# Patient Record
Sex: FEMALE | Race: WHITE | NOT HISPANIC OR LATINO | Employment: OTHER | URBAN - METROPOLITAN AREA
[De-identification: names, ages, dates, MRNs, and addresses within clinical notes are randomized per-mention and may not be internally consistent; named-entity substitution may affect disease eponyms.]

---

## 2017-01-03 ENCOUNTER — TELEPHONE (OUTPATIENT)
Dept: HEMATOLOGY/ONCOLOGY | Facility: CLINIC | Age: 72
End: 2017-01-03

## 2017-01-03 NOTE — TELEPHONE ENCOUNTER
Returned call to patient.   Patient unavailable.   Left message for patient to return call with provided call back number.     ----- Message from Graham Thornton sent at 1/3/2017  4:25 PM CST -----  Mrs. Peggy Luna cancel her appt. On Feb. 8th with Dr. avila she ask that someone please call her back and explain why she need to see carlene Navarro and Dr. avila Thanks

## 2017-01-04 ENCOUNTER — TELEPHONE (OUTPATIENT)
Dept: HEMATOLOGY/ONCOLOGY | Facility: CLINIC | Age: 72
End: 2017-01-04

## 2017-01-04 NOTE — TELEPHONE ENCOUNTER
Returned call to patient.   Rescheduled patient accordingly for survivorship clinic with Melanie per patient's preferred date and time.   Appt slip mailed out.       ----- Message from Janie Lomax sent at 1/4/2017  1:18 PM CST -----  Contact: self  Pt would like to discuss all cancel scheduled appointments, pt states at last visit she was told she will not be seen by , pt would be seen by (Melanie), pt states she's is considered a survivor pt, pt would like to clarify if she needs to see  or (Melanie).  Contact number [phone]

## 2017-01-04 NOTE — TELEPHONE ENCOUNTER
Duplicate message.     ----- Message from Hayde Olivas sent at 1/3/2017  4:55 PM CST -----  Contact: Self   Pt called to speak with you in regards to scheduling an appt. Pt stated she is confused on what physician she should be scheduled with and would like a call back at your earleist convenience.     Pt can be contacted after 2pm at [phone]

## 2017-02-21 ENCOUNTER — OFFICE VISIT (OUTPATIENT)
Dept: HEMATOLOGY/ONCOLOGY | Facility: CLINIC | Age: 72
End: 2017-02-21
Payer: MEDICARE

## 2017-02-21 VITALS
SYSTOLIC BLOOD PRESSURE: 114 MMHG | BODY MASS INDEX: 25.26 KG/M2 | HEART RATE: 86 BPM | TEMPERATURE: 99 F | WEIGHT: 156.5 LBS | OXYGEN SATURATION: 98 % | DIASTOLIC BLOOD PRESSURE: 73 MMHG | RESPIRATION RATE: 20 BRPM

## 2017-02-21 DIAGNOSIS — C50.912 MALIGNANT NEOPLASM OF LEFT FEMALE BREAST, UNSPECIFIED SITE OF BREAST: ICD-10-CM

## 2017-02-21 DIAGNOSIS — Z79.811 USE OF AROMATASE INHIBITORS: ICD-10-CM

## 2017-02-21 DIAGNOSIS — F17.200 SMOKER: ICD-10-CM

## 2017-02-21 DIAGNOSIS — C50.919 MALIGNANT NEOPLASM OF OTHER SPECIFIED SITES OF FEMALE BREAST: Primary | ICD-10-CM

## 2017-02-21 PROCEDURE — 99999 PR PBB SHADOW E&M-EST. PATIENT-LVL III: CPT | Mod: PBBFAC,,, | Performed by: PHYSICIAN ASSISTANT

## 2017-02-21 PROCEDURE — 99213 OFFICE O/P EST LOW 20 MIN: CPT | Mod: PBBFAC | Performed by: PHYSICIAN ASSISTANT

## 2017-02-21 PROCEDURE — 99213 OFFICE O/P EST LOW 20 MIN: CPT | Mod: S$PBB,,, | Performed by: PHYSICIAN ASSISTANT

## 2017-02-21 RX ORDER — ANASTROZOLE 1 MG/1
1 TABLET ORAL DAILY
Qty: 30 TABLET | Refills: 2 | Status: SHIPPED | OUTPATIENT
Start: 2017-02-21 | End: 2017-05-04 | Stop reason: SDUPTHER

## 2017-02-21 NOTE — PROGRESS NOTES
"Subjective:       Patient ID: Radha Woods is a 71 y.o. female.    Chief Complaint: Breast Cancer    HPI Comments: HPI      Still reports "just never feels well"    Currently with right shoulder pain, constant. During the day it eases up.   States she has worsening migrating arthralgias, this week it is her shoulder, last week it was her left ankle.  Also with right arm lymphedema for which she uses compression sleeve.   States she would like to try another AI to see if musculoskeletal complaints are any better. Was originally on Arimidex for 2 weeks but discontinued that as she had questionable worsening of dyspnea. States that she would like to try arimidex again as does not recall arthralgias being a problem  Still smoking, approximately 10 cigarettes per day.   Approximate 50 pack year history.    Wears oxygen PRN.     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   Her carcinoma was ER strongly positive, [place] negative, and HER-2 roe negative. She received one cycle of adjuvant AC chemotherapy, and subsequently refused further chemotherapy due to side effects was hospitalized x 2. She completed XRT.   She was then started on aromatase inhibitors with Arimidex and quit because her dyspnea gets worse.   Reports after initiating that even minimal exertion she would get winded. She stopped on her own as a test and noted by 10 days it was much better so she went on and off Arimidex.  Reports that she got in trouble for stopping with her prior onoclogist- she did agree to switch to Femara and symptoms recurred.  Did agree to restart Femar         Admits to continued tobacco- has tried all cessation issues- hypnosis, medications...  Prior history of recurrent bronchitis, asthma  Shortness of breath remains her biggest issues      Lives in [place], [place] ([place] miles south [facility])    Review of Systems "   Constitutional: Positive for fatigue. Negative for activity change, appetite change, chills, diaphoresis, fever and unexpected weight change.   HENT: Negative for congestion, dental problem, ear pain, hearing loss, mouth sores, nosebleeds, rhinorrhea, tinnitus and trouble swallowing.    Eyes: Negative for redness and visual disturbance.   Respiratory: Positive for shortness of breath. Negative for cough, chest tightness and wheezing.    Cardiovascular: Negative for chest pain, palpitations and leg swelling.   Gastrointestinal: Negative for abdominal distention, abdominal pain, blood in stool, constipation, diarrhea, nausea and vomiting.   Genitourinary: Negative for decreased urine volume, difficulty urinating, dysuria, frequency and hematuria.   Musculoskeletal: Negative for arthralgias, back pain, gait problem, joint swelling and neck pain.   Skin: Negative for pallor and rash.   Neurological: Negative for dizziness, weakness, light-headedness, numbness and headaches.   Hematological: Negative for adenopathy. Does not bruise/bleed easily.   Psychiatric/Behavioral: Positive for dysphoric mood and sleep disturbance. Negative for confusion. The patient is not nervous/anxious.        Objective:      Physical Exam   Constitutional: She is oriented to person, place, and time. She appears well-developed and well-nourished. No distress.   Presents alone   HENT:   Head: Normocephalic and atraumatic.   Right Ear: External ear normal.   Left Ear: External ear normal.   Nose: Nose normal.   Mouth/Throat: Oropharynx is clear and moist. No oropharyngeal exudate.   Eyes: Conjunctivae and EOM are normal. Pupils are equal, round, and reactive to light. Right eye exhibits no discharge. Left eye exhibits no discharge. No scleral icterus. Right pupil is round and reactive. Left pupil is round and reactive.   Neck: Normal range of motion. Neck supple. No thyromegaly present.   Cardiovascular: Normal rate, regular rhythm, normal  heart sounds and intact distal pulses.  Exam reveals no gallop and no friction rub.    No murmur heard.  Pulmonary/Chest: Effort normal and breath sounds normal. No respiratory distress. She has no wheezes. She has no rales.   Distant breath sounds  Hyperinflated  Bilateral breasts- no masses, no nipple irregularities, no supraclavicular or axillary adenopathy   Abdominal: Soft. Bowel sounds are normal. She exhibits no distension and no mass. There is no hepatosplenomegaly. There is no tenderness. There is no rebound and no guarding.   Musculoskeletal: Normal range of motion. She exhibits no edema, tenderness or deformity.   No spinal or paraspinal tenderness to palpation  No tenderness to palpation at shoulders   Lymphadenopathy:        Head (right side): No submandibular adenopathy present.        Head (left side): No submandibular adenopathy present.     She has no cervical adenopathy.        Right cervical: No superficial cervical, no deep cervical and no posterior cervical adenopathy present.       Left cervical: No superficial cervical, no deep cervical and no posterior cervical adenopathy present.        Right: No inguinal and no supraclavicular adenopathy present.        Left: No inguinal and no supraclavicular adenopathy present.   Neurological: She is alert and oriented to person, place, and time. She has normal strength. No cranial nerve deficit or sensory deficit. She exhibits normal muscle tone. Coordination normal.   Skin: Skin is warm and dry. No bruising, no lesion, no petechiae and no rash noted. No erythema. No pallor.   Psychiatric: She has a normal mood and affect. Her behavior is normal. Judgment and thought content normal. Her mood appears not anxious. She does not exhibit a depressed mood.   Nursing note and vitals reviewed.      Assessment:       1. Malignant neoplasm of other specified sites of female breast    2. Use of aromatase inhibitors    3. Smoker        Plan:       1-2) Will re-try  Arimidex to see if decreased arthralgias. Patient understands she must discontinue Femara. We discussed that her history of dyspnea is due to smoking history and unlikely due to AI use. If she has any worsening on dyspnea on Arimidex she knows to contact clinic and discontinue.   3)importance of smoking cessation discussed; patient states she will quit when she is mentally ready to do so.    Return to clinic in late April with annual mammogram.

## 2017-02-21 NOTE — LETTER
February 22, 2017      Rosio Allen MD  [address]  [facility] [address]           [facility]  [address]  [facility] [address]  Phone: [phone]          Patient: Radha Woods   MR Number: [id number]   YOB: 1945   Date of Visit: 2/21/2017       Dear Dr. Rosio Allen:    Thank you for referring Radha Woods to me for evaluation. Attached you will find relevant portions of my assessment and plan of care.    If you have questions, please do not hesitate to call me. I look forward to following Radha Woods along with you.    Sincerely,    Melanie Russell PA-C    Enclosure  CC:  No Recipients    If you would like to receive this communication electronically, please contact [email or web address] or [phone] to request more information on [employer] Link access.    For providers and/or their staff who would like to refer a patient to [facility], please contact us through our one-stop-shop provider referral line, [facility] , at [phone].    If you feel you have received this communication in error or would no longer like to receive these types of communications, please e-mail [email or web address]

## 2017-04-03 ENCOUNTER — TELEPHONE (OUTPATIENT)
Dept: ADMINISTRATIVE | Facility: OTHER | Age: 72
End: 2017-04-03

## 2017-04-03 NOTE — TELEPHONE ENCOUNTER
----- Message from Allison Brandt sent at 4/3/2017  1:13 PM CDT -----  Contact: Pt       ----- Message -----     From: Abhay Valdez     Sent: 4/3/2017   1:10 PM       To: Tiffany CARMONA Staff    Pt would like a call back from staff    Pt is requesting to schedule visit    Pt states she would like courtesy call once appt has been scheduled.  Pt ask to not schedule appt and not notify her.    Can be reached at [phone]

## 2017-04-07 ENCOUNTER — TELEPHONE (OUTPATIENT)
Dept: HEMATOLOGY/ONCOLOGY | Facility: CLINIC | Age: 72
End: 2017-04-07

## 2017-04-07 NOTE — TELEPHONE ENCOUNTER
----- Message from Megan Colin sent at 4/7/2017  2:33 PM CDT -----  Contact: Pt  Pt is calling to r/s lab appt and Hello Agento on 5/4/17.   Pt can be reached at [phone].  Thank you

## 2017-04-11 ENCOUNTER — TELEPHONE (OUTPATIENT)
Dept: HEMATOLOGY/ONCOLOGY | Facility: CLINIC | Age: 72
End: 2017-04-11

## 2017-04-11 NOTE — TELEPHONE ENCOUNTER
Faxed paperwork.       ----- Message from Jyoti Simeon sent at 4/11/2017  9:41 AM CDT -----  Contact: Dentist   Need to fax paperwork approving surgical extraction on tooth 29 ASAP. Also concerned that the blood count is low. Please fax to [phone]

## 2017-04-18 ENCOUNTER — TELEPHONE (OUTPATIENT)
Dept: ADMINISTRATIVE | Facility: OTHER | Age: 72
End: 2017-04-18

## 2017-04-18 NOTE — TELEPHONE ENCOUNTER
----- Message from Janie Lomax sent at 4/17/2017  1:19 PM CDT -----  Contact: self  Pt would like to discuss scheduled appointments on 05/10/17, pt states she didn't cancel appointments on 05/04/17, pt states she drives 6 1/2 hours and would like to confirm appointment s before they are scheduled, pt is not able to make it appointments on 05/10/17  Contact number [phone]

## 2017-05-04 ENCOUNTER — HOSPITAL ENCOUNTER (OUTPATIENT)
Dept: RADIOLOGY | Facility: HOSPITAL | Age: 72
Discharge: HOME | End: 2017-05-04
Attending: INTERNAL MEDICINE
Payer: MEDICARE

## 2017-05-04 ENCOUNTER — OFFICE VISIT (OUTPATIENT)
Dept: HEMATOLOGY/ONCOLOGY | Facility: CLINIC | Age: 72
End: 2017-05-04
Payer: MEDICARE

## 2017-05-04 VITALS
HEART RATE: 83 BPM | TEMPERATURE: 99 F | OXYGEN SATURATION: 94 % | WEIGHT: 155.19 LBS | BODY MASS INDEX: 25.05 KG/M2 | RESPIRATION RATE: 20 BRPM | SYSTOLIC BLOOD PRESSURE: 128 MMHG | DIASTOLIC BLOOD PRESSURE: 68 MMHG

## 2017-05-04 DIAGNOSIS — J06.9 UPPER RESPIRATORY TRACT INFECTION, UNSPECIFIED TYPE: ICD-10-CM

## 2017-05-04 DIAGNOSIS — C77.3 BREAST CANCER METASTASIZED TO AXILLARY LYMPH NODE, RIGHT: Primary | ICD-10-CM

## 2017-05-04 DIAGNOSIS — C50.912 MALIGNANT NEOPLASM OF LEFT FEMALE BREAST, UNSPECIFIED SITE OF BREAST: ICD-10-CM

## 2017-05-04 DIAGNOSIS — C50.919 MALIGNANT NEOPLASM OF FEMALE BREAST, UNSPECIFIED LATERALITY, UNSPECIFIED SITE OF BREAST: ICD-10-CM

## 2017-05-04 DIAGNOSIS — C50.911 MALIGNANT NEOPLASM OF RIGHT FEMALE BREAST, UNSPECIFIED SITE OF BREAST: ICD-10-CM

## 2017-05-04 DIAGNOSIS — C50.911 BREAST CANCER METASTASIZED TO AXILLARY LYMPH NODE, RIGHT: Primary | ICD-10-CM

## 2017-05-04 DIAGNOSIS — Z79.811 USE OF AROMATASE INHIBITORS: ICD-10-CM

## 2017-05-04 PROCEDURE — 99214 OFFICE O/P EST MOD 30 MIN: CPT | Mod: S$PBB,,, | Performed by: INTERNAL MEDICINE

## 2017-05-04 PROCEDURE — 99999 PR PBB SHADOW E&M-EST. PATIENT-LVL III: CPT | Mod: PBBFAC,,, | Performed by: INTERNAL MEDICINE

## 2017-05-04 PROCEDURE — 77066 DX MAMMO INCL CAD BI: CPT | Mod: 26,,, | Performed by: RADIOLOGY

## 2017-05-04 PROCEDURE — 99213 OFFICE O/P EST LOW 20 MIN: CPT | Mod: PBBFAC | Performed by: INTERNAL MEDICINE

## 2017-05-04 RX ORDER — LEVOFLOXACIN 500 MG/1
500 TABLET, FILM COATED ORAL DAILY
Qty: 7 TABLET | Refills: 0 | Status: SHIPPED | OUTPATIENT
Start: 2017-05-04 | End: 2017-05-14

## 2017-05-04 RX ORDER — ANASTROZOLE 1 MG/1
1 TABLET ORAL DAILY
Qty: 90 TABLET | Refills: 3 | Status: SHIPPED | OUTPATIENT
Start: 2017-05-04 | End: 2017-09-19

## 2017-05-04 NOTE — PROGRESS NOTES
"Subjective:       Patient ID: Radha Woods is a 71 y.o. female.    Chief Complaint: No chief complaint on file.    HPI     Still reports "never feels good"  Tries to take 1 day at a time  States she has this routine now where she stays up all night (until 4:30-5AM) and then sleeps all day.  States no one ever calls, has nothing to do or anywhere to go so this just makes sense to her    Reports something hurts every day- pains always come and go- shoulders, RUE lymphedema area     At last visit she opted to switch AIs to see if tolerated better - no difference noted on Arimidex versus Femara  She was originally on Arimidex for 2 weeks but discontinued that as she had questionable worsening of dyspnea but opted to try again.    Still smoking, admits she has increased again- states she is at point she doesn't care but then states she heard from a friend about hypnosis or accupuncture and may pursue.  Approximate 50 pack year history.   Wears oxygen PRN. States Dr. Payan gave her steroid which helped her breathing. States she is trying to exercise now.      Knows time is coming that she may need to make some decisions about where to live.    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   Her carcinoma was ER strongly positive, [place] negative, and HER-2 roe negative. She received one cycle of adjuvant AC chemotherapy, and subsequently refused further chemotherapy due to side effects was hospitalized x 2. She completed XRT.   She was then started on aromatase inhibitors with Arimidex and quit because her dyspnea gets worse. Tried Femara then back to Arimidex.      Lives in [place], [place] ([place] miles south [facility])    Review of Systems   Constitutional: Negative for activity change, appetite change, chills, diaphoresis, fatigue, fever and unexpected weight change.   HENT: Negative for congestion, dental " problem, ear pain, hearing loss, mouth sores, nosebleeds, rhinorrhea, tinnitus and trouble swallowing.    Eyes: Negative for redness and visual disturbance.   Respiratory: Positive for cough (productive green phlegm), shortness of breath and wheezing. Negative for chest tightness.    Cardiovascular: Negative for chest pain, palpitations and leg swelling.   Gastrointestinal: Negative for abdominal distention, abdominal pain, blood in stool, constipation, diarrhea, nausea and vomiting.   Genitourinary: Negative for decreased urine volume, difficulty urinating, dysuria, frequency and hematuria.   Musculoskeletal: Negative for arthralgias, back pain, gait problem, joint swelling and neck pain.   Skin: Negative for pallor and rash.   Neurological: Negative for dizziness, weakness, light-headedness, numbness and headaches.   Hematological: Negative for adenopathy. Does not bruise/bleed easily.   Psychiatric/Behavioral: Positive for dysphoric mood and sleep disturbance. Negative for confusion. The patient is not nervous/anxious.        Objective:      Physical Exam   Constitutional: She is oriented to person, place, and time. She appears well-developed and well-nourished. No distress.   Presents alone   HENT:   Head: Normocephalic and atraumatic.   Right Ear: External ear normal.   Left Ear: External ear normal.   Nose: Nose normal.   Mouth/Throat: Oropharynx is clear and moist. No oropharyngeal exudate.   Eyes: Conjunctivae and EOM are normal. Pupils are equal, round, and reactive to light. Right eye exhibits no discharge. Left eye exhibits no discharge. No scleral icterus. Right pupil is round and reactive. Left pupil is round and reactive.   Neck: Normal range of motion. Neck supple. No JVD present. No tracheal deviation present. No thyromegaly present.   Cardiovascular: Normal rate, regular rhythm, normal heart sounds and intact distal pulses.  Exam reveals no gallop and no friction rub.    No murmur  heard.  Pulmonary/Chest: Effort normal and breath sounds normal. No respiratory distress. She has no wheezes. She has no rales.   Diffuse exp wheezing  Hyperinflated  Breasts- no masses, no nipple irregularities, no LAD   Abdominal: Soft. Bowel sounds are normal. She exhibits no distension and no mass. There is no hepatosplenomegaly. There is no tenderness. There is no rebound and no guarding.   Musculoskeletal: Normal range of motion. She exhibits no edema, tenderness or deformity.   Lymphadenopathy:        Head (right side): No submandibular adenopathy present.        Head (left side): No submandibular adenopathy present.     She has no cervical adenopathy.        Right cervical: No superficial cervical, no deep cervical and no posterior cervical adenopathy present.       Left cervical: No superficial cervical, no deep cervical and no posterior cervical adenopathy present.        Right: No inguinal and no supraclavicular adenopathy present.        Left: No inguinal and no supraclavicular adenopathy present.   Neurological: She is alert and oriented to person, place, and time. She has normal strength. No cranial nerve deficit or sensory deficit. She exhibits normal muscle tone. Coordination normal.   Skin: Skin is warm and dry. No bruising, no lesion, no petechiae and no rash noted. She is not diaphoretic. No erythema. No pallor.   Psychiatric: She has a normal mood and affect. Her behavior is normal. Judgment and thought content normal. Her mood appears not anxious. She does not exhibit a depressed mood.   Nursing note and vitals reviewed.    Labs- reviewed  Assessment:       1. Breast cancer metastasized to axillary lymph node, right    2. Use of aromatase inhibitors    3. Malignant neoplasm of right female breast, unspecified site of breast    4. Upper respiratory tract infection, unspecified type        Plan:     1-3. Continue Arimidex  BMD next due in 1/18  On Calcium and Vit D  4. abx

## 2017-06-12 ENCOUNTER — TELEPHONE (OUTPATIENT)
Dept: HEMATOLOGY/ONCOLOGY | Facility: CLINIC | Age: 72
End: 2017-06-12

## 2017-06-12 NOTE — TELEPHONE ENCOUNTER
Called pt and informed pt of the following from Dr. Allen regarding Arimidex:   MD Allison Sanders   Caller: Unspecified (Today,  4:31 PM)             Hold until next appt      Pt verbalized understanding.

## 2017-06-12 NOTE — TELEPHONE ENCOUNTER
"Returned call to pt.   Pt stated that she has been having "alot of issues" with Arimidex.   Pt stated when she was with Dr. Oleary, he switched her from letrozole to arimidex.   Pt stated she can no longer take side effects of Arimidex: pt states she has tremors "can't hardly write her name", SOB has worsened, and "every bone in her body hurts" and has become reliant on pain meds every day.   I informed pt I would fwd message to Dr. Allen to see her recommendations and get back with pt.   Pt verbalized understanding.     Message routed to Dr. Allen.       ----- Message from Jyoti Simeon sent at 6/12/2017  4:26 PM CDT -----  Contact: Self   Pt wants to change anastrozole (ARIMIDEX) 1 mg Tab. Please call pt back at [phone]    "

## 2017-07-31 ENCOUNTER — TELEPHONE (OUTPATIENT)
Dept: HEMATOLOGY/ONCOLOGY | Facility: CLINIC | Age: 72
End: 2017-07-31

## 2017-07-31 NOTE — TELEPHONE ENCOUNTER
Returned call to pt.   Pt calling to schedule a f/u appt.   Informed pt that  already scheduled and mailed.   Pt verbalized understanding.       ----- Message from Denice Cantrell sent at 7/31/2017 12:26 PM CDT -----  Contact: Pt  Pt would like to be called back regarding a f/u appointment.          Please call pt at [phone].        Thanks!

## 2017-09-19 ENCOUNTER — OFFICE VISIT (OUTPATIENT)
Dept: HEMATOLOGY/ONCOLOGY | Facility: CLINIC | Age: 72
End: 2017-09-19
Payer: MEDICARE

## 2017-09-19 ENCOUNTER — LAB VISIT (OUTPATIENT)
Dept: LAB | Facility: HOSPITAL | Age: 72
End: 2017-09-19
Attending: INTERNAL MEDICINE
Payer: MEDICARE

## 2017-09-19 VITALS
BODY MASS INDEX: 24.45 KG/M2 | RESPIRATION RATE: 20 BRPM | OXYGEN SATURATION: 97 % | SYSTOLIC BLOOD PRESSURE: 107 MMHG | WEIGHT: 151.44 LBS | TEMPERATURE: 98 F | HEART RATE: 75 BPM | DIASTOLIC BLOOD PRESSURE: 59 MMHG

## 2017-09-19 DIAGNOSIS — Z79.811 USE OF AROMATASE INHIBITORS: ICD-10-CM

## 2017-09-19 DIAGNOSIS — F32.A DEPRESSION, UNSPECIFIED DEPRESSION TYPE: ICD-10-CM

## 2017-09-19 DIAGNOSIS — C50.911 BREAST CANCER METASTASIZED TO AXILLARY LYMPH NODE, RIGHT: ICD-10-CM

## 2017-09-19 DIAGNOSIS — C77.3 BREAST CANCER METASTASIZED TO AXILLARY LYMPH NODE, RIGHT: ICD-10-CM

## 2017-09-19 DIAGNOSIS — C50.911 MALIGNANT NEOPLASM OF RIGHT FEMALE BREAST: ICD-10-CM

## 2017-09-19 DIAGNOSIS — C77.3 BREAST CANCER METASTASIZED TO AXILLARY LYMPH NODE, RIGHT: Primary | ICD-10-CM

## 2017-09-19 DIAGNOSIS — J06.9 UPPER RESPIRATORY TRACT INFECTION, UNSPECIFIED TYPE: ICD-10-CM

## 2017-09-19 DIAGNOSIS — C50.911 BREAST CANCER METASTASIZED TO AXILLARY LYMPH NODE, RIGHT: Primary | ICD-10-CM

## 2017-09-19 LAB
ALBUMIN SERPL BCP-MCNC: 3.2 G/DL
ALP SERPL-CCNC: 74 U/L
ALT SERPL W/O P-5'-P-CCNC: 6 U/L
ANION GAP SERPL CALC-SCNC: 7 MMOL/L
AST SERPL-CCNC: 9 U/L
BILIRUB SERPL-MCNC: 0.4 MG/DL
BUN SERPL-MCNC: 10 MG/DL
CALCIUM SERPL-MCNC: 9.3 MG/DL
CHLORIDE SERPL-SCNC: 103 MMOL/L
CO2 SERPL-SCNC: 34 MMOL/L
CREAT SERPL-MCNC: 0.8 MG/DL
ERYTHROCYTE [DISTWIDTH] IN BLOOD BY AUTOMATED COUNT: 13 %
EST. GFR  (AFRICAN AMERICAN): >60 ML/MIN/1.73 M^2
EST. GFR  (NON AFRICAN AMERICAN): >60 ML/MIN/1.73 M^2
GLUCOSE SERPL-MCNC: 117 MG/DL
HCT VFR BLD AUTO: 41.3 %
HGB BLD-MCNC: 13.2 G/DL
MCH RBC QN AUTO: 29.7 PG
MCHC RBC AUTO-ENTMCNC: 32 G/DL
MCV RBC AUTO: 93 FL
NEUTROPHILS # BLD AUTO: 4.7 K/UL
PLATELET # BLD AUTO: 219 K/UL
PMV BLD AUTO: 9.2 FL
POTASSIUM SERPL-SCNC: 3.8 MMOL/L
PROT SERPL-MCNC: 6.6 G/DL
RBC # BLD AUTO: 4.45 M/UL
SODIUM SERPL-SCNC: 144 MMOL/L
WBC # BLD AUTO: 6.48 K/UL

## 2017-09-19 PROCEDURE — 99213 OFFICE O/P EST LOW 20 MIN: CPT | Mod: PBBFAC | Performed by: INTERNAL MEDICINE

## 2017-09-19 PROCEDURE — 80053 COMPREHEN METABOLIC PANEL: CPT

## 2017-09-19 PROCEDURE — 1157F ADVNC CARE PLAN IN RCRD: CPT | Mod: ,,, | Performed by: INTERNAL MEDICINE

## 2017-09-19 PROCEDURE — 36415 COLL VENOUS BLD VENIPUNCTURE: CPT

## 2017-09-19 PROCEDURE — 85027 COMPLETE CBC AUTOMATED: CPT

## 2017-09-19 PROCEDURE — 99214 OFFICE O/P EST MOD 30 MIN: CPT | Mod: S$PBB,,, | Performed by: INTERNAL MEDICINE

## 2017-09-19 PROCEDURE — 1159F MED LIST DOCD IN RCRD: CPT | Mod: ,,, | Performed by: INTERNAL MEDICINE

## 2017-09-19 PROCEDURE — 99999 PR PBB SHADOW E&M-EST. PATIENT-LVL III: CPT | Mod: PBBFAC,,, | Performed by: INTERNAL MEDICINE

## 2017-09-19 PROCEDURE — 1125F AMNT PAIN NOTED PAIN PRSNT: CPT | Mod: ,,, | Performed by: INTERNAL MEDICINE

## 2017-09-19 RX ORDER — LETROZOLE 2.5 MG/1
2.5 TABLET, FILM COATED ORAL DAILY
Qty: 90 TABLET | Refills: 4 | Status: SHIPPED | OUTPATIENT
Start: 2017-09-19 | End: 2018-02-06 | Stop reason: SDUPTHER

## 2017-09-19 NOTE — PROGRESS NOTES
"Subjective:       Patient ID: Radha Woods is a 72 y.o. female.    Chief Complaint: Follow-up and Generalized pain    HPI     Presents today reporting that she remains very limited  Her dyspnea remains a major issue and she reports that she is limited to a sedentary lifestyle- can barely do much other than shower, manage meals but reports very cautious and always carries her phone with her. Even these activities wear her out.  Also reports every joint hurts.  Frustarted that she cannot go anywhere to do anything without oxygen.  Wonders why emphysema did not become a big issue for her until after her breast surgery and XRT.     Tremors in the interval noted that have gotten better. No other neurologic symptoms noted.    At last visit she opted to switch AIs to see if tolerated better - no difference noted on Arimidex versus Femara  She was originally on Arimidex for 2 weeks but discontinued that as she had questionable worsening of dyspnea but opted to try again.  Now today she states she tolerated Femara better and wants to switch back to.     Still smoking  Approximate 50 pack year history.   Wears oxygen PRN but notes more frequently.     Has chronic neck and spine pain with prior surgeries.  Joints hurt all over- continued whether on or off aromatase inhibitors.    Today she is tearful at times during the visit. She admits to depression and sometimes wishing her life was over. She denies suicidal thoughts stating she would never do that as she is "[employer]"  However, she does admit that she took her gun out of her home and locked it in her vehicle.     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   Her carcinoma was ER strongly positive, TN negative, and HER-2 roe negative. She received one cycle of adjuvant AC chemotherapy, and subsequently refused further chemotherapy due to side effects was " hospitalized x 2. She completed XRT.   She was then started on aromatase inhibitors with Arimidex and quit because her dyspnea gets worse. Tried Femara then back to Arimidex.      Lives in [place], [place] (80 miles south of [place])    Review of Systems   Constitutional: Positive for activity change. Negative for appetite change, chills, diaphoresis, fatigue, fever and unexpected weight change.   HENT: Negative for congestion, dental problem, ear pain, hearing loss, mouth sores, nosebleeds, rhinorrhea, tinnitus and trouble swallowing.    Eyes: Negative for redness and visual disturbance.   Respiratory: Positive for shortness of breath and wheezing. Negative for cough and chest tightness.    Cardiovascular: Negative for chest pain, palpitations and leg swelling.   Gastrointestinal: Negative for abdominal distention, abdominal pain, blood in stool, constipation, diarrhea, nausea and vomiting.   Genitourinary: Negative for decreased urine volume, difficulty urinating, dysuria, frequency and hematuria.   Musculoskeletal: Negative for arthralgias, back pain, gait problem, joint swelling and neck pain.   Skin: Negative for pallor and rash.   Neurological: Negative for dizziness, weakness, light-headedness, numbness and headaches.   Hematological: Negative for adenopathy. Does not bruise/bleed easily.   Psychiatric/Behavioral: Positive for dysphoric mood and sleep disturbance. Negative for confusion. The patient is not nervous/anxious.        Objective:      Physical Exam   Constitutional: She is oriented to person, place, and time. She appears well-developed and well-nourished. No distress.   Presents alone   HENT:   Head: Normocephalic and atraumatic.   Right Ear: External ear normal.   Left Ear: External ear normal.   Nose: Nose normal.   Mouth/Throat: Oropharynx is clear and moist. No oropharyngeal exudate.   Eyes: Conjunctivae and EOM are normal. Pupils are equal, round, and reactive to light. Right eye exhibits no  discharge. Left eye exhibits no discharge. No scleral icterus. Right pupil is round and reactive. Left pupil is round and reactive.   Neck: Normal range of motion. Neck supple. No JVD present. No tracheal deviation present. No thyromegaly present.   Cardiovascular: Normal rate, regular rhythm, normal heart sounds and intact distal pulses.  Exam reveals no gallop and no friction rub.    No murmur heard.  Pulmonary/Chest: Effort normal and breath sounds normal. No respiratory distress. She has no wheezes. She has no rales.   Diffuse exp wheezing  Hyperinflated  Breasts- no masses, no nipple irregularities, no LAD   Abdominal: Soft. Bowel sounds are normal. She exhibits no distension and no mass. There is no hepatosplenomegaly. There is no tenderness. There is no rebound and no guarding.   Musculoskeletal: Normal range of motion. She exhibits no edema, tenderness or deformity.   Lymphadenopathy:        Head (right side): No submandibular adenopathy present.        Head (left side): No submandibular adenopathy present.     She has no cervical adenopathy.        Right cervical: No superficial cervical, no deep cervical and no posterior cervical adenopathy present.       Left cervical: No superficial cervical, no deep cervical and no posterior cervical adenopathy present.        Right: No inguinal and no supraclavicular adenopathy present.        Left: No inguinal and no supraclavicular adenopathy present.   Neurological: She is alert and oriented to person, place, and time. She has normal strength. No cranial nerve deficit or sensory deficit. She exhibits normal muscle tone. Coordination normal.   Skin: Skin is warm and dry. No bruising, no lesion, no petechiae and no rash noted. She is not diaphoretic. No erythema. No pallor.   Psychiatric: She has a normal mood and affect. Her behavior is normal. Judgment and thought content normal. Her mood appears not anxious. She does not exhibit a depressed mood.   Nursing note and  vitals reviewed.    Labs- reviewed  Assessment:       1. Breast cancer metastasized to axillary lymph node, right    2. Use of aromatase inhibitors    3. Depression, unspecified depression type        Plan:     1-2. We discussed possibility of discontinuing AIs with her side effect concerns but she wants to continue and switch back to Femara.  We will have to monitor closely.  She has lots of fears about breast cancer returning or developing lung cancer- she declines psychology or psychiatry assessment to assist with depression  We discussed imaging for concerns regarding pain and intermittent weight loss but declines and will see if desires at future visit- she states easiest for her to return 1/2018 but knows we are available at any time

## 2017-09-20 PROBLEM — F32.A DEPRESSION: Status: ACTIVE | Noted: 2017-09-20

## 2018-01-02 ENCOUNTER — TELEPHONE (OUTPATIENT)
Dept: HEMATOLOGY/ONCOLOGY | Facility: CLINIC | Age: 73
End: 2018-01-02

## 2018-01-02 NOTE — TELEPHONE ENCOUNTER
Message fwd to MD.     ----- Message from Megan Colin sent at 1/2/2018  1:26 PM CST -----  Contact: Pt  Pt is calling to have orders placed in system for bone density and PET scans.  Pt can be reached at [phone].    Thank you

## 2018-01-02 NOTE — TELEPHONE ENCOUNTER
Called pt and informed pt of the following:  MD Allison Sanders   Caller: Unspecified (Today,  2:02 PM)             Please see my last note   She declined images at that time   We would need updated clinical to order now with delay      Pt verbalized understanding.

## 2018-01-02 NOTE — TELEPHONE ENCOUNTER
Returned call to pt and informed of MD plan of care as below.   Pt expressed frustrations over the phone and stated that is not what her and MD had discussed- pt stated that she lives over 300 miles away and needs to have all of this taken care of before her MD visit as she cannot come back and do.   Informed pt would send a message to Dr. Allen for them to discuss.   Pt verbalized understanding.     Message fwd to MD.       ----- Message from Rosio Allen MD sent at 1/2/2018  1:36 PM CST -----  Contact: Pt  Last note discussed scans only if needed for continued weight loss- would be CT scans not PET and she would need reassessement before ordering to see if indicated    ----- Message -----  From: Allison Brandt  Sent: 1/2/2018   1:29 PM  To: Rosio Allen MD    Looks like her f/u was just RTC in January 2018 with labs.   Is she to get scans?     ----- Message -----  From: Megan Colin  Sent: 1/2/2018   1:26 PM  To: Tiffany CARMONA Staff    Pt is calling to have orders placed in system for bone density and PET scans.  Pt can be reached at [phone].    Thank you

## 2018-01-17 ENCOUNTER — TELEPHONE (OUTPATIENT)
Dept: HEMATOLOGY/ONCOLOGY | Facility: CLINIC | Age: 73
End: 2018-01-17

## 2018-01-17 NOTE — TELEPHONE ENCOUNTER
Returned call to pt.   Pt stated that she is sick and that the roads are iced around her area; therefore, she would like to r/s her appt on Friday.   Informed pt would r/s- pt agreeable to PA.   Rescheduled appt- pt verbalized understanding.         ----- Message from Anni Sinclair sent at 1/17/2018  1:08 PM CST -----  Contact: self  Pt called to reschedule her appts on Friday 1/19/18 due to the weather, and also being sick. Pt would like the nurse to give her a call back to reschedule.    Pt can be reached at [phone]

## 2018-01-29 ENCOUNTER — OFFICE VISIT (OUTPATIENT)
Dept: HEMATOLOGY/ONCOLOGY | Facility: CLINIC | Age: 73
End: 2018-01-29
Payer: MEDICARE

## 2018-01-29 ENCOUNTER — LAB VISIT (OUTPATIENT)
Dept: LAB | Facility: HOSPITAL | Age: 73
End: 2018-01-29
Attending: INTERNAL MEDICINE
Payer: MEDICARE

## 2018-01-29 VITALS
RESPIRATION RATE: 14 BRPM | HEIGHT: 67 IN | SYSTOLIC BLOOD PRESSURE: 137 MMHG | TEMPERATURE: 98 F | DIASTOLIC BLOOD PRESSURE: 71 MMHG | OXYGEN SATURATION: 90 % | BODY MASS INDEX: 22.98 KG/M2 | HEART RATE: 93 BPM | WEIGHT: 146.38 LBS

## 2018-01-29 DIAGNOSIS — C77.3 BREAST CANCER METASTASIZED TO AXILLARY LYMPH NODE, RIGHT: ICD-10-CM

## 2018-01-29 DIAGNOSIS — M54.50 CHRONIC BILATERAL LOW BACK PAIN WITHOUT SCIATICA: ICD-10-CM

## 2018-01-29 DIAGNOSIS — R63.4 WEIGHT LOSS: ICD-10-CM

## 2018-01-29 DIAGNOSIS — C50.911 BREAST CANCER METASTASIZED TO AXILLARY LYMPH NODE, RIGHT: ICD-10-CM

## 2018-01-29 DIAGNOSIS — C50.911 CARCINOMA OF RIGHT BREAST METASTATIC TO AXILLARY LYMPH NODE: Primary | ICD-10-CM

## 2018-01-29 DIAGNOSIS — M89.9 DISORDER OF BONE: ICD-10-CM

## 2018-01-29 DIAGNOSIS — Z13.820 OSTEOPOROSIS SCREENING: ICD-10-CM

## 2018-01-29 DIAGNOSIS — Z72.0 TOBACCO ABUSE: ICD-10-CM

## 2018-01-29 DIAGNOSIS — C50.911 MALIGNANT NEOPLASM OF RIGHT BREAST IN FEMALE, ESTROGEN RECEPTOR POSITIVE, UNSPECIFIED SITE OF BREAST: ICD-10-CM

## 2018-01-29 DIAGNOSIS — G89.29 CHRONIC BILATERAL LOW BACK PAIN WITHOUT SCIATICA: ICD-10-CM

## 2018-01-29 DIAGNOSIS — Z17.0 MALIGNANT NEOPLASM OF RIGHT BREAST IN FEMALE, ESTROGEN RECEPTOR POSITIVE, UNSPECIFIED SITE OF BREAST: ICD-10-CM

## 2018-01-29 DIAGNOSIS — C77.3 CARCINOMA OF RIGHT BREAST METASTATIC TO AXILLARY LYMPH NODE: Primary | ICD-10-CM

## 2018-01-29 LAB
ALBUMIN SERPL BCP-MCNC: 3.4 G/DL
ALP SERPL-CCNC: 82 U/L
ALT SERPL W/O P-5'-P-CCNC: 6 U/L
ANION GAP SERPL CALC-SCNC: 9 MMOL/L
AST SERPL-CCNC: 11 U/L
BASOPHILS # BLD AUTO: 0.05 K/UL
BASOPHILS NFR BLD: 0.8 %
BILIRUB SERPL-MCNC: 0.4 MG/DL
BUN SERPL-MCNC: 9 MG/DL
CALCIUM SERPL-MCNC: 9.8 MG/DL
CHLORIDE SERPL-SCNC: 102 MMOL/L
CO2 SERPL-SCNC: 33 MMOL/L
CREAT SERPL-MCNC: 0.8 MG/DL
DIFFERENTIAL METHOD: ABNORMAL
EOSINOPHIL # BLD AUTO: 0.2 K/UL
EOSINOPHIL NFR BLD: 3.8 %
ERYTHROCYTE [DISTWIDTH] IN BLOOD BY AUTOMATED COUNT: 13 %
EST. GFR  (AFRICAN AMERICAN): >60 ML/MIN/1.73 M^2
EST. GFR  (NON AFRICAN AMERICAN): >60 ML/MIN/1.73 M^2
GLUCOSE SERPL-MCNC: 105 MG/DL
HCT VFR BLD AUTO: 45.1 %
HGB BLD-MCNC: 14.1 G/DL
IMM GRANULOCYTES # BLD AUTO: 0.02 K/UL
IMM GRANULOCYTES NFR BLD AUTO: 0.3 %
LYMPHOCYTES # BLD AUTO: 1.2 K/UL
LYMPHOCYTES NFR BLD: 19.1 %
MCH RBC QN AUTO: 29.3 PG
MCHC RBC AUTO-ENTMCNC: 31.3 G/DL
MCV RBC AUTO: 94 FL
MONOCYTES # BLD AUTO: 0.5 K/UL
MONOCYTES NFR BLD: 8.2 %
NEUTROPHILS # BLD AUTO: 4.2 K/UL
NEUTROPHILS NFR BLD: 67.8 %
NRBC BLD-RTO: 0 /100 WBC
PLATELET # BLD AUTO: 255 K/UL
PMV BLD AUTO: 9.2 FL
POTASSIUM SERPL-SCNC: 4 MMOL/L
PROT SERPL-MCNC: 7.4 G/DL
RBC # BLD AUTO: 4.81 M/UL
SODIUM SERPL-SCNC: 144 MMOL/L
WBC # BLD AUTO: 6.24 K/UL

## 2018-01-29 PROCEDURE — 85025 COMPLETE CBC W/AUTO DIFF WBC: CPT

## 2018-01-29 PROCEDURE — 1125F AMNT PAIN NOTED PAIN PRSNT: CPT | Mod: ,,, | Performed by: PHYSICIAN ASSISTANT

## 2018-01-29 PROCEDURE — 99214 OFFICE O/P EST MOD 30 MIN: CPT | Mod: S$PBB,,, | Performed by: PHYSICIAN ASSISTANT

## 2018-01-29 PROCEDURE — 80053 COMPREHEN METABOLIC PANEL: CPT

## 2018-01-29 PROCEDURE — 99999 PR PBB SHADOW E&M-EST. PATIENT-LVL V: CPT | Mod: PBBFAC,,, | Performed by: PHYSICIAN ASSISTANT

## 2018-01-29 PROCEDURE — 99215 OFFICE O/P EST HI 40 MIN: CPT | Mod: PBBFAC | Performed by: PHYSICIAN ASSISTANT

## 2018-01-29 PROCEDURE — 36415 COLL VENOUS BLD VENIPUNCTURE: CPT

## 2018-01-29 PROCEDURE — 1159F MED LIST DOCD IN RCRD: CPT | Mod: ,,, | Performed by: PHYSICIAN ASSISTANT

## 2018-01-29 RX ORDER — OXYCODONE AND ACETAMINOPHEN 5; 325 MG/1; MG/1
1 TABLET ORAL EVERY 4 HOURS PRN
COMMUNITY
End: 2019-03-27 | Stop reason: SDUPTHER

## 2018-01-29 NOTE — PROGRESS NOTES
Subjective:       Patient ID: Radha Woods is a 72 y.o. female.    Chief Complaint: Breast cancer metastasized to axillary lymph node, right    72 year old female with right breast cancer, currently on adjuvant letrozole.     Patient states she is overall just not feeling well.     Has had follow up with PCP who is managing chronic back/neck pain. Pain has worsened and she recently started on Percocet, taking 4 times a day. Also uses Tens unit and Flexeril.    Has an appointment with rheumatology in 2 weeks close to home to evaluate new arthritic symptoms.  Has previously seen Orthopedic surgeon in the past for back pain, believes last MRI back was 7 years ago.  Remains on letrozole.   Uses portable oxygen, has had to use that more recently. She is a patient of Dr. Payan's, last seen 10/2016 with recommendation for smoking cessation.   She has good appetite but notes continued weight loss despite increased calorie intake.     Remains on Femara.     Still smoking, although has cut back.    Approximate 50 pack year history.               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   Her carcinoma was ER strongly positive, AL negative, and HER-2 roe negative. She received one cycle of adjuvant AC chemotherapy, and subsequently refused further chemotherapy due to side effects was hospitalized x 2. She completed XRT.   She was then started on aromatase inhibitors with Arimidex and quit because her dyspnea gets worse. Tried Femara then back to Arimidex.      Lives in [place], [place] ([place] miles south [facility])      Review of Systems   Constitutional: Positive for activity change and unexpected weight change (weight loss). Negative for appetite change, chills, diaphoresis, fatigue and fever.   HENT: Negative for congestion, dental problem, ear pain, hearing loss, mouth sores, nosebleeds, rhinorrhea, tinnitus  and trouble swallowing.    Eyes: Negative for redness and visual disturbance.   Respiratory: Positive for shortness of breath. Negative for cough, chest tightness and wheezing.    Cardiovascular: Negative for chest pain, palpitations and leg swelling.   Gastrointestinal: Negative for abdominal distention, abdominal pain, blood in stool, constipation, diarrhea, nausea and vomiting.   Genitourinary: Negative for decreased urine volume, difficulty urinating, dysuria, frequency and hematuria.   Musculoskeletal: Negative for arthralgias, back pain, gait problem, joint swelling and neck pain.   Skin: Negative for pallor and rash.   Neurological: Negative for dizziness, weakness, light-headedness, numbness and headaches.   Hematological: Negative for adenopathy. Does not bruise/bleed easily.   Psychiatric/Behavioral: Positive for dysphoric mood and sleep disturbance. Negative for confusion. The patient is not nervous/anxious.        Objective:      Physical Exam   Constitutional: She is oriented to person, place, and time. She appears well-developed and well-nourished. No distress.   Presents alone   HENT:   Head: Normocephalic and atraumatic.   Nose: Nose normal.   Mouth/Throat: Oropharynx is clear and moist. No oropharyngeal exudate.   Eyes: Conjunctivae and EOM are normal. Pupils are equal, round, and reactive to light. Right eye exhibits no discharge. Left eye exhibits no discharge. No scleral icterus. Right pupil is round and reactive. Left pupil is round and reactive.   Neck: Normal range of motion. Neck supple. No thyromegaly present.   Cardiovascular: Normal rate, regular rhythm, normal heart sounds and intact distal pulses.  Exam reveals no gallop and no friction rub.    No murmur heard.  Pulmonary/Chest: Effort normal and breath sounds normal. No respiratory distress. She has no wheezes. She has no rales.   Diffuse exp wheezing  Hyperinflated  Right breast with well healed lumpectomy incision, no mass, nodule or  skin changes. Left breast without mass, nodule or skin changes. No axillary or supraclavicular adenopathy.    Abdominal: Soft. Bowel sounds are normal. She exhibits no distension and no mass. There is no hepatosplenomegaly. There is no tenderness. There is no rebound and no guarding.   Musculoskeletal: Normal range of motion. She exhibits no edema, tenderness or deformity.   Tenderness to palpation in lumbar spine  5/5 strength in upper and lower extremities.    Lymphadenopathy:        Head (right side): No submandibular adenopathy present.        Head (left side): No submandibular adenopathy present.     She has no cervical adenopathy.        Right cervical: No superficial cervical, no deep cervical and no posterior cervical adenopathy present.       Left cervical: No superficial cervical, no deep cervical and no posterior cervical adenopathy present.        Right: No inguinal and no supraclavicular adenopathy present.        Left: No inguinal and no supraclavicular adenopathy present.   Neurological: She is alert and oriented to person, place, and time. She has normal strength. No cranial nerve deficit or sensory deficit. She exhibits normal muscle tone. Coordination normal.   Skin: Skin is warm and dry. No bruising, no lesion, no petechiae and no rash noted. No erythema. No pallor.   Psychiatric: She has a normal mood and affect. Her behavior is normal. Judgment and thought content normal. Her mood appears not anxious. She does not exhibit a depressed mood.   Nursing note and vitals reviewed.      Assessment:       1. Carcinoma of right breast metastatic to axillary lymph node    2. Malignant neoplasm of right breast in female, estrogen receptor positive, unspecified site of breast    3. Weight loss    4. Chronic bilateral low back pain without sciatica    5. Tobacco abuse        Plan:       1-2)Given continued weight loss, worsening fatigue/malaise and worsening back pain will obtain PET scan. She will continue  Letrozole.  Mammogram and bone density due 5/2018.  3)as above; encouraged smaller more frequent protein rich meals  4)PET scan as above; also has f/u with rheumatology closer to home. Pain medication currently prescribed by her primary care doctor at home.   5)smoking cessation encouraged    Distress Screening Results: Psychosocial Distress screening score of Distress Score: 3 noted and reviewed. No intervention indicated.

## 2018-01-29 NOTE — Clinical Note
Graham- can we check on her PET scan being scheduled? She is leaving town at the end of the week and we were hoping to get it done prior to then, thanks

## 2018-01-29 NOTE — Clinical Note
Hi- can we get her scheduled for a PET scan this week, in the afternoon? She will be going back to [place] this weekend so ideally done soon, thanks, order is in

## 2018-01-29 NOTE — Clinical Note
Just a fYi, sending for PET scan; worsening fatigue, back pain and weight loss in h/o breast cancer and heavy smoker. If you would prefer CT scan let me know

## 2018-01-31 ENCOUNTER — TELEPHONE (OUTPATIENT)
Dept: HEMATOLOGY/ONCOLOGY | Facility: CLINIC | Age: 73
End: 2018-01-31

## 2018-01-31 NOTE — TELEPHONE ENCOUNTER
----- Message from Melanie Russell PA-C sent at 1/29/2018  2:44 PM CST -----  Hi- can we get her scheduled for a PET scan this week, in the afternoon? She will be going back to [place] this weekend so ideally done soon, thanks, order is in

## 2018-02-02 ENCOUNTER — HOSPITAL ENCOUNTER (OUTPATIENT)
Dept: RADIOLOGY | Facility: HOSPITAL | Age: 73
Discharge: HOME | End: 2018-02-02
Attending: PHYSICIAN ASSISTANT
Payer: MEDICARE

## 2018-02-02 DIAGNOSIS — C77.3 CARCINOMA OF RIGHT BREAST METASTATIC TO AXILLARY LYMPH NODE: ICD-10-CM

## 2018-02-02 DIAGNOSIS — C50.911 CARCINOMA OF RIGHT BREAST METASTATIC TO AXILLARY LYMPH NODE: ICD-10-CM

## 2018-02-02 LAB — POCT GLUCOSE: 97 MG/DL (ref 70–110)

## 2018-02-02 PROCEDURE — A9552 F18 FDG: HCPCS

## 2018-02-02 PROCEDURE — 78815 PET IMAGE W/CT SKULL-THIGH: CPT | Mod: TC

## 2018-02-02 PROCEDURE — 78815 PET IMAGE W/CT SKULL-THIGH: CPT | Mod: 26,PI,, | Performed by: RADIOLOGY

## 2018-02-06 DIAGNOSIS — C50.911 BREAST CANCER METASTASIZED TO AXILLARY LYMPH NODE, RIGHT: ICD-10-CM

## 2018-02-06 DIAGNOSIS — C77.3 BREAST CANCER METASTASIZED TO AXILLARY LYMPH NODE, RIGHT: ICD-10-CM

## 2018-02-06 RX ORDER — LETROZOLE 2.5 MG/1
2.5 TABLET, FILM COATED ORAL DAILY
Qty: 90 TABLET | Refills: 4 | Status: SHIPPED | OUTPATIENT
Start: 2018-02-06 | End: 2018-02-08 | Stop reason: SDUPTHER

## 2018-02-08 DIAGNOSIS — C50.911 BREAST CANCER METASTASIZED TO AXILLARY LYMPH NODE, RIGHT: ICD-10-CM

## 2018-02-08 DIAGNOSIS — C77.3 BREAST CANCER METASTASIZED TO AXILLARY LYMPH NODE, RIGHT: ICD-10-CM

## 2018-02-08 RX ORDER — LETROZOLE 2.5 MG/1
2.5 TABLET, FILM COATED ORAL DAILY
Qty: 90 TABLET | Refills: 4 | Status: SHIPPED | OUTPATIENT
Start: 2018-02-08 | End: 2019-03-08 | Stop reason: SDUPTHER

## 2018-02-09 ENCOUNTER — DOCUMENTATION ONLY (OUTPATIENT)
Dept: HEMATOLOGY/ONCOLOGY | Facility: CLINIC | Age: 73
End: 2018-02-09

## 2018-02-09 NOTE — PROGRESS NOTES
Called patient to discuss PET findings, namely left adrenal lesion.  She has been scheduled with IR for biopsy.

## 2018-02-15 ENCOUNTER — TELEPHONE (OUTPATIENT)
Dept: HEMATOLOGY/ONCOLOGY | Facility: CLINIC | Age: 73
End: 2018-02-15

## 2018-02-15 NOTE — TELEPHONE ENCOUNTER
Returned call to pt.   Pt stated she is to be scheduled for a biopsy after her PET scan- informed pt do not see any notes in chart stating a biopsy needed or what kind of biopsy.   Pt stated she had spoke with  regarding setting up and nothing is scheduled currently.   Asked pt if she knew name of  she spoke with- pt can not recall name.   Informed pt that Dr. Allen out of office but would send her a message as well as  to see what is needed and how fast it could be taken care of.   Pt verbalized understanding.     Message fwd to Graham/Dr. Allen.         ----- Message from Tulio Lomax sent at 2/15/2018  4:22 PM CST -----  Contact: Pt   Will like a call from office regarding her supposedly be scheduled for biopsy on tomorrow     Contact::[phone]

## 2018-02-16 DIAGNOSIS — C77.3 CARCINOMA OF RIGHT BREAST METASTATIC TO AXILLARY LYMPH NODE: Primary | ICD-10-CM

## 2018-02-16 DIAGNOSIS — C50.911 CARCINOMA OF RIGHT BREAST METASTATIC TO AXILLARY LYMPH NODE: Primary | ICD-10-CM

## 2018-02-16 NOTE — TELEPHONE ENCOUNTER
I Return Mrs. Woods  Call and told her the I left a voice mail today for IR to setup her biopsy and I am not sure of the protocol, but  someone will give her a call back.

## 2018-03-01 DIAGNOSIS — C77.3 CARCINOMA OF RIGHT BREAST METASTATIC TO AXILLARY LYMPH NODE: Primary | ICD-10-CM

## 2018-03-01 DIAGNOSIS — C50.911 CARCINOMA OF RIGHT BREAST METASTATIC TO AXILLARY LYMPH NODE: Primary | ICD-10-CM

## 2018-03-01 DIAGNOSIS — D49.9 NEOPLASM: ICD-10-CM

## 2018-03-01 NOTE — PRE-PROCEDURE INSTRUCTIONS
Preop instructions: NPO after midnight or 8 hours prior to procedure time, shower instructions, directions, leave all valuables at home, medication instructions for PM prior & am of procedure explained. Patient stated an understanding.  Patient denies any side effects or issues with anesthesia or sedation.

## 2018-03-02 ENCOUNTER — ANESTHESIA (OUTPATIENT)
Dept: ENDOSCOPY | Facility: HOSPITAL | Age: 73
End: 2018-03-02
Payer: MEDICARE

## 2018-03-02 ENCOUNTER — ANESTHESIA EVENT (OUTPATIENT)
Dept: ENDOSCOPY | Facility: HOSPITAL | Age: 73
End: 2018-03-02
Payer: MEDICARE

## 2018-03-02 ENCOUNTER — HOSPITAL ENCOUNTER (OUTPATIENT)
Facility: HOSPITAL | Age: 73
Discharge: HOME | End: 2018-03-02
Attending: INTERNAL MEDICINE | Admitting: INTERNAL MEDICINE
Payer: MEDICARE

## 2018-03-02 VITALS
OXYGEN SATURATION: 95 % | HEART RATE: 70 BPM | RESPIRATION RATE: 16 BRPM | DIASTOLIC BLOOD PRESSURE: 64 MMHG | HEIGHT: 67 IN | SYSTOLIC BLOOD PRESSURE: 113 MMHG | WEIGHT: 145 LBS | TEMPERATURE: 98 F | BODY MASS INDEX: 22.76 KG/M2

## 2018-03-02 DIAGNOSIS — C50.911 CARCINOMA OF RIGHT BREAST METASTATIC TO AXILLARY LYMPH NODE: ICD-10-CM

## 2018-03-02 DIAGNOSIS — C77.3 CARCINOMA OF RIGHT BREAST METASTATIC TO AXILLARY LYMPH NODE: ICD-10-CM

## 2018-03-02 PROCEDURE — 88342 IMHCHEM/IMCYTCHM 1ST ANTB: CPT | Mod: 26,,, | Performed by: PATHOLOGY

## 2018-03-02 PROCEDURE — 25000003 PHARM REV CODE 250: Performed by: FAMILY MEDICINE

## 2018-03-02 PROCEDURE — 63600175 PHARM REV CODE 636 W HCPCS: Performed by: NURSE ANESTHETIST, CERTIFIED REGISTERED

## 2018-03-02 PROCEDURE — 88305 TISSUE EXAM BY PATHOLOGIST: CPT | Performed by: PATHOLOGY

## 2018-03-02 PROCEDURE — 88333 PATH CONSLTJ SURG CYTO XM 1: CPT | Mod: 26,,, | Performed by: PATHOLOGY

## 2018-03-02 PROCEDURE — 37000008 HC ANESTHESIA 1ST 15 MINUTES

## 2018-03-02 PROCEDURE — 37000009 HC ANESTHESIA EA ADD 15 MINS

## 2018-03-02 PROCEDURE — 88305 TISSUE EXAM BY PATHOLOGIST: CPT | Mod: 26,,, | Performed by: PATHOLOGY

## 2018-03-02 PROCEDURE — 71000033 HC RECOVERY, INTIAL HOUR

## 2018-03-02 PROCEDURE — 27000221 HC OXYGEN, UP TO 24 HOURS

## 2018-03-02 PROCEDURE — 25000003 PHARM REV CODE 250: Performed by: NURSE ANESTHETIST, CERTIFIED REGISTERED

## 2018-03-02 PROCEDURE — D9220A PRA ANESTHESIA: Mod: ANES,,, | Performed by: ANESTHESIOLOGY

## 2018-03-02 PROCEDURE — D9220A PRA ANESTHESIA: Mod: CRNA,,, | Performed by: NURSE ANESTHETIST, CERTIFIED REGISTERED

## 2018-03-02 RX ORDER — FENTANYL CITRATE 50 UG/ML
INJECTION, SOLUTION INTRAMUSCULAR; INTRAVENOUS
Status: DISCONTINUED | OUTPATIENT
Start: 2018-03-02 | End: 2018-03-02

## 2018-03-02 RX ORDER — GLYCOPYRROLATE 0.2 MG/ML
INJECTION INTRAMUSCULAR; INTRAVENOUS
Status: DISCONTINUED | OUTPATIENT
Start: 2018-03-02 | End: 2018-03-02

## 2018-03-02 RX ORDER — ROCURONIUM BROMIDE 10 MG/ML
INJECTION, SOLUTION INTRAVENOUS
Status: DISCONTINUED | OUTPATIENT
Start: 2018-03-02 | End: 2018-03-02

## 2018-03-02 RX ORDER — SODIUM CHLORIDE 0.9 % (FLUSH) 0.9 %
3 SYRINGE (ML) INJECTION
Status: DISCONTINUED | OUTPATIENT
Start: 2018-03-02 | End: 2018-03-02 | Stop reason: HOSPADM

## 2018-03-02 RX ORDER — ACETAMINOPHEN 500 MG
1000 TABLET ORAL DAILY
Status: ON HOLD | COMMUNITY
End: 2022-04-06

## 2018-03-02 RX ORDER — LIDOCAINE HCL/PF 100 MG/5ML
SYRINGE (ML) INTRAVENOUS
Status: DISCONTINUED | OUTPATIENT
Start: 2018-03-02 | End: 2018-03-02

## 2018-03-02 RX ORDER — DEXAMETHASONE SODIUM PHOSPHATE 4 MG/ML
INJECTION, SOLUTION INTRA-ARTICULAR; INTRALESIONAL; INTRAMUSCULAR; INTRAVENOUS; SOFT TISSUE
Status: DISCONTINUED | OUTPATIENT
Start: 2018-03-02 | End: 2018-03-02

## 2018-03-02 RX ORDER — MIDAZOLAM HYDROCHLORIDE 1 MG/ML
INJECTION, SOLUTION INTRAMUSCULAR; INTRAVENOUS
Status: DISCONTINUED | OUTPATIENT
Start: 2018-03-02 | End: 2018-03-02

## 2018-03-02 RX ORDER — SODIUM CHLORIDE 9 MG/ML
500 INJECTION, SOLUTION INTRAVENOUS ONCE
Status: COMPLETED | OUTPATIENT
Start: 2018-03-02 | End: 2018-03-02

## 2018-03-02 RX ORDER — PROPOFOL 10 MG/ML
VIAL (ML) INTRAVENOUS
Status: DISCONTINUED | OUTPATIENT
Start: 2018-03-02 | End: 2018-03-02

## 2018-03-02 RX ORDER — CALCIUM CARBONATE 600 MG
1 TABLET ORAL DAILY
Status: ON HOLD | COMMUNITY
End: 2024-01-23 | Stop reason: HOSPADM

## 2018-03-02 RX ORDER — NEOSTIGMINE METHYLSULFATE 1 MG/ML
INJECTION, SOLUTION INTRAVENOUS
Status: DISCONTINUED | OUTPATIENT
Start: 2018-03-02 | End: 2018-03-02

## 2018-03-02 RX ORDER — PHENYLEPHRINE HYDROCHLORIDE 10 MG/ML
INJECTION INTRAVENOUS
Status: DISCONTINUED | OUTPATIENT
Start: 2018-03-02 | End: 2018-03-02

## 2018-03-02 RX ORDER — VITAMIN E 268 MG
400 CAPSULE ORAL DAILY
Status: ON HOLD | COMMUNITY
End: 2020-03-11

## 2018-03-02 RX ORDER — ONDANSETRON 2 MG/ML
INJECTION INTRAMUSCULAR; INTRAVENOUS
Status: DISCONTINUED | OUTPATIENT
Start: 2018-03-02 | End: 2018-03-02

## 2018-03-02 RX ADMIN — PROPOFOL 80 MG: 10 INJECTION, EMULSION INTRAVENOUS at 12:03

## 2018-03-02 RX ADMIN — DEXAMETHASONE SODIUM PHOSPHATE 8 MG: 4 INJECTION, SOLUTION INTRAMUSCULAR; INTRAVENOUS at 01:03

## 2018-03-02 RX ADMIN — PHENYLEPHRINE HYDROCHLORIDE 100 MCG: 10 INJECTION INTRAVENOUS at 12:03

## 2018-03-02 RX ADMIN — SODIUM CHLORIDE 500 ML: 0.9 INJECTION, SOLUTION INTRAVENOUS at 09:03

## 2018-03-02 RX ADMIN — ONDANSETRON 4 MG: 2 INJECTION INTRAMUSCULAR; INTRAVENOUS at 01:03

## 2018-03-02 RX ADMIN — NEOSTIGMINE METHYLSULFATE 4 MG: 1 INJECTION INTRAVENOUS at 01:03

## 2018-03-02 RX ADMIN — LIDOCAINE HYDROCHLORIDE 60 MG: 20 INJECTION, SOLUTION INTRAVENOUS at 12:03

## 2018-03-02 RX ADMIN — MIDAZOLAM HYDROCHLORIDE 1 MG: 1 INJECTION, SOLUTION INTRAMUSCULAR; INTRAVENOUS at 12:03

## 2018-03-02 RX ADMIN — ROCURONIUM BROMIDE 30 MG: 10 INJECTION, SOLUTION INTRAVENOUS at 12:03

## 2018-03-02 RX ADMIN — GLYCOPYRROLATE 0.4 MG: 0.2 INJECTION, SOLUTION INTRAMUSCULAR; INTRAVENOUS at 01:03

## 2018-03-02 RX ADMIN — FENTANYL CITRATE 50 MCG: 50 INJECTION, SOLUTION INTRAMUSCULAR; INTRAVENOUS at 12:03

## 2018-03-02 NOTE — PROGRESS NOTES
Pt to IR room 173 via stretcher for left adrenal lesion biopsy. No complaints at this time.  Timeout and consents complete. Pt to remain in the care of Anesthesia.

## 2018-03-02 NOTE — TRANSFER OF CARE
"Anesthesia Transfer of Care Note    Patient: Radha Woods    Procedure(s) Performed: Procedure(s) (LRB):  [id number] (N/A)    Patient location: PACU    Anesthesia Type: general    Transport from OR: Transported from OR on 2-3 L/min O2 by NC with adequate spontaneous ventilation    Post pain: adequate analgesia    Post assessment: no apparent anesthetic complications    Post vital signs: stable    Level of consciousness: awake    Nausea/Vomiting: no nausea/vomiting    Complications: none    Transfer of care protocol was followed      Last vitals:   Visit Vitals  /61 (BP Location: Left arm, Patient Position: Lying)   Pulse 73   Temp 36.4 °C (97.6 °F)   Resp 19   Ht 5' 7" (1.702 m)   Wt 65.8 kg (145 lb)   SpO2 (!) 93%   Breastfeeding? No   BMI 22.71 kg/m²     "

## 2018-03-02 NOTE — DISCHARGE INSTRUCTIONS
Anesthesia: General Anesthesia     You are watched continuously during your procedure by your anesthesia provider.     Youre due to have surgery. During surgery, youll be given medicine called anesthesia or anesthetic. This will keep you comfortable and pain-free. Your anesthesia provider will use general anesthesia.  What is general anesthesia?  General anesthesia puts you into a state like deep sleep. It goes into the bloodstream (IV anesthetics), into the lungs (gas anesthetics), or both. You feel nothing during the procedure. You will not remember it. During the procedure, the anesthesia provider monitors you continuously. He or she checks your heart rate and rhythm, blood pressure, breathing, and blood oxygen.  · IV anesthetics. IV anesthetics are given through an IV line in your arm. Theyre often given first. This is so you are asleep before a gas anesthetic is started. Some kinds of IV anesthetics relieve pain. Others relax you. Your doctor will decide which kind is best in your case.  · Gas anesthetics. Gas anesthetics are breathed into the lungs. They are often used to keep you asleep. They can be given through a facemask or a tube placed in your larynx or trachea (breathing tube).  ¨ If you have a facemask, your anesthesia provider will most likely place it over your nose and mouth while youre still awake. Youll breathe oxygen through the mask as your IV anesthetic is started. Gas anesthetic may be added through the mask.  ¨ If you have a tube in the larynx or trachea, it will be inserted into your throat after youre asleep.  Anesthesia tools and medicines  You will likely have:  · IV anesthetics. These are put into an IV line into your bloodstream.  · Gas anesthetics. You breathe these anesthetics into your lungs, where they pass into your bloodstream.  · Pulse oximeter. This is a small clip that is attached to the end of your finger. This measures your blood oxygen level.  · Electrocardiography  leads (electrodes). These are small sticky pads that are placed on your chest. They record your heart rate and rhythm.  · Blood pressure cuff. This reads your blood pressure.  Risks and possible complications  General anesthesia has some risks. These include:  · Breathing problems  · Nausea and vomiting  · Sore throat or hoarseness (usually temporary)  · Allergic reaction to the anesthetic  · Irregular heartbeat (rare)  · Cardiac arrest (rare)   Anesthesia safety  · Follow all instructions you are given for how long not to eat or drink before your procedure.  · Be sure your doctor knows what medicines and drugs you take. This includes over-the-counter medicines, herbs, supplements, alcohol or other drugs. You will be asked when those were last taken.  · Have an adult family member or friend drive you home after the procedure.  · For the first 24 hours after your surgery:  ¨ Do not drive or use heavy equipment.  ¨ Do not make important decisions or sign legal documents. If important decisions or signing legal documents is necessary during the first 24 hours after surgery, have a trusted family member or spouse act on your behalf.  ¨ Avoid alcohol.  ¨ Have a responsible adult stay with you. He or she can watch for problems and help keep you safe.  Date Last Reviewed: 12/1/2016  © 5816-2443 [employer]. [address] [place] [address], [place] [address]. All rights reserved. This information is not intended as a substitute for professional medical care. Always follow your healthcare professional's instructions.

## 2018-03-02 NOTE — ANESTHESIA PREPROCEDURE EVALUATION
03/02/2018     Radha Woods is a 72 y.o., female with a history of breast cancer here for CT-guided biopsy of an adrenal mass.  H/o COPD, on 2-2.5L O2 most of the time but at times feels great and does not always require O2.    Past Medical History:   Diagnosis Date    Allergy     Back pain     Breast cancer 5/2015    right breast infiltrating ductal CA with DCIS, ER positive, CO/Her2 negative     COPD (chronic obstructive pulmonary disease)     Chronic bronchitis    Hyperlipidemia     Hypertension        Past Surgical History:   Procedure Laterality Date    BREAST BIOPSY Right 5/6/2015    IDC and DCIS    CERVICAL FUSION      HYSTERECTOMY      with BSO    LUMBAR FUSION      LYMPH NODE DISSECTION Right     right lumpectomy           Anesthesia Evaluation    I have reviewed the Patient Summary Reports.     I have reviewed the Medications.     Review of Systems  Anesthesia Hx:  No problems with previous Anesthesia  History of prior surgery of interest to airway management or planning: Denies Family Hx of Anesthesia complications.   Denies Personal Hx of Anesthesia complications.   Social:  Smoker    Cardiovascular:   Exercise tolerance: poor Hypertension Denies MI.    Denies Angina.    Pulmonary:   COPD Shortness of breath (with minimal activity) Home O2 2-2.5L most of the day and most nights   Hepatic/GI:  Hepatic/GI Normal    Neurological:  Neurology Normal        Physical Exam  General:  Well nourished    Airway/Jaw/Neck:  Airway Findings: Mouth Opening: Normal Tongue: Normal  General Airway Assessment: Adult  Mallampati: II  TM Distance: Normal, at least 6 cm      Dental:  Dental Findings: In tact   Chest/Lungs:  Chest/Lungs Findings: Decreased Breathe Sounds Left, Decreased Breathe Sounds Right, Clear to auscultation     Heart/Vascular:  Heart Findings: Rate: Normal        Mental  Status:  Mental Status Findings:  Alert and Oriented         Anesthesia Plan  Type of Anesthesia, risks & benefits discussed:  Anesthesia Type:  general  Patient's Preference:   Intra-op Monitoring Plan: standard ASA monitors  Intra-op Monitoring Plan Comments:   Post Op Pain Control Plan: multimodal analgesia, IV/PO Opioids PRN and per primary service following discharge from PACU  Post Op Pain Control Plan Comments:   Induction:   IV  Beta Blocker:  Patient is not currently on a Beta-Blocker (No further documentation required).       Informed Consent: Patient understands risks and agrees with Anesthesia plan.  Questions answered. Anesthesia consent signed with patient.  ASA Score: 3     Day of Surgery Review of History & Physical:    H&P update referred to the surgeon.     Anesthesia Plan Notes: Discussed increased risk of post-op intubation given COPD.          Ready For Surgery From Anesthesia Perspective.

## 2018-03-02 NOTE — PLAN OF CARE
Problem: Patient Care Overview  Goal: Plan of Care Review  Outcome: Outcome(s) achieved Date Met: 03/02/18  Awake and alert. VSS. Denies pain or nausea. Tolerating liquids well. Incontinent of urine and cleaned up. DC instructions given to patient and family and they verbalize understanding.

## 2018-03-02 NOTE — PROCEDURES
Radiology Post-Procedure Note    Pre Op Diagnosis: Adrenal nodule    Post Op Diagnosis: Same    Procedure: Left adrenal nodule biopsy    Procedure performed by: Dagoberto Bernabe MD    Written Informed Consent Obtained: Yes  Specimen Removed: YES 6 x 18 gauge cores of left adrenal nodule  Estimated Blood Loss: Minimal    Findings:   Using CT guidance, a 17/18 gauge biopsy system was used to obtain 6 core samples of a left adrenal nodule. No complications.     Patient tolerated procedure well.    Dagoberto Bernabe M.D.  Diagnostic and Interventional Radiologist  Department of Radiology  Pager: [phone]

## 2018-03-02 NOTE — H&P
Radiology History & Physical      SUBJECTIVE:     Chief Complaint: left adrenal nodule    History of Present Illness:  Radha Woods is a 72 y.o. female who presents for ct guided left adrenal lesion biopsy. Pt with hx of breast cancer.    Past Medical History:   Diagnosis Date    Allergy     Back pain     Breast cancer 5/2015    right breast infiltrating ductal CA with DCIS, ER positive, UT/Her2 negative     COPD (chronic obstructive pulmonary disease)     Chronic bronchitis    Hyperlipidemia     Hypertension      Past Surgical History:   Procedure Laterality Date    BREAST BIOPSY Right 5/6/2015    IDC and DCIS    CERVICAL FUSION      HYSTERECTOMY      with BSO    LUMBAR FUSION      LYMPH NODE DISSECTION Right     right lumpectomy         Home Meds:   Prior to Admission medications    Medication Sig Start Date End Date Taking? Authorizing Provider   albuterol 90 mcg/actuation inhaler Inhale 2 puffs into the lungs every 6 (six) hours as needed for Wheezing.   Yes Historical Provider, MD   calcium carbonate (OS-JENNI) 500 mg calcium (1,250 mg) chewable tablet Take 1 tablet by mouth once daily.   Yes Historical Provider, MD   cetirizine (ZYRTEC) 10 MG tablet Take 10 mg by mouth every evening.    Yes Historical Provider, MD   letrozole (FEMARA) 2.5 mg Tab Take 1 tablet (2.5 mg total) by mouth once daily.  Patient taking differently: Take 2.5 mg by mouth every evening.  2/8/18 2/8/19 Yes Rosio Allen MD   metoprolol tartrate (LOPRESSOR) 50 MG tablet Take 50 mg by mouth every evening.    Yes Historical Provider, MD   oxyCODONE-acetaminophen (PERCOCET) 5-325 mg per tablet Take 1 tablet by mouth every 4 (four) hours as needed for Pain.   Yes Historical Provider, MD   pravastatin (PRAVACHOL) 40 MG tablet Take 40 mg by mouth every evening.    Yes Historical Provider, MD   tiotropium-olodaterol (STIOLTO RESPIMAT) 2.5-2.5 mcg/actuation Mist Inhale into the lungs once daily.    Yes Historical Provider, MD    topiramate (TOPAMAX) 50 MG tablet Take 50 mg by mouth every evening.    Yes Historical Provider, MD   vitamin D 1000 units Tab Take 1,000 Units by mouth once daily.   Yes Historical Provider, MD   vitamin E 400 UNIT capsule Take 400 Units by mouth once daily.   Yes Historical Provider, MD   aspirin (ECOTRIN) 81 MG EC tablet Take 81 mg by mouth once daily.    Historical Provider, MD     Anticoagulants/Antiplatelets: aspirin, stopped 5 days ago    Allergies:   Review of patient's allergies indicates:   Allergen Reactions    Adhesive Rash     Use PAPER Tape / TEGADERM    Levaquin [levofloxacin] Diarrhea and Nausea Only    Penicillins Rash     Sedation History:  no adverse reactions    Review of Systems:   Hematological: no known coagulopathies  Respiratory: no shortness of breath  Cardiovascular: no chest pain  Gastrointestinal: no abdominal pain  Genito-Urinary: no dysuria  Musculoskeletal: negative  Neurological: no TIA or stroke symptoms         OBJECTIVE:     Vital Signs (Most Recent)  Temp: 98.7 °F (37.1 °C) (03/02/18 0916)  Pulse: 72 (03/02/18 0916)  Resp: 16 (03/02/18 0916)  BP: (!) 142/79 (03/02/18 0916)  SpO2: 100 % (03/02/18 0916)    Physical Exam:  ASA: 3  Mallampati: 1    General: no acute distress  Mental Status: alert and oriented to person, place and time  HEENT: normocephalic, atraumatic  Chest: unlabored breathing  Heart: regular heart rate  Abdomen: nondistended  Extremity: moves all extremities    Laboratory  Lab Results   Component Value Date    INR 0.9 03/02/2018       Lab Results   Component Value Date    WBC 6.73 03/02/2018    HGB 13.0 03/02/2018    HCT 40.8 03/02/2018    MCV 92 03/02/2018     03/02/2018      Lab Results   Component Value Date     01/29/2018     01/29/2018    K 4.0 01/29/2018     01/29/2018    CO2 33 (H) 01/29/2018    BUN 9 01/29/2018    CREATININE 0.8 01/29/2018    CALCIUM 9.8 01/29/2018    ALT 6 (L) 01/29/2018    AST 11 01/29/2018    ALBUMIN 3.4  (L) 01/29/2018    BILITOT 0.4 01/29/2018       ASSESSMENT/PLAN:     Sedation Plan: per anesthesia  Patient will undergo ct guided left adrenal mass biopsy    Enmanuel Patricia MD  Department of Radiology  Pager: [phone]

## 2018-03-02 NOTE — DISCHARGE SUMMARY
Radiology Discharge Summary      Hospital Course: No complications    Admit Date: 3/2/2018  Discharge Date: 03/02/2018     Instructions Given to Patient: Yes  Diet: Resume prior diet  Activity: activity as tolerated and no driving for today    Description of Condition on Discharge: Stable  Vital Signs (Most Recent): Temp: 97.6 °F (36.4 °C) (03/02/18 1330)  Pulse: 70 (03/02/18 1415)  Resp: 20 (03/02/18 1415)  BP: (!) 133/58 (03/02/18 1415)  SpO2: (!) 94 % (03/02/18 1415)    Discharge Disposition: Home    Discharge Diagnosis: LEft adrenal nodule, history of breast cancer. F/U as scheduled.    Dagoberto Bernabe M.D.  Diagnostic and Interventional Radiologist  Department of Radiology  Pager: [phone]

## 2018-03-03 NOTE — ANESTHESIA POSTPROCEDURE EVALUATION
"Anesthesia Post Evaluation    Patient: Radha Woods    Procedure(s) Performed: Procedure(s) (LRB):  [id number] (N/A)    Final Anesthesia Type: general  Patient location during evaluation: PACU  Patient participation: Yes- Able to Participate  Level of consciousness: awake and alert  Post-procedure vital signs: reviewed and stable  Pain management: adequate  Airway patency: patent  PONV status at discharge: No PONV  Anesthetic complications: no      Cardiovascular status: blood pressure returned to baseline  Respiratory status: unassisted, room air and spontaneous ventilation  Hydration status: euvolemic  Follow-up not needed.        Visit Vitals  /64   Pulse 70   Temp 36.6 °C (97.9 °F) (Temporal)   Resp 16   Ht 5' 7" (1.702 m)   Wt 65.8 kg (145 lb)   SpO2 95%   Breastfeeding? No   BMI 22.71 kg/m²       Pain/Aj Score: Pain Assessment Performed: Yes (3/2/2018  3:15 PM)  Presence of Pain: denies (3/2/2018  3:15 PM)  Aj Score: 10 (3/2/2018  2:15 PM)      "

## 2018-03-05 ENCOUNTER — TELEPHONE (OUTPATIENT)
Dept: HEMATOLOGY/ONCOLOGY | Facility: CLINIC | Age: 73
End: 2018-03-05

## 2018-03-05 NOTE — TELEPHONE ENCOUNTER
----- Message from Tulio Lomax sent at 3/5/2018  1:10 PM CST -----  Contact: Pt   Will like to know results from biopsy done on Friday     Contact::[phone]

## 2018-03-05 NOTE — TELEPHONE ENCOUNTER
----- Message from Sirisha Salcido sent at 3/5/2018  3:18 PM CST -----  Contact: Pt  Pt calling stating that the number left for a return call was incorrect. Please contact pt at number listed below.        Pt call back number [phone]

## 2018-03-05 NOTE — TELEPHONE ENCOUNTER
Spoke with pt to inform her that her results have not come in yet. Pt would like a call as soon as they come in and the number she can be reached at is [phone].

## 2018-03-05 NOTE — TELEPHONE ENCOUNTER
----- Message from Tulio Lomax sent at 3/5/2018  1:10 PM CST -----  Contact: Pt   Will like to know results from biopsy done on Friday     Contact::[phone]

## 2018-03-07 ENCOUNTER — TELEPHONE (OUTPATIENT)
Dept: HEMATOLOGY/ONCOLOGY | Facility: CLINIC | Age: 73
End: 2018-03-07

## 2018-03-07 ENCOUNTER — DOCUMENTATION ONLY (OUTPATIENT)
Dept: HEMATOLOGY/ONCOLOGY | Facility: CLINIC | Age: 73
End: 2018-03-07

## 2018-03-07 NOTE — TELEPHONE ENCOUNTER
Returned call to pt.   Pt calling for pathology results.   Informed pt would send message to PA.   Pt verbalized understanding.         ----- Message from Leana Gomez sent at 3/7/2018  3:19 PM CST -----  Contact: Pt  Pt called and would like a call back would like to get information   Callback#[phone]  Thank You  CLARITZA Gomez

## 2018-03-07 NOTE — PROGRESS NOTES
Patient contacted regarding recent biopsy of adrenal gland which did not show any evidence of malignancy.  I will discuss further imaging with Dr. Allen to establish surveillance plan.  Patient understands biopsy results and we will contact her with upcoming appt.

## 2018-03-09 ENCOUNTER — PATIENT MESSAGE (OUTPATIENT)
Dept: HEMATOLOGY/ONCOLOGY | Facility: CLINIC | Age: 73
End: 2018-03-09

## 2018-03-09 ENCOUNTER — DOCUMENTATION ONLY (OUTPATIENT)
Dept: HEMATOLOGY/ONCOLOGY | Facility: CLINIC | Age: 73
End: 2018-03-09

## 2018-03-09 DIAGNOSIS — C50.911 MALIGNANT NEOPLASM OF RIGHT BREAST IN FEMALE, ESTROGEN RECEPTOR POSITIVE, UNSPECIFIED SITE OF BREAST: Primary | ICD-10-CM

## 2018-03-09 DIAGNOSIS — C50.611 MALIGNANT NEOPLASM OF AXILLARY TAIL OF RIGHT BREAST IN FEMALE, ESTROGEN RECEPTOR POSITIVE: ICD-10-CM

## 2018-03-09 DIAGNOSIS — Z17.0 MALIGNANT NEOPLASM OF RIGHT BREAST IN FEMALE, ESTROGEN RECEPTOR POSITIVE, UNSPECIFIED SITE OF BREAST: Primary | ICD-10-CM

## 2018-03-09 DIAGNOSIS — Z17.0 MALIGNANT NEOPLASM OF AXILLARY TAIL OF RIGHT BREAST IN FEMALE, ESTROGEN RECEPTOR POSITIVE: ICD-10-CM

## 2018-03-09 NOTE — PROGRESS NOTES
Subjective:       Patient ID: Radha Woods is a 72 y.o. female.    Chief Complaint: No chief complaint on file.    HPI  Review of Systems    Objective:      Physical Exam    Assessment:       No diagnosis found.    Plan:       ***

## 2018-03-19 ENCOUNTER — TELEPHONE (OUTPATIENT)
Dept: HEMATOLOGY/ONCOLOGY | Facility: CLINIC | Age: 73
End: 2018-03-19

## 2018-03-19 NOTE — TELEPHONE ENCOUNTER
Message fwd to .       ----- Message from Ruthy Miranda sent at 3/19/2018 10:39 AM CDT -----  Contact: Pt can be reached at [phone]  Pt called to speak with the nurse to possibly reschedule her appt to later morning time if possible.      Please contact pt      Thank you!

## 2018-03-20 ENCOUNTER — TELEPHONE (OUTPATIENT)
Dept: HEMATOLOGY/ONCOLOGY | Facility: CLINIC | Age: 73
End: 2018-03-20

## 2018-03-20 NOTE — TELEPHONE ENCOUNTER
----- Message from Allison Brandt sent at 3/19/2018 10:41 AM CDT -----  Contact: Pt can be reached at [phone]      ----- Message -----  From: Ruthy Miranda  Sent: 3/19/2018  10:39 AM  To: Drew VILLALTA ([facility]) Staff    Pt called to speak with the nurse to possibly reschedule her appt to later morning time if possible.      Please contact pt      Thank you!

## 2018-03-22 ENCOUNTER — TELEPHONE (OUTPATIENT)
Dept: HEMATOLOGY/ONCOLOGY | Facility: CLINIC | Age: 73
End: 2018-03-22

## 2018-03-22 NOTE — TELEPHONE ENCOUNTER
----- Message from Allison Brandt sent at 3/22/2018 11:29 AM CDT -----  Contact: patient      ----- Message -----  From: Breanna Russell  Sent: 3/22/2018  11:25 AM  To: Drew VILLALTA ([facility]) Staff    patient needs to speak with the nurse about rescheduling her appointments 5/28/18 & she wants after 1:00 they don't all have to be on same day either, patient also said she does not want to have to come & go from across the street.  Please call patient at [phone]

## 2018-03-22 NOTE — TELEPHONE ENCOUNTER
Message fwd to .       ----- Message from Breanna Russell sent at 3/22/2018 11:25 AM CDT -----  Contact: patient  patient needs to speak with the nurse about rescheduling her appointments 5/28/18 & she wants after 1:00 they don't all have to be on same day either, patient also said she does not want to have to come & go from across the street.  Please call patient at [phone]

## 2018-03-22 NOTE — TELEPHONE ENCOUNTER
----- Message from Graham Thornton sent at 3/22/2018 12:53 PM CDT -----  Contact: patient  Return  Call no answer all appt. R/s per pt. Request. Dr. avila do not have any open times she will see Melanie. Thanks   ----- Message -----  From: Allison Brandt  Sent: 3/22/2018  11:29 AM  To: Graham Thornton        ----- Message -----  From: Breanna Russell  Sent: 3/22/2018  11:25 AM  To: Drew VILLALTA ([facility]) Staff    patient needs to speak with the nurse about rescheduling her appointments 5/28/18 & she wants after 1:00 they don't all have to be on same day either, patient also said she does not want to have to come & go from across the street.  Please call patient at [phone]

## 2018-05-28 ENCOUNTER — HOSPITAL ENCOUNTER (OUTPATIENT)
Dept: RADIOLOGY | Facility: CLINIC | Age: 73
Discharge: HOME | End: 2018-05-28
Attending: PHYSICIAN ASSISTANT
Payer: MEDICARE

## 2018-05-28 ENCOUNTER — HOSPITAL ENCOUNTER (OUTPATIENT)
Dept: RADIOLOGY | Facility: HOSPITAL | Age: 73
Discharge: HOME | End: 2018-05-28
Attending: PHYSICIAN ASSISTANT
Payer: MEDICARE

## 2018-05-28 DIAGNOSIS — C50.911 CARCINOMA OF RIGHT BREAST METASTATIC TO AXILLARY LYMPH NODE: ICD-10-CM

## 2018-05-28 DIAGNOSIS — C77.3 CARCINOMA OF RIGHT BREAST METASTATIC TO AXILLARY LYMPH NODE: ICD-10-CM

## 2018-05-28 DIAGNOSIS — Z13.820 OSTEOPOROSIS SCREENING: ICD-10-CM

## 2018-05-28 DIAGNOSIS — M89.9 DISORDER OF BONE: ICD-10-CM

## 2018-05-28 PROCEDURE — 77062 BREAST TOMOSYNTHESIS BI: CPT | Mod: 26,,, | Performed by: RADIOLOGY

## 2018-05-28 PROCEDURE — 77080 DXA BONE DENSITY AXIAL: CPT | Mod: 26,,, | Performed by: INTERNAL MEDICINE

## 2018-05-28 PROCEDURE — 77066 DX MAMMO INCL CAD BI: CPT | Mod: 26,,, | Performed by: RADIOLOGY

## 2018-05-28 PROCEDURE — 77080 DXA BONE DENSITY AXIAL: CPT | Mod: TC

## 2018-05-28 PROCEDURE — 77062 BREAST TOMOSYNTHESIS BI: CPT | Mod: TC,PO

## 2018-05-29 ENCOUNTER — OFFICE VISIT (OUTPATIENT)
Dept: HEMATOLOGY/ONCOLOGY | Facility: CLINIC | Age: 73
End: 2018-05-29
Payer: MEDICARE

## 2018-05-29 ENCOUNTER — LAB VISIT (OUTPATIENT)
Dept: LAB | Facility: HOSPITAL | Age: 73
End: 2018-05-29
Attending: PHYSICIAN ASSISTANT
Payer: MEDICARE

## 2018-05-29 VITALS
WEIGHT: 143.75 LBS | DIASTOLIC BLOOD PRESSURE: 66 MMHG | SYSTOLIC BLOOD PRESSURE: 126 MMHG | HEART RATE: 78 BPM | RESPIRATION RATE: 12 BRPM | OXYGEN SATURATION: 86 % | BODY MASS INDEX: 22.56 KG/M2 | HEIGHT: 67 IN | TEMPERATURE: 99 F

## 2018-05-29 DIAGNOSIS — C77.3 CARCINOMA OF RIGHT BREAST METASTATIC TO AXILLARY LYMPH NODE: ICD-10-CM

## 2018-05-29 DIAGNOSIS — C50.111 MALIGNANT NEOPLASM OF CENTRAL PORTION OF RIGHT BREAST IN FEMALE, ESTROGEN RECEPTOR POSITIVE: ICD-10-CM

## 2018-05-29 DIAGNOSIS — Z17.0 MALIGNANT NEOPLASM OF RIGHT BREAST IN FEMALE, ESTROGEN RECEPTOR POSITIVE, UNSPECIFIED SITE OF BREAST: Primary | ICD-10-CM

## 2018-05-29 DIAGNOSIS — Z17.0 MALIGNANT NEOPLASM OF CENTRAL PORTION OF RIGHT BREAST IN FEMALE, ESTROGEN RECEPTOR POSITIVE: ICD-10-CM

## 2018-05-29 DIAGNOSIS — C50.911 CARCINOMA OF RIGHT BREAST METASTATIC TO AXILLARY LYMPH NODE: ICD-10-CM

## 2018-05-29 DIAGNOSIS — C50.911 MALIGNANT NEOPLASM OF RIGHT BREAST IN FEMALE, ESTROGEN RECEPTOR POSITIVE, UNSPECIFIED SITE OF BREAST: Primary | ICD-10-CM

## 2018-05-29 DIAGNOSIS — Z72.0 TOBACCO ABUSE: ICD-10-CM

## 2018-05-29 LAB
ALBUMIN SERPL BCP-MCNC: 3.6 G/DL
ALP SERPL-CCNC: 72 U/L
ALT SERPL W/O P-5'-P-CCNC: 10 U/L
ANION GAP SERPL CALC-SCNC: 6 MMOL/L
AST SERPL-CCNC: 13 U/L
BILIRUB SERPL-MCNC: 0.6 MG/DL
BUN SERPL-MCNC: 14 MG/DL
CALCIUM SERPL-MCNC: 9.6 MG/DL
CHLORIDE SERPL-SCNC: 103 MMOL/L
CO2 SERPL-SCNC: 36 MMOL/L
CREAT SERPL-MCNC: 0.8 MG/DL
ERYTHROCYTE [DISTWIDTH] IN BLOOD BY AUTOMATED COUNT: 12.9 %
EST. GFR  (AFRICAN AMERICAN): >60 ML/MIN/1.73 M^2
EST. GFR  (NON AFRICAN AMERICAN): >60 ML/MIN/1.73 M^2
GLUCOSE SERPL-MCNC: 98 MG/DL
HCT VFR BLD AUTO: 44.5 %
HGB BLD-MCNC: 13.8 G/DL
IMM GRANULOCYTES # BLD AUTO: 0.03 K/UL
MCH RBC QN AUTO: 29.8 PG
MCHC RBC AUTO-ENTMCNC: 31 G/DL
MCV RBC AUTO: 96 FL
NEUTROPHILS # BLD AUTO: 4 K/UL
PLATELET # BLD AUTO: 234 K/UL
PMV BLD AUTO: 9.1 FL
POTASSIUM SERPL-SCNC: 3.8 MMOL/L
PROT SERPL-MCNC: 6.8 G/DL
RBC # BLD AUTO: 4.63 M/UL
SODIUM SERPL-SCNC: 145 MMOL/L
WBC # BLD AUTO: 6.38 K/UL

## 2018-05-29 PROCEDURE — 99214 OFFICE O/P EST MOD 30 MIN: CPT | Mod: S$PBB,,, | Performed by: PHYSICIAN ASSISTANT

## 2018-05-29 PROCEDURE — 36415 COLL VENOUS BLD VENIPUNCTURE: CPT

## 2018-05-29 PROCEDURE — 80053 COMPREHEN METABOLIC PANEL: CPT

## 2018-05-29 PROCEDURE — 99999 PR PBB SHADOW E&M-EST. PATIENT-LVL IV: CPT | Mod: PBBFAC,,, | Performed by: PHYSICIAN ASSISTANT

## 2018-05-29 PROCEDURE — 99214 OFFICE O/P EST MOD 30 MIN: CPT | Mod: PBBFAC | Performed by: PHYSICIAN ASSISTANT

## 2018-05-29 PROCEDURE — 85027 COMPLETE CBC AUTOMATED: CPT

## 2018-05-29 RX ORDER — VARENICLINE TARTRATE 0.5 (11)-1
KIT ORAL
Status: ON HOLD | COMMUNITY
End: 2020-03-23 | Stop reason: HOSPADM

## 2018-05-29 NOTE — Clinical Note
Adrenal biopsy from march was negative. I will have her return in September with repeat PET (6 months from the last one)- if you want it sooner let me know

## 2018-05-29 NOTE — PROGRESS NOTES
Subjective:       Patient ID: Radha Woods is a 72 y.o. female.    Chief Complaint: Breast cancer metastasized to axillary lymph node, right    72 year old female with right breast cancer, currently on adjuvant letrozole.     Had PET scan on 3/2 showing adrenal activity (see below).  Subsequent biopsy on 3/2/18 was negative.   On Chantix but has still been smoking.   States it is easier to just quit cold turkey.   Overall feeling well and better than in past. She notes some generalized aches and pains.    Appetite is good.  Uses portable 02 as needed.  She is a patient of Dr. Carreros, last seen 10/2016 with recommendation for smoking cessation.        PET from 2/2/18:  There is physiologic intracranial, head, and neck activity. There is vocal cord activation. There is neck muscle activation. Heart and mediastinum are normal. There is a left adrenal lesion SUV max 5.04. There is physiologic liver spleen GI and  activity. There is a postoperative change of the spine. There is emphysema.    Approximate 50 pack year history.               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   Her carcinoma was ER strongly positive, UT negative, and HER-2 roe negative. She received one cycle of adjuvant AC chemotherapy, and subsequently refused further chemotherapy due to side effects was hospitalized x 2. She completed XRT.   She was then started on aromatase inhibitors with Arimidex and quit because her dyspnea gets worse. Tried Femara then back to Arimidex.      Lives in [place], [place] (80 miles south of [place])      Review of Systems   Constitutional: Negative for activity change, appetite change, chills, diaphoresis, fatigue, fever and unexpected weight change.   HENT: Negative for congestion, dental problem, ear pain, hearing loss, mouth sores, nosebleeds, rhinorrhea, tinnitus and trouble swallowing.     Eyes: Negative for redness and visual disturbance.   Respiratory: Positive for shortness of breath (chronic but no recent exacerbation). Negative for cough, chest tightness and wheezing.    Cardiovascular: Negative for chest pain, palpitations and leg swelling.   Gastrointestinal: Negative for abdominal distention, abdominal pain, blood in stool, constipation, diarrhea, nausea and vomiting.   Genitourinary: Negative for decreased urine volume, difficulty urinating, dysuria, frequency and hematuria.   Musculoskeletal: Negative for arthralgias, back pain, gait problem, joint swelling and neck pain.   Skin: Negative for pallor and rash.   Neurological: Negative for dizziness, weakness, light-headedness, numbness and headaches.   Hematological: Negative for adenopathy. Does not bruise/bleed easily.   Psychiatric/Behavioral: Positive for dysphoric mood and sleep disturbance. Negative for confusion. The patient is not nervous/anxious.        Objective:      Physical Exam   Constitutional: She is oriented to person, place, and time. She appears well-developed and well-nourished. No distress.   Presents alone   HENT:   Head: Normocephalic and atraumatic.   Nose: Nose normal.   Mouth/Throat: Oropharynx is clear and moist. No oropharyngeal exudate.   Eyes: Conjunctivae and EOM are normal. Pupils are equal, round, and reactive to light. Right eye exhibits no discharge. Left eye exhibits no discharge. No scleral icterus. Right pupil is round and reactive. Left pupil is round and reactive.   Neck: Normal range of motion. Neck supple. No thyromegaly present.   Cardiovascular: Normal rate, regular rhythm, normal heart sounds and intact distal pulses.  Exam reveals no gallop and no friction rub.    No murmur heard.  Pulmonary/Chest: Effort normal and breath sounds normal. No respiratory distress. She has no wheezes. She has no rales.   Hyperinflated and diminished breath sounds throughout lung fields consistent with smoking  history.  Right breast with well healed lumpectomy incision, no mass, nodule or skin changes. Left breast without mass, nodule or skin changes. No axillary or supraclavicular adenopathy.    Abdominal: Soft. Bowel sounds are normal. She exhibits no distension and no mass. There is no hepatosplenomegaly. There is no tenderness. There is no rebound and no guarding.   Musculoskeletal: Normal range of motion. She exhibits no edema, tenderness or deformity.   Tenderness to palpation in lumbar spine  5/5 strength in upper and lower extremities.    Lymphadenopathy:        Head (right side): No submandibular adenopathy present.        Head (left side): No submandibular adenopathy present.     She has no cervical adenopathy.        Right cervical: No superficial cervical, no deep cervical and no posterior cervical adenopathy present.       Left cervical: No superficial cervical, no deep cervical and no posterior cervical adenopathy present.        Right: No inguinal and no supraclavicular adenopathy present.        Left: No inguinal and no supraclavicular adenopathy present.   Neurological: She is alert and oriented to person, place, and time. She has normal strength. No cranial nerve deficit or sensory deficit. She exhibits normal muscle tone. Coordination normal.   Skin: Skin is warm and dry. No bruising, no lesion, no petechiae and no rash noted. No erythema. No pallor.   Psychiatric: She has a normal mood and affect. Her behavior is normal. Judgment and thought content normal. Her mood appears not anxious. She does not exhibit a depressed mood.   Nursing note and vitals reviewed.      Assessment:       1. Malignant neoplasm of right breast in female, estrogen receptor positive, unspecified site of breast    2. Carcinoma of right breast metastatic to axillary lymph node    3. Tobacco abuse        Plan:       1-2)Continue Letrozole and return to clinic in 3-4 months.  At that time will plan on follow up PET to assess  stability of left adrenal nodule which was negative on biopsy in March 2018.  Mammogram from today was unremarkable.  BOne density results from yesterday are pending, she remains on Vitamin D and Calcium.   3)continued use of Chantix/smoking cessation encouraged      Distress Screening Results: Psychosocial Distress screening score of Distress Score: 1 noted and reviewed. No intervention indicated.

## 2018-06-06 DIAGNOSIS — M85.80 OSTEOPENIA, UNSPECIFIED LOCATION: Primary | ICD-10-CM

## 2018-06-06 RX ORDER — IBANDRONATE SODIUM 150 MG/1
150 TABLET, FILM COATED ORAL
Qty: 1 TABLET | Refills: 11 | Status: SHIPPED | OUTPATIENT
Start: 2018-06-06 | End: 2019-06-18 | Stop reason: SDUPTHER

## 2018-06-06 NOTE — PROGRESS NOTES
Spoke with patient about recent bone density test which showed decrease and recommendation for supplemental bone therapy.  Given her location out of town will try oral bisphosphonate.   RX for Boniva sent to [facility].  Side effects discussed with patient. She does not have any dental issues.  She continues on vitamin D/Calcium.  She knows to call if any issues/concerns.

## 2018-09-18 ENCOUNTER — HOSPITAL ENCOUNTER (OUTPATIENT)
Dept: RADIOLOGY | Facility: HOSPITAL | Age: 73
Discharge: HOME | End: 2018-09-18
Attending: PHYSICIAN ASSISTANT
Payer: MEDICARE

## 2018-09-18 DIAGNOSIS — C50.111 MALIGNANT NEOPLASM OF CENTRAL PORTION OF RIGHT BREAST IN FEMALE, ESTROGEN RECEPTOR POSITIVE: ICD-10-CM

## 2018-09-18 DIAGNOSIS — Z17.0 MALIGNANT NEOPLASM OF CENTRAL PORTION OF RIGHT BREAST IN FEMALE, ESTROGEN RECEPTOR POSITIVE: ICD-10-CM

## 2018-09-18 DIAGNOSIS — Z17.0 MALIGNANT NEOPLASM OF RIGHT BREAST IN FEMALE, ESTROGEN RECEPTOR POSITIVE, UNSPECIFIED SITE OF BREAST: ICD-10-CM

## 2018-09-18 DIAGNOSIS — C50.911 MALIGNANT NEOPLASM OF RIGHT BREAST IN FEMALE, ESTROGEN RECEPTOR POSITIVE, UNSPECIFIED SITE OF BREAST: ICD-10-CM

## 2018-09-18 LAB — POCT GLUCOSE: 103 MG/DL (ref 70–110)

## 2018-09-18 PROCEDURE — A9552 F18 FDG: HCPCS

## 2018-09-18 PROCEDURE — 78815 PET IMAGE W/CT SKULL-THIGH: CPT | Mod: 26,PS,, | Performed by: RADIOLOGY

## 2018-09-18 PROCEDURE — 78815 PET IMAGE W/CT SKULL-THIGH: CPT | Mod: TC,PS

## 2018-09-19 ENCOUNTER — TELEPHONE (OUTPATIENT)
Dept: HEMATOLOGY/ONCOLOGY | Facility: CLINIC | Age: 73
End: 2018-09-19

## 2018-09-19 ENCOUNTER — OFFICE VISIT (OUTPATIENT)
Dept: HEMATOLOGY/ONCOLOGY | Facility: CLINIC | Age: 73
End: 2018-09-19
Payer: MEDICARE

## 2018-09-19 VITALS
SYSTOLIC BLOOD PRESSURE: 151 MMHG | DIASTOLIC BLOOD PRESSURE: 76 MMHG | HEIGHT: 67 IN | TEMPERATURE: 98 F | WEIGHT: 146.19 LBS | BODY MASS INDEX: 22.94 KG/M2 | HEART RATE: 91 BPM | RESPIRATION RATE: 20 BRPM | OXYGEN SATURATION: 95 %

## 2018-09-19 DIAGNOSIS — J06.9 UPPER RESPIRATORY TRACT INFECTION, UNSPECIFIED TYPE: Primary | ICD-10-CM

## 2018-09-19 DIAGNOSIS — C77.3 CARCINOMA OF RIGHT BREAST METASTATIC TO AXILLARY LYMPH NODE: ICD-10-CM

## 2018-09-19 DIAGNOSIS — C50.911 CARCINOMA OF RIGHT BREAST METASTATIC TO AXILLARY LYMPH NODE: ICD-10-CM

## 2018-09-19 PROCEDURE — 99999 PR PBB SHADOW E&M-EST. PATIENT-LVL III: CPT | Mod: PBBFAC,,, | Performed by: INTERNAL MEDICINE

## 2018-09-19 PROCEDURE — 99214 OFFICE O/P EST MOD 30 MIN: CPT | Mod: S$PBB,,, | Performed by: INTERNAL MEDICINE

## 2018-09-19 PROCEDURE — 99213 OFFICE O/P EST LOW 20 MIN: CPT | Mod: PBBFAC | Performed by: INTERNAL MEDICINE

## 2018-09-19 RX ORDER — PREDNISONE 2.5 MG/1
2.5 TABLET ORAL DAILY
Qty: 5 TABLET | Refills: 0 | Status: SHIPPED | OUTPATIENT
Start: 2018-09-19 | End: 2018-09-29

## 2018-09-19 RX ORDER — AZITHROMYCIN 250 MG/1
TABLET, FILM COATED ORAL
Qty: 6 TABLET | Refills: 0 | Status: SHIPPED | OUTPATIENT
Start: 2018-09-19 | End: 2018-09-24

## 2018-09-19 RX ORDER — AMOXICILLIN 500 MG
CAPSULE ORAL DAILY
Status: ON HOLD | COMMUNITY
End: 2022-04-06

## 2018-09-19 NOTE — TELEPHONE ENCOUNTER
Returned call to [facility]'s pharmacy.   Informed Deandra that rx for prednisone should be for 10 tablets and not 5.   Deandra verbalized understanding and will adjust rx.         ----- Message from Tulio Lomax sent at 9/19/2018 12:41 PM CDT -----  Contact: Pt   Will like a call from office in regards to predniSONE (DELTASONE) 2.5 MG tablet being prescribed for 5 days instead of 10     Contact::[phone]

## 2018-09-19 NOTE — PROGRESS NOTES
Subjective:       Patient ID: Radha Woods is a 73 y.o. female.    Chief Complaint: Follow-up    HPI     This is a 72 year old female with right breast cancer, currently on adjuvant letrozole.   Reports she has an upper respiratory infection developing- she needs to wear her O2 with less exertion now as a result.    Had PET scan on 3/2/18 showing adrenal activity (see below).  Subsequent biopsy on 3/2/18 was negative.   Repeat PET scan 9/18/18 revealed No evidence of recurrent or metastatic disease.  Decrease in activity of the left adrenal nodule, previously biopsied with pathology demonstrating benign adrenal cortical tissue    Reports that she needs to see dentist- knows to hold Boniva pre and post surgery    On Chantix but has still been smoking.   Overall feeling well and better than in past. She notes some generalized aches and pains.    Appetite is good.  Uses portable 02 as needed.       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   Her carcinoma was ER strongly positive, SC negative, and HER-2 roe negative. She received one cycle of adjuvant AC chemotherapy, and subsequently refused further chemotherapy due to side effects was hospitalized x 2. She completed XRT.   She was then started on aromatase inhibitors with Arimidex and quit because her dyspnea gets worse. Tried Femara then back to Arimidex.      Lives in [place], [place] ([place] south [facility])    Review of Systems   Constitutional: Negative for activity change, appetite change, chills, diaphoresis, fatigue, fever and unexpected weight change.   HENT: Negative for congestion, dental problem, ear pain, hearing loss, mouth sores, nosebleeds, rhinorrhea, tinnitus and trouble swallowing.    Eyes: Negative for redness and visual disturbance.   Respiratory: Positive for shortness of breath (chronic but no recent exacerbation). Negative  for cough, chest tightness and wheezing.    Cardiovascular: Negative for chest pain, palpitations and leg swelling.   Gastrointestinal: Negative for abdominal distention, abdominal pain, blood in stool, constipation, diarrhea, nausea and vomiting.   Genitourinary: Negative for decreased urine volume, difficulty urinating, dysuria, frequency and hematuria.   Musculoskeletal: Negative for arthralgias, back pain, gait problem, joint swelling and neck pain.   Skin: Negative for pallor and rash.   Neurological: Negative for dizziness, weakness, light-headedness, numbness and headaches.   Hematological: Negative for adenopathy. Does not bruise/bleed easily.   Psychiatric/Behavioral: Positive for dysphoric mood and sleep disturbance. Negative for confusion. The patient is not nervous/anxious.        Objective:      Physical Exam   Constitutional: She is oriented to person, place, and time. She appears well-developed and well-nourished. No distress.   Presents alone   HENT:   Head: Normocephalic and atraumatic.   Nose: Nose normal.   Mouth/Throat: Oropharynx is clear and moist. No oropharyngeal exudate.   Eyes: Conjunctivae and EOM are normal. Pupils are equal, round, and reactive to light. Right eye exhibits no discharge. Left eye exhibits no discharge. No scleral icterus. Right pupil is round and reactive. Left pupil is round and reactive.   Neck: Normal range of motion. Neck supple. No thyromegaly present.   Cardiovascular: Normal rate, regular rhythm, normal heart sounds and intact distal pulses. Exam reveals no gallop and no friction rub.   No murmur heard.  Pulmonary/Chest: Effort normal and breath sounds normal. No respiratory distress. She has no wheezes. She has no rales.   Hyperinflated and diminished breath sounds throughout lung fields consistent with smoking history.  Right breast with well healed lumpectomy incision, no mass, nodule or skin changes. Left breast without mass, nodule or skin changes. No axillary  or supraclavicular adenopathy.    Abdominal: Soft. Bowel sounds are normal. She exhibits no distension and no mass. There is no hepatosplenomegaly. There is no tenderness. There is no rebound and no guarding.   Musculoskeletal: Normal range of motion. She exhibits no edema, tenderness or deformity.   Tenderness to palpation in lumbar spine  5/5 strength in upper and lower extremities.    Lymphadenopathy:        Head (right side): No submandibular adenopathy present.        Head (left side): No submandibular adenopathy present.     She has no cervical adenopathy.        Right cervical: No superficial cervical, no deep cervical and no posterior cervical adenopathy present.       Left cervical: No superficial cervical, no deep cervical and no posterior cervical adenopathy present.        Right: No inguinal and no supraclavicular adenopathy present.        Left: No inguinal and no supraclavicular adenopathy present.   Neurological: She is alert and oriented to person, place, and time. She has normal strength. No cranial nerve deficit or sensory deficit. She exhibits normal muscle tone. Coordination normal.   Skin: Skin is warm and dry. No bruising, no lesion, no petechiae and no rash noted. No erythema. No pallor.   Psychiatric: She has a normal mood and affect. Her behavior is normal. Judgment and thought content normal. Her mood appears not anxious. She does not exhibit a depressed mood.   Nursing note and vitals reviewed.    Labs- reviewed  Assessment:       1. Upper respiratory tract infection, unspecified type    2. Carcinoma of right breast metastatic to axillary lymph node        Plan:     1. Antibiotic sent  2. Continue Letrozole  Scans negative for malignancy  See above  Reviewed with patient  Knows to call for any issues    RTC 3 months

## 2018-09-19 NOTE — TELEPHONE ENCOUNTER
Returned call to pharmacy.   Informed pharmacy that pt is listed only allergic to levaquin and PCN and no prior allergies to Z-pack.   Pharmacy verbalized understanding.           ----- Message from Carleen Beck sent at 9/19/2018  1:24 PM CDT -----  Contact: Gerri Whitt  Pharmacy called regarding  for the above Pt....she was RX  For a Zpac pt had a  allergic reaction to this mediciation .    Kathy from [facility] can be reached at [phone].

## 2019-01-29 ENCOUNTER — TELEPHONE (OUTPATIENT)
Dept: HEMATOLOGY/ONCOLOGY | Facility: CLINIC | Age: 74
End: 2019-01-29

## 2019-01-29 DIAGNOSIS — M81.0 OSTEOPOROSIS, UNSPECIFIED OSTEOPOROSIS TYPE, UNSPECIFIED PATHOLOGICAL FRACTURE PRESENCE: Primary | ICD-10-CM

## 2019-01-29 RX ORDER — ALENDRONATE SODIUM 70 MG/1
70 TABLET ORAL
Qty: 4 TABLET | Refills: 11 | Status: ON HOLD | OUTPATIENT
Start: 2019-01-29 | End: 2020-03-23 | Stop reason: HOSPADM

## 2019-01-29 NOTE — TELEPHONE ENCOUNTER
Called and informed of below.   Pt stated picked up Boniva and paid $40 copay and after runs out she will  Fosamax rx.   Pt verbalized understanding of instructions.         ----- Message from Melanie Russell PA-C sent at 1/29/2019  4:08 PM CST -----  Can you let patient know that boniva no longer covered by her insurance, I sent in RX for Fosamax instead. So she will need to take this once a week instead of the Boniva which she was taking once a month. Thanks!

## 2019-02-05 ENCOUNTER — TELEPHONE (OUTPATIENT)
Dept: HEMATOLOGY/ONCOLOGY | Facility: CLINIC | Age: 74
End: 2019-02-05

## 2019-02-05 NOTE — TELEPHONE ENCOUNTER
Returned call to patient she stated she wanted to reschedule appt she wants  to see Melanie. Mailed appt slip      ----- Message from Leana Gomez sent at 2/5/2019 12:35 PM CST -----  Contact: pt   Pt called to speak with you about her appt on 3/26 have some questions   Callback#[phone]  Thank You  CLARITZA Gomez

## 2019-03-08 DIAGNOSIS — C50.911 BREAST CANCER METASTASIZED TO AXILLARY LYMPH NODE, RIGHT: ICD-10-CM

## 2019-03-08 DIAGNOSIS — C77.3 BREAST CANCER METASTASIZED TO AXILLARY LYMPH NODE, RIGHT: ICD-10-CM

## 2019-03-11 RX ORDER — LETROZOLE 2.5 MG/1
TABLET, FILM COATED ORAL
Qty: 90 TABLET | Refills: 4 | Status: SHIPPED | OUTPATIENT
Start: 2019-03-11 | End: 2020-04-21

## 2019-03-26 ENCOUNTER — TELEPHONE (OUTPATIENT)
Dept: HEMATOLOGY/ONCOLOGY | Facility: CLINIC | Age: 74
End: 2019-03-26

## 2019-03-26 ENCOUNTER — HOSPITAL ENCOUNTER (EMERGENCY)
Facility: HOSPITAL | Age: 74
Discharge: HOME | End: 2019-03-27
Attending: EMERGENCY MEDICINE
Payer: MEDICARE
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
## 2019-03-26 DIAGNOSIS — S32.020A CLOSED WEDGE COMPRESSION FRACTURE OF SECOND LUMBAR VERTEBRA, INITIAL ENCOUNTER: Primary | ICD-10-CM

## 2019-03-26 DIAGNOSIS — M25.559 HIP PAIN: ICD-10-CM

## 2019-03-26 DIAGNOSIS — R06.02 SHORTNESS OF BREATH: ICD-10-CM

## 2019-03-26 PROCEDURE — 99285 EMERGENCY DEPT VISIT HI MDM: CPT | Mod: ,,, | Performed by: PHYSICIAN ASSISTANT

## 2019-03-26 PROCEDURE — 96375 TX/PRO/DX INJ NEW DRUG ADDON: CPT

## 2019-03-26 PROCEDURE — 96376 TX/PRO/DX INJ SAME DRUG ADON: CPT

## 2019-03-26 PROCEDURE — 99285 EMERGENCY DEPT VISIT HI MDM: CPT | Mod: 25

## 2019-03-26 PROCEDURE — 96374 THER/PROPH/DIAG INJ IV PUSH: CPT

## 2019-03-26 PROCEDURE — 99285 PR EMERGENCY DEPT VISIT,LEVEL V: ICD-10-PCS | Mod: ,,, | Performed by: PHYSICIAN ASSISTANT

## 2019-03-26 NOTE — TELEPHONE ENCOUNTER
Returned call to pt.   Pt stated fell last night sitting down- injured back and tailbone.   Pt calling to cancel appts today.   Advised pt to go to ED.   Pt verbalized understanding and stated she will r/s appts in May when due for mammo.         ----- Message from Janie Mayes RN sent at 3/26/2019  1:11 PM CDT -----  Contact: Self      ----- Message -----  From: Natacha Payan  Sent: 3/26/2019  12:32 PM  To: Drew VILLALTA ([facility]) Staff    Pt is calling to speak with Staff regarding scheduling an appt.  Pt says she called earlier today and left a message, but has not received a returned call.      She can be reached at [phone].    Thank you.

## 2019-03-27 ENCOUNTER — TELEPHONE (OUTPATIENT)
Dept: NEUROSURGERY | Facility: CLINIC | Age: 74
End: 2019-03-27

## 2019-03-27 VITALS
RESPIRATION RATE: 20 BRPM | SYSTOLIC BLOOD PRESSURE: 113 MMHG | BODY MASS INDEX: 22.44 KG/M2 | WEIGHT: 143 LBS | TEMPERATURE: 98 F | HEART RATE: 102 BPM | HEIGHT: 67 IN | DIASTOLIC BLOOD PRESSURE: 64 MMHG | OXYGEN SATURATION: 95 %

## 2019-03-27 LAB
ALBUMIN SERPL BCP-MCNC: 3.6 G/DL (ref 3.5–5.2)
ALP SERPL-CCNC: 67 U/L (ref 55–135)
ALT SERPL W/O P-5'-P-CCNC: 7 U/L (ref 10–44)
ANION GAP SERPL CALC-SCNC: 6 MMOL/L (ref 8–16)
AST SERPL-CCNC: 12 U/L (ref 10–40)
BASOPHILS # BLD AUTO: 0.04 K/UL (ref 0–0.2)
BASOPHILS NFR BLD: 0.4 % (ref 0–1.9)
BILIRUB SERPL-MCNC: 0.5 MG/DL (ref 0.1–1)
BNP SERPL-MCNC: 47 PG/ML (ref 0–99)
BUN SERPL-MCNC: 10 MG/DL (ref 8–23)
CALCIUM SERPL-MCNC: 9.5 MG/DL (ref 8.7–10.5)
CHLORIDE SERPL-SCNC: 99 MMOL/L (ref 95–110)
CO2 SERPL-SCNC: 37 MMOL/L (ref 23–29)
CREAT SERPL-MCNC: 0.7 MG/DL (ref 0.5–1.4)
DIFFERENTIAL METHOD: ABNORMAL
EOSINOPHIL # BLD AUTO: 0.2 K/UL (ref 0–0.5)
EOSINOPHIL NFR BLD: 2.1 % (ref 0–8)
ERYTHROCYTE [DISTWIDTH] IN BLOOD BY AUTOMATED COUNT: 12.6 % (ref 11.5–14.5)
EST. GFR  (AFRICAN AMERICAN): >60 ML/MIN/1.73 M^2
EST. GFR  (NON AFRICAN AMERICAN): >60 ML/MIN/1.73 M^2
GLUCOSE SERPL-MCNC: 137 MG/DL (ref 70–110)
HCT VFR BLD AUTO: 43.5 % (ref 37–48.5)
HGB BLD-MCNC: 12.9 G/DL (ref 12–16)
IMM GRANULOCYTES # BLD AUTO: 0.04 K/UL (ref 0–0.04)
IMM GRANULOCYTES NFR BLD AUTO: 0.4 % (ref 0–0.5)
INR PPP: 0.9 (ref 0.8–1.2)
LACTATE SERPL-SCNC: 1 MMOL/L (ref 0.5–2.2)
LYMPHOCYTES # BLD AUTO: 0.6 K/UL (ref 1–4.8)
LYMPHOCYTES NFR BLD: 6.6 % (ref 18–48)
MAGNESIUM SERPL-MCNC: 2 MG/DL (ref 1.6–2.6)
MCH RBC QN AUTO: 29.5 PG (ref 27–31)
MCHC RBC AUTO-ENTMCNC: 29.7 G/DL (ref 32–36)
MCV RBC AUTO: 100 FL (ref 82–98)
MONOCYTES # BLD AUTO: 1 K/UL (ref 0.3–1)
MONOCYTES NFR BLD: 10.3 % (ref 4–15)
NEUTROPHILS # BLD AUTO: 7.4 K/UL (ref 1.8–7.7)
NEUTROPHILS NFR BLD: 80.2 % (ref 38–73)
NRBC BLD-RTO: 0 /100 WBC
PLATELET # BLD AUTO: 146 K/UL (ref 150–350)
PMV BLD AUTO: 9.6 FL (ref 9.2–12.9)
POTASSIUM SERPL-SCNC: 3.7 MMOL/L (ref 3.5–5.1)
PROT SERPL-MCNC: 6.6 G/DL (ref 6–8.4)
PROTHROMBIN TIME: 10 SEC (ref 9–12.5)
RBC # BLD AUTO: 4.37 M/UL (ref 4–5.4)
SODIUM SERPL-SCNC: 142 MMOL/L (ref 136–145)
TROPONIN I SERPL DL<=0.01 NG/ML-MCNC: 0.01 NG/ML (ref 0–0.03)
WBC # BLD AUTO: 9.21 K/UL (ref 3.9–12.7)

## 2019-03-27 PROCEDURE — 80053 COMPREHEN METABOLIC PANEL: CPT

## 2019-03-27 PROCEDURE — 83880 ASSAY OF NATRIURETIC PEPTIDE: CPT

## 2019-03-27 PROCEDURE — 83735 ASSAY OF MAGNESIUM: CPT

## 2019-03-27 PROCEDURE — 87040 BLOOD CULTURE FOR BACTERIA: CPT | Mod: 59

## 2019-03-27 PROCEDURE — 93010 EKG 12-LEAD: ICD-10-PCS | Mod: ,,, | Performed by: INTERNAL MEDICINE

## 2019-03-27 PROCEDURE — 84484 ASSAY OF TROPONIN QUANT: CPT

## 2019-03-27 PROCEDURE — 83605 ASSAY OF LACTIC ACID: CPT

## 2019-03-27 PROCEDURE — 63600175 PHARM REV CODE 636 W HCPCS: Performed by: PHYSICIAN ASSISTANT

## 2019-03-27 PROCEDURE — 93005 ELECTROCARDIOGRAM TRACING: CPT

## 2019-03-27 PROCEDURE — 93010 ELECTROCARDIOGRAM REPORT: CPT | Mod: ,,, | Performed by: INTERNAL MEDICINE

## 2019-03-27 PROCEDURE — 85610 PROTHROMBIN TIME: CPT

## 2019-03-27 PROCEDURE — 85025 COMPLETE CBC W/AUTO DIFF WBC: CPT

## 2019-03-27 RX ORDER — OXYCODONE AND ACETAMINOPHEN 5; 325 MG/1; MG/1
1 TABLET ORAL EVERY 4 HOURS PRN
Qty: 12 TABLET | Refills: 0 | Status: ON HOLD | OUTPATIENT
Start: 2019-03-27 | End: 2020-03-23 | Stop reason: HOSPADM

## 2019-03-27 RX ORDER — MORPHINE SULFATE 4 MG/ML
4 INJECTION, SOLUTION INTRAMUSCULAR; INTRAVENOUS
Status: COMPLETED | OUTPATIENT
Start: 2019-03-27 | End: 2019-03-27

## 2019-03-27 RX ORDER — CYCLOBENZAPRINE HCL 10 MG
10 TABLET ORAL 3 TIMES DAILY PRN
Status: ON HOLD | COMMUNITY
End: 2020-03-23 | Stop reason: HOSPADM

## 2019-03-27 RX ORDER — ONDANSETRON 2 MG/ML
4 INJECTION INTRAMUSCULAR; INTRAVENOUS
Status: COMPLETED | OUTPATIENT
Start: 2019-03-27 | End: 2019-03-27

## 2019-03-27 RX ADMIN — MORPHINE SULFATE 4 MG: 4 INJECTION, SOLUTION INTRAMUSCULAR; INTRAVENOUS at 02:03

## 2019-03-27 RX ADMIN — ONDANSETRON 4 MG: 2 INJECTION INTRAMUSCULAR; INTRAVENOUS at 12:03

## 2019-03-27 RX ADMIN — MORPHINE SULFATE 4 MG: 4 INJECTION, SOLUTION INTRAMUSCULAR; INTRAVENOUS at 12:03

## 2019-03-27 NOTE — DISCHARGE INSTRUCTIONS
Return to the ED for any worsening pain, leg weakness, urinary/fecal incontinence, or numbness to legs.     Our goal in the emergency department is to always give you outstanding care and exceptional service. You may receive a survey by mail or e-mail in the next week regarding your experience in our ED. We would greatly appreciate your completing and returning the survey. Your feedback provides us with a way to recognize our staff who give very good care and it helps us learn how to improve when your experience was below our aspiration of excellence.

## 2019-03-27 NOTE — CONSULTS
[facility]  Neurosurgery  Consult Note    Consults  Subjective:     Chief Complaint/Reason for Admission: Lumbar compression fracture    History of Present Illness: Patient is a 73 year old female with PMHX of hx of breast cancer 2015, d-CHF with 60%EF, HTN, HLD, COPD, and degenerative disc disease. She presents to the ED for fall. She reports having fall from standing position last night. Reports falling directly onto tailbone and onto back. Denies head trauma. Denies LOC. Denies hx of anticoagulation. Also, reports fever 101 on Saturday. Reports associated productive cough with green tinged sputum and SOB. Reports baseline oxygen saturation of 80-85% on home oxygen of 2.5 to 3 L via NC. Reports desaturation to 70% with exertion, which has been occurring for approximately one year. Denies urinary/fecal incontinence, paresthesias, or lower extremity weakness. She denies chills, nausea, vomiting, chest pain, abd pain, dysuria, diarrhea, or constipation. She is a current everyday smoker and reports alcohol use.          (Not in a hospital admission)    Review of patient's allergies indicates:   Allergen Reactions    Adhesive Rash     Use PAPER Tape / TEGADERM    Levaquin [levofloxacin] Diarrhea and Nausea Only    Penicillins Rash       Past Medical History:   Diagnosis Date    Allergy     Back pain     Breast cancer 5/2015    right breast infiltrating ductal CA with DCIS, ER positive, NC/Her2 negative     COPD (chronic obstructive pulmonary disease)     Chronic bronchitis    Hyperlipidemia     Hypertension      Past Surgical History:   Procedure Laterality Date    OFVHVL-MGUVPZ-[id number] N/A 3/2/2018    Performed by [facility] Surgeon at [facility] EV    BREAST BIOPSY Right     BREAST LUMPECTOMY Right 2015    IDC & DCIS    CERVICAL FUSION      DISSECTION-AXILLARY LYMPH NODE Right 6/29/2015    Performed by [facility] at [facility] OR 2ND FLR    HYSTERECTOMY      with BSO    ICYTGEMUZ-TDYN-A-CATH  Fluoro equipment needed Left 8/3/2015    Performed [facility] at [facility] OR 2ND FLR    LUMBAR FUSION      LUMPECTOMY-BREAST-w/wire localization  Right 6/29/2015    Performed by [facility] at [facility] OR 2ND FLR    LYMPH NODE DISSECTION Right     REMOVAL-PORT-A-CATH Left 12/3/2015    Performed by [facility] at [facility] OR 2ND FLR    right lumpectomy       Family History     Problem Relation (Age of Onset)    Breast cancer Maternal Grandmother (75)        Tobacco Use    Smoking status: Current Every Day Smoker     Packs/day: 1.00     Years: 45.00     Pack years: 45.00     Types: Cigarettes    Smokeless tobacco: Never Used    Tobacco comment: admits to attempting to quit multiple times; currently smoking up to 1 packs/day down from 2 packs/day at time of diagnosis   Substance and Sexual Activity    Alcohol use: Yes     Comment: occasionaly    Drug use: No    Sexual activity: Not on file     Review of Systems  Objective:     Weight: 64.9 kg (143 lb)  Body mass index is 22.4 kg/m².  Vital Signs (Most Recent):  Temp: 97.5 °F (36.4 °C) (03/26/19 2330)  Pulse: 97 (03/27/19 0147)  Resp: 18 (03/27/19 0147)  BP: (!) 147/67 (03/27/19 0147)  SpO2: 100 % (03/27/19 0147) Vital Signs (24h Range):  Temp:  [97.5 °F (36.4 °C)] 97.5 °F (36.4 °C)  Pulse:  [92-97] 97  Resp:  [14-18] 18  SpO2:  [89 %-100 %] 100 %  BP: (119-147)/(60-67) 147/67                          Closed/Suction Drain 06/29/15 1248 Right  Bulb 10 Fr. (Active)       Physical Exam:    Constitutional: She appears well-developed and well-nourished. She is not diaphoretic. No distress.     Cardiovascular: Normal rate.     Abdominal: Soft.     Psych/Behavior: She is alert. She is oriented to person, place, and time.     Musculoskeletal:        Neck: There is no tenderness.        Back: There is tenderness.        Right Upper Extremities: Muscle strength is 5/5.        Left Upper Extremities: Muscle strength is 5/5.       Right Lower  Extremities: Muscle strength is 5/5.        Left Lower Extremities: Muscle strength is 5/5.     Neurological:        Sensory: There is no sensory deficit in the trunk. There is no sensory deficit in the extremities.        DTRs: DTRs are DTRS NORMAL AND SYMMETRICnormal and symmetric.        Cranial nerves: Cranial nerve(s) II, III, IV, V, VI, VII, VIII, IX, X, XI and XII are intact.       Significant Labs:  Recent Labs   Lab 03/27/19 0024   *      K 3.7   CL 99   CO2 37*   BUN 10   CREATININE 0.7   CALCIUM 9.5   MG 2.0     Recent Labs   Lab 03/27/19 0024   WBC 9.21   HGB 12.9   HCT 43.5   *     Recent Labs   Lab 03/27/19 0024   INR 0.9     Microbiology Results (last 7 days)     Procedure Component Value Units Date/Time    Blood culture x two cultures. Draw prior to antibiotics. [[id number]] Collected:  03/27/19 0127    Order Status:  Sent Specimen:  Blood from Peripheral, Forearm, Left Updated:  03/27/19 0141    Blood culture x two cultures. Draw prior to antibiotics. [[id number]] Collected:  03/27/19 0024    Order Status:  Sent Specimen:  Blood from Peripheral, Antecubital, Left Updated:  03/27/19 0046        All pertinent labs from the last 24 hours have been reviewed.  Significant Diagnostics:  I have reviewed all pertinent imaging results/findings within the past 24 hours.    Assessment/Plan:     There are no hospital problems to display for this patient.    73F with L2 compression fracture after fall.    -- No acute neurosurgical intervention necessary.  -- Obtain TLSO brace for comfort  -- Pain control  -- Pt to follow up in neurosurgery clinic.  -- OK to DC home from a neurosurgical perspective.    Thank you for your consult. I will follow-up with patient. Please contact us if you have any additional questions.    Jose Alberto Tyson MD  Neurosurgery  [facility]

## 2019-03-27 NOTE — ED NOTES
Called GILBERT Mcarthur Supervisor ([phone]). Advised that we were unable to contact anyone for a fitting. He advised that he would either get in touch with someone or come out and take care of it himself.

## 2019-03-27 NOTE — TELEPHONE ENCOUNTER
Pt will call us w/her PCP's info so we can send the XR order.  Pt will F/U w/her PCP in 2WK for eval w/XR.  V/U. ----- Message from Wilfrid Cook sent at 3/27/2019  3:30 PM CDT -----  Contact: Pt:[phone]  .Patient Requesting Sooner Appointment.     Reason for sooner appt.:Pt states she would like to speak with the nurse because she was in the ER  When is the first available appointment?none  Communication Preference:Pt:[phone]  Additional Information:

## 2019-03-27 NOTE — ED NOTES
Difficulty obtaining accurate SPO2 reading, nasal probe used which showed 85 to 95%. Charge RN made aware. Pt does not appear to be in respiratory distress but does report a URI. Pt to remain on 4L NC.

## 2019-03-27 NOTE — ED NOTES
Called [facility] ([phone]) - No Answer  Called the hospital  and was transferred to [facility]. They advised me to call [phone] and were unable to provide me with any other means of contacting someone.  House Sup and [facility] notified.

## 2019-03-27 NOTE — ED NOTES
Called [facility] ([phone]) every 5 minutes from 3am to 4am. Phone transfers directly to [employer].   [facility] notified and will continue to search for a solution.

## 2019-03-27 NOTE — ED PROVIDER NOTES
Encounter Date: 3/26/2019       History     Chief Complaint   Patient presents with    Fall     Pt fell s/p dizziness and pt fell backwards injuring back. Pt denies LOC, head injury, blood thinner use. Bruising to RUE. Pt has breast cancer, no longer on chemo, on 2.5L NC of home O2. Pt reports increased pain sitting or standing.      Patient is a 73 year old female with PMHX of hx of breast cancer 2015, d-CHF with 60%EF, HTN, HLD, COPD, and degenerative disc disease. She presents to the ED for fall. She reports having fall from standing position last night. Reports falling directly onto tailbone and onto back. Denies head trauma. Denies LOC. Denies hx of anticoagulation. Also, reports fever 101 on Saturday. Reports associated productive cough with green tinged sputum and SOB. Reports baseline oxygen saturation of 80-85% on home oxygen of 2.5 to 3 L via NC. Reports desaturation to 70% with exertion, which has been occurring for approximately one year. Denies urinary/fecal incontinence, paresthesias, or lower extremity weakness. She denies chills, nausea, vomiting, chest pain, abd pain, dysuria, diarrhea, or constipation. She is a current everyday smoker and reports alcohol use. Patient reports previous spine surgeon, Dr. Mccoy in [place].         Review of patient's allergies indicates:   Allergen Reactions    Adhesive Rash     Use PAPER Tape / TEGADERM    Levaquin [levofloxacin] Diarrhea and Nausea Only    Penicillins Rash     Past Medical History:   Diagnosis Date    Allergy     Back pain     Breast cancer 5/2015    right breast infiltrating ductal CA with DCIS, ER positive, [place]/Her2 negative     COPD (chronic obstructive pulmonary disease)     Chronic bronchitis    Hyperlipidemia     Hypertension      Past Surgical History:   Procedure Laterality Date    [id number] N/A 3/2/2018    Performed by [facility] at [facility]    BREAST BIOPSY Right     BREAST LUMPECTOMY Right 2015    IDC & DCIS     CERVICAL FUSION      DISSECTION-AXILLARY LYMPH NODE Right 6/29/2015    [facility]ND [facility]    HYSTERECTOMY      with BSO    [id number]-[id number]-E-CATH Fluoro equipment needed Left 8/3/2015    [facility] OR [facility]    LUMBAR FUSION      LUMPECTOMY-BREAST-w/wire localization  Right 6/29/2015    [facility]    LYMPH NODE DISSECTION Right     REMOVAL-PORT-A-CATH Left 12/3/2015    [facility] OR [facility]ND FLR    right lumpectomy       Family History   Problem Relation Age of Onset    Breast cancer Maternal Grandmother 75    Ovarian cancer Neg Hx      Social History     Tobacco Use    Smoking status: Current Every Day Smoker     Packs/day: 1.00     Years: 45.00     Pack years: 45.00     Types: Cigarettes    Smokeless tobacco: Never Used    Tobacco comment: admits to attempting to quit multiple times; currently smoking up to 1 packs/day down from 2 packs/day at time of diagnosis   Substance Use Topics    Alcohol use: Yes     Comment: occasionaly    Drug use: No     Review of Systems   Constitutional: Positive for fever.   HENT: Negative for sore throat.    Respiratory: Positive for cough and shortness of breath.    Cardiovascular: Negative for chest pain.   Gastrointestinal: Negative for abdominal pain, nausea and vomiting.   Genitourinary: Negative for dysuria.   Musculoskeletal: Positive for back pain.   Skin: Negative for rash.   Neurological: Negative for weakness.   Hematological: Does not bruise/bleed easily.       Physical Exam     Initial Vitals [03/26/19 2330]   BP Pulse Resp Temp SpO2   (!) 145/65 94 18 97.5 °F (36.4 °C) (!) 89 %      MAP       --         Physical Exam    Vitals reviewed.  Constitutional: She appears well-developed and well-nourished. No distress.   HENT:   Head: Normocephalic.   Eyes: Conjunctivae are normal.   Neck: Normal range of motion.    Cardiovascular: Normal rate and regular rhythm.   No murmur heard.  Pulmonary/Chest: Breath sounds normal. No respiratory distress. She has no wheezes. She has no rales.   Abdominal: Soft. Bowel sounds are normal. She exhibits no distension. There is no tenderness.   Musculoskeletal: Normal range of motion.   Midline spinal tenderness of lumbar.    Neurological: She is alert and oriented to person, place, and time. She has normal strength. No sensory deficit.   Reflex Scores:       Patellar reflexes are 2+ on the right side and 2+ on the left side.       Achilles reflexes are 2+ on the right side and 2+ on the left side.  Skin: Skin is warm and dry. No erythema.         ED Course   Procedures  Labs Reviewed   CBC W/ AUTO DIFFERENTIAL - Abnormal; Notable for the following components:       Result Value     (*)     MCHC 29.7 (*)     Platelets 146 (*)     Lymph # 0.6 (*)     Gran% 80.2 (*)     Lymph% 6.6 (*)     All other components within normal limits   COMPREHENSIVE METABOLIC PANEL - Abnormal; Notable for the following components:    CO2 37 (*)     Glucose 137 (*)     ALT 7 (*)     Anion Gap 6 (*)     All other components within normal limits   CULTURE, BLOOD   CULTURE, BLOOD   LACTIC ACID, PLASMA   B-TYPE NATRIURETIC PEPTIDE   TROPONIN I   PROTIME-INR   MAGNESIUM          Imaging Results           CT Lumbar Spine Without Contrast (Final result)  Result time 03/27/19 02:05:03    Final result by Melchor Hagen MD (03/27/19 02:05:03)                 Impression:      1. Acute appearing mild superior endplate compression deformity L2.  2. Posterior instrumented fusion L3-L5.  Hardware appears intact in appropriate position.  3. L5-S1 right paracentral disc osteophyte complex resulting in moderate right neural foraminal narrowing.  4. Spinal cord stimulator in place.  Additional findings as above.    This report was flagged in Epic as abnormal.    Electronically signed by resident: Mohamud  Foto  Date:    03/27/2019  Time:    01:23    Electronically signed by: Melchor Hagen MD  Date:    03/27/2019  Time:    02:05             Narrative:    EXAMINATION:  CT LUMBAR SPINE WITHOUT CONTRAST    CLINICAL HISTORY:  Low back pain, minor trauma;    TECHNIQUE:  Low-dose axial, sagittal and coronal reformations are obtained through the lumbar spine.  Contrast was not administered.    COMPARISON:  PET-CT 09/18/2018, CTA chest 08/17/2015    FINDINGS:  Posterior instrumented fusion L3-L5 with bilateral pedicle screws and posterior fixation rods.  Hardware appears intact in appropriate position.  No evidence of hardware loosening.  Spinal cord stimulator device within the paraspinal soft tissues overlying T12-L1 with course of distal leads obscured by hardware artifact.    Satisfactory alignment without evidence of spondylolisthesis.    Acute appearing mild superior endplate compression deformity of L2.  Remaining lumbar vertebral bodies are normal in height.  No destructive osseous lesion identified.    Mild disc space height loss L5-S1 with vacuum disc phenomenon and endplate sclerosis.    There is degenerative disc disease with most significant level being L5-S1 with right paracentral disc osteophyte complex resulting in moderate right neural foraminal narrowing.  No significant spinal canal stenosis.    Calcific atherosclerosis of the abdominal aorta.  Stable left adrenal nodule.                               X-Ray Pelvis Routine AP (Final result)  Result time 03/27/19 01:02:02    Final result by Melchor Hagen MD (03/27/19 01:02:02)                 Impression:      No acute displaced pelvic fracture.      Electronically signed by: Melchor Hagen MD  Date:    03/27/2019  Time:    01:02             Narrative:    EXAMINATION:  XR PELVIS ROUTINE AP    CLINICAL HISTORY:  Pain in unspecified hip    TECHNIQUE:  AP view of the pelvis was performed.    COMPARISON:  None.    FINDINGS:  No displaced pelvic fracture  identified.  No bone destruction.  Hip joints and SI joints appear intact.  Partially visualized instrumented hardware in the lumbar spine.                               X-Ray Chest AP Portable (Final result)  Result time 03/27/19 01:10:03    Final result by Melchor Hagen MD (03/27/19 01:10:03)                 Impression:      No acute findings in the chest.      Electronically signed by: Melchor Hagen MD  Date:    03/27/2019  Time:    01:10             Narrative:    EXAMINATION:  XR CHEST AP PORTABLE    CLINICAL HISTORY:  Sepsis;    TECHNIQUE:  Single frontal view of the chest was performed.    COMPARISON:  October 25, 2016.    FINDINGS:  Stimulator device is seen overlying the lower thoracic spine, similar to prior.    Chest appears under penetrated compared to prior.    No consolidation, pleural effusion or pneumothorax.    Cardiomediastinal silhouette is unremarkable.                                       APC / Resident Notes:   Patient is a 73 year old female presents to the ED for emergent evaluation of fall and SOB.     Will order labs and imaging. Will continue to monitor.     Differential diagnoses include, but are not limited to: metastatic disease, pneumonia, fracture/dislocation, or electrolyte imbalance.     No leukocytosis. Hemodynamically stable. Thrombocytopenia. Lactate WNL. Blood cultures pending. CXR found to have No acute findings. Xray pelvis found to have No acute displaced pelvic fracture. CT lumbar spine found to have Acute mild superior endplate compression deformity L2. Neurosurgery consulted whom states patient needs TLSO brace and pain control and may F/U in their clinic.     Patient reassessed, reports symptoms improved with ED management. I have discussed emergency department findings, and plan with the patient. Will discharge home with pain control and F/U with neurosurgery and PCP. Patient verbalizes understanding of plan and agrees. Return precautions given.     I have  discussed and reviewed with my supervising physician.         Attending Attestation:     Physician Attestation Statement for NP/PA:   I reviewed the chart but I did not personally examine the patient. The face to face encounter was performed by the NP/PA.            Clinical Impression:       ICD-10-CM ICD-9-CM   1. Closed wedge compression fracture of second lumbar vertebra, initial encounter S32.020A 805.4   2. Fall W19.XXXA E888.9   3. Shortness of breath R06.02 786.05   4. Hip pain M25.559 719.45         Disposition:   Disposition: Discharged  Condition: Fair                        Emely Lockwood PA-C  03/27/19 0416       Param Rogel MD  04/03/19 0216

## 2019-03-27 NOTE — ED TRIAGE NOTES
Radha Woods, a 73 y.o. female presents to the ED w/ complaint of falling backwards on her back due to her being dizzy. Pt denies LOC, any head trauma, and blood thinner uses. Pt states that her midline back is in a 10/10 pain. Pt had back surgery long ago, and had titanium rods placed in her back. Pt also complains of SOB and green tinged sputum during admission, and is on 2.5L at home. She states that she had an URI last week. Pt states her normal O2 saturation is 80-85% during rest and decreases during exertion. Pt is currently has an O2 saturation of % at this time. Pt has a history of breast cancer and states that she is done with treatment a year ago. She was suppose to have a follow up today, but couldn't make it due to her falling.    Triage note:  Chief Complaint   Patient presents with    Fall     Pt fell s/p dizziness and pt fell backwards injuring back. Pt denies LOC, head injury, blood thinner use. Bruising to RUE. Pt has breast cancer, no longer on chemo, on 2.5L NC of home O2. Pt reports increased pain sitting or standing.      Review of patient's allergies indicates:   Allergen Reactions    Adhesive Rash     Use PAPER Tape / TEGADERM    Levaquin [levofloxacin] Diarrhea and Nausea Only    Penicillins Rash     Past Medical History:   Diagnosis Date    Allergy     Back pain     Breast cancer 5/2015    right breast infiltrating ductal CA with DCIS, ER positive, [place]/Her2 negative     COPD (chronic obstructive pulmonary disease)     Chronic bronchitis    Hyperlipidemia     Hypertension

## 2019-03-28 ENCOUNTER — TELEPHONE (OUTPATIENT)
Dept: NEUROSURGERY | Facility: CLINIC | Age: 74
End: 2019-03-28

## 2019-03-28 NOTE — TELEPHONE ENCOUNTER
Pt called and seemed confused about our convo yesterday.  Explained to pt that we need her PCP's info and pt is unable to make a decision about whether she would like her F/U care to be done in [place] or in her hometown.  Advised pt to discuss her options w/her family and let us know. V/U.

## 2019-03-28 NOTE — TELEPHONE ENCOUNTER
Spoke w/Alisha from pt's PCP office.  States pt claimed she fell in the parking lot at discharge while attempting to get into he vehicle.  Claims no one helped her or advised her to return to the ED.  Pt did not tell me she fell during our previous convos.    Pt states she is in a lot of pain and I advised the nurse to speak with the pt and recommend returning to the ED if the pain continues or increases.    Will fax radiologist reports per her PCP's request. V/U.   Dr. Duran  [phone] (P)  [phone] (F)

## 2019-04-01 LAB
BACTERIA BLD CULT: NORMAL
BACTERIA BLD CULT: NORMAL

## 2019-04-16 ENCOUNTER — TELEPHONE (OUTPATIENT)
Dept: ADMINISTRATIVE | Facility: CLINIC | Age: 74
End: 2019-04-16

## 2019-04-18 ENCOUNTER — OFFICE VISIT (OUTPATIENT)
Dept: NEUROSURGERY | Facility: CLINIC | Age: 74
End: 2019-04-18
Payer: MEDICARE

## 2019-04-18 VITALS
BODY MASS INDEX: 22.11 KG/M2 | SYSTOLIC BLOOD PRESSURE: 124 MMHG | WEIGHT: 140.88 LBS | HEART RATE: 77 BPM | HEIGHT: 67 IN | TEMPERATURE: 98 F | DIASTOLIC BLOOD PRESSURE: 65 MMHG

## 2019-04-18 DIAGNOSIS — S32.020D CLOSED COMPRESSION FRACTURE OF L2 LUMBAR VERTEBRA WITH ROUTINE HEALING, SUBSEQUENT ENCOUNTER: Primary | ICD-10-CM

## 2019-04-18 PROCEDURE — 99203 PR OFFICE/OUTPT VISIT, NEW, LEVL III, 30-44 MIN: ICD-10-PCS | Mod: S$PBB,,, | Performed by: NEUROLOGICAL SURGERY

## 2019-04-18 PROCEDURE — 99999 PR PBB SHADOW E&M-EST. PATIENT-LVL III: CPT | Mod: PBBFAC,,, | Performed by: NEUROLOGICAL SURGERY

## 2019-04-18 PROCEDURE — 99213 OFFICE O/P EST LOW 20 MIN: CPT | Mod: PBBFAC | Performed by: NEUROLOGICAL SURGERY

## 2019-04-18 PROCEDURE — 99203 OFFICE O/P NEW LOW 30 MIN: CPT | Mod: S$PBB,,, | Performed by: NEUROLOGICAL SURGERY

## 2019-04-18 PROCEDURE — 99999 PR PBB SHADOW E&M-EST. PATIENT-LVL III: ICD-10-PCS | Mod: PBBFAC,,, | Performed by: NEUROLOGICAL SURGERY

## 2019-04-18 RX ORDER — GARLIC 1000 MG
1000 CAPSULE ORAL
COMMUNITY
End: 2022-04-04

## 2019-04-18 RX ORDER — IBUPROFEN 100 MG/5ML
1000 SUSPENSION, ORAL (FINAL DOSE FORM) ORAL DAILY
COMMUNITY
End: 2022-04-04

## 2019-04-18 NOTE — PROGRESS NOTES
CHIEF COMPLAINT:  Follow up from ED for evaluation of low back pain    I, Gilberto Robertson, attest that this documentation has been prepared under the direction and in the presence of Enmanuel Olivera MD.    HPI:  Radha Woods is a 73 y.o.  female with history of COPD, CHF, and breast cancer, who presents today in follow up from the ED for low back pain after a fall on 3/26/2019, no LOC, suffering an L2 compression fracture. Pt reports that her pain was at a 5/10 in severity earlier today but has progressed to a 7/10 during the visit. Pt reports 2 previous cervical fusions and a lumbar fusion. Pt's previous lumbar surgery was performed in 1998 by Dr. Mccoy in [place]. Pt notes longstanding back pain, but states that it has been exacerbated by her recent fall. Pt takes oxycodone PRN to manage her back pain and has been requiring more medication since her fall. Pt fell after bending forward to  an object and standing upright causing light headedness leading to the fall. She fell onto her buttocks. Pt denies shooting leg pain. She is ambulating better since her ED visit but continues to guard against the pain. Pt takes calcium supplements and boniva for her osteoporosis. Pt presents today wearing a TLSO brace which she has been wearing at all times besides removing it to stretch. Pt is taking letrozole, prescribed by her oncologist, which she states is causing bone thinning. Pt is a smoker but states that she is trying to quit.          Review of patient's allergies indicates:   Allergen Reactions    Adhesive Rash     Use PAPER Tape / TEGADERM    Levaquin [levofloxacin] Diarrhea and Nausea Only    Penicillins Rash       Past Medical History:   Diagnosis Date    Allergy     Back pain     Breast cancer 5/2015    right breast infiltrating ductal CA with DCIS, ER positive, [place]/Her2 negative     COPD (chronic obstructive pulmonary disease)     Chronic bronchitis    Hyperlipidemia     Hypertension      Past  Surgical History:   Procedure Laterality Date    [id number] N/A 3/2/2018    [facility]    BREAST BIOPSY Right     BREAST LUMPECTOMY Right 2015    IDC & DCIS    CERVICAL FUSION      DISSECTION-AXILLARY LYMPH NODE Right 6/29/2015    [facility] OR 2ND FLR    HYSTERECTOMY      with BSO    [id number]-MQSM-M-CATH Fluoro equipment needed Left 8/3/2015    [facility] OR 2ND [facility]LR    LUMBAR FUSION      LUMPECTOMY-BREAST-w/wire localization  Right 6/29/2015    [facility] 2ND [facility]LR    LYMPH NODE DISSECTION Right     REMOVAL-PORT-A-CATH Left 12/3/2015    [facility]    right lumpectomy       Family History   Problem Relation Age of Onset    Breast cancer Maternal Grandmother 75    Ovarian cancer Neg Hx      Social History     Tobacco Use    Smoking status: Current Every Day Smoker     Packs/day: 1.00     Years: 45.00     Pack years: 45.00     Types: Cigarettes    Smokeless tobacco: Current User    Tobacco comment: admits to attempting to quit multiple times; currently smoking up to 1 packs/day down from 2 packs/day at time of diagnosis   Substance Use Topics    Alcohol use: Yes     Comment: occasionaly    Drug use: No        Review of Systems   Constitutional: Negative.    HENT: Negative.    Eyes: Negative.    Respiratory: Negative.    Cardiovascular: Negative.    Gastrointestinal: Negative.    Endocrine: Negative.    Genitourinary: Negative.    Musculoskeletal: Positive for back pain (low back pain). Negative for gait problem and neck pain.   Skin: Negative.    Allergic/Immunologic: Negative.    Neurological: Negative for weakness, light-headedness, numbness and headaches.   Hematological: Negative.    Psychiatric/Behavioral: Negative.        OBJECTIVE:   Vital Signs:  Temp: 97.7 °F (36.5 °C) (04/18/19 1013)  Pulse: 77 (04/18/19 1013)  BP:  124/65 (04/18/19 1013)    Physical Exam:    Vital signs: All nursing notes and vital signs reviewed -- afebrile, vital signs stable.  Constitutional: Patient sitting comfortably in chair. Appears well developed and well nourished.  Skin: Exposed areas are intact without abnormal markings, rashes or other lesions.  HEENT: Normocephalic. Normal conjunctivae.  Cardiovascular: Normal rate and regular rhythm.  Respiratory: Chest wall rises and falls symmetrically, without signs of respiratory distress.  Abdomen: Soft and non-tender.  Extremities: Warm and without edema. Calves supple, non-tender.  Psych/Behavior: Normal affect.    Neurological:    Mental status: Alert and oriented. Conversational and appropriate.       Cranial Nerves: Grossly intact.     Motor:    Upper:  Deltoids Triceps Biceps WE WF     R 5/5 5/5 5/5 5/5 5/5 5/5    L 5/5 5/5 5/5 5/5 5/5 5/5      Lower:  HF KE KF DF PF EHL    R 5/5 5/5 5/5 5/5 5/5 5/5    L 5/5 5/5 5/5 5/5 5/5 5/5     Sensory: Intact sensation to light touch in all extremities. Romberg negative.    Reflexes:          DTR: 2+ symmetrically throughout.     Meyers's: Negative.     Babinski's: Negative.     Clonus: Negative.    Cerebellar: Finger-to-nose and rapid alternating movements normal. Gait stable, fluid.    Spine:    Posture: Head well aligned over pelvis in front and side views.  No focal or global spinal deformity visible on inspection. Shoulders and hips even. No obvious leg length discrepancy. No scapula winging.    Bending: Full ROM with forward, back and lateral bending. No rib prominence with forward bend.    Cervical:      ROM: Full with flexion, extension, lateral rotation and ear-to-shoulder bend.      Midline TTP: Negative.     Spurling's test: Negative.     Lhermitte's: Negative.    Thoracic:     Midline TTP: Negative    Lumbar:     Midline TTP: Positive over fracture site.     Straight Leg Test: Negative     Crossed Straight Leg Test: Negative     Sciatic notch  tenderness: Negative.    Other:     SI joint TTP: Negative.     Greater trochanter TTP: Negative.     Tenderness with external/internal hip rotation: Negative.    Diagnostic Results:  All imaging was independently reviewed by me.    PET Scan, dated 9/18/2018:   1. No evidence of malignancy    DXA Bone Scan, dated 5/28/2018:   1. Osteoporosis    CT L-spine, dated 3/27/2019:  1. Previous L3 to L5 posterior fusion with an implanted bone stimulator  2. Evidence of bony fusion posterolaterally L3 to L5  3. Mild compression fracture of superior endplate of L2     ASSESSMENT/PLAN:     Radha Woods has an osteoporotic L2 compression fracture undergoing routine healing. It has only been three weeks since her fall and there is still focal TTP at L2. My recommendation is smoking cessation and referral to the fragility clinic for Forteo therapy. She will follow up in my clinic in 3 months with a new lumbar xray.    The patient understands and agrees with the plan of care. All questions were answered.     1. Referral to Fragility Clinic for Forteo   2. RTC 3 months with X-ray L-spine      I, Dr. Enmanuel Olivera personally performed the services described in this documentation. All medical record entries made by the scribe, Gilberto Robertson, were at my direction and in my presence.  I have reviewed the chart and agree that the record reflects my personal performance and is accurate and complete.      Enmanuel Olivera M.D.  Department of Neurosurgery  [facility]      .

## 2019-04-22 ENCOUNTER — TELEPHONE (OUTPATIENT)
Dept: ORTHOPEDICS | Facility: CLINIC | Age: 74
End: 2019-04-22

## 2019-04-22 NOTE — TELEPHONE ENCOUNTER
Tried contacting patient regarding scheduling  an appointment  in the fracture clinic , no answer , could not leave a message no voice mail

## 2019-05-07 ENCOUNTER — TELEPHONE (OUTPATIENT)
Dept: HEMATOLOGY/ONCOLOGY | Facility: CLINIC | Age: 74
End: 2019-05-07

## 2019-05-07 NOTE — TELEPHONE ENCOUNTER
----- Message from Allison Brandt sent at 5/7/2019  4:28 PM CDT -----  Next available KIRAN    ----- Message -----  From: Rosio Allen MD  Sent: 5/7/2019   4:27 PM  To: Allison Brandt    Yes can see KIRAN    ----- Message -----  From: Allison Brandt  Sent: 5/7/2019   4:25 PM  To: Rosio Allen MD    Next available KIRAN?    ----- Message -----  From: Aileen Lino  Sent: 5/7/2019   4:24 PM  To: Tiffany ACRMONA Staff    What is she due for, just a f/u?    ----- Message -----  From: Leana Gomez  Sent: 5/7/2019   4:22 PM  To: Aileen Lino    Pt called to schedule appt   Callback#[phone]  Thank You  CLARITZA Gomez

## 2019-05-07 NOTE — TELEPHONE ENCOUNTER
Cristhian solorzano spoke with patient and assisted with scheduling her f/u apt. Apt made with joe on 5/21

## 2019-05-20 ENCOUNTER — TELEPHONE (OUTPATIENT)
Dept: ORTHOPEDICS | Facility: CLINIC | Age: 74
End: 2019-05-20

## 2019-05-20 NOTE — TELEPHONE ENCOUNTER
I spoke with patient regarding rescheduling her fracture , She will call back after she review her schedule

## 2019-05-20 NOTE — PROGRESS NOTES
Subjective:       Patient ID: Radha Woods is a 73 y.o. female.    Chief Complaint: Malignant neoplasm of right breast in female, estrogen recep    HPI     This is a 72 year old female with right breast cancer, currently on adjuvant letrozole.   In the interim:  osteoporotic L2 compression fracture.   Wearing back brace.   Neurosurgery has referred her to fragility clinic for Forteo therapy.     Most recent BMD 5/2018. Currently on Boniva x 2 years.     Today, feels good.   Generalized aches and pains.   Wears O2 with exertion due to COPD. Has portable O2 with her today.   Continues to smoke.      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   Her carcinoma was ER strongly positive, TN negative, and HER-2 roe negative. She received one cycle of adjuvant AC chemotherapy, and subsequently refused further chemotherapy due to side effects was hospitalized x 2. She completed XRT.   She was then started on aromatase inhibitors with Arimidex and quit because her dyspnea gets worse. Tried Femara then back to Arimidex. Now on Femara.      Had PET scan on 3/2/18 showing adrenal activity (see below).  Subsequent biopsy on 3/2/18 was negative.   Repeat PET scan 9/18/18 revealed No evidence of recurrent or metastatic disease.  Decrease in activity of the left adrenal nodule, previously biopsied with pathology demonstrating benign adrenal cortical tissue      Lives in [place], [place] ([place] [facility])    Review of Systems   Constitutional: Negative for activity change, appetite change, chills, diaphoresis, fatigue, fever and unexpected weight change.   HENT: Negative for congestion, dental problem, ear pain, hearing loss, mouth sores, nosebleeds, rhinorrhea, tinnitus and trouble swallowing.    Eyes: Negative for redness and visual disturbance.   Respiratory: Positive for shortness of breath (chronic but no  recent exacerbation). Negative for cough, chest tightness and wheezing.    Cardiovascular: Negative for chest pain, palpitations and leg swelling.   Gastrointestinal: Negative for abdominal distention, abdominal pain, blood in stool, constipation, diarrhea, nausea and vomiting.   Genitourinary: Negative for decreased urine volume, difficulty urinating, dysuria, frequency and hematuria.   Musculoskeletal: Positive for back pain. Negative for arthralgias, gait problem, joint swelling and neck pain.   Skin: Negative for pallor and rash.   Neurological: Negative for dizziness, weakness, light-headedness, numbness and headaches.   Hematological: Negative for adenopathy. Does not bruise/bleed easily.   Psychiatric/Behavioral: Negative for confusion, dysphoric mood and sleep disturbance. The patient is not nervous/anxious.        Objective:      Physical Exam   Constitutional: She is oriented to person, place, and time. She appears well-developed and well-nourished. No distress.   Presents alone; in WC.    HENT:   Head: Normocephalic and atraumatic.   Nose: Nose normal.   Mouth/Throat: Oropharynx is clear and moist. No oropharyngeal exudate.   Eyes: Pupils are equal, round, and reactive to light. Conjunctivae and EOM are normal. Right eye exhibits no discharge. Left eye exhibits no discharge. No scleral icterus. Right pupil is round and reactive. Left pupil is round and reactive.   Neck: Normal range of motion. Neck supple. No thyromegaly present.   Cardiovascular: Normal rate, regular rhythm, normal heart sounds and intact distal pulses. Exam reveals no gallop and no friction rub.   No murmur heard.  Pulmonary/Chest: Effort normal and breath sounds normal. No respiratory distress. She has no wheezes. She has no rales.   Hyperinflated and diminished breath sounds throughout lung fields consistent with smoking history.  Right breast with well healed lumpectomy incision, no mass, nodule or skin changes. Left breast without  mass, nodule or skin changes. No axillary or supraclavicular adenopathy.    Abdominal: Soft. Bowel sounds are normal. She exhibits no distension and no mass. There is no hepatosplenomegaly. There is no tenderness. There is no rebound and no guarding.   Musculoskeletal: Normal range of motion. She exhibits no edema, tenderness or deformity.   Tenderness to palpation in lumbar spine.   Back brace   5/5 strength in upper and lower extremities.    Lymphadenopathy:        Head (right side): No submandibular adenopathy present.        Head (left side): No submandibular adenopathy present.     She has no cervical adenopathy.        Right cervical: No superficial cervical, no deep cervical and no posterior cervical adenopathy present.       Left cervical: No superficial cervical, no deep cervical and no posterior cervical adenopathy present.        Right: No inguinal and no supraclavicular adenopathy present.        Left: No inguinal and no supraclavicular adenopathy present.   Neurological: She is alert and oriented to person, place, and time. She has normal strength. No cranial nerve deficit or sensory deficit. She exhibits normal muscle tone. Coordination normal.   Skin: Skin is warm and dry. No bruising, no lesion, no petechiae and no rash noted. No erythema. No pallor.   Psychiatric: She has a normal mood and affect. Her behavior is normal. Judgment and thought content normal. Her mood appears not anxious. She does not exhibit a depressed mood.   Nursing note and vitals reviewed.      WBC   Date Value Ref Range Status   05/21/2019 7.37 3.90 - 12.70 K/uL Final     Hemoglobin   Date Value Ref Range Status   05/21/2019 13.9 12.0 - 16.0 g/dL Final     Hematocrit   Date Value Ref Range Status   05/21/2019 43.7 37.0 - 48.5 % Final     Platelets   Date Value Ref Range Status   05/21/2019 219 150 - 350 K/uL Final     Gran # (ANC)   Date Value Ref Range Status   05/21/2019 4.8 1.8 - 7.7 K/uL Final     Gran%   Date Value Ref  Range Status   05/21/2019 65.2 38.0 - 73.0 % Final       Chemistry        Component Value Date/Time     05/21/2019 1212    K 3.8 05/21/2019 1212     05/21/2019 1212    CO2 30 (H) 05/21/2019 1212    BUN 13 05/21/2019 1212    CREATININE 0.7 05/21/2019 1212    GLU 76 05/21/2019 1212        Component Value Date/Time    CALCIUM 9.6 05/21/2019 1212    ALKPHOS 72 05/21/2019 1212    AST 10 05/21/2019 1212    ALT 5 (L) 05/21/2019 1212    BILITOT 0.2 05/21/2019 1212    ESTGFRAFRICA >60.0 05/21/2019 1212    EGFRNONAA >60.0 05/21/2019 1212          Assessment:       1. Carcinoma of breast metastatic to axillary lymph node, unspecified laterality    2. Use of aromatase inhibitors    3. Osteoporosis, unspecified osteoporosis type, unspecified pathological fracture presence        Plan:       -Doing well, MISTY clinically. Labs reviewed.   -MMG scheduled for 6/5/19.  -Continue Letrozole.   -Unable to add Vit D level- will check at next visit.   -Of note, Recent osteoporotic fx, on boniva and being referred to fragility clinic. Will get BMD at 1 year to compare.   -RTC 3 months to see Dr. Allen with a CBC, CMP, Vit D level and BMD .     Patient is in agreement with the proposed treatment plan. All questions were answered to the patient's satisfaction. Pt knows to call clinic for any new or worsening symptoms and if anything is needed before the next clinic visit.      Eh Hdz, ODALISP-C  Hematology & Oncology  [facility] [place] [address]  ph. [phone]  Fax. [phone]     I spent 30 minutes (face to face) with the patient, more than 50% was in counseling and coordination of care as detailed above.           Distress Screening Results: Psychosocial Distress screening score of Distress Score: 3 noted and reviewed. No intervention indicated.

## 2019-05-20 NOTE — TELEPHONE ENCOUNTER
----- Message from Rula Chan LPN sent at 5/17/2019  4:58 PM CDT -----  Contact: Allen Stoddard -    Pt was expecting sooner appt in Fracture Clinic than June. I noted an attempt had been made to contact her on 4/22/19.  Please call pt to assist with sooner appt, as she was to consult with her Hem-Onc doctor at a future appt regarding any improvement per Fracture Clinic. She indicated that she  is limited in the number of appts she can tolerate in one day. I let her know that she will receive a follow up all on Monday.   Thank you,  Rula  [phone]  ----- Message -----  From: Hussain Lance  Sent: 5/17/2019   4:20 PM  To: [facility] Clinical Support    Pt is requesting a call back in reference to an appointment.     [phone]

## 2019-05-21 ENCOUNTER — LAB VISIT (OUTPATIENT)
Dept: LAB | Facility: HOSPITAL | Age: 74
End: 2019-05-21
Attending: INTERNAL MEDICINE
Payer: MEDICARE

## 2019-05-21 ENCOUNTER — OFFICE VISIT (OUTPATIENT)
Dept: HEMATOLOGY/ONCOLOGY | Facility: CLINIC | Age: 74
End: 2019-05-21
Payer: MEDICARE

## 2019-05-21 VITALS
TEMPERATURE: 98 F | DIASTOLIC BLOOD PRESSURE: 57 MMHG | SYSTOLIC BLOOD PRESSURE: 116 MMHG | HEIGHT: 67 IN | RESPIRATION RATE: 16 BRPM | HEART RATE: 89 BPM | WEIGHT: 136.88 LBS | OXYGEN SATURATION: 94 % | BODY MASS INDEX: 21.48 KG/M2

## 2019-05-21 DIAGNOSIS — C77.3 CARCINOMA OF BREAST METASTATIC TO AXILLARY LYMPH NODE, UNSPECIFIED LATERALITY: Primary | ICD-10-CM

## 2019-05-21 DIAGNOSIS — M81.0 OSTEOPOROSIS, UNSPECIFIED OSTEOPOROSIS TYPE, UNSPECIFIED PATHOLOGICAL FRACTURE PRESENCE: ICD-10-CM

## 2019-05-21 DIAGNOSIS — C77.3 CARCINOMA OF RIGHT BREAST METASTATIC TO AXILLARY LYMPH NODE: ICD-10-CM

## 2019-05-21 DIAGNOSIS — C50.919 CARCINOMA OF BREAST METASTATIC TO AXILLARY LYMPH NODE, UNSPECIFIED LATERALITY: Primary | ICD-10-CM

## 2019-05-21 DIAGNOSIS — Z79.811 USE OF AROMATASE INHIBITORS: ICD-10-CM

## 2019-05-21 DIAGNOSIS — C50.911 CARCINOMA OF RIGHT BREAST METASTATIC TO AXILLARY LYMPH NODE: ICD-10-CM

## 2019-05-21 LAB
ALBUMIN SERPL BCP-MCNC: 3.5 G/DL (ref 3.5–5.2)
ALP SERPL-CCNC: 72 U/L (ref 55–135)
ALT SERPL W/O P-5'-P-CCNC: 5 U/L (ref 10–44)
ANION GAP SERPL CALC-SCNC: 5 MMOL/L (ref 8–16)
AST SERPL-CCNC: 10 U/L (ref 10–40)
BASOPHILS # BLD AUTO: 0.05 K/UL (ref 0–0.2)
BASOPHILS NFR BLD: 0.7 % (ref 0–1.9)
BILIRUB SERPL-MCNC: 0.2 MG/DL (ref 0.1–1)
BUN SERPL-MCNC: 13 MG/DL (ref 8–23)
CALCIUM SERPL-MCNC: 9.6 MG/DL (ref 8.7–10.5)
CHLORIDE SERPL-SCNC: 106 MMOL/L (ref 95–110)
CO2 SERPL-SCNC: 30 MMOL/L (ref 23–29)
CREAT SERPL-MCNC: 0.7 MG/DL (ref 0.5–1.4)
DIFFERENTIAL METHOD: ABNORMAL
EOSINOPHIL # BLD AUTO: 0.2 K/UL (ref 0–0.5)
EOSINOPHIL NFR BLD: 3.3 % (ref 0–8)
ERYTHROCYTE [DISTWIDTH] IN BLOOD BY AUTOMATED COUNT: 12.4 % (ref 11.5–14.5)
EST. GFR  (AFRICAN AMERICAN): >60 ML/MIN/1.73 M^2
EST. GFR  (NON AFRICAN AMERICAN): >60 ML/MIN/1.73 M^2
GLUCOSE SERPL-MCNC: 76 MG/DL (ref 70–110)
HCT VFR BLD AUTO: 43.7 % (ref 37–48.5)
HGB BLD-MCNC: 13.9 G/DL (ref 12–16)
IMM GRANULOCYTES # BLD AUTO: 0.03 K/UL (ref 0–0.04)
IMM GRANULOCYTES NFR BLD AUTO: 0.4 % (ref 0–0.5)
LYMPHOCYTES # BLD AUTO: 1.5 K/UL (ref 1–4.8)
LYMPHOCYTES NFR BLD: 20.9 % (ref 18–48)
MCH RBC QN AUTO: 29.8 PG (ref 27–31)
MCHC RBC AUTO-ENTMCNC: 31.8 G/DL (ref 32–36)
MCV RBC AUTO: 94 FL (ref 82–98)
MONOCYTES # BLD AUTO: 0.7 K/UL (ref 0.3–1)
MONOCYTES NFR BLD: 9.5 % (ref 4–15)
NEUTROPHILS # BLD AUTO: 4.8 K/UL (ref 1.8–7.7)
NEUTROPHILS NFR BLD: 65.2 % (ref 38–73)
NRBC BLD-RTO: 0 /100 WBC
PLATELET # BLD AUTO: 219 K/UL (ref 150–350)
PMV BLD AUTO: 9.5 FL (ref 9.2–12.9)
POTASSIUM SERPL-SCNC: 3.8 MMOL/L (ref 3.5–5.1)
PROT SERPL-MCNC: 7 G/DL (ref 6–8.4)
RBC # BLD AUTO: 4.66 M/UL (ref 4–5.4)
SODIUM SERPL-SCNC: 141 MMOL/L (ref 136–145)
WBC # BLD AUTO: 7.37 K/UL (ref 3.9–12.7)

## 2019-05-21 PROCEDURE — 99214 OFFICE O/P EST MOD 30 MIN: CPT | Mod: S$PBB,,, | Performed by: NURSE PRACTITIONER

## 2019-05-21 PROCEDURE — 36415 COLL VENOUS BLD VENIPUNCTURE: CPT

## 2019-05-21 PROCEDURE — 80053 COMPREHEN METABOLIC PANEL: CPT

## 2019-05-21 PROCEDURE — 99999 PR PBB SHADOW E&M-EST. PATIENT-LVL III: CPT | Mod: PBBFAC,,, | Performed by: NURSE PRACTITIONER

## 2019-05-21 PROCEDURE — 99213 OFFICE O/P EST LOW 20 MIN: CPT | Mod: PBBFAC | Performed by: NURSE PRACTITIONER

## 2019-05-21 PROCEDURE — 99214 PR OFFICE/OUTPT VISIT, EST, LEVL IV, 30-39 MIN: ICD-10-PCS | Mod: S$PBB,,, | Performed by: NURSE PRACTITIONER

## 2019-05-21 PROCEDURE — 85025 COMPLETE CBC W/AUTO DIFF WBC: CPT

## 2019-05-21 PROCEDURE — 99999 PR PBB SHADOW E&M-EST. PATIENT-LVL III: ICD-10-PCS | Mod: PBBFAC,,, | Performed by: NURSE PRACTITIONER

## 2019-05-21 NOTE — Clinical Note
Hi there!Wanted to make you aware---Current;y on letrozole.She fell in March 2019 and sustained an osteoporotic fx which is followed by neurosurgery. They are sending her to fragility clinic. Her last BMD was 1 year ago and she is on Boniva. I will recheck BMD when you see her in 3 months.

## 2019-06-05 ENCOUNTER — OFFICE VISIT (OUTPATIENT)
Dept: ORTHOPEDICS | Facility: CLINIC | Age: 74
End: 2019-06-05
Payer: MEDICARE

## 2019-06-05 VITALS — HEART RATE: 91 BPM | DIASTOLIC BLOOD PRESSURE: 81 MMHG | SYSTOLIC BLOOD PRESSURE: 120 MMHG

## 2019-06-05 DIAGNOSIS — M81.6 LOCALIZED OSTEOPOROSIS OF LEQUESNE: ICD-10-CM

## 2019-06-05 DIAGNOSIS — M81.0 OSTEOPOROSIS, UNSPECIFIED OSTEOPOROSIS TYPE, UNSPECIFIED PATHOLOGICAL FRACTURE PRESENCE: ICD-10-CM

## 2019-06-05 DIAGNOSIS — C50.919 CARCINOMA OF BREAST METASTATIC TO AXILLARY LYMPH NODE, UNSPECIFIED LATERALITY: Primary | ICD-10-CM

## 2019-06-05 DIAGNOSIS — M80.80XA PATHOLOGICAL FRACTURE DUE TO OSTEOPOROSIS, UNSPECIFIED FRACTURE SITE, UNSPECIFIED OSTEOPOROSIS TYPE, INITIAL ENCOUNTER: Primary | ICD-10-CM

## 2019-06-05 DIAGNOSIS — E55.9 VITAMIN D DEFICIENCY: ICD-10-CM

## 2019-06-05 DIAGNOSIS — C77.3 CARCINOMA OF BREAST METASTATIC TO AXILLARY LYMPH NODE, UNSPECIFIED LATERALITY: Primary | ICD-10-CM

## 2019-06-05 PROCEDURE — 99214 OFFICE O/P EST MOD 30 MIN: CPT | Mod: PBBFAC

## 2019-06-05 PROCEDURE — 99999 PR PBB SHADOW E&M-EST. PATIENT-LVL IV: ICD-10-PCS | Mod: PBBFAC,,, | Performed by: PHYSICIAN ASSISTANT

## 2019-06-05 PROCEDURE — 99999 PR PBB SHADOW E&M-EST. PATIENT-LVL IV: CPT | Mod: PBBFAC,,, | Performed by: PHYSICIAN ASSISTANT

## 2019-06-05 PROCEDURE — 99214 OFFICE O/P EST MOD 30 MIN: CPT | Mod: S$PBB,,, | Performed by: PHYSICIAN ASSISTANT

## 2019-06-05 PROCEDURE — 99214 PR OFFICE/OUTPT VISIT, EST, LEVL IV, 30-39 MIN: ICD-10-PCS | Mod: S$PBB,,, | Performed by: PHYSICIAN ASSISTANT

## 2019-06-05 NOTE — PROGRESS NOTES
SUBJECTIVE:     Chief Complaint & History of Present Illness:  Radha Woods is a new patient 73 y.o. female who is seen here today for a bone health evaluation for osteoporosis, fracture prevention, and risk evaluation for future fractures.   she is accompanied by a family member  today who is helpful with her history.     she was appropriately identified and referred by Enmanuel Olivera MD due to concerns for compromised bone quality, and risk of future fractures.  This visit is medically necessary to identify risk, investigate and initiative treatment as appropriate to improve bone quality and strength for the reduction of secondary fractures.     her medical history, medications and fracture history will be reviewed and summarized      Review of patient's allergies indicates:   Allergen Reactions    Adhesive Rash     Use PAPER Tape / TEGADERM    Levaquin [levofloxacin] Diarrhea and Nausea Only    Penicillins Rash         Current Outpatient Medications   Medication Sig Dispense Refill    albuterol 90 mcg/actuation inhaler Inhale 2 puffs into the lungs every 6 (six) hours as needed for Wheezing.      alendronate (FOSAMAX) 70 MG tablet Take 1 tablet (70 mg total) by mouth every 7 days. 4 tablet 11    ascorbic acid, vitamin C, (VITAMIN C) 1000 MG tablet Take 1,000 mg by mouth once daily.      aspirin (ECOTRIN) 81 MG EC tablet Take 81 mg by mouth once daily.      calcium carbonate (OS-JENNI) 500 mg calcium (1,250 mg) chewable tablet Take 2 tablets by mouth once daily.       cetirizine (ZYRTEC) 10 MG tablet Take 10 mg by mouth every evening.       cholecalciferol, vitamin D3, (VITAMIN D3) 2,000 unit Cap Take 1,000 Units by mouth once daily.       cyclobenzaprine (FLEXERIL) 10 MG tablet Take 10 mg by mouth 3 (three) times daily as needed for Muscle spasms.      fish oil-omega-3 fatty acids 300-1,000 mg capsule Take by mouth once daily.      garlic 1,000 mg Cap Take 1,000 mg by mouth.      ibandronate  (BONIVA) 150 mg tablet Take 1 tablet (150 mg total) by mouth every 30 days. 1 tablet 11    letrozole (FEMARA) 2.5 mg Tab TAKE 1 TABLET BY MOUTH ONCE DAILY 90 tablet 4    metoprolol tartrate (LOPRESSOR) 50 MG tablet Take 50 mg by mouth every evening.       oxyCODONE-acetaminophen (PERCOCET) 5-325 mg per tablet Take 1 tablet by mouth every 4 (four) hours as needed for Pain. 12 tablet 0    pravastatin (PRAVACHOL) 40 MG tablet Take 40 mg by mouth every evening.       tiotropium-olodaterol (STIOLTO RESPIMAT) 2.5-2.5 mcg/actuation Mist Inhale into the lungs once daily.       topiramate (TOPAMAX) 50 MG tablet Take 50 mg by mouth every evening.       varenicline (CHANTIX SABINE) 0.5 mg (11)- 1 mg (42) tablet Take by mouth. Take one 0.5mg tab by mouth once daily X3 days,then increase to one 0.5mg tab twice daily X4 days,then increase to one 1mg tab twice daily      vitamin E 400 UNIT capsule Take 400 Units by mouth once daily.       No current facility-administered medications for this visit.      Facility-Administered Medications Ordered in Other Visits   Medication Dose Route Frequency Provider Last Rate Last Dose    ondansetron HCl (PF) 4 mg/2 mL injection    PRN Shy Montague CRNA   4 mg at 06/29/15 1246       Past Medical History:   Diagnosis Date    Allergy     Back pain     Breast cancer 5/2015    right breast infiltrating ductal CA with DCIS, ER positive, TX/Her2 negative     COPD (chronic obstructive pulmonary disease)     Chronic bronchitis    Hyperlipidemia     Hypertension        Past Surgical History:   Procedure Laterality Date    DALNCP-OLLZEU-PS N/A 3/2/2018    Performed by [facility] Surgeon at [facility] EV    BREAST BIOPSY Right     BREAST LUMPECTOMY Right 2015    IDC & DCIS    CERVICAL FUSION      DISSECTION-AXILLARY LYMPH NODE Right 6/29/2015    Performed by Robert Simmons MD at [facility] OR 2ND FLR    HYSTERECTOMY      with BSO    TEJXFIXYT-OSQZ-R-CATH Fluoro equipment needed Left 8/3/2015     Per[facility] at [facility] OR 2ND FLR    LUMBAR FUSION      LUMPECTOMY-BREAST-w/wire localization  Right 6/29/2015    Performed by Robert Simmons [facility] at [facility] OR 2ND FLR    LYMPH NODE DISSECTION Right     REMOVAL-PORT-A-CATH Left 12/3/2015    Performed by Robert Simmons [facility] at [facility] OR 2ND FLR    right lumpectomy         Lab Results   Component Value Date     05/21/2019    K 3.8 05/21/2019     05/21/2019    CO2 30 (H) 05/21/2019    GLU 76 05/21/2019    BUN 13 05/21/2019    CREATININE 0.7 05/21/2019    CALCIUM 9.6 05/21/2019    PROT 7.0 05/21/2019    ALBUMIN 3.5 05/21/2019    BILITOT 0.2 05/21/2019    ALKPHOS 72 05/21/2019    AST 10 05/21/2019    ALT 5 (L) 05/21/2019    ANIONGAP 5 (L) 05/21/2019    ESTGFRAFRICA >60.0 05/21/2019    EGFRNONAA >60.0 05/21/2019      Lab Results   Component Value Date    WBC 7.37 05/21/2019    RBC 4.66 05/21/2019    HGB 13.9 05/21/2019    HCT 43.7 05/21/2019    MCV 94 05/21/2019    MCH 29.8 05/21/2019    MCHC 31.8 (L) 05/21/2019    RDW 12.4 05/21/2019     05/21/2019    MPV 9.5 05/21/2019    GRAN 4.8 05/21/2019    GRAN 65.2 05/21/2019    LYMPH 1.5 05/21/2019    LYMPH 20.9 05/21/2019    MONO 0.7 05/21/2019    MONO 9.5 05/21/2019    EOS 0.2 05/21/2019    BASO 0.05 05/21/2019    EOSINOPHIL 3.3 05/21/2019    BASOPHIL 0.7 05/21/2019    DIFFMETHOD Automated 05/21/2019      Lab Results   Component Value Date    MG 2.0 03/27/2019      No results found for: PHOS   No results found for: [id number]   No results found for: PTH   No results found for: TSH   No results found for: FREET4   No results found for: HGBA1C, ESTIMATEDAVG   No results found for: FREETESTOSTE         Vital Signs (Most Recent)  Vitals:    06/05/19 1420   BP: 120/81   Pulse: 91         Today's Visit and Bone Health History     Have you had any loss of height or gotten shorter since your 20's?  0   Are you postmenopausal?  Surgical hysterectomy with overies removed   Please indicate  at what age you became postmenopausal. 45   Have you ever taken any type of hormone replacement therapy? Yes   If you have had hormone replacement therapy please indicate which hormone therapy was used and for how long. orthonovum   Do you currently smoke? Yes   Have you ever smoked in the past? Yes   How many alcoholic beverages do you drink per day? 0   How many caffeinated beverages do you drink per day? 1 to 3   Have you had 2 or more falls in the past 12 months? No   How active have you been in the past 12 months? Not active ( in the house only )   Did either of your parents have a hip fracture after the age of 50? No   Have you ever been diagnosed with any of the following? COPD    Hepatitis B or C    Fracture   Do you currently have a fracture? Yes   If you currently have a fracture please indicate where and when the fracture occured. lumbar / March 26 2019   Have you broken any other bones since you turned 50 or older? No   Please list all bones broken since you turned 50 or older. na   Have you ever had a bone density test or DXA scan? Yes   Are you currently taking or have you ever taken any of the following medications? Boniva ([place])    Opiods (Oxycodone, Hyrocodone)    Chemotherapy Medications    Steroids (Prednisone, Cortisone)    SSRI's (Antidepressants (Lexapro, Zoloft)   Do you take Calcium? Yes   If you take Calcium what dose? 2000 units daily   Do you take Vitamin D? Yes   If you take Vitamin D what dose? 2000 units daily   Have you ever been diagnosed with cancer? Yes   Please indicate what type of cancer and when you were diagnosed. breast / April 2015   Have you ever been treated for cancer with high beam radiation or had radioactive implants? Yes   If you have been treated for cancer with high beam radiation or had radioactive implants please indicate what type.        she denies exposure to high beam radiation, or radioactive implants, no history of elevated calcium levels of Paget's  disease.     she has medical history and medication use known to be associated with bone loss and osteoporosis to include pathological compression fracture lumbar spine, COPD, history of breast cancer with chemo and radiation therapy, previous diagnosis of osteoporosis currently on Boniva, inhaled steroids, opioid pain medications,.     The last DXA was performed in 05/28/2018.          T-Score Hip: -2.5     T-Score radius: -0.9      FRAX:      Major Fx.: 16%         Hip Fx.: 6.8%      Review of Systems:  ROS:  Constitutional: no fever or chills  Eyes: no visual changes  ENT: no nasal congestion or sore throat  Respiratory: no respiratory symptoms and Positive COPD  Cardiovascular: no chest pain or palpitations  Gastrointestinal: no nausea or vomiting, tolerating diet  Genitourinary: no hematuria or dysuria  Integument/Breast: no rash or pruritis, Positive for breast cancer  Hematologic/Lymphatic: no easy bruising or lymphadenopathy  Musculoskeletal: no arthralgias or myalgias  Neurological: no seizures or tremors  Behavioral/Psych: no auditory or visual hallucinations, Positive for depression  Endocrine: no heat or cold intolerance      OBJECTIVE:     PHYSICAL EXAM:     ,                  General: no acute distress and well developed/well nourished  Behavioral/Psych: normal judgment and insight  Skin: no rash  Head/Neck: atraumatic, normocephalic, without obvious abnormality  Respiratory: normal respiratory effort  Cardiac: regular rate and rhythm  Vascular: pulses present  Abdomen: soft, non-tender  Musculoskeletal: no joint tenderness, deformity or swelling               ASSESSMENT/PLAN:     Assessment:    Osteoporosis, at high risk for future fractures, history of pathological compression spinal fracture.    Plan:   30-45 minutes spent in face-to-face consultation with patient and her family members today, discussing the disease management of osteoporosis, for the reduction of future fractures.  I have explained  that bone strength is equal to bone quality. A bone density / DEXA scan is important to complement her care since her fracture was by definition a fragility fracture/traumatic fracture.  Over half of the encounter was spent (50%) counseling the patient on the disease of osteoporosis evidence-based and best practice treatment options available as well as recommendations for improvement of bone quality, the importance of nutritional supplements to include:  Calcium 3224-3234 mg daily in divided doses   Vitamin D3  7706-1126 units daily in divided doses.   Fall prevention and personal safety for the reduction of future fractures.    Clinicians Guidelines for the treatment of Osteoporosis 2017 as part of the National Osteoporosis Foundation were utilized as the evidence-based information provided.    Discussed pharmaceutical treatment options to include Biphosphatases, Denosumab, Abaloparatide and Teriparatide. Information to include indications for therapy, risks and benefits to treatment, and important safety information related to these treatments was provided and discussed.  Handouts were given to the patient for her review on osteoporosis and other pharmacological treatment information related to other available treatment options.    The importance of diet, impact exercise, and core strengthening with gait and balance exercise, through  both formal physical therapy programs and home exercise programs was discussed.       Bone health labs recommended and ordered    Will see her back following testing to discuss treatment options

## 2019-06-18 DIAGNOSIS — M85.80 OSTEOPENIA, UNSPECIFIED LOCATION: ICD-10-CM

## 2019-06-18 RX ORDER — IBANDRONATE SODIUM 150 MG/1
150 TABLET, FILM COATED ORAL
Qty: 1 TABLET | Refills: 11 | Status: SHIPPED | OUTPATIENT
Start: 2019-06-18 | End: 2020-08-03

## 2019-06-19 ENCOUNTER — OFFICE VISIT (OUTPATIENT)
Dept: ORTHOPEDICS | Facility: CLINIC | Age: 74
End: 2019-06-19
Payer: MEDICARE

## 2019-06-19 ENCOUNTER — HOSPITAL ENCOUNTER (OUTPATIENT)
Dept: RADIOLOGY | Facility: CLINIC | Age: 74
Discharge: HOME | End: 2019-06-19
Attending: NURSE PRACTITIONER
Payer: MEDICARE

## 2019-06-19 VITALS
WEIGHT: 137.13 LBS | BODY MASS INDEX: 21.52 KG/M2 | DIASTOLIC BLOOD PRESSURE: 66 MMHG | HEIGHT: 67 IN | SYSTOLIC BLOOD PRESSURE: 109 MMHG | HEART RATE: 92 BPM

## 2019-06-19 DIAGNOSIS — C77.3 CARCINOMA OF BREAST METASTATIC TO AXILLARY LYMPH NODE, UNSPECIFIED LATERALITY: ICD-10-CM

## 2019-06-19 DIAGNOSIS — M81.6 LOCALIZED OSTEOPOROSIS OF LEQUESNE: ICD-10-CM

## 2019-06-19 DIAGNOSIS — Z79.811 USE OF AROMATASE INHIBITORS: ICD-10-CM

## 2019-06-19 DIAGNOSIS — C50.919 CARCINOMA OF BREAST METASTATIC TO AXILLARY LYMPH NODE, UNSPECIFIED LATERALITY: ICD-10-CM

## 2019-06-19 DIAGNOSIS — M80.80XA PATHOLOGICAL FRACTURE DUE TO OSTEOPOROSIS, UNSPECIFIED FRACTURE SITE, UNSPECIFIED OSTEOPOROSIS TYPE, INITIAL ENCOUNTER: Primary | ICD-10-CM

## 2019-06-19 DIAGNOSIS — M81.0 OSTEOPOROSIS, UNSPECIFIED OSTEOPOROSIS TYPE, UNSPECIFIED PATHOLOGICAL FRACTURE PRESENCE: ICD-10-CM

## 2019-06-19 PROCEDURE — 99999 PR PBB SHADOW E&M-EST. PATIENT-LVL IV: CPT | Mod: PBBFAC,,, | Performed by: PHYSICIAN ASSISTANT

## 2019-06-19 PROCEDURE — 99213 OFFICE O/P EST LOW 20 MIN: CPT | Mod: S$PBB,,, | Performed by: PHYSICIAN ASSISTANT

## 2019-06-19 PROCEDURE — 77080 DEXA BONE DENSITY SPINE HIP: ICD-10-PCS | Mod: 26,,, | Performed by: INTERNAL MEDICINE

## 2019-06-19 PROCEDURE — 99214 OFFICE O/P EST MOD 30 MIN: CPT | Mod: PBBFAC,25

## 2019-06-19 PROCEDURE — 77080 DXA BONE DENSITY AXIAL: CPT | Mod: TC

## 2019-06-19 PROCEDURE — 77080 DXA BONE DENSITY AXIAL: CPT | Mod: 26,,, | Performed by: INTERNAL MEDICINE

## 2019-06-19 PROCEDURE — 99999 PR PBB SHADOW E&M-EST. PATIENT-LVL IV: ICD-10-PCS | Mod: PBBFAC,,, | Performed by: PHYSICIAN ASSISTANT

## 2019-06-19 PROCEDURE — 99213 PR OFFICE/OUTPT VISIT, EST, LEVL III, 20-29 MIN: ICD-10-PCS | Mod: S$PBB,,, | Performed by: PHYSICIAN ASSISTANT

## 2019-06-19 NOTE — PROGRESS NOTES
Chief Complaint & History of Present Illness:  Radha Woods is a established patient 74 y.o. female who is seen here today for review of lab results, DEXA scan results, and determine a treatment plan for her osteoporosis.  she has reviewed the information provided at her initial visit.  She has considerable apprehension with potential side effects to any of the medications discussed.  After review of treatment options together we has elected contiue Boniva as her treatment option today for her osteoporosis and to reduce risk of future fractures.        Review of patient's allergies indicates:   Allergen Reactions    Adhesive Rash     Use PAPER Tape / TEGADERM    Levaquin [levofloxacin] Diarrhea and Nausea Only    Penicillins Rash         Current Outpatient Medications   Medication Sig Dispense Refill    albuterol 90 mcg/actuation inhaler Inhale 2 puffs into the lungs every 6 (six) hours as needed for Wheezing.      ascorbic acid, vitamin C, (VITAMIN C) 1000 MG tablet Take 1,000 mg by mouth once daily.      aspirin (ECOTRIN) 81 MG EC tablet Take 81 mg by mouth once daily.      calcium carbonate (OS-JENNI) 500 mg calcium (1,250 mg) chewable tablet Take 2 tablets by mouth once daily.       cetirizine (ZYRTEC) 10 MG tablet Take 10 mg by mouth every evening.       cholecalciferol, vitamin D3, (VITAMIN D3) 2,000 unit Cap Take 1,000 Units by mouth once daily.       cyclobenzaprine (FLEXERIL) 10 MG tablet Take 10 mg by mouth 3 (three) times daily as needed for Muscle spasms.      garlic 1,000 mg Cap Take 1,000 mg by mouth.      ibandronate (BONIVA) 150 mg tablet Take 1 tablet (150 mg total) by mouth every 30 days. 1 tablet 11    letrozole (FEMARA) 2.5 mg Tab TAKE 1 TABLET BY MOUTH ONCE DAILY 90 tablet 4    metoprolol tartrate (LOPRESSOR) 50 MG tablet Take 50 mg by mouth every evening.       oxyCODONE-acetaminophen (PERCOCET) 5-325 mg per tablet Take 1 tablet by mouth every 4 (four) hours as needed for Pain.  12 tablet 0    pravastatin (PRAVACHOL) 40 MG tablet Take 40 mg by mouth every evening.       tiotropium-olodaterol (STIOLTO RESPIMAT) 2.5-2.5 mcg/actuation Mist Inhale into the lungs once daily.       topiramate (TOPAMAX) 50 MG tablet Take 50 mg by mouth every evening.       vitamin E 400 UNIT capsule Take 400 Units by mouth once daily.      alendronate (FOSAMAX) 70 MG tablet Take 1 tablet (70 mg total) by mouth every 7 days. 4 tablet 11    fish oil-omega-3 fatty acids 300-1,000 mg capsule Take by mouth once daily.      varenicline (CHANTIX SABINE) 0.5 mg (11)- 1 mg (42) tablet Take by mouth. Take one 0.5mg tab by mouth once daily X3 days,then increase to one 0.5mg tab twice daily X4 days,then increase to one 1mg tab twice daily       No current facility-administered medications for this visit.      Facility-Administered Medications Ordered in Other Visits   Medication Dose Route Frequency Provider Last Rate Last Dose    ondansetron HCl (PF) 4 mg/2 mL injection    PRN Shy Montague CRNA   4 mg at 06/29/15 1246       Past Medical History:   Diagnosis Date    Allergy     Back pain     Breast cancer 5/2015    right breast infiltrating ductal CA with DCIS, ER positive, WA/Her2 negative     COPD (chronic obstructive pulmonary disease)     Chronic bronchitis    Hyperlipidemia     Hypertension        Past Surgical History:   Procedure Laterality Date    [id number] N/A 3/2/2018    Performed by Ev Surgeon at [facility]    BREAST BIOPSY Right     BREAST LUMPECTOMY Right 2015    IDC & DCIS    CERVICAL FUSION      DISSECTION-AXILLARY LYMPH NODE Right 6/29/2015    Performed by Robert Simmons [facility] at [facility] OR 2ND FLR    HYSTERECTOMY      with BSO    [id number] Fluoro equipment needed Left 8/3/2015    Performed by Robert [facility] at [facility] 2ND [facility]LR    LUMBAR FUSION      LUMPECTOMY-BREAST-w/wire localization  Right 6/29/2015    Performed by Robert [facility] at [facility] 2ND [facility]LR     LYMPH NODE DISSECTION Right     REMOVAL-PORT-A-CATH Left 12/3/2015    [facility] 2ND F[facility]    right lumpectomy         Lab Results   Component Value Date     05/21/2019    K 3.8 05/21/2019     05/21/2019    CO2 30 (H) 05/21/2019    GLU 76 05/21/2019    BUN 13 05/21/2019    CREATININE 0.7 05/21/2019    CALCIUM 9.6 05/21/2019    PROT 7.0 05/21/2019    ALBUMIN 3.5 05/21/2019    BILITOT 0.2 05/21/2019    ALKPHOS 72 05/21/2019    AST 10 05/21/2019    ALT 5 (L) 05/21/2019    ANIONGAP 5 (L) 05/21/2019    ESTGFRAFRICA >60.0 05/21/2019    EGFRNONAA >60.0 05/21/2019      Lab Results   Component Value Date    WBC 7.37 05/21/2019    RBC 4.66 05/21/2019    HGB 13.9 05/21/2019    HCT 43.7 05/21/2019    MCV 94 05/21/2019    MCH 29.8 05/21/2019    MCHC 31.8 (L) 05/21/2019    RDW 12.4 05/21/2019     05/21/2019    MPV 9.5 05/21/2019    GRAN 4.8 05/21/2019    GRAN 65.2 05/21/2019    LYMPH 1.5 05/21/2019    LYMPH 20.9 05/21/2019    MONO 0.7 05/21/2019    MONO 9.5 05/21/2019    EOS 0.2 05/21/2019    BASO 0.05 05/21/2019    EOSINOPHIL 3.3 05/21/2019    BASOPHIL 0.7 05/21/2019    DIFFMETHOD Automated 05/21/2019      Lab Results   Component Value Date    MG 2.0 03/27/2019      No results found for: PHOS   Lab Results   Component Value Date    [id number] 59 06/05/2019      Lab Results   Component Value Date    PTH 50.0 06/05/2019      Lab Results   Component Value Date    TSH 2.627 06/05/2019      Lab Results   Component Value Date    FREET4 0.96 06/05/2019      No results found for: HGBA1C, ESTIMATEDAVG   No results found for: FREETESTOSTE     DEXA Scan was reviewed and demonstrates :     T-Score Hip: -2.4     T-Score Radius: -1.8      FRAX:      Major Fx.: 22%         Hip Fx.: 9.1%                                          Vital Signs (Most Recent)  Vitals:    06/19/19 1405   BP: 109/66   Pulse: 92         Review of Systems:  ROS:  Constitutional: no fever or chills  Eyes: no  visual changes  ENT: no nasal congestion or sore throat  Respiratory: no respiratory symptoms and Positive COPD  Cardiovascular: no chest pain or palpitations  Gastrointestinal: no nausea or vomiting, tolerating diet  Genitourinary: no hematuria or dysuria  Integument/Breast: no rash or pruritis, Positive for breast cancer  Hematologic/Lymphatic: no easy bruising or lymphadenopathy  Musculoskeletal: no arthralgias or myalgias  Neurological: no seizures or tremors  Behavioral/Psych: no auditory or visual hallucinations, Positive for depression  Endocrine: no heat or cold intolerance       She will continue with her Boniva,  calcium and vitamin D.  Fall prevention and personal safety have been reviewed.        Assessment and plan:    Osteoporosis    Continue Boniva as directed follow-up p.r.n.

## 2019-06-21 ENCOUNTER — TELEPHONE (OUTPATIENT)
Dept: HEMATOLOGY/ONCOLOGY | Facility: CLINIC | Age: 74
End: 2019-06-21

## 2019-06-21 NOTE — TELEPHONE ENCOUNTER
Returned call to pt.   Pt calling to clarify her appts- pt stated used to be 6 months and is scheduled for a 3 month follow up.   S/w Dr. Allen and appt can be cancelled in August as too soon.   Pt stated she will  Boniva at [facility] and then would like sent to [facility] in future.   Nurse verbalized understadning.         ----- Message from Shakeel Olivas sent at 6/21/2019 12:14 PM CDT -----  Contact: Pt  Pt would like to be called back asap regarding questions concerning future appts scheduled.   Pt is also requesting her prescription ibandronate(Boniva)150 mg [phone] from [facility] [phone].    Pt can be reached at [phone].    Thanks

## 2020-02-17 ENCOUNTER — OFFICE VISIT (OUTPATIENT)
Dept: HEMATOLOGY/ONCOLOGY | Facility: CLINIC | Age: 75
End: 2020-02-17
Attending: INTERNAL MEDICINE
Payer: MEDICARE

## 2020-02-17 ENCOUNTER — LAB VISIT (OUTPATIENT)
Dept: LAB | Facility: HOSPITAL | Age: 75
End: 2020-02-17
Attending: INTERNAL MEDICINE
Payer: MEDICARE

## 2020-02-17 ENCOUNTER — TELEPHONE (OUTPATIENT)
Dept: HEMATOLOGY/ONCOLOGY | Facility: CLINIC | Age: 75
End: 2020-02-17

## 2020-02-17 VITALS
RESPIRATION RATE: 14 BRPM | DIASTOLIC BLOOD PRESSURE: 62 MMHG | TEMPERATURE: 98 F | WEIGHT: 135 LBS | HEART RATE: 87 BPM | SYSTOLIC BLOOD PRESSURE: 122 MMHG | OXYGEN SATURATION: 92 % | BODY MASS INDEX: 21.19 KG/M2 | HEIGHT: 67 IN

## 2020-02-17 DIAGNOSIS — R06.02 SHORTNESS OF BREATH: ICD-10-CM

## 2020-02-17 DIAGNOSIS — C50.911 CARCINOMA OF RIGHT BREAST METASTATIC TO AXILLARY LYMPH NODE: Primary | ICD-10-CM

## 2020-02-17 DIAGNOSIS — Z79.811 USE OF AROMATASE INHIBITORS: ICD-10-CM

## 2020-02-17 DIAGNOSIS — G89.29 CHRONIC BACK PAIN, UNSPECIFIED BACK LOCATION, UNSPECIFIED BACK PAIN LATERALITY: ICD-10-CM

## 2020-02-17 DIAGNOSIS — M81.0 OSTEOPOROSIS, UNSPECIFIED OSTEOPOROSIS TYPE, UNSPECIFIED PATHOLOGICAL FRACTURE PRESENCE: ICD-10-CM

## 2020-02-17 DIAGNOSIS — M54.9 CHRONIC BACK PAIN, UNSPECIFIED BACK LOCATION, UNSPECIFIED BACK PAIN LATERALITY: ICD-10-CM

## 2020-02-17 DIAGNOSIS — C77.3 CARCINOMA OF RIGHT BREAST METASTATIC TO AXILLARY LYMPH NODE: Primary | ICD-10-CM

## 2020-02-17 DIAGNOSIS — C50.919 CARCINOMA OF BREAST METASTATIC TO AXILLARY LYMPH NODE, UNSPECIFIED LATERALITY: ICD-10-CM

## 2020-02-17 DIAGNOSIS — C77.3 CARCINOMA OF BREAST METASTATIC TO AXILLARY LYMPH NODE, UNSPECIFIED LATERALITY: ICD-10-CM

## 2020-02-17 DIAGNOSIS — M80.00XD OSTEOPOROSIS WITH CURRENT PATHOLOGICAL FRACTURE WITH ROUTINE HEALING, UNSPECIFIED OSTEOPOROSIS TYPE, SUBSEQUENT ENCOUNTER: ICD-10-CM

## 2020-02-17 DIAGNOSIS — R63.4 WEIGHT LOSS: ICD-10-CM

## 2020-02-17 DIAGNOSIS — E27.8 ADRENAL NODULE: ICD-10-CM

## 2020-02-17 LAB
25(OH)D3+25(OH)D2 SERPL-MCNC: 45 NG/ML (ref 30–96)
ALBUMIN SERPL BCP-MCNC: 3.7 G/DL (ref 3.5–5.2)
ALP SERPL-CCNC: 72 U/L (ref 55–135)
ALT SERPL W/O P-5'-P-CCNC: 7 U/L (ref 10–44)
ANION GAP SERPL CALC-SCNC: 10 MMOL/L (ref 8–16)
AST SERPL-CCNC: 10 U/L (ref 10–40)
BILIRUB SERPL-MCNC: 0.2 MG/DL (ref 0.1–1)
BUN SERPL-MCNC: 11 MG/DL (ref 8–23)
CALCIUM SERPL-MCNC: 9.9 MG/DL (ref 8.7–10.5)
CHLORIDE SERPL-SCNC: 96 MMOL/L (ref 95–110)
CO2 SERPL-SCNC: 40 MMOL/L (ref 23–29)
CREAT SERPL-MCNC: 0.7 MG/DL (ref 0.5–1.4)
ERYTHROCYTE [DISTWIDTH] IN BLOOD BY AUTOMATED COUNT: 12.6 % (ref 11.5–14.5)
EST. GFR  (AFRICAN AMERICAN): >60 ML/MIN/1.73 M^2
EST. GFR  (NON AFRICAN AMERICAN): >60 ML/MIN/1.73 M^2
GLUCOSE SERPL-MCNC: 106 MG/DL (ref 70–110)
HCT VFR BLD AUTO: 42.5 % (ref 37–48.5)
HGB BLD-MCNC: 12.6 G/DL (ref 12–16)
IMM GRANULOCYTES # BLD AUTO: 0.02 K/UL (ref 0–0.04)
MCH RBC QN AUTO: 29.9 PG (ref 27–31)
MCHC RBC AUTO-ENTMCNC: 29.6 G/DL (ref 32–36)
MCV RBC AUTO: 101 FL (ref 82–98)
NEUTROPHILS # BLD AUTO: 3.3 K/UL (ref 1.8–7.7)
PLATELET # BLD AUTO: 193 K/UL (ref 150–350)
PMV BLD AUTO: 9.7 FL (ref 9.2–12.9)
POTASSIUM SERPL-SCNC: 3.4 MMOL/L (ref 3.5–5.1)
PROT SERPL-MCNC: 7 G/DL (ref 6–8.4)
RBC # BLD AUTO: 4.21 M/UL (ref 4–5.4)
SODIUM SERPL-SCNC: 146 MMOL/L (ref 136–145)
WBC # BLD AUTO: 4.96 K/UL (ref 3.9–12.7)

## 2020-02-17 PROCEDURE — 99999 PR PBB SHADOW E&M-EST. PATIENT-LVL V: ICD-10-PCS | Mod: PBBFAC,,, | Performed by: NURSE PRACTITIONER

## 2020-02-17 PROCEDURE — 85027 COMPLETE CBC AUTOMATED: CPT

## 2020-02-17 PROCEDURE — 99214 OFFICE O/P EST MOD 30 MIN: CPT | Mod: S$PBB,,, | Performed by: NURSE PRACTITIONER

## 2020-02-17 PROCEDURE — 99999 PR PBB SHADOW E&M-EST. PATIENT-LVL V: CPT | Mod: PBBFAC,,, | Performed by: NURSE PRACTITIONER

## 2020-02-17 PROCEDURE — 99214 PR OFFICE/OUTPT VISIT, EST, LEVL IV, 30-39 MIN: ICD-10-PCS | Mod: S$PBB,,, | Performed by: NURSE PRACTITIONER

## 2020-02-17 PROCEDURE — 36415 COLL VENOUS BLD VENIPUNCTURE: CPT

## 2020-02-17 PROCEDURE — 99215 OFFICE O/P EST HI 40 MIN: CPT | Mod: PBBFAC | Performed by: NURSE PRACTITIONER

## 2020-02-17 PROCEDURE — 82306 VITAMIN D 25 HYDROXY: CPT

## 2020-02-17 PROCEDURE — 80053 COMPREHEN METABOLIC PANEL: CPT

## 2020-02-17 RX ORDER — AZELASTINE HCL 205.5 UG/1
SPRAY NASAL
Status: ON HOLD | COMMUNITY
Start: 2020-01-17 | End: 2020-03-11

## 2020-02-17 RX ORDER — BETAMETHASONE DIPROPIONATE 0.5 MG/G
CREAM TOPICAL
Status: ON HOLD | COMMUNITY
Start: 2019-12-11 | End: 2020-03-11

## 2020-02-17 RX ORDER — METOPROLOL SUCCINATE 50 MG/1
TABLET, EXTENDED RELEASE ORAL
Status: ON HOLD | COMMUNITY
Start: 2020-01-07 | End: 2020-03-10 | Stop reason: HOSPADM

## 2020-02-17 RX ORDER — IPRATROPIUM BROMIDE AND ALBUTEROL SULFATE 2.5; .5 MG/3ML; MG/3ML
SOLUTION RESPIRATORY (INHALATION)
Status: ON HOLD | COMMUNITY
Start: 2020-01-17 | End: 2020-03-09 | Stop reason: HOSPADM

## 2020-02-17 NOTE — PROGRESS NOTES
Subjective:       Patient ID: Radha Woods is a 74 y.o. female.    Chief Complaint: Follow-up    HPI     This is a 74 year old female with right breast cancer, currently on adjuvant letrozole.     On continuous O2 now- 2 L/NC at home. Pulse ox usually 98% with O2. Seems SOB worse since fall in March 2019.   +fatigue; states only wants to sleep.   +back pain-no worse than usual. Taking oxycodone 1-2 x's a day. Was seeing pain management but now PCP managing/prescribing.   +weight loss.  Not eating as much.   Feels like something going on in her body and requesting imaging.    Does not want to see pulmonary.   MMG done at [facility] imaging 7/2019- negative. Will scan into chart.     Of note,   osteoporotic L2 compression fracture 3/2019.   Wearing back brace.   Neurosurgery has referred her to fragility clinic for Forteo therapy.     Most recent BMD 5/2018. Currently on Boniva and will continue.     Quit smoking although admits to cheating.        Oncology History:  This is a 74 year old female who underwent a lumpectomy on 6/29/15 for a 1.4 cm invasive ductal carcinoma with three positive lymph nodes.   Her carcinoma was ER strongly positive, TN negative, and HER-2 roe negative. She received one cycle of adjuvant AC chemotherapy, and subsequently refused further chemotherapy due to side effects was hospitalized x 2. She completed XRT.   She was then started on aromatase inhibitors with Arimidex and quit because her dyspnea gets worse. Tried Femara then back to Arimidex. Now on Femara.      Had PET scan on 3/2/18 showing adrenal activity (see below).  Subsequent biopsy on 3/2/18 was negative.   Repeat PET scan 9/18/18 revealed No evidence of recurrent or metastatic disease.  Decrease in activity of the left adrenal nodule, previously biopsied with pathology demonstrating benign adrenal cortical tissue.      Lives in [place], [place] ([place] miles south of [place]    Review of Systems   Constitutional: Positive for activity  change, appetite change, fatigue and unexpected weight change. Negative for chills, diaphoresis and fever.   HENT: Negative for congestion, dental problem, ear pain, hearing loss, mouth sores, nosebleeds, rhinorrhea, tinnitus and trouble swallowing.    Eyes: Negative for redness and visual disturbance.   Respiratory: Positive for shortness of breath (on continuous O2). Negative for cough, chest tightness and wheezing.    Cardiovascular: Negative for chest pain, palpitations and leg swelling.   Gastrointestinal: Negative for abdominal distention, abdominal pain, blood in stool, constipation, diarrhea, nausea and vomiting.   Genitourinary: Negative for decreased urine volume, difficulty urinating, dysuria, frequency and hematuria.   Musculoskeletal: Positive for back pain. Negative for arthralgias, gait problem, joint swelling and neck pain.   Skin: Negative for pallor and rash.   Neurological: Negative for dizziness, weakness, light-headedness, numbness and headaches.   Hematological: Negative for adenopathy. Does not bruise/bleed easily.   Psychiatric/Behavioral: Negative for confusion, dysphoric mood and sleep disturbance. The patient is not nervous/anxious.        Objective:      Physical Exam   Constitutional: She is oriented to person, place, and time. No distress.   Presents alone; in WC.   Examined in WC as unable to transfer to exam table due to chronic back pain.   Thin female in NAD.    HENT:   Head: Normocephalic and atraumatic.   Nose: Nose normal.   Mouth/Throat: Oropharynx is clear and moist. No oropharyngeal exudate.   Eyes: Pupils are equal, round, and reactive to light. Conjunctivae and EOM are normal. Right eye exhibits no discharge. Left eye exhibits no discharge. No scleral icterus. Right pupil is round and reactive. Left pupil is round and reactive.   Neck: Normal range of motion. Neck supple. No thyromegaly present.   Cardiovascular: Normal rate, regular rhythm and normal heart sounds. Exam  reveals no gallop and no friction rub.   No murmur heard.  Pulmonary/Chest: Effort normal. No respiratory distress. She has no wheezes.   Hyperinflated and diminished breath sounds throughout lung fields consistent with smoking history.  O2 at 1.5l/NC at this time.   Right breast with well healed lumpectomy incision, no mass, nodule or skin changes. Left breast without mass, nodule or skin changes. No axillary or supraclavicular adenopathy.    Abdominal: Soft. Bowel sounds are normal. She exhibits no distension. There is no hepatosplenomegaly. There is no tenderness.   Musculoskeletal: Normal range of motion. She exhibits no edema or tenderness.   Tenderness to palpation in lumbar spine.   Back brace   5/5 strength in upper and lower extremities.    Lymphadenopathy:        Head (right side): No submandibular adenopathy present.        Head (left side): No submandibular adenopathy present.     She has no cervical adenopathy.        Right cervical: No superficial cervical, no deep cervical and no posterior cervical adenopathy present.       Left cervical: No superficial cervical, no deep cervical and no posterior cervical adenopathy present.        Right: No inguinal and no supraclavicular adenopathy present.        Left: No inguinal and no supraclavicular adenopathy present.   Neurological: She is alert and oriented to person, place, and time. She has normal strength.   Skin: Skin is warm and dry. No bruising, no lesion, no petechiae and no rash noted. No erythema. No pallor.   Psychiatric: She has a normal mood and affect. Her behavior is normal. Judgment and thought content normal. Her mood appears not anxious. She does not exhibit a depressed mood.   Nursing note and vitals reviewed.      WBC   Date Value Ref Range Status   02/17/2020 4.96 3.90 - 12.70 K/uL Final     Hemoglobin   Date Value Ref Range Status   02/17/2020 12.6 12.0 - 16.0 g/dL Final     Hematocrit   Date Value Ref Range Status   02/17/2020 42.5 37.0  - 48.5 % Final     Platelets   Date Value Ref Range Status   02/17/2020 193 150 - 350 K/uL Final     Gran # (ANC)   Date Value Ref Range Status   02/17/2020 3.3 1.8 - 7.7 K/uL Final     Comment:     The ANC is based on a white cell differential from an   automated cell counter. It has not been microscopically   reviewed for the presence of abnormal cells. Clinical   correlation is required.         Chemistry        Component Value Date/Time     (H) 02/17/2020 1500    K 3.4 (L) 02/17/2020 1500    CL 96 02/17/2020 1500    CO2 30 (H) 05/21/2019 1212    BUN 11 02/17/2020 1500    CREATININE 0.7 02/17/2020 1500     02/17/2020 1500        Component Value Date/Time    CALCIUM 9.9 02/17/2020 1500    ALKPHOS 72 02/17/2020 1500    AST 10 02/17/2020 1500    ALT 7 (L) 02/17/2020 1500    BILITOT 0.2 02/17/2020 1500    ESTGFRAFRICA >60.0 02/17/2020 1500    EGFRNONAA >60.0 02/17/2020 1500          Assessment:       1. Carcinoma of right breast metastatic to axillary lymph node    2. Weight loss    3. Shortness of breath    4. Adrenal nodule    5. Osteoporosis with current pathological fracture with routine healing, unspecified osteoporosis type, subsequent encounter    6. Use of aromatase inhibitors    7. Chronic back pain, unspecified back location, unspecified back pain laterality        Plan:       Patient with complaints of worsening SOB, weight loss and chronic back pain. Currently in NAD. On O2 per N- 98% O2 sat.   Offered CXR today- refuses as does not have time.   Offered Pulmonary consult but refuses at this time.   Plan for CXR stat - encouraged to do in the next day or two. Also plan for PET scan and to see Dr. Tiffany pichardo. We discussed pulmonary appt and she may pursue after imaging/MD appt.    Next MMG 7/2020  Continue Boniva.    Patient is in agreement with the proposed treatment plan. All questions were answered to the patient's satisfaction. Pt knows to call clinic for any new or worsening symptoms and  if anything is needed before the next clinic visit.      Eh Hdz, ODALISP-C  Hematology & Oncology  [facility] [place] [address]  ph. [phone]  Fax. [phone]     I spent 30 minutes (face to face) with the patient, more than 50% was in counseling and coordination of care as detailed above.

## 2020-02-17 NOTE — TELEPHONE ENCOUNTER
----- Message from Eh Hdz NP sent at 2/17/2020  4:25 PM CST -----  CXR  tomorrow or Wednesday.   PET scan at the end of next week and see Dr. Allen the following.

## 2020-02-19 ENCOUNTER — HOSPITAL ENCOUNTER (OUTPATIENT)
Dept: RADIOLOGY | Facility: HOSPITAL | Age: 75
Discharge: HOME | End: 2020-02-19
Attending: NURSE PRACTITIONER
Payer: MEDICARE

## 2020-02-19 DIAGNOSIS — R63.4 WEIGHT LOSS: ICD-10-CM

## 2020-02-19 DIAGNOSIS — R06.02 SHORTNESS OF BREATH: ICD-10-CM

## 2020-02-19 DIAGNOSIS — G89.29 CHRONIC BACK PAIN, UNSPECIFIED BACK LOCATION, UNSPECIFIED BACK PAIN LATERALITY: ICD-10-CM

## 2020-02-19 DIAGNOSIS — M54.9 CHRONIC BACK PAIN, UNSPECIFIED BACK LOCATION, UNSPECIFIED BACK PAIN LATERALITY: ICD-10-CM

## 2020-02-19 DIAGNOSIS — C50.911 CARCINOMA OF RIGHT BREAST METASTATIC TO AXILLARY LYMPH NODE: ICD-10-CM

## 2020-02-19 DIAGNOSIS — E27.8 ADRENAL NODULE: ICD-10-CM

## 2020-02-19 DIAGNOSIS — C77.3 CARCINOMA OF RIGHT BREAST METASTATIC TO AXILLARY LYMPH NODE: ICD-10-CM

## 2020-02-19 PROCEDURE — 71046 X-RAY EXAM CHEST 2 VIEWS: CPT | Mod: TC

## 2020-02-19 PROCEDURE — 71046 XR CHEST PA AND LATERAL: ICD-10-PCS | Mod: 26,,, | Performed by: RADIOLOGY

## 2020-02-19 PROCEDURE — 71046 X-RAY EXAM CHEST 2 VIEWS: CPT | Mod: 26,,, | Performed by: RADIOLOGY

## 2020-03-02 ENCOUNTER — HOSPITAL ENCOUNTER (OUTPATIENT)
Dept: RADIOLOGY | Facility: HOSPITAL | Age: 75
Discharge: HOME | DRG: 189 | End: 2020-03-02
Attending: NURSE PRACTITIONER
Payer: MEDICARE

## 2020-03-02 DIAGNOSIS — G89.29 CHRONIC BACK PAIN, UNSPECIFIED BACK LOCATION, UNSPECIFIED BACK PAIN LATERALITY: ICD-10-CM

## 2020-03-02 DIAGNOSIS — E27.8 ADRENAL NODULE: ICD-10-CM

## 2020-03-02 DIAGNOSIS — M54.9 CHRONIC BACK PAIN, UNSPECIFIED BACK LOCATION, UNSPECIFIED BACK PAIN LATERALITY: ICD-10-CM

## 2020-03-02 DIAGNOSIS — C50.911 CARCINOMA OF RIGHT BREAST METASTATIC TO AXILLARY LYMPH NODE: ICD-10-CM

## 2020-03-02 DIAGNOSIS — C77.3 CARCINOMA OF RIGHT BREAST METASTATIC TO AXILLARY LYMPH NODE: ICD-10-CM

## 2020-03-02 DIAGNOSIS — R06.02 SHORTNESS OF BREATH: ICD-10-CM

## 2020-03-02 DIAGNOSIS — R63.4 WEIGHT LOSS: ICD-10-CM

## 2020-03-02 LAB — POCT GLUCOSE: 95 MG/DL (ref 70–110)

## 2020-03-02 PROCEDURE — 78815 NM PET CT ROUTINE: ICD-10-PCS | Mod: 26,PS,, | Performed by: RADIOLOGY

## 2020-03-02 PROCEDURE — A9552 F18 FDG: HCPCS

## 2020-03-02 PROCEDURE — 78815 PET IMAGE W/CT SKULL-THIGH: CPT | Mod: 26,PS,, | Performed by: RADIOLOGY

## 2020-03-02 PROCEDURE — 78815 PET IMAGE W/CT SKULL-THIGH: CPT | Mod: TC

## 2020-03-05 ENCOUNTER — TELEPHONE (OUTPATIENT)
Dept: HEMATOLOGY/ONCOLOGY | Facility: CLINIC | Age: 75
End: 2020-03-05

## 2020-03-05 ENCOUNTER — HOSPITAL ENCOUNTER (INPATIENT)
Facility: HOSPITAL | Age: 75
LOS: 5 days | Discharge: NURSING, REHAB OR LONG-TERM CARE | DRG: 189 | End: 2020-03-10
Attending: EMERGENCY MEDICINE | Admitting: INTERNAL MEDICINE
Payer: MEDICARE

## 2020-03-05 DIAGNOSIS — J44.1 COPD EXACERBATION: Primary | ICD-10-CM

## 2020-03-05 DIAGNOSIS — R41.82 ALTERED MENTAL STATUS: ICD-10-CM

## 2020-03-05 DIAGNOSIS — J96.21 ACUTE ON CHRONIC RESPIRATORY FAILURE WITH HYPOXIA AND HYPERCAPNIA: ICD-10-CM

## 2020-03-05 DIAGNOSIS — J96.22 ACUTE ON CHRONIC RESPIRATORY FAILURE WITH HYPOXIA AND HYPERCAPNIA: ICD-10-CM

## 2020-03-05 DIAGNOSIS — R06.02 SHORTNESS OF BREATH: ICD-10-CM

## 2020-03-05 DIAGNOSIS — Z71.89 GOALS OF CARE, COUNSELING/DISCUSSION: ICD-10-CM

## 2020-03-05 PROBLEM — G93.40 ACUTE ENCEPHALOPATHY: Status: ACTIVE | Noted: 2020-03-05

## 2020-03-05 LAB
ABO + RH BLD: NORMAL
ALBUMIN SERPL BCP-MCNC: 3.6 G/DL (ref 3.5–5.2)
ALLENS TEST: ABNORMAL
ALLENS TEST: ABNORMAL
ALP SERPL-CCNC: 68 U/L (ref 55–135)
ALT SERPL W/O P-5'-P-CCNC: 6 U/L (ref 10–44)
ANION GAP SERPL CALC-SCNC: 4 MMOL/L (ref 8–16)
AST SERPL-CCNC: 10 U/L (ref 10–40)
BASOPHILS # BLD AUTO: 0.04 K/UL (ref 0–0.2)
BASOPHILS NFR BLD: 0.6 % (ref 0–1.9)
BILIRUB SERPL-MCNC: 0.4 MG/DL (ref 0.1–1)
BLD GP AB SCN CELLS X3 SERPL QL: NORMAL
BNP SERPL-MCNC: 20 PG/ML (ref 0–99)
BUN SERPL-MCNC: 9 MG/DL (ref 8–23)
CALCIUM SERPL-MCNC: 10.1 MG/DL (ref 8.7–10.5)
CHLORIDE SERPL-SCNC: 93 MMOL/L (ref 95–110)
CO2 SERPL-SCNC: 48 MMOL/L (ref 23–29)
CREAT SERPL-MCNC: 0.6 MG/DL (ref 0.5–1.4)
DELSYS: ABNORMAL
DIFFERENTIAL METHOD: ABNORMAL
EOSINOPHIL # BLD AUTO: 0.1 K/UL (ref 0–0.5)
EOSINOPHIL NFR BLD: 0.9 % (ref 0–8)
ERYTHROCYTE [DISTWIDTH] IN BLOOD BY AUTOMATED COUNT: 12.5 % (ref 11.5–14.5)
ERYTHROCYTE [SEDIMENTATION RATE] IN BLOOD BY WESTERGREN METHOD: 28 MM/H
EST. GFR  (AFRICAN AMERICAN): >60 ML/MIN/1.73 M^2
EST. GFR  (NON AFRICAN AMERICAN): >60 ML/MIN/1.73 M^2
FIO2: 32
FLOW: 4
GLUCOSE SERPL-MCNC: 81 MG/DL (ref 70–110)
HCO3 UR-SCNC: 48.5 MMOL/L (ref 24–28)
HCO3 UR-SCNC: 50.7 MMOL/L (ref 24–28)
HCT VFR BLD AUTO: 38.4 % (ref 37–48.5)
HGB BLD-MCNC: 11.2 G/DL (ref 12–16)
IMM GRANULOCYTES # BLD AUTO: 0.09 K/UL (ref 0–0.04)
IMM GRANULOCYTES NFR BLD AUTO: 1.4 % (ref 0–0.5)
INFLUENZA A, MOLECULAR: NEGATIVE
INFLUENZA B, MOLECULAR: NEGATIVE
LACTATE SERPL-SCNC: 0.7 MMOL/L (ref 0.5–2.2)
LYMPHOCYTES # BLD AUTO: 0.9 K/UL (ref 1–4.8)
LYMPHOCYTES NFR BLD: 13.8 % (ref 18–48)
MAGNESIUM SERPL-MCNC: 1.7 MG/DL (ref 1.6–2.6)
MCH RBC QN AUTO: 29.4 PG (ref 27–31)
MCHC RBC AUTO-ENTMCNC: 29.2 G/DL (ref 32–36)
MCV RBC AUTO: 101 FL (ref 82–98)
MODE: ABNORMAL
MONOCYTES # BLD AUTO: 0.5 K/UL (ref 0.3–1)
MONOCYTES NFR BLD: 8.1 % (ref 4–15)
NEUTROPHILS # BLD AUTO: 4.8 K/UL (ref 1.8–7.7)
NEUTROPHILS NFR BLD: 75.2 % (ref 38–73)
NRBC BLD-RTO: 0 /100 WBC
PCO2 BLDA: 107.3 MMHG (ref 35–45)
PCO2 BLDA: 89.3 MMHG (ref 35–45)
PH SMN: 7.28 [PH] (ref 7.35–7.45)
PH SMN: 7.34 [PH] (ref 7.35–7.45)
PLATELET # BLD AUTO: 149 K/UL (ref 150–350)
PMV BLD AUTO: 9.4 FL (ref 9.2–12.9)
PO2 BLDA: 22 MMHG (ref 40–60)
PO2 BLDA: 54 MMHG (ref 80–100)
POC BE: 23 MMOL/L
POC BE: 24 MMOL/L
POC SATURATED O2: 27 % (ref 95–100)
POC SATURATED O2: 83 % (ref 95–100)
POC TCO2: >50 MMOL/L (ref 23–27)
POC TCO2: >50 MMOL/L (ref 24–29)
POTASSIUM SERPL-SCNC: 4 MMOL/L (ref 3.5–5.1)
PROCALCITONIN SERPL IA-MCNC: 0.03 NG/ML
PROT SERPL-MCNC: 6.8 G/DL (ref 6–8.4)
RBC # BLD AUTO: 3.81 M/UL (ref 4–5.4)
SAMPLE: ABNORMAL
SAMPLE: ABNORMAL
SITE: ABNORMAL
SITE: ABNORMAL
SODIUM SERPL-SCNC: 145 MMOL/L (ref 136–145)
SPECIMEN SOURCE: NORMAL
TROPONIN I SERPL DL<=0.01 NG/ML-MCNC: 0.01 NG/ML (ref 0–0.03)
WBC # BLD AUTO: 6.32 K/UL (ref 3.9–12.7)

## 2020-03-05 PROCEDURE — 87040 BLOOD CULTURE FOR BACTERIA: CPT

## 2020-03-05 PROCEDURE — 81001 URINALYSIS AUTO W/SCOPE: CPT

## 2020-03-05 PROCEDURE — 25000242 PHARM REV CODE 250 ALT 637 W/ HCPCS: Performed by: EMERGENCY MEDICINE

## 2020-03-05 PROCEDURE — 99291 PR CRITICAL CARE, E/M 30-74 MINUTES: ICD-10-PCS | Mod: ,,, | Performed by: EMERGENCY MEDICINE

## 2020-03-05 PROCEDURE — 82800 BLOOD PH: CPT

## 2020-03-05 PROCEDURE — 99291 PR CRITICAL CARE, E/M 30-74 MINUTES: ICD-10-PCS | Mod: GC,,, | Performed by: INTERNAL MEDICINE

## 2020-03-05 PROCEDURE — 83735 ASSAY OF MAGNESIUM: CPT

## 2020-03-05 PROCEDURE — 93010 EKG 12-LEAD: ICD-10-PCS | Mod: ,,, | Performed by: INTERNAL MEDICINE

## 2020-03-05 PROCEDURE — 36600 WITHDRAWAL OF ARTERIAL BLOOD: CPT

## 2020-03-05 PROCEDURE — 27000190 HC CPAP FULL FACE MASK W/VALVE

## 2020-03-05 PROCEDURE — 27000221 HC OXYGEN, UP TO 24 HOURS

## 2020-03-05 PROCEDURE — 87086 URINE CULTURE/COLONY COUNT: CPT

## 2020-03-05 PROCEDURE — 93005 ELECTROCARDIOGRAM TRACING: CPT

## 2020-03-05 PROCEDURE — 12000002 HC ACUTE/MED SURGE SEMI-PRIVATE ROOM

## 2020-03-05 PROCEDURE — 94640 AIRWAY INHALATION TREATMENT: CPT

## 2020-03-05 PROCEDURE — 99285 EMERGENCY DEPT VISIT HI MDM: CPT | Mod: 25

## 2020-03-05 PROCEDURE — 82803 BLOOD GASES ANY COMBINATION: CPT

## 2020-03-05 PROCEDURE — 80053 COMPREHEN METABOLIC PANEL: CPT

## 2020-03-05 PROCEDURE — 87502 INFLUENZA DNA AMP PROBE: CPT

## 2020-03-05 PROCEDURE — 99291 CRITICAL CARE FIRST HOUR: CPT | Mod: GC,,, | Performed by: INTERNAL MEDICINE

## 2020-03-05 PROCEDURE — 99900035 HC TECH TIME PER 15 MIN (STAT)

## 2020-03-05 PROCEDURE — 84145 PROCALCITONIN (PCT): CPT

## 2020-03-05 PROCEDURE — 94761 N-INVAS EAR/PLS OXIMETRY MLT: CPT

## 2020-03-05 PROCEDURE — 84484 ASSAY OF TROPONIN QUANT: CPT

## 2020-03-05 PROCEDURE — 99291 CRITICAL CARE FIRST HOUR: CPT | Mod: ,,, | Performed by: EMERGENCY MEDICINE

## 2020-03-05 PROCEDURE — 93010 ELECTROCARDIOGRAM REPORT: CPT | Mod: ,,, | Performed by: INTERNAL MEDICINE

## 2020-03-05 PROCEDURE — 83605 ASSAY OF LACTIC ACID: CPT

## 2020-03-05 PROCEDURE — 63600175 PHARM REV CODE 636 W HCPCS: Performed by: INTERNAL MEDICINE

## 2020-03-05 PROCEDURE — 86901 BLOOD TYPING SEROLOGIC RH(D): CPT

## 2020-03-05 PROCEDURE — 83880 ASSAY OF NATRIURETIC PEPTIDE: CPT

## 2020-03-05 PROCEDURE — 85025 COMPLETE CBC W/AUTO DIFF WBC: CPT

## 2020-03-05 PROCEDURE — 94660 CPAP INITIATION&MGMT: CPT

## 2020-03-05 PROCEDURE — 96374 THER/PROPH/DIAG INJ IV PUSH: CPT

## 2020-03-05 RX ORDER — IPRATROPIUM BROMIDE AND ALBUTEROL SULFATE 2.5; .5 MG/3ML; MG/3ML
3 SOLUTION RESPIRATORY (INHALATION) EVERY 4 HOURS
Status: DISCONTINUED | OUTPATIENT
Start: 2020-03-06 | End: 2020-03-09

## 2020-03-05 RX ORDER — CEFTRIAXONE 1 G/1
1 INJECTION, POWDER, FOR SOLUTION INTRAMUSCULAR; INTRAVENOUS
Status: COMPLETED | OUTPATIENT
Start: 2020-03-05 | End: 2020-03-07

## 2020-03-05 RX ORDER — IPRATROPIUM BROMIDE AND ALBUTEROL SULFATE 2.5; .5 MG/3ML; MG/3ML
3 SOLUTION RESPIRATORY (INHALATION)
Status: COMPLETED | OUTPATIENT
Start: 2020-03-05 | End: 2020-03-05

## 2020-03-05 RX ORDER — ALBUTEROL SULFATE 2.5 MG/.5ML
2.5 SOLUTION RESPIRATORY (INHALATION)
Status: COMPLETED | OUTPATIENT
Start: 2020-03-05 | End: 2020-03-05

## 2020-03-05 RX ORDER — ENOXAPARIN SODIUM 100 MG/ML
40 INJECTION SUBCUTANEOUS EVERY 24 HOURS
Status: DISCONTINUED | OUTPATIENT
Start: 2020-03-06 | End: 2020-03-10 | Stop reason: HOSPADM

## 2020-03-05 RX ADMIN — ALBUTEROL SULFATE 2.5 MG: 2.5 SOLUTION RESPIRATORY (INHALATION) at 05:03

## 2020-03-05 RX ADMIN — IPRATROPIUM BROMIDE AND ALBUTEROL SULFATE 3 ML: .5; 3 SOLUTION RESPIRATORY (INHALATION) at 05:03

## 2020-03-05 RX ADMIN — CEFTRIAXONE SODIUM 1 G: 1 INJECTION, POWDER, FOR SOLUTION INTRAMUSCULAR; INTRAVENOUS at 09:03

## 2020-03-05 NOTE — ED TRIAGE NOTES
Patient is a 74 year old female that presents to ED via EMS with c/o altered mental status. Hx of breast cancer and on home O2 2L. Patient's son at bedside states started to become confused earlier today.

## 2020-03-05 NOTE — ED PROVIDER NOTES
Encounter Date: 3/5/2020       History     Chief Complaint   Patient presents with    Back Pain     fall today x1 hour, history of l3, 4, 5 fracture    Altered Mental Status     74-year-old female with history of breast cancer (metastatic), COPD (oxygen-dependent, 2 L), hypertension presents for evaluation of altered mental status.  Her son reports that since yesterday she has had a significant change in her behavior.  She seems confused.  She is not making sense when she talks.  She is having difficulty doing her daily task.  Son states that she is usually very functional.  He says this morning he found her with her oxygen turned up to 5 L. the son reports that she fell a few days ago and has been complaining of back pain. She has not had any incontinence or difficulty moving.  She was recently seen by her oncologist.  The son reports that she is still smoking, but that she is probably only had about 5 cigarettes in the last 2 weeks.  He reports that today she tried to smoke a cigarette, but did not have the ability to do so because she seems diffuse    The history is provided by the patient and a caregiver. The history is limited by the condition of the patient.     Review of patient's allergies indicates:   Allergen Reactions    Adhesive Rash     Use PAPER Tape / TEGADERM    Levaquin [levofloxacin] Diarrhea and Nausea Only    Penicillins Rash     Past Medical History:   Diagnosis Date    Allergy     Back pain     Breast cancer 5/2015    right breast infiltrating ductal CA with DCIS, ER positive, NH/Her2 negative     COPD (chronic obstructive pulmonary disease)     Chronic bronchitis    Hyperlipidemia     Hypertension      Past Surgical History:   Procedure Laterality Date    BREAST BIOPSY Right     BREAST LUMPECTOMY Right 2015    IDC & DCIS    CERVICAL FUSION      HYSTERECTOMY      with BSO    LUMBAR FUSION      LYMPH NODE DISSECTION Right     right lumpectomy       Family History   Problem  Relation Age of Onset    Breast cancer Maternal Grandmother 75    Ovarian cancer Neg Hx      Social History     Tobacco Use    Smoking status: Current Every Day Smoker     Packs/day: 1.00     Years: 45.00     Pack years: 45.00     Types: Cigarettes    Smokeless tobacco: Current User    Tobacco comment: admits to attempting to quit multiple times; currently smoking up to 1 packs/day down from 2 packs/day at time of diagnosis   Substance Use Topics    Alcohol use: Yes     Comment: occasionaly    Drug use: No     Review of Systems   Unable to perform ROS: Mental status change       Physical Exam     Initial Vitals   BP Pulse Resp Temp SpO2   03/05/20 1652 03/05/20 1652 03/05/20 1652 03/06/20 0444 03/05/20 1652   (!) 148/78 90 18 98.4 °F (36.9 °C) (!) 93 %      MAP       --                Physical Exam    Nursing note and vitals reviewed.  Constitutional: Vital signs are normal. She appears well-developed and well-nourished.   HENT:   Head: Normocephalic and atraumatic.   Mouth/Throat: Oropharynx is clear and moist.   Eyes: Conjunctivae and EOM are normal. Pupils are equal, round, and reactive to light.   Neck: Trachea normal and normal range of motion. Neck supple.   Cardiovascular: Normal rate, regular rhythm, normal heart sounds and normal pulses.   Pulmonary/Chest: Breath sounds normal. Accessory muscle usage present. Tachypnea noted. She is in respiratory distress.   Long exhales through pursed lips  Decreased air movement diffusely, minimal breath sounds heard   Abdominal: Soft. Normal appearance. There is no tenderness.   Musculoskeletal: Normal range of motion.   Neurological: She is alert.   Oriented to person and location.  Offers confused and disoriented response is otherwise   Skin: Skin is warm and dry.         ED Course   Procedures  Labs Reviewed   CBC W/ AUTO DIFFERENTIAL - Abnormal; Notable for the following components:       Result Value    RBC 3.81 (*)     Hemoglobin 11.2 (*)     Mean  Corpuscular Volume 101 (*)     Mean Corpuscular Hemoglobin Conc 29.2 (*)     Platelets 149 (*)     Immature Granulocytes 1.4 (*)     Immature Grans (Abs) 0.09 (*)     Lymph # 0.9 (*)     Gran% 75.2 (*)     Lymph% 13.8 (*)     All other components within normal limits   COMPREHENSIVE METABOLIC PANEL - Abnormal; Notable for the following components:    Chloride 93 (*)     CO2 48 (*)     ALT 6 (*)     Anion Gap 4 (*)     All other components within normal limits   URINALYSIS, REFLEX TO URINE CULTURE - Abnormal; Notable for the following components:    Appearance, UA Cloudy (*)     pH, UA >8.0 (*)     Protein, UA 1+ (*)     Leukocytes, UA 2+ (*)     All other components within normal limits    Narrative:     Preferred Collection Type->Urine, Catheterized   URINALYSIS MICROSCOPIC - Abnormal; Notable for the following components:    WBC, UA 34 (*)     Bacteria Few (*)     Non-Squam Epith 2 (*)     Amorphous, UA Many (*)     All other components within normal limits    Narrative:     Preferred Collection Type->Urine, Catheterized   CBC W/ AUTO DIFFERENTIAL - Abnormal; Notable for the following components:    RBC 3.54 (*)     Hemoglobin 10.5 (*)     Hematocrit 34.8 (*)     Mean Corpuscular Hemoglobin Conc 30.2 (*)     Platelets 145 (*)     Immature Granulocytes 1.2 (*)     Immature Grans (Abs) 0.09 (*)     Lymph% 14.1 (*)     All other components within normal limits   BASIC METABOLIC PANEL - Abnormal; Notable for the following components:    Potassium 3.4 (*)     Chloride 92 (*)     CO2 45 (*)     Anion Gap 7 (*)     All other components within normal limits   MAGNESIUM - Abnormal; Notable for the following components:    Magnesium 1.5 (*)     All other components within normal limits   PHOSPHORUS - Abnormal; Notable for the following components:    Phosphorus 1.4 (*)     All other components within normal limits   ISTAT PROCEDURE - Abnormal; Notable for the following components:    POC PH 7.343 (*)     POC PCO2 89.3 (*)      POC PO2 54 (*)     POC HCO3 48.5 (*)     POC SATURATED O2 83 (*)     POC TCO2 >50 (*)     All other components within normal limits   ISTAT PROCEDURE - Abnormal; Notable for the following components:    POC PH 7.283 (*)     POC PCO2 107.3 (*)     POC PO2 22 (*)     POC HCO3 50.7 (*)     POC SATURATED O2 27 (*)     POC TCO2 >50 (*)     All other components within normal limits   INFLUENZA A & B BY MOLECULAR   CULTURE, URINE   B-TYPE NATRIURETIC PEPTIDE   MAGNESIUM   LACTIC ACID, PLASMA   TROPONIN I   PROCALCITONIN   TYPE & SCREEN     EKG Readings: (Independently Interpreted)   Initial Reading: No STEMI. Rhythm: Normal Sinus Rhythm. Heart Rate: 87. ST Segments: Normal ST Segments.     ECG Results          EKG 12-lead (Final result)  Result time 03/06/20 18:19:40    Final result by Interface, Lab In [facility] (03/06/20 18:19:40)                 Narrative:    Test Reason : R41.82,    Vent. Rate : 087 BPM     Atrial Rate : 087 BPM     P-R Int : 156 ms          QRS Dur : 074 ms      QT Int : 380 ms       P-R-T Axes : 079 074 085 degrees     QTc Int : 457 ms    Normal sinus rhythm  Left atrial enlargement  Baseline Artifact  Borderline Abnormal ECG  Abnormal ECG  When compared with ECG of 27-MAR-2019 00:12,  No significant change was found  Confirmed by MORENO SWENSON MD ([id number]) on 3/6/2020 6:19:29 PM    Referred By: [email or web address]   SELF           Confirmed By:MORENO SWENSON MD                            Imaging Results          CT Head Without Contrast (Final result)  Result time 03/05/20 19:22:09    Final result by Tam Sparrow MD (03/05/20 19:22:09)                 Impression:      No evidence of acute intracranial pathology.  Additional evaluation, as clinically warranted.    Electronically signed by resident: Zelalem Siddiqui  Date:    03/05/2020  Time:    18:57    Electronically signed by: Tam Sparrow MD  Date:    03/05/2020  Time:    19:22             Narrative:    EXAMINATION:  CT HEAD WITHOUT CONTRAST    CLINICAL  HISTORY:  Confusion/delirium, altered LOC, unexplained;    TECHNIQUE:  Low dose axial CT images obtained throughout the head without the use of intravenous contrast.  Axial, sagittal and coronal reconstructions were performed.    COMPARISON:  None.    FINDINGS:  The ventricles and sulci are normal in size for age without evidence of hydrocephalus.    The brain parenchyma appears within normal limits.  No parenchymal mass, hemorrhage, edema or major vascular distribution infarct.    No extra-axial blood or fluid collections.    Skull/extracranial contents (limited evaluation):    No acute calvarial fracture.  Mastoid air cells and paranasal sinuses are essentially clear.                               X-Ray Chest 1 View (Final result)  Result time 03/05/20 18:41:48    Final result by Tam Sparrow MD (03/05/20 18:41:48)                 Impression:      No acute process.      Electronically signed by: Tam Sparrow MD  Date:    03/05/2020  Time:    18:41             Narrative:    EXAMINATION:  XR CHEST 1 VIEW    CLINICAL HISTORY:  Shortness of breath    TECHNIQUE:  Single frontal view of the chest was performed.    COMPARISON:  02/19/2020.    FINDINGS:  Monitoring EKG leads are present.  The trachea is unremarkable.  The cardiomediastinal silhouette is within normal limits.  The hilar structures are unremarkable.  The right hemidiaphragm is unremarkable.  There is elevation of the left hemidiaphragm.  There are no pleural effusions.  There is no evidence of a pneumothorax.  There is no evidence of pneumomediastinum.  No airspace opacity is present.    There are degenerative changes in the osseous structures.  There are postoperative changes in the right axillary region.                              X-Rays:   Independently Interpreted Readings:   Chest X-Ray: Normal heart size.  No infiltrates.  No acute abnormalities.   Head CT: No hemorrhage.  No skull fracture.  No acute stroke.     Medical Decision Making:   History:    I obtained history from: someone other than patient.       <> Summary of History: Son provides additional history  Old Medical Records: I decided to obtain old medical records.  Old Records Summarized: records from previous admission(s) and records from clinic visits.  Initial Assessment:   Emergent evaluation of altered mental status  Differential Diagnosis:   Respiratory failure, progression of metastatic disease, CVA  Independently Interpreted Test(s):   I have ordered and independently interpreted X-rays - see prior notes.  I have ordered and independently interpreted EKG Reading(s) - see prior notes  Clinical Tests:   Lab Tests: Ordered and Reviewed  Radiological Study: Ordered and Reviewed  Medical Tests: Ordered and Reviewed  ED Management:  Patient presents with clear altered mental status.  This is a new change for the patient based on the the son's history.  She smells of cigarette smoke and is having difficulty breathing.  My initial suspicion is for acute on chronic respiratory failure.  I obtained an ABG which demonstrated elevated CO2.  No priors for comparison though her bicarb in her blood work has been chronically elevated.  I then placed her on BiPAP and gave bronchodilator treatments.  Plan for lab work including cardiac biomarkers.  Chest x-ray and CT scan to evaluate for acute intracranial process.  Other:   I have discussed this case with another health care provider.              Attending Attestation:         Attending Critical Care:   Critical Care Times:   Direct Patient Care (initial evaluation, reassessments, and time considering the case)................................................................20 minutes.   Additional History from reviewing old medical records or taking additional history from the family, EMS, PCP, etc.......................10 minutes.   Ordering, Reviewing, and Interpreting Diagnostic  Studies...............................................................................................................5 minutes.   Documentation..................................................................................................................................................................................5 minutes.   Consultation with other Physicians. .................................................................................................................................................10 minutes.   Consultation with the patient's family directly relating to the patient's condition, care, and DNR status (when patient unable)......5 minutes.   ==============================================================  · Total Critical Care Time - exclusive of procedural time: 55 minutes.  ==============================================================  Critical care was necessary to treat or prevent imminent or life-threatening deterioration of the following conditions: COPD exacerbation and respiratory failure.       Attending ED Notes:   8:00 p.m.  Patient re-evaluated.  She seems to be more alert though She keeps asking if she is dead yet.  Will repeat VBG.  Remaining lab work and imaging unremarkable for acute process.  At this time her symptoms seem to be related to respiratory failure.  Updated family.  Discussed with a ICU to evaluate the patient    Update:  Patient evaluated by the ICU.  Repeat VBG with significant increase in her CO2.  Patient will be admitted to the ICU                        Clinical Impression:       ICD-10-CM ICD-9-CM   1. Altered mental status R41.82 780.97   2. Shortness of breath R06.02 786.05   3. Acute on chronic respiratory failure with hypoxia and hypercapnia J96.21 518.84    J96.22 786.09     799.02   4. COPD exacerbation J44.1 491.21   5. Goals of care, counseling/discussion Z71.89 V65.49         Disposition:   Disposition: Admitted  Condition: Critical     ED  Disposition Condition    Admit                           Ricki Yates MD  03/06/20 [id number]

## 2020-03-05 NOTE — TELEPHONE ENCOUNTER
----- Message from Allison Brandt sent at 3/5/2020 11:26 AM CST -----      ----- Message -----  From: Helen Lama  Sent: 3/5/2020  11:23 AM CST  To: Tiffany CARMONA Staff    Pt is calling to cancel her appt and would like for someone to give her a call back to set a day but next wednesday would be nice

## 2020-03-05 NOTE — TELEPHONE ENCOUNTER
Message fwd to .       ----- Message from Helen Lama sent at 3/5/2020 11:23 AM CST -----  Pt is calling to cancel her appt and would like for someone to give her a call back to set a day but next wednesday would be nice

## 2020-03-05 NOTE — TELEPHONE ENCOUNTER
----- Message from Allison Brandt sent at 3/5/2020  2:14 PM CST -----  3/19 is fine! Thanks    ----- Message -----  From: Rosio Allen MD  Sent: 3/5/2020   2:11 PM CST  To: Allison Brandt    Yes    ----- Message -----  From: Allison Brandt  Sent: 3/5/2020  12:32 PM CST  To: Rosio Allen MD    PET was negative so 3/19 appt should be fine? (Pt r/s from today)    ----- Message -----  From: Nimco Roth  Sent: 3/5/2020  11:44 AM CST  To: Allison Brandt    Next available is 3/19. Is that okay?  ----- Message -----  From: Allison Brandt  Sent: 3/5/2020  11:26 AM CST  To: [employer]  Pool        ----- Message -----  From: Helen Lama  Sent: 3/5/2020  11:23 AM CST  To: Tiffany CARMONA Staff    Pt is calling to cancel her appt and would like for someone to give her a call back to set a day but next wednesday would be nice

## 2020-03-06 PROBLEM — J96.01 ACUTE RESPIRATORY FAILURE WITH HYPOXIA AND HYPERCAPNIA: Status: ACTIVE | Noted: 2020-03-06

## 2020-03-06 PROBLEM — J96.02 ACUTE RESPIRATORY FAILURE WITH HYPOXIA AND HYPERCAPNIA: Status: ACTIVE | Noted: 2020-03-06

## 2020-03-06 LAB
ADENOVIRUS: NOT DETECTED
ALLENS TEST: ABNORMAL
AMORPH CRY UR QL COMP ASSIST: ABNORMAL
ANION GAP SERPL CALC-SCNC: 7 MMOL/L (ref 8–16)
BACTERIA #/AREA URNS AUTO: ABNORMAL /HPF
BASOPHILS # BLD AUTO: 0.04 K/UL (ref 0–0.2)
BASOPHILS NFR BLD: 0.5 % (ref 0–1.9)
BILIRUB UR QL STRIP: NEGATIVE
BORDETELLA PARAPERTUSSIS (IS1001): NOT DETECTED
BORDETELLA PERTUSSIS (PTXP): NOT DETECTED
BUN SERPL-MCNC: 10 MG/DL (ref 8–23)
CALCIUM SERPL-MCNC: 9.8 MG/DL (ref 8.7–10.5)
CHLAMYDIA PNEUMONIAE: NOT DETECTED
CHLORIDE SERPL-SCNC: 92 MMOL/L (ref 95–110)
CLARITY UR REFRACT.AUTO: ABNORMAL
CO2 SERPL-SCNC: 45 MMOL/L (ref 23–29)
COLOR UR AUTO: YELLOW
CORONAVIRUS 229E, COMMON COLD VIRUS: NOT DETECTED
CORONAVIRUS HKU1, COMMON COLD VIRUS: NOT DETECTED
CORONAVIRUS NL63, COMMON COLD VIRUS: NOT DETECTED
CORONAVIRUS OC43, COMMON COLD VIRUS: NOT DETECTED
CREAT SERPL-MCNC: 0.6 MG/DL (ref 0.5–1.4)
DELSYS: ABNORMAL
DIFFERENTIAL METHOD: ABNORMAL
EOSINOPHIL # BLD AUTO: 0 K/UL (ref 0–0.5)
EOSINOPHIL NFR BLD: 0.5 % (ref 0–8)
EP: 5
EP: 5
ERYTHROCYTE [DISTWIDTH] IN BLOOD BY AUTOMATED COUNT: 12.5 % (ref 11.5–14.5)
ERYTHROCYTE [SEDIMENTATION RATE] IN BLOOD BY WESTERGREN METHOD: 14 MM/H
EST. GFR  (AFRICAN AMERICAN): >60 ML/MIN/1.73 M^2
EST. GFR  (NON AFRICAN AMERICAN): >60 ML/MIN/1.73 M^2
FIO2: 30
FIO2: 30
FLOW: 2
FLUBV RNA NPH QL NAA+NON-PROBE: NOT DETECTED
GLUCOSE SERPL-MCNC: 102 MG/DL (ref 70–110)
GLUCOSE UR QL STRIP: NEGATIVE
HCO3 UR-SCNC: 40.4 MMOL/L (ref 24–28)
HCO3 UR-SCNC: 44 MMOL/L (ref 24–28)
HCO3 UR-SCNC: 48.5 MMOL/L (ref 24–28)
HCO3 UR-SCNC: 52.5 MMOL/L (ref 24–28)
HCT VFR BLD AUTO: 34.8 % (ref 37–48.5)
HGB BLD-MCNC: 10.5 G/DL (ref 12–16)
HGB UR QL STRIP: NEGATIVE
HPIV1 RNA NPH QL NAA+NON-PROBE: NOT DETECTED
HPIV2 RNA NPH QL NAA+NON-PROBE: NOT DETECTED
HPIV3 RNA NPH QL NAA+NON-PROBE: NOT DETECTED
HPIV4 RNA NPH QL NAA+NON-PROBE: NOT DETECTED
HUMAN METAPNEUMOVIRUS: NOT DETECTED
HYALINE CASTS UR QL AUTO: 0 /LPF
IMM GRANULOCYTES # BLD AUTO: 0.09 K/UL (ref 0–0.04)
IMM GRANULOCYTES NFR BLD AUTO: 1.2 % (ref 0–0.5)
INFLUENZA A (SUBTYPES H1,H1-2009,H3): NOT DETECTED
IP: 15
IP: 15
KETONES UR QL STRIP: NEGATIVE
LEUKOCYTE ESTERASE UR QL STRIP: ABNORMAL
LYMPHOCYTES # BLD AUTO: 1.1 K/UL (ref 1–4.8)
LYMPHOCYTES NFR BLD: 14.1 % (ref 18–48)
MAGNESIUM SERPL-MCNC: 1.5 MG/DL (ref 1.6–2.6)
MCH RBC QN AUTO: 29.7 PG (ref 27–31)
MCHC RBC AUTO-ENTMCNC: 30.2 G/DL (ref 32–36)
MCV RBC AUTO: 98 FL (ref 82–98)
MICROSCOPIC COMMENT: ABNORMAL
MODE: ABNORMAL
MONOCYTES # BLD AUTO: 0.9 K/UL (ref 0.3–1)
MONOCYTES NFR BLD: 12.1 % (ref 4–15)
MYCOPLASMA PNEUMONIAE: NOT DETECTED
NEUTROPHILS # BLD AUTO: 5.4 K/UL (ref 1.8–7.7)
NEUTROPHILS NFR BLD: 71.6 % (ref 38–73)
NITRITE UR QL STRIP: NEGATIVE
NON-SQ EPI CELLS #/AREA URNS AUTO: 2 /HPF
NRBC BLD-RTO: 0 /100 WBC
PCO2 BLDA: 101.6 MMHG (ref 35–45)
PCO2 BLDA: 73.5 MMHG (ref 35–45)
PCO2 BLDA: 76.5 MMHG (ref 35–45)
PCO2 BLDA: 77.1 MMHG (ref 35–45)
PH SMN: 7.32 [PH] (ref 7.35–7.45)
PH SMN: 7.33 [PH] (ref 7.35–7.45)
PH SMN: 7.38 [PH] (ref 7.35–7.45)
PH SMN: 7.41 [PH] (ref 7.35–7.45)
PH UR STRIP: >8 [PH] (ref 5–8)
PHOSPHATE SERPL-MCNC: 1.4 MG/DL (ref 2.7–4.5)
PLATELET # BLD AUTO: 145 K/UL (ref 150–350)
PMV BLD AUTO: 10 FL (ref 9.2–12.9)
PO2 BLDA: 17 MMHG (ref 40–60)
PO2 BLDA: 24 MMHG (ref 40–60)
PO2 BLDA: 26 MMHG (ref 40–60)
PO2 BLDA: 29 MMHG (ref 40–60)
POC BE: 14 MMOL/L
POC BE: 19 MMOL/L
POC BE: 24 MMOL/L
POC BE: 26 MMOL/L
POC SATURATED O2: 17 % (ref 95–100)
POC SATURATED O2: 34 % (ref 95–100)
POC SATURATED O2: 43 % (ref 95–100)
POC SATURATED O2: 51 % (ref 95–100)
POC TCO2: 43 MMOL/L (ref 24–29)
POC TCO2: 46 MMOL/L (ref 24–29)
POC TCO2: >50 MMOL/L (ref 24–29)
POC TCO2: >50 MMOL/L (ref 24–29)
POTASSIUM SERPL-SCNC: 3.4 MMOL/L (ref 3.5–5.1)
PROT UR QL STRIP: ABNORMAL
RBC # BLD AUTO: 3.54 M/UL (ref 4–5.4)
RBC #/AREA URNS AUTO: 0 /HPF (ref 0–4)
RESPIRATORY INFECTION PANEL SOURCE: NORMAL
RSV RNA NPH QL NAA+NON-PROBE: NOT DETECTED
RV+EV RNA NPH QL NAA+NON-PROBE: NOT DETECTED
SAMPLE: ABNORMAL
SITE: ABNORMAL
SODIUM SERPL-SCNC: 144 MMOL/L (ref 136–145)
SP GR UR STRIP: 1.01 (ref 1–1.03)
SP02: 96
SQUAMOUS #/AREA URNS AUTO: 4 /HPF
URN SPEC COLLECT METH UR: ABNORMAL
WBC # BLD AUTO: 7.52 K/UL (ref 3.9–12.7)
WBC #/AREA URNS AUTO: 34 /HPF (ref 0–5)

## 2020-03-06 PROCEDURE — 25000003 PHARM REV CODE 250: Performed by: STUDENT IN AN ORGANIZED HEALTH CARE EDUCATION/TRAINING PROGRAM

## 2020-03-06 PROCEDURE — 80048 BASIC METABOLIC PNL TOTAL CA: CPT

## 2020-03-06 PROCEDURE — 94660 CPAP INITIATION&MGMT: CPT

## 2020-03-06 PROCEDURE — 27000221 HC OXYGEN, UP TO 24 HOURS

## 2020-03-06 PROCEDURE — 25000242 PHARM REV CODE 250 ALT 637 W/ HCPCS: Performed by: INTERNAL MEDICINE

## 2020-03-06 PROCEDURE — 87633 RESP VIRUS 12-25 TARGETS: CPT

## 2020-03-06 PROCEDURE — 97161 PT EVAL LOW COMPLEX 20 MIN: CPT

## 2020-03-06 PROCEDURE — 83735 ASSAY OF MAGNESIUM: CPT

## 2020-03-06 PROCEDURE — 84100 ASSAY OF PHOSPHORUS: CPT

## 2020-03-06 PROCEDURE — 85025 COMPLETE CBC W/AUTO DIFF WBC: CPT

## 2020-03-06 PROCEDURE — 99900035 HC TECH TIME PER 15 MIN (STAT)

## 2020-03-06 PROCEDURE — 99222 1ST HOSP IP/OBS MODERATE 55: CPT | Mod: ,,, | Performed by: INTERNAL MEDICINE

## 2020-03-06 PROCEDURE — 97165 OT EVAL LOW COMPLEX 30 MIN: CPT

## 2020-03-06 PROCEDURE — 63600175 PHARM REV CODE 636 W HCPCS: Performed by: STUDENT IN AN ORGANIZED HEALTH CARE EDUCATION/TRAINING PROGRAM

## 2020-03-06 PROCEDURE — 94640 AIRWAY INHALATION TREATMENT: CPT

## 2020-03-06 PROCEDURE — 25000242 PHARM REV CODE 250 ALT 637 W/ HCPCS: Performed by: STUDENT IN AN ORGANIZED HEALTH CARE EDUCATION/TRAINING PROGRAM

## 2020-03-06 PROCEDURE — 82800 BLOOD PH: CPT

## 2020-03-06 PROCEDURE — 94761 N-INVAS EAR/PLS OXIMETRY MLT: CPT

## 2020-03-06 PROCEDURE — 97116 GAIT TRAINING THERAPY: CPT

## 2020-03-06 PROCEDURE — 63600175 PHARM REV CODE 636 W HCPCS: Performed by: INTERNAL MEDICINE

## 2020-03-06 PROCEDURE — 11000001 HC ACUTE MED/SURG PRIVATE ROOM

## 2020-03-06 PROCEDURE — 99222 PR INITIAL HOSPITAL CARE,LEVL II: ICD-10-PCS | Mod: ,,, | Performed by: INTERNAL MEDICINE

## 2020-03-06 PROCEDURE — 82803 BLOOD GASES ANY COMBINATION: CPT

## 2020-03-06 RX ORDER — PRAVASTATIN SODIUM 10 MG/1
40 TABLET ORAL NIGHTLY
Status: DISCONTINUED | OUTPATIENT
Start: 2020-03-06 | End: 2020-03-10 | Stop reason: HOSPADM

## 2020-03-06 RX ORDER — GLUCAGON 1 MG
1 KIT INJECTION
Status: DISCONTINUED | OUTPATIENT
Start: 2020-03-06 | End: 2020-03-10 | Stop reason: HOSPADM

## 2020-03-06 RX ORDER — LETROZOLE 2.5 MG/1
2.5 TABLET, FILM COATED ORAL DAILY
Status: DISCONTINUED | OUTPATIENT
Start: 2020-03-06 | End: 2020-03-10 | Stop reason: HOSPADM

## 2020-03-06 RX ORDER — IBUPROFEN 200 MG
16 TABLET ORAL
Status: DISCONTINUED | OUTPATIENT
Start: 2020-03-06 | End: 2020-03-10 | Stop reason: HOSPADM

## 2020-03-06 RX ORDER — TIOTROPIUM BROMIDE 18 UG/1
1 CAPSULE ORAL; RESPIRATORY (INHALATION) DAILY
Status: DISCONTINUED | OUTPATIENT
Start: 2020-03-06 | End: 2020-03-10 | Stop reason: HOSPADM

## 2020-03-06 RX ORDER — ACETAMINOPHEN 325 MG/1
650 TABLET ORAL EVERY 4 HOURS PRN
Status: DISCONTINUED | OUTPATIENT
Start: 2020-03-06 | End: 2020-03-10 | Stop reason: HOSPADM

## 2020-03-06 RX ORDER — ONDANSETRON 8 MG/1
8 TABLET, ORALLY DISINTEGRATING ORAL EVERY 8 HOURS PRN
Status: DISCONTINUED | OUTPATIENT
Start: 2020-03-06 | End: 2020-03-10 | Stop reason: HOSPADM

## 2020-03-06 RX ORDER — FLUTICASONE FUROATE AND VILANTEROL 100; 25 UG/1; UG/1
1 POWDER RESPIRATORY (INHALATION) DAILY
Status: DISCONTINUED | OUTPATIENT
Start: 2020-03-06 | End: 2020-03-10 | Stop reason: HOSPADM

## 2020-03-06 RX ORDER — IBUPROFEN 200 MG
24 TABLET ORAL
Status: DISCONTINUED | OUTPATIENT
Start: 2020-03-06 | End: 2020-03-10 | Stop reason: HOSPADM

## 2020-03-06 RX ORDER — MAGNESIUM SULFATE HEPTAHYDRATE 40 MG/ML
2 INJECTION, SOLUTION INTRAVENOUS
Status: COMPLETED | OUTPATIENT
Start: 2020-03-06 | End: 2020-03-06

## 2020-03-06 RX ORDER — ASPIRIN 81 MG/1
81 TABLET ORAL DAILY
Status: DISCONTINUED | OUTPATIENT
Start: 2020-03-06 | End: 2020-03-10 | Stop reason: HOSPADM

## 2020-03-06 RX ORDER — LIDOCAINE 50 MG/G
3 PATCH TOPICAL
Status: DISCONTINUED | OUTPATIENT
Start: 2020-03-06 | End: 2020-03-10 | Stop reason: HOSPADM

## 2020-03-06 RX ORDER — POTASSIUM CHLORIDE 750 MG/1
30 CAPSULE, EXTENDED RELEASE ORAL EVERY 4 HOURS
Status: COMPLETED | OUTPATIENT
Start: 2020-03-06 | End: 2020-03-06

## 2020-03-06 RX ORDER — SODIUM CHLORIDE 0.9 % (FLUSH) 0.9 %
10 SYRINGE (ML) INJECTION
Status: DISCONTINUED | OUTPATIENT
Start: 2020-03-06 | End: 2020-03-10 | Stop reason: HOSPADM

## 2020-03-06 RX ADMIN — ACETAMINOPHEN 650 MG: 325 TABLET ORAL at 08:03

## 2020-03-06 RX ADMIN — IPRATROPIUM BROMIDE AND ALBUTEROL SULFATE 3 ML: .5; 3 SOLUTION RESPIRATORY (INHALATION) at 04:03

## 2020-03-06 RX ADMIN — PRAVASTATIN SODIUM 40 MG: 10 TABLET ORAL at 08:03

## 2020-03-06 RX ADMIN — PRAVASTATIN SODIUM 40 MG: 10 TABLET ORAL at 02:03

## 2020-03-06 RX ADMIN — ENOXAPARIN SODIUM 40 MG: 100 INJECTION SUBCUTANEOUS at 04:03

## 2020-03-06 RX ADMIN — LIDOCAINE 3 PATCH: 50 PATCH TOPICAL at 09:03

## 2020-03-06 RX ADMIN — IPRATROPIUM BROMIDE AND ALBUTEROL SULFATE 3 ML: .5; 3 SOLUTION RESPIRATORY (INHALATION) at 11:03

## 2020-03-06 RX ADMIN — POTASSIUM CHLORIDE 30 MEQ: 750 CAPSULE, EXTENDED RELEASE ORAL at 02:03

## 2020-03-06 RX ADMIN — MAGNESIUM SULFATE HEPTAHYDRATE 2 G: 40 INJECTION, SOLUTION INTRAVENOUS at 09:03

## 2020-03-06 RX ADMIN — POTASSIUM PHOSPHATE, MONOBASIC AND POTASSIUM PHOSPHATE, DIBASIC 20 MMOL: 224; 236 INJECTION, SOLUTION, CONCENTRATE INTRAVENOUS at 09:03

## 2020-03-06 RX ADMIN — LETROZOLE 2.5 MG: 2.5 TABLET ORAL at 12:03

## 2020-03-06 RX ADMIN — ASPIRIN 81 MG: 81 TABLET, COATED ORAL at 08:03

## 2020-03-06 RX ADMIN — FLUTICASONE FUROATE AND VILANTEROL TRIFENATATE 1 PUFF: 100; 25 POWDER RESPIRATORY (INHALATION) at 11:03

## 2020-03-06 RX ADMIN — TIOTROPIUM BROMIDE 18 MCG: 18 CAPSULE ORAL; RESPIRATORY (INHALATION) at 11:03

## 2020-03-06 RX ADMIN — IPRATROPIUM BROMIDE AND ALBUTEROL SULFATE 3 ML: .5; 3 SOLUTION RESPIRATORY (INHALATION) at 08:03

## 2020-03-06 RX ADMIN — IPRATROPIUM BROMIDE AND ALBUTEROL SULFATE 3 ML: .5; 3 SOLUTION RESPIRATORY (INHALATION) at 12:03

## 2020-03-06 RX ADMIN — MAGNESIUM SULFATE HEPTAHYDRATE 2 G: 40 INJECTION, SOLUTION INTRAVENOUS at 12:03

## 2020-03-06 RX ADMIN — IPRATROPIUM BROMIDE AND ALBUTEROL SULFATE 3 ML: .5; 3 SOLUTION RESPIRATORY (INHALATION) at 05:03

## 2020-03-06 RX ADMIN — POTASSIUM CHLORIDE 30 MEQ: 750 CAPSULE, EXTENDED RELEASE ORAL at 09:03

## 2020-03-06 RX ADMIN — CEFTRIAXONE SODIUM 1 G: 1 INJECTION, POWDER, FOR SOLUTION INTRAMUSCULAR; INTRAVENOUS at 09:03

## 2020-03-06 RX ADMIN — ACETAMINOPHEN 650 MG: 325 TABLET ORAL at 06:03

## 2020-03-06 NOTE — ASSESSMENT & PLAN NOTE
No cough, no sputum production/increase productive cough  Chest xray with out acute process- hyperinflation   Continue treatment with nebs and abx  PRN BiPAP

## 2020-03-06 NOTE — HPI
Radha Woods is a 73 yo female patient with a history of advanced COPD/emphysema, home O2, breast cancer in 2015 (on oral CTX), osteoporosis and compression fracture along with remote back surgery. She lives with her son who cares for her at his home.   Recently she has been having episodes of excessive sleepiness and had been difficult to arouse by the son. She has had times of confusion and has been requiring more oxygen, which she adjusts herself.     She was admitted for hypercapnic respiratory failure, placed on Bipap over night and has improved this morning.     Evaluation showed overall very poor pulmonary function, which appears to have been progressive over the past months/years. The pt. Has had missed follow ups with pulmonary clinic.     I am being asked to discuss goals of care with the pt. Dr. Escobar with ICU has discussed advanced directive with the pt. And family yesterday and had recommended no mechanical ventilation or CPR.     I met with the pt. And the son at the bedside today. Ms. You is awake and able to participate in our conversation.

## 2020-03-06 NOTE — NURSING
Dr. Willett and team at bedside for rounding. Updated of patient condition and events overnight. Plan for weaning bipap to nasal cannula, medication optimization, respiratory viral panel, and bipap at night and as she is napping through the day. Plan also to restart diet. Patient and son at bedside updated of plan of care moving forward. Questions encouraged and answered. Verbalized understanding of all.

## 2020-03-06 NOTE — PLAN OF CARE
"      SICU PLAN OF CARE NOTE    Dx: Acute on chronic respiratory failure with hypoxia and hypercapnia    Shift Events: Received patient from ED. Placed patient on bipap 30% upon arrival to SICU. Cardiac monitor applied. Patient oriented to person and place but states she is here for "a quadruple bypass surgery". Explained to patient the reason she is here. Patient intermittently confused and unable to follow conversation. CC at bedside and stated possible stepdown later on today. Plan of care reviewed with patient and son. Palliative care consult in place.    Goals of Care: Stepdown.     Neuro: Follows Commands, Moves All Extremities and Confused    Vital Signs: /80   Pulse 99   Temp 97.7 °F (36.5 °C)   Resp 17   Ht 5' 7" (1.702 m)   Wt 60.8 kg (134 lb 0.6 oz)   SpO2 (!) 94%   Breastfeeding? No   BMI 20.99 kg/m²     Respiratory: BiPAP/CPAP    Diet: NPO    Urine Output: Purewick    Labs/Accuchecks: Daily labs    Skin: Wound on sacrum present on admission. Foam placed on sacrum. No other skin breakdown noted. SOS completed. Foam placed on bilateral heels. Waffle mattress inflated. Bed plugged in. Patient turned.        "

## 2020-03-06 NOTE — SUBJECTIVE & OBJECTIVE
Past Medical History:   Diagnosis Date    Allergy     Back pain     Breast cancer 5/2015    right breast infiltrating ductal CA with DCIS, ER positive, [place]/Her2 negative     COPD (chronic obstructive pulmonary disease)     Chronic bronchitis    Hyperlipidemia     Hypertension        Past Surgical History:   Procedure Laterality Date    BREAST BIOPSY Right     BREAST LUMPECTOMY Right 2015    IDC & DCIS    CERVICAL FUSION      HYSTERECTOMY      with BSO    LUMBAR FUSION      LYMPH NODE DISSECTION Right     right lumpectomy         Review of patient's allergies indicates:   Allergen Reactions    Adhesive Rash     Use PAPER Tape / TEGADERM    Levaquin [levofloxacin] Diarrhea and Nausea Only    Penicillins Rash       Family History     Problem Relation (Age of Onset)    Breast cancer Maternal Grandmother (75)        Tobacco Use    Smoking status: Current Every Day Smoker     Packs/day: 1.00     Years: 45.00     Pack years: 45.00     Types: Cigarettes    Smokeless tobacco: Current User    Tobacco comment: admits to attempting to quit multiple times; currently smoking up to 1 packs/day down from 2 packs/day at time of diagnosis   Substance and Sexual Activity    Alcohol use: Yes     Comment: occasionaly    Drug use: No    Sexual activity: Not Currently      Review of Systems   Unable to perform ROS: Acuity of condition     Objective:     Vital Signs (Most Recent):  Pulse: 91 (03/05/20 2102)  Resp: (!) 22 (03/05/20 2102)  BP: 115/70 (03/05/20 2102)  SpO2: (!) 90 % (03/05/20 2102) Vital Signs (24h Range):  Pulse:  [84-95] 91  Resp:  [16-43] 22  SpO2:  [90 %-99 %] 90 %  BP: (115-148)/(61-94) 115/70   Weight: 59 kg (130 lb)  Body mass index is 20.36 kg/m².    No intake or output data in the 24 hours ending 03/05/20 2206    Physical Exam   Constitutional: She is oriented to person, place, and time. No distress.   Patient was on BiPAP   HENT:   Head: Normocephalic and atraumatic.   Eyes: Pupils are equal,  round, and reactive to light. EOM are normal.   Neck: Normal range of motion. Neck supple. No JVD present.   Cardiovascular: Normal rate and regular rhythm.   No murmur heard.  Pulmonary/Chest: She is in respiratory distress.   On BiPAP  Seemed confused, but able to follow command   Decreased breath sounds bilaterally    Abdominal: Soft. Bowel sounds are normal. She exhibits no distension. There is no tenderness.   Musculoskeletal: Normal range of motion. She exhibits no edema or deformity.   Neurological: She is oriented to person, place, and time.   Skin: Skin is warm. Capillary refill takes less than 2 seconds. No erythema. No pallor.       Vents:  Oxygen Concentration (%): 36 (03/05/20 2101)  Lines/Drains/Airways     Peripheral Intravenous Line                 Peripheral IV - Single Lumen 03/05/20 1821 20 G Left Forearm less than 1 day              Significant Labs:    CBC/Anemia Profile:  Recent Labs   Lab 03/05/20  1746   WBC 6.32   HGB 11.2*   HCT 38.4   *   *   RDW 12.5        Chemistries:  Recent Labs   Lab 03/05/20  1746      K 4.0   CL 93*   CO2 48*   BUN 9   CREATININE 0.6   CALCIUM 10.1   ALBUMIN 3.6   PROT 6.8   BILITOT 0.4   ALKPHOS 68   ALT 6*   AST 10   MG 1.7       ABGs:   Recent Labs   Lab 03/05/20 1951   PH 7.283*   PCO2 107.3*   HCO3 50.7*   POCSATURATED 27*   BE 24     CMP:   Recent Labs   Lab 03/05/20  1746      K 4.0   CL 93*   CO2 48*   GLU 81   BUN 9   CREATININE 0.6   CALCIUM 10.1   PROT 6.8   ALBUMIN 3.6   BILITOT 0.4   ALKPHOS 68   AST 10   ALT 6*   ANIONGAP 4*   EGFRNONAA >60.0       Significant Imaging: I have reviewed all pertinent imaging results/findings within the past 24 hours.

## 2020-03-06 NOTE — PLAN OF CARE
Problem: Physical Therapy Goal  Goal: Physical Therapy Goal  Description  Goals to be met by: 3/20/20    Patient will increase functional independence with mobility by performin. Supine to sit with Stand-by Assistance -not met  2. Sit to stand transfer with Stand-by Assistance -not met  3. Gait  x 100 feet with Contact Guard Assistance using Rolling Walker. -not met  4. Lower extremity exercise program x15 reps  with supervision -not met     Outcome: Ongoing, Progressing   Evaluation completed and goals appropriate. Debra Key, PT  3/6/2020

## 2020-03-06 NOTE — CONSULTS
Consult received. Continue nebs and ceftriaxone. Recommend avoiding steroids for now given encephalopathy. Will check repeat ABG. Full recommendation to follow. Thank you!    Marko Coello MD  Critical Care - PGY-3

## 2020-03-06 NOTE — SUBJECTIVE & OBJECTIVE
Interval History:     Past Medical History:   Diagnosis Date    Allergy     Back pain     Breast cancer 5/2015    right breast infiltrating ductal CA with DCIS, ER positive, [place]/Her2 negative     COPD (chronic obstructive pulmonary disease)     Chronic bronchitis    Hyperlipidemia     Hypertension        Past Surgical History:   Procedure Laterality Date    BREAST BIOPSY Right     BREAST LUMPECTOMY Right 2015    IDC & DCIS    CERVICAL FUSION      HYSTERECTOMY      with BSO    LUMBAR FUSION      LYMPH NODE DISSECTION Right     right lumpectomy         Review of patient's allergies indicates:   Allergen Reactions    Adhesive Rash     Use PAPER Tape / TEGADERM    Levaquin [levofloxacin] Diarrhea and Nausea Only    Penicillins Rash       Medications:  Continuous Infusions:  Scheduled Meds:   albuterol-ipratropium  3 mL Nebulization Q4H    aspirin  81 mg Oral Daily    cefTRIAXone (ROCEPHIN) IVPB  1 g Intravenous Q24H    enoxaparin  40 mg Subcutaneous Daily    fluticasone furoate-vilanterol  1 puff Inhalation Daily    letrozole  2.5 mg Oral Daily    magnesium sulfate IVPB  2 g Intravenous Q2H    potassium chloride  30 mEq Oral Q4H    potassium phosphate IVPB  20 mmol Intravenous Once    pravastatin  40 mg Oral QHS    tiotropium  1 capsule Inhalation Daily     PRN Meds:acetaminophen, Dextrose 10% Bolus, Dextrose 10% Bolus, glucagon (human recombinant), glucose, glucose, ondansetron, sodium chloride 0.9%    Family History     Problem Relation (Age of Onset)    Breast cancer Maternal Grandmother (75)        Tobacco Use    Smoking status: Current Every Day Smoker     Packs/day: 1.00     Years: 45.00     Pack years: 45.00     Types: Cigarettes    Smokeless tobacco: Current User    Tobacco comment: admits to attempting to quit multiple times; currently smoking up to 1 packs/day down from 2 packs/day at time of diagnosis   Substance and Sexual Activity    Alcohol use: Yes     Comment: occasionaly     Drug use: No    Sexual activity: Not Currently       Review of Systems   Constitutional: Positive for activity change, fatigue and unexpected weight change.   Respiratory: Positive for chest tightness and shortness of breath.    Cardiovascular: Negative for chest pain.   Gastrointestinal: Negative for abdominal distention.   Genitourinary: Negative for difficulty urinating.   Musculoskeletal: Positive for back pain.   Neurological: Negative for headaches.     Objective:     Vital Signs (Most Recent):  Temp: 97.8 °F (36.6 °C) (03/06/20 0700)  Pulse: 89 (03/06/20 1200)  Resp: (!) 23 (03/06/20 1200)  BP: (!) 166/89 (03/06/20 1200)  SpO2: 98 % (03/06/20 1200) Vital Signs (24h Range):  Temp:  [97.7 °F (36.5 °C)-98.4 °F (36.9 °C)] 97.8 °F (36.6 °C)  Pulse:  [] 89  Resp:  [16-43] 23  SpO2:  [88 %-99 %] 98 %  BP: (114-166)/(61-99) 166/89     Weight: 60.8 kg (134 lb 0.6 oz)  Body mass index is 20.99 kg/m².    Review of Symptoms  Symptom Assessment (ESAS 0-10 scale)   ESAS 0 1 2 3 4 5 6 7 8 9 10   Pain              Dyspnea              Anxiety              Nausea              Depression               Anorexia              Fatigue              Insomnia              Restlessness               Agitation              CAM / Delirium __ --  ___+   Constipation     __ --  ___+   Diarrhea           __ --  ___+  Bowel Management Plan (BMP): Yes    Comments:     Pain Assessment: back pain    OME in 24 hours:     Performance Status: 60    ECOG Performance Status Grade: 3 - Confined to bed or chair 50% of waking hours    Physical Exam   Constitutional: She appears well-developed.   Thin female   HENT:   Head: Normocephalic.   Neck: No JVD present.   Cardiovascular: Normal rate.   No murmur heard.  Pulmonary/Chest:   Poor air movement. No wheezing.    Abdominal: Soft. She exhibits no distension.   Musculoskeletal: Normal range of motion. She exhibits no edema.   Neurological: She is alert.   A bit confused at times        Significant Labs: All pertinent labs within the past 24 hours have been reviewed.  CBC:   Recent Labs   Lab 20  0332   WBC 7.52   HGB 10.5*   HCT 34.8*   MCV 98   *     BMP:  Recent Labs   Lab 20  033         K 3.4*   CL 92*   CO2 45*   BUN 10   CREATININE 0.6   CALCIUM 9.8   MG 1.5*     LFT:  Lab Results   Component Value Date    AST 10 2020    ALKPHOS 68 2020    BILITOT 0.4 2020     Albumin:   Albumin   Date Value Ref Range Status   2020 3.6 3.5 - 5.2 g/dL Final     Protein:   Total Protein   Date Value Ref Range Status   2020 6.8 6.0 - 8.4 g/dL Final     Lactic acid:   Lab Results   Component Value Date    LACTATE 0.7 2020    LACTATE 1.0 2019       Significant Imaging: I have reviewed all pertinent imaging results/findings within the past 24 hours.    Advance Care Planning   Advanced Directives::  Living Will: Yes. Copy on chart: Yes  LaPOST: No  Do Not Resuscitate Status: No  Medical Power of : Yes, son shanon is RADHA    Decision-Making Capacity: Patient answered questions, Family answered questions       Living Arrangements: Lives with family    Psychosocial/Cultural:  Patient's most important priorities:  Getting back pain better, getting lung better so she can spend time with her grand children and go to see a concert etc.     Patient's biggest concerns/fears:  Not getting back pain better    Previous death/end of life care history:       Patient's goals/hopes:  Getting better and going home    Spiritual:     F- Lilly and Belief: spiritual     I - Importance:   .  C - Community:     A - Address in Care:

## 2020-03-06 NOTE — CARE UPDATE
Pt transported from ED to [facility] on [place]. ambu bag at bedside. Report given to Shawn RUSSELL.

## 2020-03-06 NOTE — PLAN OF CARE
MARILEE is following this Pt for DC planning needs.     MARILEE will continue to coordinate with patient, family, team and insurance to complete patient's discharge plan.    Karissa Justice LMSW   - Case Management

## 2020-03-06 NOTE — ASSESSMENT & PLAN NOTE
"Palliative Care Encounter / Goals of care discussion:     Narrative:   Radha Woods is a 74 y.o. female patient with advanced COPD, progressive hypercapnic/hypoxemic respiratory failure, chronic back pain due to compression fractrue.     1: Symptoms:   - pain assesment: mid back, unable to get around   - dyspnea assessment: at times, turns up her oxygen when she gets short winded. Can barely make it to the bathroom   - anxiety assessment: at times   - depression assessment: does feel sad but not depressed    2: Code Status:   - full code    3: Psychosocial :   - Marital status:   - Children: two sons, one is caregiver and POA, one lives out of state but is involved.   - POA: Hiram You    3: Medicolegal:    - Advance directive: yes, on file    4: Support System:  - Spiritual: yes  - Family: supportive     5: Goals of care Decisions / Recommendations / Plan:     03/06/2020   Initial Assessment:    Insight in Disease and Illness trajectory  - Limited insight in severity of illness  - has a bit simplistic view of interventions that can be done to get her better.   - when discussed that that there may be a possibility that her lungs or her back will not get better no matter what we do, she tells me "I figured that".   - overall appears to have poor prognostic awareness    Prognostication:  - Advanced / end stage COPD with now recurrent hypercapnic/hypoxemic respiratory failure, poor mobility and weight loss.   - prognosis felt to be less than 6 months.     Psychosocial  - lives with son who takes care of her. Son is worried as she had episodes of confusion and disorientation along with sleepiness at home (hypercarbia?)  - he has heard many different options about remedies for her problems but is confused which ones will actually help.     Goals of care   - Pt. Wants to be able to move around, spend time with grand children and go to concerts   - high hopes and expectations in restorative measures   - we " "discussed "what if" we could not fix the lungs or the back but could make her sx. Better   - she is willing to discuss this possibility with her trusted friend and son and think about what other things may be important now   - agreed to meet again to evaluate more what treatments may be helpful and what her goals may be if significant improvement is not in the future.      - plan to discuss DNR/DNI as well as options for hospice vs. Palliative care vs. Full medical care going forward on follow up.      Advanced Care Planning:   Advance Care Planning            Symptom Management:  - management of back pain and dyspnea depend on goals.   - feel hospice appropriate to palliate many sx. But goal needs to be better defined  - opioids an option for dyspnea and back pain, however with hypercarbia may be challenging. Low dose morphine shown to be safe in this setting though.     Disposition:  -likely home with  vs. hospice    6: Follow up plans:    Daily.     Thank you for your consult. I will follow along with you. Please call [phone] with questions.         "

## 2020-03-06 NOTE — H&P
[facility]  Critical Care Medicine  History & Physical    Patient Name: Radha Woods  MRN: [id number]  Admission Date: 3/5/2020  Hospital Length of Stay: 1 days  Code Status: Full Code  Attending Physician: Michelle Willett MD   Primary Care Provider: Param Riley MD   Principal Problem: Acute on chronic respiratory failure with hypoxia and hypercapnia    Subjective:     HPI:  Ms. You is a 73 yo female with severe COPD on 3 lt oxygen at home, osteoporosis on bisphosphonate, history of left breast cancer s/p lumpectomy and chemoradiation currently on maintenance therapy (Femara) who presents with acute confusion for the past few days. History is taken from son at bedside as patient is confused and unable to give reliable history. Son reports patient has been confused and says nonsensical phrases for the last few days. Reports tremors, excessive sleepiness/lethargy. States pt's functional capacity has declined as well, most recently bed bound status. No fever, chills, cough, chest pain. Reports palpitations and back pain. Son reports patient was on 5 lt of oxygen when he woke up this morning.     In the ED patient was HD stable. Basic labs reviewed. Unremarkable. Chest xray with out acute process. ABG showed compensated respiratory acidosis with CO2 of 89. She was put on the BiPAP. Subsequent VBG with CO2 of 112. Unclear baseline.     Hospital/ICU Course:  No notes on file     Past Medical History:   Diagnosis Date    Allergy     Back pain     Breast cancer 5/2015    right breast infiltrating ductal CA with DCIS, ER positive, [place]/Her2 negative     COPD (chronic obstructive pulmonary disease)     Chronic bronchitis    Hyperlipidemia     Hypertension        Past Surgical History:   Procedure Laterality Date    BREAST BIOPSY Right     BREAST LUMPECTOMY Right 2015    IDC & DCIS    CERVICAL FUSION      HYSTERECTOMY      with BSO    LUMBAR FUSION      LYMPH NODE DISSECTION Right      right lumpectomy         Review of patient's allergies indicates:   Allergen Reactions    Adhesive Rash     Use PAPER Tape / TEGADERM    Levaquin [levofloxacin] Diarrhea and Nausea Only    Penicillins Rash       Family History     Problem Relation (Age of Onset)    Breast cancer Maternal Grandmother (75)        Tobacco Use    Smoking status: Current Every Day Smoker     Packs/day: 1.00     Years: 45.00     Pack years: 45.00     Types: Cigarettes    Smokeless tobacco: Current User    Tobacco comment: admits to attempting to quit multiple times; currently smoking up to 1 packs/day down from 2 packs/day at time of diagnosis   Substance and Sexual Activity    Alcohol use: Yes     Comment: occasionaly    Drug use: No    Sexual activity: Not Currently      Review of Systems   Unable to perform ROS: Acuity of condition     Objective:     Vital Signs (Most Recent):  Pulse: 91 (03/05/20 2102)  Resp: (!) 22 (03/05/20 2102)  BP: 115/70 (03/05/20 2102)  SpO2: (!) 90 % (03/05/20 2102) Vital Signs (24h Range):  Pulse:  [84-95] 91  Resp:  [16-43] 22  SpO2:  [90 %-99 %] 90 %  BP: (115-148)/(61-94) 115/70   Weight: 59 kg (130 lb)  Body mass index is 20.36 kg/m².    No intake or output data in the 24 hours ending 03/05/20 2206    Physical Exam   Constitutional: She is oriented to person, place, and time. No distress.   Patient was on BiPAP   HENT:   Head: Normocephalic and atraumatic.   Eyes: Pupils are equal, round, and reactive to light. EOM are normal.   Neck: Normal range of motion. Neck supple. No JVD present.   Cardiovascular: Normal rate and regular rhythm.   No murmur heard.  Pulmonary/Chest: She is in respiratory distress.   On BiPAP  Seemed confused, but able to follow command   Decreased breath sounds bilaterally    Abdominal: Soft. Bowel sounds are normal. She exhibits no distension. There is no tenderness.   Musculoskeletal: Normal range of motion. She exhibits no edema or deformity.   Neurological: She is  oriented to person, place, and time.   Skin: Skin is warm. Capillary refill takes less than 2 seconds. No erythema. No pallor.       Vents:  Oxygen Concentration (%): 36 (03/05/20 2101)  Lines/Drains/Airways     Peripheral Intravenous Line                 Peripheral IV - Single Lumen 03/05/20 1821 20 G Left Forearm less than 1 day              Significant Labs:    CBC/Anemia Profile:  Recent Labs   Lab 03/05/20  1746   WBC 6.32   HGB 11.2*   HCT 38.4   *   *   RDW 12.5        Chemistries:  Recent Labs   Lab 03/05/20  1746      K 4.0   CL 93*   CO2 48*   BUN 9   CREATININE 0.6   CALCIUM 10.1   ALBUMIN 3.6   PROT 6.8   BILITOT 0.4   ALKPHOS 68   ALT 6*   AST 10   MG 1.7       ABGs:   Recent Labs   Lab 03/05/20 1951   PH 7.283*   PCO2 107.3*   HCO3 50.7*   POCSATURATED 27*   BE 24     CMP:   Recent Labs   Lab 03/05/20  1746      K 4.0   CL 93*   CO2 48*   GLU 81   BUN 9   CREATININE 0.6   CALCIUM 10.1   PROT 6.8   ALBUMIN 3.6   BILITOT 0.4   ALKPHOS 68   AST 10   ALT 6*   ANIONGAP 4*   EGFRNONAA >60.0       Significant Imaging: I have reviewed all pertinent imaging results/findings within the past 24 hours.    Assessment/Plan:     Neuro  Acute encephalopathy  Encephalopathy most likely from CO2 narcosis.   CTH with out acute process. No evidence for infectious etiologies.   Please see respiratory failure     Pulmonary  * Acute on chronic respiratory failure with hypoxia and hypercapnia  73 yo female with acute encephalopathy with history of severe COPD here with acute on chronic hypoxic hypercarbic respiratory failure. Unclear etiology of encephalopathy suspect progression of COPD with new baseline CO2 causing change in mentation. Also considered new onset/progressive dementia given history of hallucinations and tremors per son.     Suspect new baseline CO2 given evidence of full compensation on ABG/VBG  Patient is oriented to place, time and person however has trouble tracking conversations  at times though redirectable.   No sob, chest pain, fever, cough to suggest acute COPD exacerbation       Plan:  Continue scheduled breathing treatment   Abx - ceftriaxone   Avoid steroids given encephalopathy  Recommend palliative care consult for possible hospice given severity of COPD    COPD exacerbation  No cough, no sputum production/increase productive cough  Chest xray with out acute process- hyperinflation   Continue treatment with nebs and abx  PRN BiPAP     Marko Coello MD  Critical Care Medicine  [facility]

## 2020-03-06 NOTE — ASSESSMENT & PLAN NOTE
73 yo female with acute encephalopathy with history of severe COPD here with acute on chronic hypoxic hypercarbic respiratory failure. Unclear etiology of encephalopathy suspect progression of COPD with new baseline CO2 causing change in mentation. Also considered new onset/progressive dementia given history of hallucinations and tremors per son.     Suspect new baseline CO2 given evidence of full compensation on ABG/VBG  Patient is oriented to place, time and person however has trouble tracking conversations at times though redirectable.   No sob, chest pain, fever, cough to suggest acute COPD exacerbation       Plan:  Continue scheduled breathing treatment   Abx - ceftriaxone   Avoid steroids given encephalopathy  Recommend palliative care consult for possible hospice given severity of COPD

## 2020-03-06 NOTE — CONSULTS
"[facility]  Palliative Medicine  Consult Note    Patient Name: Radha Woods  MRN: [id number]  Admission Date: 3/5/2020  Hospital Length of Stay: 1 days  Code Status: Full Code   Attending Provider: Michelle Willett MD  Consulting Provider: Luis Daniel Augustine MD  Primary Care Physician: Param Riley MD  Principal Problem:Acute on chronic respiratory failure with hypoxia and hypercapnia    Patient information was obtained from patient, relative(s) and past medical records.      Inpatient consult to Palliative Care  Consult performed by: Luis Daniel Augustine MD  Consult ordered by: Marko Coello MD        Assessment/Plan:     Palliative care encounter  Palliative Care Encounter / Goals of care discussion:     Narrative:   Radha Woods is a 74 y.o. female patient with advanced COPD, progressive hypercapnic/hypoxemic respiratory failure, chronic back pain due to compression fractrue.     1: Symptoms:   - pain assesment: mid back, unable to get around   - dyspnea assessment: at times, turns up her oxygen when she gets short winded. Can barely make it to the bathroom   - anxiety assessment: at times   - depression assessment: does feel sad but not depressed    2: Code Status:   - full code    3: Psychosocial :   - Marital status:   - Children: two sons, one is caregiver and POA, one lives out of state but is involved.   - POA: Hiram You    3: Medicolegal:    - Advance directive: yes, on file    4: Support System:  - Spiritual: yes  - Family: supportive     5: Goals of care Decisions / Recommendations / Plan:     03/06/2020   Initial Assessment:    Insight in Disease and Illness trajectory  - Limited insight in severity of illness  - has a bit simplistic view of interventions that can be done to get her better.   - when discussed that that there may be a possibility that her lungs or her back will not get better no matter what we do, she tells me "I figured that".   - overall appears " "to have poor prognostic awareness    Prognostication:  - Advanced / end stage COPD with now recurrent hypercapnic/hypoxemic respiratory failure, poor mobility and weight loss.   - prognosis felt to be less than 6 months.     Psychosocial  - lives with son who takes care of her. Son is worried as she had episodes of confusion and disorientation along with sleepiness at home (hypercarbia?)  - he has heard many different options about remedies for her problems but is confused which ones will actually help.     Goals of care   - Pt. Wants to be able to move around, spend time with grand children and go to concerts   - high hopes and expectations in restorative measures   - we discussed "what if" we could not fix the lungs or the back but could make her sx. Better   - she is willing to discuss this possibility with her trusted friend and son and think about what other things may be important now   - agreed to meet again to evaluate more what treatments may be helpful and what her goals may be if significant improvement is not in the future.      - plan to discuss DNR/DNI as well as options for hospice vs. Palliative care vs. Full medical care going forward on follow up.      Advanced Care Planning:   Advance Care Planning           Symptom Management:  - management of back pain and dyspnea depend on goals.   - feel hospice appropriate to palliate many sx. But goal needs to be better defined  - opioids an option for dyspnea and back pain, however with hypercarbia may be challenging. Low dose morphine shown to be safe in this setting though.     Disposition:  -likely home with  vs. hospice    6: Follow up plans:    Daily.     Thank you for your consult. I will follow along with you. Please call [phone] with questions.               Thank you for your consult. I will follow-up with patient. Please contact us if you have any additional questions.    Subjective:     HPI:   Radha Woods is a 75 yo female patient " with a history of advanced COPD/emphysema, home O2, breast cancer in 2015 (on oral CTX), osteoporosis and compression fracture along with remote back surgery. She lives with her son who cares for her at his home.   Recently she has been having episodes of excessive sleepiness and had been difficult to arouse by the son. She has had times of confusion and has been requiring more oxygen, which she adjusts herself.     She was admitted for hypercapnic respiratory failure, placed on Bipap over night and has improved this morning.     Evaluation showed overall very poor pulmonary function, which appears to have been progressive over the past months/years. The pt. Has had missed follow ups with pulmonary clinic.     I am being asked to discuss goals of care with the pt. Dr. Escobar with ICU has discussed advanced directive with the pt. And family yesterday and had recommended no mechanical ventilation or CPR.     I met with the pt. And the son at the bedside today. Ms. You is awake and able to participate in our conversation.     Hospital Course:  No notes on file    Interval History:     Past Medical History:   Diagnosis Date    Allergy     Back pain     Breast cancer 5/2015    right breast infiltrating ductal CA with DCIS, ER positive, [place]/Her2 negative     COPD (chronic obstructive pulmonary disease)     Chronic bronchitis    Hyperlipidemia     Hypertension        Past Surgical History:   Procedure Laterality Date    BREAST BIOPSY Right     BREAST LUMPECTOMY Right 2015    IDC & DCIS    CERVICAL FUSION      HYSTERECTOMY      with BSO    LUMBAR FUSION      LYMPH NODE DISSECTION Right     right lumpectomy         Review of patient's allergies indicates:   Allergen Reactions    Adhesive Rash     Use PAPER Tape / TEGADERM    Levaquin [levofloxacin] Diarrhea and Nausea Only    Penicillins Rash       Medications:  Continuous Infusions:  Scheduled Meds:   albuterol-ipratropium  3 mL Nebulization Q4H     aspirin  81 mg Oral Daily    cefTRIAXone (ROCEPHIN) IVPB  1 g Intravenous Q24H    enoxaparin  40 mg Subcutaneous Daily    fluticasone furoate-vilanterol  1 puff Inhalation Daily    letrozole  2.5 mg Oral Daily    magnesium sulfate IVPB  2 g Intravenous Q2H    potassium chloride  30 mEq Oral Q4H    potassium phosphate IVPB  20 mmol Intravenous Once    pravastatin  40 mg Oral QHS    tiotropium  1 capsule Inhalation Daily     PRN Meds:acetaminophen, Dextrose 10% Bolus, Dextrose 10% Bolus, glucagon (human recombinant), glucose, glucose, ondansetron, sodium chloride 0.9%    Family History     Problem Relation (Age of Onset)    Breast cancer Maternal Grandmother (75)        Tobacco Use    Smoking status: Current Every Day Smoker     Packs/day: 1.00     Years: 45.00     Pack years: 45.00     Types: Cigarettes    Smokeless tobacco: Current User    Tobacco comment: admits to attempting to quit multiple times; currently smoking up to 1 packs/day down from 2 packs/day at time of diagnosis   Substance and Sexual Activity    Alcohol use: Yes     Comment: occasionaly    Drug use: No    Sexual activity: Not Currently       Review of Systems   Constitutional: Positive for activity change, fatigue and unexpected weight change.   Respiratory: Positive for chest tightness and shortness of breath.    Cardiovascular: Negative for chest pain.   Gastrointestinal: Negative for abdominal distention.   Genitourinary: Negative for difficulty urinating.   Musculoskeletal: Positive for back pain.   Neurological: Negative for headaches.     Objective:     Vital Signs (Most Recent):  Temp: 97.8 °F (36.6 °C) (03/06/20 0700)  Pulse: 89 (03/06/20 1200)  Resp: (!) 23 (03/06/20 1200)  BP: (!) 166/89 (03/06/20 1200)  SpO2: 98 % (03/06/20 1200) Vital Signs (24h Range):  Temp:  [97.7 °F (36.5 °C)-98.4 °F (36.9 °C)] 97.8 °F (36.6 °C)  Pulse:  [] 89  Resp:  [16-43] 23  SpO2:  [88 %-99 %] 98 %  BP: (114-166)/(61-99) 166/89     Weight:  60.8 kg (134 lb 0.6 oz)  Body mass index is 20.99 kg/m².    Review of Symptoms  Symptom Assessment (ESAS 0-10 scale)   ESAS 0 1 2 3 4 5 6 7 8 9 10   Pain              Dyspnea              Anxiety              Nausea              Depression               Anorexia              Fatigue              Insomnia              Restlessness               Agitation              CAM / Delirium __ --  ___+   Constipation     __ --  ___+   Diarrhea           __ --  ___+  Bowel Management Plan (BMP): Yes    Comments:     Pain Assessment: back pain    OME in 24 hours:     Performance Status: 60    ECOG Performance Status Grade: 3 - Confined to bed or chair 50% of waking hours    Physical Exam   Constitutional: She appears well-developed.   Thin female   HENT:   Head: Normocephalic.   Neck: No JVD present.   Cardiovascular: Normal rate.   No murmur heard.  Pulmonary/Chest:   Poor air movement. No wheezing.    Abdominal: Soft. She exhibits no distension.   Musculoskeletal: Normal range of motion. She exhibits no edema.   Neurological: She is alert.   A bit confused at times       Significant Labs: All pertinent labs within the past 24 hours have been reviewed.  CBC:   Recent Labs   Lab 03/06/20  0332   WBC 7.52   HGB 10.5*   HCT 34.8*   MCV 98   *     BMP:  Recent Labs   Lab 03/06/20  0332         K 3.4*   CL 92*   CO2 45*   BUN 10   CREATININE 0.6   CALCIUM 9.8   MG 1.5*     LFT:  Lab Results   Component Value Date    AST 10 03/05/2020    ALKPHOS 68 03/05/2020    BILITOT 0.4 03/05/2020     Albumin:   Albumin   Date Value Ref Range Status   03/05/2020 3.6 3.5 - 5.2 g/dL Final     Protein:   Total Protein   Date Value Ref Range Status   03/05/2020 6.8 6.0 - 8.4 g/dL Final     Lactic acid:   Lab Results   Component Value Date    LACTATE 0.7 03/05/2020    LACTATE 1.0 03/27/2019       Significant Imaging: I have reviewed all pertinent imaging results/findings within the past 24 hours.    Advance Care Planning    Advanced Directives::  Living Will: Yes. Copy on chart: Yes  LaPOST: No  Do Not Resuscitate Status: No  Medical Power of : Yes, son shanon is RADHA    Decision-Making Capacity: Patient answered questions, Family answered questions       Living Arrangements: Lives with family    Psychosocial/Cultural:  Patient's most important priorities:  Getting back pain better, getting lung better so she can spend time with her grand children and go to see a concert etc.     Patient's biggest concerns/fears:  Not getting back pain better    Previous death/end of life care history:       Patient's goals/hopes:  Getting better and going home    Spiritual:     F- Lilly and Belief: spiritual     I - Importance:   .  C - Community:     A - Address in Care:       > 50% of 55 min visit spent in chart review, face to face discussion of goals of care,  symptom assessment, coordination of care and emotional support.    Luis Daniel Augustine MD  Palliative Medicine  [facility]

## 2020-03-06 NOTE — ED NOTES
Patient on continuous cardiac monitor, automatic blood pressure cuff and continuous pulse oximeter. BiPAP on patient. Son at bedside. Patient is AAOx2. Side rails up and bed in lowest position. Call light in reach. Will continue to monitor.

## 2020-03-06 NOTE — PLAN OF CARE
74 year old woman with history of breast cancer s/p chemo/rads/lumpectomy (still on maintenance letrazole) and severe COPD (FEV1 of 33%) presenting with confusion and lethargy and admitted to ICU with hypercapnic respiratory failure.      · Acute on chronic hypercapnic respiratory failure: pCO2 and pH improved with bipap. Most recent VBG is 7.38/73.5/29.  Will place on nasal cannula and continue to monitor  · COPD: Patient has not seen a pulmonologist in some time.  She reports that she is using her home O2 with increasing frequency (previously just at night, and now throughout the day as well).  Though she has not had recent admission to the hospital for COPD, she acknowledges that her breathing has been deteriorating over the last year or two. She is using her nebulizer more frequently.  She also notes that she takes hydrocodone for back pain and that she has required that more frequently in recent weeks (and her dose makes her sleepy).  · continue tiotropium  · duonebs q4h; can be spaced out or made prn if patient continues to improve  · Start LABA-ICS inhaler  · Okay to avoid systemic steroids for now as patient has improved without them.  · Check RIP  · Continue empiric antibiotics while cultures mature  · Patient's baseline hypercapnea should qualify her for home bipap or trilogy. Patient should wear bipap titrated to a tidal volume >360cc whenever she is sleeping (napping and overnight)  · Wean supplemental oxygen while maintaining an O2 Sat range of 88-92%

## 2020-03-06 NOTE — PT/OT/SLP EVAL
Physical Therapy Evaluation and treatment       Patient Name:  Radha Woods   MRN:  [id number]    Recommendations:     Discharge Recommendations:  home health PT   Discharge Equipment Recommendations: none   Barriers to discharge: None    Assessment:     Radha Woods is a 74 y.o. female admitted with a medical diagnosis of Acute on chronic respiratory failure with hypoxia and hypercapnia.  She presents with the following impairments/functional limitations:  weakness, gait instability, decreased lower extremity function, impaired balance, impaired endurance, impaired functional mobilty pt lety treatment well and will benefit from skilled PT 3x/wk. Pt will be able to discharge home with HHPT when medically stable.     Rehab Prognosis: Fair; patient would benefit from acute skilled PT services to address these deficits and reach maximum level of function.    Recent Surgery: * No surgery found *      Plan:     During this hospitalization, patient to be seen 3 x/week to address the identified rehab impairments via gait training, therapeutic activities, therapeutic exercises and progress toward the following goals:    · Plan of Care Expires:  04/05/20    Subjective     Chief Complaint: pt c/o dizziness and SOB with treatment. Pt O2 sats were 96-97% during treatment.   Patient/Family Comments/goals:  To get better and go home.   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   Prior to admission, patients level of function was modified Independent using RW.  Equipment used at home: walker, rolling, bedside commode, bath bench, wheelchair, oxygen.  DME owned (not currently  used): none.  Upon discharge, patient will have assistance from son and family members. .    Objective:     Communicated with nurse prior to session.  Patient found supine with telemetry, oxygen, pulse ox (continuous)(hep lock IV)  upon PT entry to room.    General Precautions: Standard, fall   Orthopedic Precautions:    Braces:       Exams:  · Cognitive Exam:  Patient is oriented to Person, Place, Time and Situation  · RLE ROM: WFL  · RLE Strength: WFL  · LLE ROM: WFL  · LLE Strength: WFL    Functional Mobility:  · Bed Mobility:  Pt needed verbal cues for hand placement and sequencing for functional mobility.    · Rolling Right: contact guard assistance  · Supine to Sit: contact guard assistance  ·   · Transfers:     · Sit to Stand:  contact guard assistance with hand-held assist  · Gait: pt received gait training ~ 5 ft with max assist x 2 and O2 intact. Pt leaned backwards with standing and was max assist standing blance.       Therapeutic Activities and Exercises:   pt, son and family friend received verbal instructions in role of PT and POC. All verbally expressed understanding of such.     AM-PAC 6 CLICK MOBILITY  Total Score:15     Patient left up in chair with all lines intact, call button in reach and son and family friend present.    GOALS:   Multidisciplinary Problems     Physical Therapy Goals        Problem: Physical Therapy Goal    Goal Priority Disciplines Outcome Goal Variances Interventions   Physical Therapy Goal     PT, PT/OT Ongoing, Progressing     Description:  Goals to be met by: 3/20/20    Patient will increase functional independence with mobility by performin. Supine to sit with Stand-by Assistance -not met  2. Sit to stand transfer with Stand-by Assistance -not met  3. Gait  x 100 feet with Contact Guard Assistance using Rolling Walker. -not met  4. Lower extremity exercise program x15 reps  with supervision -not met                      History:     Past Medical History:    Diagnosis Date    Allergy     Back pain     Breast cancer 5/2015    right breast infiltrating ductal CA with DCIS, ER positive, [place]/Her2 negative     COPD (chronic obstructive pulmonary disease)     Chronic bronchitis    Hyperlipidemia     Hypertension        Past Surgical History:   Procedure Laterality Date    BREAST BIOPSY Right     BREAST LUMPECTOMY Right 2015    IDC & DCIS    CERVICAL FUSION      HYSTERECTOMY      with BSO    LUMBAR FUSION      LYMPH NODE DISSECTION Right     right lumpectomy         Time Tracking:     PT Received On: 03/06/20  PT Start Time: 1325     PT Stop Time: 1342  PT Total Time (min): 17 min     Billable Minutes: Evaluation 8 min and Gait Training 9 min      Debra Key, PT  03/06/2020

## 2020-03-06 NOTE — PLAN OF CARE
Payor: MEDICARE / Plan: MEDICARE PART A & B / Product Type: Government /      Param Riley MD       [id number] - [place], [place] - [address] [address]  [address] [place] [address] [address]  Phone: [phone] Fax: [phone]    [facility] - [place], [place] - [address]   [id number]   [place] [address]  Phone: [phone] Fax: [phone]    [facility] - [address]  [address] [facility]  [address]  [facility] [place] [address]  Phone: [phone] Fax: [phone]      Future Appointments   Date Time Provider Department Center   3/19/2020  1:30 PM Rosio Allen MD [facility] HEM ONC [facility]          03/06/20 1204   Discharge Assessment   Assessment Type Discharge Planning Assessment   Confirmed/corrected address and phone number on facesheet? Yes   Assessment information obtained from? Medical Record   Expected Length of Stay (days) 4   Communicated expected length of stay with patient/caregiver no   Prior to hospitilization cognitive status: Not Oriented to Person;Not Oriented to Place;Not Oriented to Time   Prior to hospitalization functional status: Needs Assistance   Current cognitive status:   (confused)   Facility Arrived From: home   Lives With child(uriel), adult  (son )   Able to Return to Prior Arrangements yes   Is patient able to care for self after discharge?   (with assistance)   Who are your caregiver(s) and their phone number(s)? Hiram Hylton [phone]   Readmission Within the Last 30 Days no previous admission in last 30 days   Patient currently being followed by outpatient case management? No   Patient currently receives any other outside agency services? No   Equipment Currently Used at Home walker, rolling   Do you have any problems affording any of your prescribed medications? No   Is the patient taking medications as prescribed? yes   Does the patient have transportation home? Yes    Transportation Anticipated family or friend will provide   Does the patient receive services at the Coumadin Clinic? No   Discharge Plan A   (to be determined HH vs Hospice)   Discharge Plan B   (to be determined)

## 2020-03-06 NOTE — ASSESSMENT & PLAN NOTE
Encephalopathy most likely from CO2 narcosis.   CTH with out acute process. No evidence for infectious etiologies.   Please see respiratory failure

## 2020-03-06 NOTE — HPI
Ms. You is a 75 yo female with severe COPD on 3 lt oxygen at home, osteoporosis on bisphosphonate, history of left breast cancer s/p lumpectomy and chemoradiation currently on maintenance therapy (Femara) who presents with acute confusion for the past few days. History is taken from son at bedside as patient is confused and unable to give reliable history. Son reports patient has been confused and says nonsensical phrases for the last few days. Reports tremors, excessive sleepiness/lethargy. States pt's functional capacity has declined as well, most recently bed bound status. No fever, chills, cough, chest pain. Reports palpitations and back pain. Son reports patient was on 5 lt of oxygen when he woke up this morning.     In the ED patient was HD stable. Basic labs reviewed. Unremarkable. Chest xray with out acute process. ABG showed compensated respiratory acidosis with CO2 of 89. She was put on the BiPAP. Subsequent VBG with CO2 of 112. Unclear baseline.

## 2020-03-06 NOTE — NURSING
Skin Note    Thorough skin assessment completed. Stage II to sacrum previously noted at admission. Wound care consult ordered. Mepilex removed, assessed, and replaced.No other pressure or device related breakdown to occiput, back, elbows or heels. All preventative precautions in place including waffle mattress in place and inflated, heels foams, and strict q2h turning.

## 2020-03-06 NOTE — NURSING
Palliative care and PT/OT at bedside for evaluations. PT/OT say they will return at a later time today. Dr. Samuels speaking with patient and her son now.

## 2020-03-07 PROBLEM — M54.50 CHRONIC LOW BACK PAIN: Status: ACTIVE | Noted: 2020-03-07

## 2020-03-07 PROBLEM — G93.41 METABOLIC ENCEPHALOPATHY: Status: ACTIVE | Noted: 2020-03-05

## 2020-03-07 PROBLEM — D64.89 OTHER SPECIFIED ANEMIAS: Status: ACTIVE | Noted: 2020-03-07

## 2020-03-07 PROBLEM — N30.00 ACUTE CYSTITIS WITHOUT HEMATURIA: Status: ACTIVE | Noted: 2020-03-07

## 2020-03-07 PROBLEM — G89.29 CHRONIC LOW BACK PAIN: Status: ACTIVE | Noted: 2020-03-07

## 2020-03-07 PROBLEM — I10 HYPERTENSION: Status: ACTIVE | Noted: 2020-03-07

## 2020-03-07 PROBLEM — E78.5 HYPERLIPIDEMIA: Status: ACTIVE | Noted: 2020-03-07

## 2020-03-07 LAB
ALLENS TEST: ABNORMAL
ANION GAP SERPL CALC-SCNC: 7 MMOL/L (ref 8–16)
BACTERIA UR CULT: NORMAL
BACTERIA UR CULT: NORMAL
BASOPHILS # BLD AUTO: 0.03 K/UL (ref 0–0.2)
BASOPHILS NFR BLD: 0.5 % (ref 0–1.9)
BUN SERPL-MCNC: 10 MG/DL (ref 8–23)
CALCIUM SERPL-MCNC: 8.7 MG/DL (ref 8.7–10.5)
CHLORIDE SERPL-SCNC: 98 MMOL/L (ref 95–110)
CO2 SERPL-SCNC: 37 MMOL/L (ref 23–29)
CREAT SERPL-MCNC: 0.5 MG/DL (ref 0.5–1.4)
DELSYS: ABNORMAL
DIFFERENTIAL METHOD: ABNORMAL
EOSINOPHIL # BLD AUTO: 0.1 K/UL (ref 0–0.5)
EOSINOPHIL NFR BLD: 2.3 % (ref 0–8)
ERYTHROCYTE [DISTWIDTH] IN BLOOD BY AUTOMATED COUNT: 12.8 % (ref 11.5–14.5)
EST. GFR  (AFRICAN AMERICAN): >60 ML/MIN/1.73 M^2
EST. GFR  (NON AFRICAN AMERICAN): >60 ML/MIN/1.73 M^2
FLOW: 2
GLUCOSE SERPL-MCNC: 97 MG/DL (ref 70–110)
HCO3 UR-SCNC: 44.4 MMOL/L (ref 24–28)
HCT VFR BLD AUTO: 34.1 % (ref 37–48.5)
HGB BLD-MCNC: 10.3 G/DL (ref 12–16)
IMM GRANULOCYTES # BLD AUTO: 0.05 K/UL (ref 0–0.04)
IMM GRANULOCYTES NFR BLD AUTO: 0.9 % (ref 0–0.5)
LYMPHOCYTES # BLD AUTO: 0.9 K/UL (ref 1–4.8)
LYMPHOCYTES NFR BLD: 16.6 % (ref 18–48)
MAGNESIUM SERPL-MCNC: 2.2 MG/DL (ref 1.6–2.6)
MCH RBC QN AUTO: 30.1 PG (ref 27–31)
MCHC RBC AUTO-ENTMCNC: 30.2 G/DL (ref 32–36)
MCV RBC AUTO: 100 FL (ref 82–98)
MODE: ABNORMAL
MONOCYTES # BLD AUTO: 0.8 K/UL (ref 0.3–1)
MONOCYTES NFR BLD: 13.8 % (ref 4–15)
NEUTROPHILS # BLD AUTO: 3.7 K/UL (ref 1.8–7.7)
NEUTROPHILS NFR BLD: 65.9 % (ref 38–73)
NRBC BLD-RTO: 0 /100 WBC
PCO2 BLDA: 74.8 MMHG (ref 35–45)
PH SMN: 7.38 [PH] (ref 7.35–7.45)
PHOSPHATE SERPL-MCNC: 3 MG/DL (ref 2.7–4.5)
PLATELET # BLD AUTO: 143 K/UL (ref 150–350)
PMV BLD AUTO: 9.5 FL (ref 9.2–12.9)
PO2 BLDA: 70 MMHG (ref 80–100)
POC BE: 19 MMOL/L
POC SATURATED O2: 92 % (ref 95–100)
POC TCO2: 47 MMOL/L (ref 23–27)
POTASSIUM SERPL-SCNC: 3.6 MMOL/L (ref 3.5–5.1)
RBC # BLD AUTO: 3.42 M/UL (ref 4–5.4)
SAMPLE: ABNORMAL
SITE: ABNORMAL
SODIUM SERPL-SCNC: 142 MMOL/L (ref 136–145)
WBC # BLD AUTO: 5.67 K/UL (ref 3.9–12.7)

## 2020-03-07 PROCEDURE — 94660 CPAP INITIATION&MGMT: CPT

## 2020-03-07 PROCEDURE — 25000242 PHARM REV CODE 250 ALT 637 W/ HCPCS: Performed by: STUDENT IN AN ORGANIZED HEALTH CARE EDUCATION/TRAINING PROGRAM

## 2020-03-07 PROCEDURE — 25000242 PHARM REV CODE 250 ALT 637 W/ HCPCS: Performed by: INTERNAL MEDICINE

## 2020-03-07 PROCEDURE — 94640 AIRWAY INHALATION TREATMENT: CPT

## 2020-03-07 PROCEDURE — 27000221 HC OXYGEN, UP TO 24 HOURS

## 2020-03-07 PROCEDURE — 99233 SBSQ HOSP IP/OBS HIGH 50: CPT | Mod: GC,,, | Performed by: HOSPITALIST

## 2020-03-07 PROCEDURE — 99900035 HC TECH TIME PER 15 MIN (STAT)

## 2020-03-07 PROCEDURE — 25000003 PHARM REV CODE 250: Performed by: STUDENT IN AN ORGANIZED HEALTH CARE EDUCATION/TRAINING PROGRAM

## 2020-03-07 PROCEDURE — 80048 BASIC METABOLIC PNL TOTAL CA: CPT

## 2020-03-07 PROCEDURE — 25000003 PHARM REV CODE 250: Performed by: HOSPITALIST

## 2020-03-07 PROCEDURE — 11000001 HC ACUTE MED/SURG PRIVATE ROOM

## 2020-03-07 PROCEDURE — 36600 WITHDRAWAL OF ARTERIAL BLOOD: CPT

## 2020-03-07 PROCEDURE — 84100 ASSAY OF PHOSPHORUS: CPT

## 2020-03-07 PROCEDURE — 94761 N-INVAS EAR/PLS OXIMETRY MLT: CPT

## 2020-03-07 PROCEDURE — 99233 PR SUBSEQUENT HOSPITAL CARE,LEVL III: ICD-10-PCS | Mod: GC,,, | Performed by: HOSPITALIST

## 2020-03-07 PROCEDURE — 82803 BLOOD GASES ANY COMBINATION: CPT

## 2020-03-07 PROCEDURE — 63600175 PHARM REV CODE 636 W HCPCS: Performed by: STUDENT IN AN ORGANIZED HEALTH CARE EDUCATION/TRAINING PROGRAM

## 2020-03-07 PROCEDURE — 83735 ASSAY OF MAGNESIUM: CPT

## 2020-03-07 PROCEDURE — 36415 COLL VENOUS BLD VENIPUNCTURE: CPT

## 2020-03-07 PROCEDURE — 85025 COMPLETE CBC W/AUTO DIFF WBC: CPT

## 2020-03-07 RX ORDER — OXYCODONE AND ACETAMINOPHEN 5; 325 MG/1; MG/1
1 TABLET ORAL EVERY 8 HOURS PRN
Status: DISCONTINUED | OUTPATIENT
Start: 2020-03-07 | End: 2020-03-10 | Stop reason: HOSPADM

## 2020-03-07 RX ORDER — PREDNISONE 10 MG/1
40 TABLET ORAL DAILY
Status: DISCONTINUED | OUTPATIENT
Start: 2020-03-07 | End: 2020-03-10 | Stop reason: HOSPADM

## 2020-03-07 RX ORDER — L. ACIDOPHILUS/L.BULGARICUS 100MM CELL
1 GRANULES IN PACKET (EA) ORAL 2 TIMES DAILY
Status: DISCONTINUED | OUTPATIENT
Start: 2020-03-07 | End: 2020-03-10 | Stop reason: HOSPADM

## 2020-03-07 RX ORDER — METOPROLOL SUCCINATE 50 MG/1
50 TABLET, EXTENDED RELEASE ORAL DAILY
Status: DISCONTINUED | OUTPATIENT
Start: 2020-03-07 | End: 2020-03-10

## 2020-03-07 RX ADMIN — IPRATROPIUM BROMIDE AND ALBUTEROL SULFATE 3 ML: .5; 3 SOLUTION RESPIRATORY (INHALATION) at 08:03

## 2020-03-07 RX ADMIN — ACETAMINOPHEN 650 MG: 325 TABLET ORAL at 03:03

## 2020-03-07 RX ADMIN — LIDOCAINE 3 PATCH: 50 PATCH TOPICAL at 10:03

## 2020-03-07 RX ADMIN — LETROZOLE 2.5 MG: 2.5 TABLET ORAL at 09:03

## 2020-03-07 RX ADMIN — ASPIRIN 81 MG: 81 TABLET, COATED ORAL at 09:03

## 2020-03-07 RX ADMIN — PREDNISONE 40 MG: 10 TABLET ORAL at 12:03

## 2020-03-07 RX ADMIN — IPRATROPIUM BROMIDE AND ALBUTEROL SULFATE 3 ML: .5; 3 SOLUTION RESPIRATORY (INHALATION) at 04:03

## 2020-03-07 RX ADMIN — CEFTRIAXONE SODIUM 1 G: 1 INJECTION, POWDER, FOR SOLUTION INTRAMUSCULAR; INTRAVENOUS at 10:03

## 2020-03-07 RX ADMIN — TIOTROPIUM BROMIDE 18 MCG: 18 CAPSULE ORAL; RESPIRATORY (INHALATION) at 09:03

## 2020-03-07 RX ADMIN — OXYCODONE HYDROCHLORIDE AND ACETAMINOPHEN 1 TABLET: 5; 325 TABLET ORAL at 01:03

## 2020-03-07 RX ADMIN — LACTOBACILLUS ACIDOPHILUS / LACTOBACILLUS BULGARICUS 1 EACH: 100 MILLION CFU STRENGTH GRANULES at 10:03

## 2020-03-07 RX ADMIN — METOPROLOL SUCCINATE 50 MG: 50 TABLET, EXTENDED RELEASE ORAL at 09:03

## 2020-03-07 RX ADMIN — IPRATROPIUM BROMIDE AND ALBUTEROL SULFATE 3 ML: .5; 3 SOLUTION RESPIRATORY (INHALATION) at 12:03

## 2020-03-07 RX ADMIN — OXYCODONE HYDROCHLORIDE AND ACETAMINOPHEN 1 TABLET: 5; 325 TABLET ORAL at 10:03

## 2020-03-07 RX ADMIN — IPRATROPIUM BROMIDE AND ALBUTEROL SULFATE 3 ML: .5; 3 SOLUTION RESPIRATORY (INHALATION) at 11:03

## 2020-03-07 RX ADMIN — FLUTICASONE FUROATE AND VILANTEROL TRIFENATATE 1 PUFF: 100; 25 POWDER RESPIRATORY (INHALATION) at 09:03

## 2020-03-07 RX ADMIN — LACTOBACILLUS ACIDOPHILUS / LACTOBACILLUS BULGARICUS 1 EACH: 100 MILLION CFU STRENGTH GRANULES at 09:03

## 2020-03-07 RX ADMIN — PRAVASTATIN SODIUM 40 MG: 10 TABLET ORAL at 10:03

## 2020-03-07 NOTE — NURSING
Called to confirm before transferring patient no continuous pulse oximetry. CC team stated no continuous pulse oximetry needed to be ordered.

## 2020-03-07 NOTE — ASSESSMENT & PLAN NOTE
Initally on admission patient with AMS, found to be hypercarbic. Interval improvement in mental status

## 2020-03-07 NOTE — ASSESSMENT & PLAN NOTE
UA in ICU with multiple organisms, altered mental status on admission, however patient now not complaining of dysuria    Finish course of ceftriaxone for UTI

## 2020-03-07 NOTE — ASSESSMENT & PLAN NOTE
74 year old with severe COPD presents with AMS and found to be in COPD exacerbation. Treated with bipap in ICU with interval improvement in mental status and respiratory status. Initial pCO2 100s, currently on 2 liters, with most recent CO2 74    Plan  Continue ICS/LAMA  Continue duonebs  Nightly bipap  Started 5 day course of pred 40 for COPD  Keep O2 sats 88-92

## 2020-03-07 NOTE — HPI
74 year old female with history of severe COPD (FEV1 of 33% and 1.5-2L at home), breast cacner s/p lumpectomy and chemo on FEMARA maintenance, presented as an admission to ICU 3/5 with confusion, tremors. Denies any recent fevers/ chills/cough/sick contacts/nausea. Infectious workup only revealing of dirty UA - started ceftriaxone, although she only reports urinary frequency, has been incontinent of urine for some months. She states that she has been treated for her COPD for some time, takes her rescue inhaler and ICS/LAMA religiously. In the ICU she was noted to have a compensated respiratory alkalosis with CO2 of 89, treated with bipap, ICS/LAMA. Patient with interval improvement on BiPAP. Also assessed by pallative care for goals of care/symptom treatment. She remains a full code.    Step down to hospital medicine 4

## 2020-03-07 NOTE — PLAN OF CARE
Problem: Fall Injury Risk  Goal: Absence of Fall and Fall-Related Injury  Outcome: Ongoing, Progressing     Problem: Adult Inpatient Plan of Care  Goal: Plan of Care Review  Outcome: Ongoing, Progressing     Problem: Adult Inpatient Plan of Care  Goal: Patient-Specific Goal (Individualization)  Outcome: Ongoing, Progressing     Problem: Adult Inpatient Plan of Care  Goal: Absence of Hospital-Acquired Illness or Injury  Outcome: Ongoing, Progressing     Problem: Adult Inpatient Plan of Care  Goal: Readiness for Transition of Care  Outcome: Ongoing, Progressing

## 2020-03-07 NOTE — SUBJECTIVE & OBJECTIVE
Interval History: No acute events overnight, stepped down from ICU to team 4    Review of Systems   Constitutional: Negative for activity change, chills, diaphoresis and fever.   HENT: Negative for rhinorrhea and sore throat.    Respiratory: Positive for shortness of breath. Negative for cough.    Cardiovascular: Negative for chest pain and palpitations.   Gastrointestinal: Negative for abdominal pain, constipation, diarrhea and nausea.   Genitourinary: Positive for frequency. Negative for dysuria and flank pain.   Neurological: Positive for weakness. Negative for light-headedness, numbness and headaches.     Objective:     Vital Signs (Most Recent):  Temp: 96.4 °F (35.8 °C) (03/07/20 0910)  Pulse: 96 (03/07/20 0910)  Resp: 18 (03/07/20 0910)  BP: 139/86 (03/07/20 0910)  SpO2: (!) 88 % (03/07/20 0910) Vital Signs (24h Range):  Temp:  [96.4 °F (35.8 °C)-98.1 °F (36.7 °C)] 96.4 °F (35.8 °C)  Pulse:  [] 96  Resp:  [14-38] 18  SpO2:  [88 %-100 %] 88 %  BP: (125-180)/() 139/86     Weight: 60.1 kg (132 lb 7.9 oz)  Body mass index is 20.75 kg/m².    Intake/Output Summary (Last 24 hours) at 3/7/2020 0926  Last data filed at 3/6/2020 2000  Gross per 24 hour   Intake 986.28 ml   Output 1150 ml   Net -163.72 ml      Physical Exam   Constitutional: She is oriented to person, place, and time. She appears well-developed and well-nourished. No distress.   HENT:   Head: Normocephalic and atraumatic.   Nose: Nose normal.   Mouth/Throat: No oropharyngeal exudate.   Eyes: Pupils are equal, round, and reactive to light. EOM are normal. Right eye exhibits no discharge. Left eye exhibits no discharge. No scleral icterus.   Neck: Normal range of motion. Neck supple. No JVD present. No tracheal deviation present.   Cardiovascular: Normal rate and regular rhythm. Exam reveals no gallop and no friction rub.   Pulmonary/Chest: Effort normal. She has wheezes. She has no rales.   Decreased breath sounds bilaterally  2L of nasal  cannula   Abdominal: Soft. Bowel sounds are normal. There is no tenderness. There is no guarding.   Genitourinary: No vaginal discharge found.   Musculoskeletal: Normal range of motion. She exhibits no edema, tenderness or deformity.   Neurological: She is alert and oriented to person, place, and time. No cranial nerve deficit. Coordination normal.   Skin: She is not diaphoretic.       Significant Labs:   CBC:   Recent Labs   Lab 03/05/20 1746 03/06/20 0332 03/07/20  0551   WBC 6.32 7.52 5.67   HGB 11.2* 10.5* 10.3*   HCT 38.4 34.8* 34.1*   * 145* 143*     CMP:   Recent Labs   Lab 03/05/20 1746 03/06/20 0332 03/07/20  0548    144 142   K 4.0 3.4* 3.6   CL 93* 92* 98   CO2 48* 45* 37*   GLU 81 102 97   BUN 9 10 10   CREATININE 0.6 0.6 0.5   CALCIUM 10.1 9.8 8.7   PROT 6.8  --   --    ALBUMIN 3.6  --   --    BILITOT 0.4  --   --    ALKPHOS 68  --   --    AST 10  --   --    ALT 6*  --   --    ANIONGAP 4* 7* 7*   EGFRNONAA >60.0 >60.0 >60.0       Significant Imaging: I have reviewed all pertinent imaging results/findings within the past 24 hours.

## 2020-03-07 NOTE — ASSESSMENT & PLAN NOTE
Patient with chronic back pain in setting of previous hx of breast cancer and compression fracture. Takes   Percocet 5-325 q4, flexeril 10 mg tid    Plan  Pain control with acetomenophen 650  Assess pain daily

## 2020-03-07 NOTE — NURSING TRANSFER
Nursing Transfer Note      3/6/2020     Transfer To: 626    Transfer via wheelchair    Transfer with O2, cardiac monitoring    Transported by BERRY Johns    Medicines sent: yes    Chart send with patient: Yes    Notified: son    Patient reassessed at: 3/6/2020 2100     Upon arrival to floor: cardiac monitor applied, patient oriented to room, call bell in reach and bed in lowest position

## 2020-03-07 NOTE — PROGRESS NOTES
[facility] Hospital Medicine  Progress Note    Patient Name: Radha Woods  MRN: [id number]  Patient Class: IP- Inpatient   Admission Date: 3/5/2020  Length of Stay: 2 days  Attending Physician: Hiram Cook MD  Primary Care Provider: Param Riley MD    [facility] Medicine [place]: [facility] [place] [facility] [place] Don Orr MD    Subjective:     Principal Problem:COPD exacerbation        HPI:  74 year old female with history of severe COPD (FEV1 of 33% and 1.5-2L at home), breast cacner s/p lumpectomy and chemo on FEMARA maintenance, presented as an admission to ICU 3/5 with confusion, tremors. Denies any recent fevers/ chills/cough/sick contacts/nausea. Infectious workup only revealing of dirty UA - started ceftriaxone, although she only reports urinary frequency, has been incontinent of urine for some months. She states that she has been treated for her COPD for some time, takes her rescue inhaler and ICS/LAMA religiously. In the ICU she was noted to have a compensated respiratory alkalosis with CO2 of 89, treated with bipap, ICS/LAMA. Patient with interval improvement on BiPAP. Also assessed by pallative care for goals of care/symptom treatment. She remains a full code.    Step down to hospital medicine 4    Overview/Hospital Course:  74 year old female with history of severe COPD (FEV1 of 33% and 1.5-2L at home), breast cacner s/p lumpectomy and chemo on FEMARA maintenance, presented as an admission to ICU 3/5 with COPD exacerbation. Treated with bipap and stepped down to hospital medicine - continuing with duonebs, ICS/LAMA and ceftriaxone (finishing course). Palliative care following for goals of care/symptom management    Interval History: No acute events overnight, stepped down from ICU to team 4    Review of Systems   Constitutional: Negative for activity change, chills, diaphoresis and fever.   HENT: Negative for rhinorrhea and sore throat.    Respiratory: Positive for shortness of breath.  Negative for cough.    Cardiovascular: Negative for chest pain and palpitations.   Gastrointestinal: Negative for abdominal pain, constipation, diarrhea and nausea.   Genitourinary: Positive for frequency. Negative for dysuria and flank pain.   Neurological: Positive for weakness. Negative for light-headedness, numbness and headaches.     Objective:     Vital Signs (Most Recent):  Temp: 96.4 °F (35.8 °C) (03/07/20 0910)  Pulse: 96 (03/07/20 0910)  Resp: 18 (03/07/20 0910)  BP: 139/86 (03/07/20 0910)  SpO2: (!) 88 % (03/07/20 0910) Vital Signs (24h Range):  Temp:  [96.4 °F (35.8 °C)-98.1 °F (36.7 °C)] 96.4 °F (35.8 °C)  Pulse:  [] 96  Resp:  [14-38] 18  SpO2:  [88 %-100 %] 88 %  BP: (125-180)/() 139/86     Weight: 60.1 kg (132 lb 7.9 oz)  Body mass index is 20.75 kg/m².    Intake/Output Summary (Last 24 hours) at 3/7/2020 0926  Last data filed at 3/6/2020 2000  Gross per 24 hour   Intake 986.28 ml   Output 1150 ml   Net -163.72 ml      Physical Exam   Constitutional: She is oriented to person, place, and time. She appears well-developed and well-nourished. No distress.   HENT:   Head: Normocephalic and atraumatic.   Nose: Nose normal.   Mouth/Throat: No oropharyngeal exudate.   Eyes: Pupils are equal, round, and reactive to light. EOM are normal. Right eye exhibits no discharge. Left eye exhibits no discharge. No scleral icterus.   Neck: Normal range of motion. Neck supple. No JVD present. No tracheal deviation present.   Cardiovascular: Normal rate and regular rhythm. Exam reveals no gallop and no friction rub.   Pulmonary/Chest: Effort normal. She has wheezes. She has no rales.   Decreased breath sounds bilaterally  2L of nasal cannula   Abdominal: Soft. Bowel sounds are normal. There is no tenderness. There is no guarding.   Genitourinary: No vaginal discharge found.   Musculoskeletal: Normal range of motion. She exhibits no edema, tenderness or deformity.   Neurological: She is alert and oriented to  person, place, and time. No cranial nerve deficit. Coordination normal.   Skin: She is not diaphoretic.       Significant Labs:   CBC:   Recent Labs   Lab 03/05/20 1746 03/06/20 0332 03/07/20  0551   WBC 6.32 7.52 5.67   HGB 11.2* 10.5* 10.3*   HCT 38.4 34.8* 34.1*   * 145* 143*     CMP:   Recent Labs   Lab 03/05/20 1746 03/06/20 0332 03/07/20  0548    144 142   K 4.0 3.4* 3.6   CL 93* 92* 98   CO2 48* 45* 37*   GLU 81 102 97   BUN 9 10 10   CREATININE 0.6 0.6 0.5   CALCIUM 10.1 9.8 8.7   PROT 6.8  --   --    ALBUMIN 3.6  --   --    BILITOT 0.4  --   --    ALKPHOS 68  --   --    AST 10  --   --    ALT 6*  --   --    ANIONGAP 4* 7* 7*   EGFRNONAA >60.0 >60.0 >60.0       Significant Imaging: I have reviewed all pertinent imaging results/findings within the past 24 hours.      Assessment/Plan:      * COPD exacerbation  See hypercarbic hypoxic respiratory failure      Hyperlipidemia  Continue home pravastatin 40 mg      Hypertension  Historical diagnosis  Home medications: metoprolol succinate 50 mg    Continue metoprolol 50    Acute cystitis without hematuria  UA in ICU with multiple organisms, altered mental status on admission, however patient now not complaining of dysuria    Finish course of ceftriaxone for UTI      Chronic low back pain  Patient with chronic back pain in setting of previous hx of breast cancer and compression fracture. Takes   Percocet 5-325 q4, flexeril 10 mg tid    Plan  Pain control with acetomenophen 650  Assess pain daily    Palliative care encounter  Appreciate pallative care help and recs      Metabolic encephalopathy  Initally on admission patient with AMS, found to be hypercarbic. Interval improvement in mental status      Acute on chronic respiratory failure with hypoxia and hypercapnia  74 year old with severe COPD presents with AMS and found to be in COPD exacerbation. Treated with bipap in ICU with interval improvement in mental status and respiratory status. Initial  pCO2 100s, currently on 2 liters, with most recent CO2 74    Plan  Continue ICS/LAMA  Continue duonebs  Nightly bipap  Started 5 day course of pred 40 for COPD  Keep O2 sats 88-92        VTE Risk Mitigation (From admission, onward)         Ordered     enoxaparin injection 40 mg  Daily      03/05/20 2209                      Don Orr MD  Department of Hospital Medicine   [facility]

## 2020-03-07 NOTE — HOSPITAL COURSE
74 year old female with history of severe COPD (FEV1 of 33% and 1.5-2L at home), breast cacner s/p lumpectomy and chemo on FEMARA maintenance, presented as an admission to ICU 3/5 with COPD exacerbation. Treated with bipap and stepped down to hospital medicine - continuing with duonebs, ICS/LAMA and ceftriaxone (finished course). Started on Prednisone 40 mg x5 days with improvement in symptoms. Oxygen weaning down as tolerated, now at 2L. Started low dose of Losartan 12.5 mg daily in the hospital for elevated BP, will continue on discharge. Pt was on Metoprolol at home but was unsure why she was taking it, denies hx of cardiac dysfunction. Given recent falls at home, will discontinue as no current indication for BB. Working with PT/OT, will discharge to SNF.

## 2020-03-07 NOTE — RESIDENT HANDOFF
Handoff     Primary Team: Networked reference to record [facility]  Room Number: 01654/36748 A     Patient Name: Radha Woods MRN: [id number]     Date of Birth: [id number] Allergies: Adhesive; Levaquin [levofloxacin]; and Penicillins     Age: 74 y.o. Admit Date: 3/5/2020     Sex: female  BMI: Body mass index is 20.99 kg/m².     Code Status: Full Code        Illness Level (current clinical status): Watcher - No    Reason for Admission: Acute on chronic respiratory failure with hypoxia and hypercapnia    Brief HPI (pertinent PMH and diagnosis or differential diagnosis): This is a 74 year old female with PMH significant for severe COPD (on 3 L at home) and history of left breast cancer (status post lumpectomy and chemoradiation, currently on maintenance therapy with FEMARA) who presented on 03/05 with confusion for the past few days. History was initially taken from son at bedside, but then from patient following stabilization with BiPAP. Son reported patient had been confused and saying nonsensical phrases for the last few days, associated with tremors, and excessive sleepiness/lethargy, however without fever, chills, cough, and chest pain. The patient reported that prior to symptom onset she had been using her home O2 with increasing frequency (previously just at night, and now throughout the day as well). Though she has not had recent admission to the hospital for COPD, she acknowledged that her breathing has been deteriorating over the last year or two and she is using her nebulizer more frequently. She also noted that she takes Hydrocodone for chronic back pain and that she has required that more frequently in recent weeks (with her dose associated with drowsiness).      Procedure Date: None    Hospital Course (updated, brief assessment by system or problem, significant events): In the ED patient was HD stable with initial exam noted for encephalopathy. Basic labs reviewed. Unremarkable. UA was suggestive of possible UTI.  CXR was with out acute process but did show emphysematous changes. ABG showed compensated respiratory acidosis with CO2 of 89. She was initiated on continuous BiPAP and scheduled duo-nebulizers, however steroids were held with concern for exacerbating encephalopathy. By morning of 03/06, her mental status had improved to baseline and VBG showed improving hypercapnia with ability to transition to nasal cannula. Work-up for underlying respiratory infection was unremarkable. Her presentation was suspected to be multifactorial from progression of underlying disease versus decreased respiratory drive from opioid use versus infectious etiology of exacerbation.     Tasks (specific, using if-then statements):   #1. Acute on chronic hypercapnic respiratory failure:   · Associated with history of severe COPD (FEV1 of 33%).   · Continue supplemental O2 with goal O2 >88%; avoid hyperoxygenation.   · Continue scheduled duo-nebulizers, transitioning to PRN as improved.   · Continue LABA-ICS inhaler.   · Continue nightly BiPAP.   · Outpatient follow-up with pulmonology here for evaluation of home BiPAP or TRILOGY.   · Follow-up palliative care recommendations with consideration of goals of care with declining lung function.     #2. UTI:   · Continue Ceftriaxone.     Contingency Plan (special circumstances anticipated and plan): Please notify MICU of decline in respiratory status associated with rising hypercapnia.     Discharge Disposition: Home or Self Care    Mentored By: Dr. Willett

## 2020-03-08 PROBLEM — Z02.9 DISCHARGE PLANNING ISSUES: Status: ACTIVE | Noted: 2020-03-08

## 2020-03-08 PROBLEM — Z75.8 DISCHARGE PLANNING ISSUES: Status: ACTIVE | Noted: 2020-03-08

## 2020-03-08 LAB
ANION GAP SERPL CALC-SCNC: 9 MMOL/L (ref 8–16)
BASOPHILS # BLD AUTO: 0.05 K/UL (ref 0–0.2)
BASOPHILS NFR BLD: 0.6 % (ref 0–1.9)
BUN SERPL-MCNC: 11 MG/DL (ref 8–23)
CALCIUM SERPL-MCNC: 9.8 MG/DL (ref 8.7–10.5)
CHLORIDE SERPL-SCNC: 98 MMOL/L (ref 95–110)
CO2 SERPL-SCNC: 36 MMOL/L (ref 23–29)
CREAT SERPL-MCNC: 0.6 MG/DL (ref 0.5–1.4)
DIFFERENTIAL METHOD: ABNORMAL
EOSINOPHIL # BLD AUTO: 0 K/UL (ref 0–0.5)
EOSINOPHIL NFR BLD: 0 % (ref 0–8)
ERYTHROCYTE [DISTWIDTH] IN BLOOD BY AUTOMATED COUNT: 12.8 % (ref 11.5–14.5)
EST. GFR  (AFRICAN AMERICAN): >60 ML/MIN/1.73 M^2
EST. GFR  (NON AFRICAN AMERICAN): >60 ML/MIN/1.73 M^2
GLUCOSE SERPL-MCNC: 77 MG/DL (ref 70–110)
HCT VFR BLD AUTO: 39.9 % (ref 37–48.5)
HGB BLD-MCNC: 11.8 G/DL (ref 12–16)
IMM GRANULOCYTES # BLD AUTO: 0.12 K/UL (ref 0–0.04)
IMM GRANULOCYTES NFR BLD AUTO: 1.5 % (ref 0–0.5)
LYMPHOCYTES # BLD AUTO: 1.7 K/UL (ref 1–4.8)
LYMPHOCYTES NFR BLD: 21.7 % (ref 18–48)
MAGNESIUM SERPL-MCNC: 2.2 MG/DL (ref 1.6–2.6)
MCH RBC QN AUTO: 29.5 PG (ref 27–31)
MCHC RBC AUTO-ENTMCNC: 29.6 G/DL (ref 32–36)
MCV RBC AUTO: 100 FL (ref 82–98)
MONOCYTES # BLD AUTO: 1 K/UL (ref 0.3–1)
MONOCYTES NFR BLD: 12.5 % (ref 4–15)
NEUTROPHILS # BLD AUTO: 5 K/UL (ref 1.8–7.7)
NEUTROPHILS NFR BLD: 63.7 % (ref 38–73)
NRBC BLD-RTO: 0 /100 WBC
PHOSPHATE SERPL-MCNC: 3.5 MG/DL (ref 2.7–4.5)
PLATELET # BLD AUTO: 161 K/UL (ref 150–350)
PMV BLD AUTO: 10 FL (ref 9.2–12.9)
POTASSIUM SERPL-SCNC: 4.9 MMOL/L (ref 3.5–5.1)
RBC # BLD AUTO: 4 M/UL (ref 4–5.4)
SODIUM SERPL-SCNC: 143 MMOL/L (ref 136–145)
WBC # BLD AUTO: 7.85 K/UL (ref 3.9–12.7)

## 2020-03-08 PROCEDURE — 25000242 PHARM REV CODE 250 ALT 637 W/ HCPCS: Performed by: INTERNAL MEDICINE

## 2020-03-08 PROCEDURE — 25000003 PHARM REV CODE 250: Performed by: STUDENT IN AN ORGANIZED HEALTH CARE EDUCATION/TRAINING PROGRAM

## 2020-03-08 PROCEDURE — 94761 N-INVAS EAR/PLS OXIMETRY MLT: CPT

## 2020-03-08 PROCEDURE — 99900035 HC TECH TIME PER 15 MIN (STAT)

## 2020-03-08 PROCEDURE — 27000221 HC OXYGEN, UP TO 24 HOURS

## 2020-03-08 PROCEDURE — 25000003 PHARM REV CODE 250: Performed by: HOSPITALIST

## 2020-03-08 PROCEDURE — 25000242 PHARM REV CODE 250 ALT 637 W/ HCPCS: Performed by: STUDENT IN AN ORGANIZED HEALTH CARE EDUCATION/TRAINING PROGRAM

## 2020-03-08 PROCEDURE — 94640 AIRWAY INHALATION TREATMENT: CPT

## 2020-03-08 PROCEDURE — 99238 PR HOSPITAL DISCHARGE DAY,<30 MIN: ICD-10-PCS | Mod: GC,,, | Performed by: HOSPITALIST

## 2020-03-08 PROCEDURE — 84100 ASSAY OF PHOSPHORUS: CPT

## 2020-03-08 PROCEDURE — 63600175 PHARM REV CODE 636 W HCPCS: Performed by: STUDENT IN AN ORGANIZED HEALTH CARE EDUCATION/TRAINING PROGRAM

## 2020-03-08 PROCEDURE — 94660 CPAP INITIATION&MGMT: CPT

## 2020-03-08 PROCEDURE — 83735 ASSAY OF MAGNESIUM: CPT

## 2020-03-08 PROCEDURE — 85025 COMPLETE CBC W/AUTO DIFF WBC: CPT

## 2020-03-08 PROCEDURE — 80048 BASIC METABOLIC PNL TOTAL CA: CPT

## 2020-03-08 PROCEDURE — 11000001 HC ACUTE MED/SURG PRIVATE ROOM

## 2020-03-08 PROCEDURE — 36415 COLL VENOUS BLD VENIPUNCTURE: CPT

## 2020-03-08 PROCEDURE — 99238 HOSP IP/OBS DSCHRG MGMT 30/<: CPT | Mod: GC,,, | Performed by: HOSPITALIST

## 2020-03-08 RX ADMIN — FLUTICASONE FUROATE AND VILANTEROL TRIFENATATE 1 PUFF: 100; 25 POWDER RESPIRATORY (INHALATION) at 08:03

## 2020-03-08 RX ADMIN — LACTOBACILLUS ACIDOPHILUS / LACTOBACILLUS BULGARICUS 1 EACH: 100 MILLION CFU STRENGTH GRANULES at 09:03

## 2020-03-08 RX ADMIN — ASPIRIN 81 MG: 81 TABLET, COATED ORAL at 08:03

## 2020-03-08 RX ADMIN — OXYCODONE HYDROCHLORIDE AND ACETAMINOPHEN 1 TABLET: 5; 325 TABLET ORAL at 06:03

## 2020-03-08 RX ADMIN — IPRATROPIUM BROMIDE AND ALBUTEROL SULFATE 3 ML: .5; 3 SOLUTION RESPIRATORY (INHALATION) at 08:03

## 2020-03-08 RX ADMIN — LETROZOLE 2.5 MG: 2.5 TABLET ORAL at 08:03

## 2020-03-08 RX ADMIN — IPRATROPIUM BROMIDE AND ALBUTEROL SULFATE 3 ML: .5; 3 SOLUTION RESPIRATORY (INHALATION) at 11:03

## 2020-03-08 RX ADMIN — IPRATROPIUM BROMIDE AND ALBUTEROL SULFATE 3 ML: .5; 3 SOLUTION RESPIRATORY (INHALATION) at 10:03

## 2020-03-08 RX ADMIN — PREDNISONE 40 MG: 10 TABLET ORAL at 08:03

## 2020-03-08 RX ADMIN — LIDOCAINE 3 PATCH: 50 PATCH TOPICAL at 09:03

## 2020-03-08 RX ADMIN — TIOTROPIUM BROMIDE 18 MCG: 18 CAPSULE ORAL; RESPIRATORY (INHALATION) at 08:03

## 2020-03-08 RX ADMIN — IPRATROPIUM BROMIDE AND ALBUTEROL SULFATE 3 ML: .5; 3 SOLUTION RESPIRATORY (INHALATION) at 05:03

## 2020-03-08 RX ADMIN — IPRATROPIUM BROMIDE AND ALBUTEROL SULFATE 3 ML: .5; 3 SOLUTION RESPIRATORY (INHALATION) at 04:03

## 2020-03-08 RX ADMIN — METOPROLOL SUCCINATE 50 MG: 50 TABLET, EXTENDED RELEASE ORAL at 08:03

## 2020-03-08 RX ADMIN — LACTOBACILLUS ACIDOPHILUS / LACTOBACILLUS BULGARICUS 1 EACH: 100 MILLION CFU STRENGTH GRANULES at 08:03

## 2020-03-08 RX ADMIN — OXYCODONE HYDROCHLORIDE AND ACETAMINOPHEN 1 TABLET: 5; 325 TABLET ORAL at 05:03

## 2020-03-08 RX ADMIN — PRAVASTATIN SODIUM 40 MG: 10 TABLET ORAL at 09:03

## 2020-03-08 NOTE — PROGRESS NOTES
[facility] Hospital Medicine  Progress Note    Patient Name: Radha Woods  MRN: [id number]  Patient Class: IP- Inpatient   Admission Date: 3/5/2020  Length of Stay: 3 days  Attending Physician: Hiram Cook MD  Primary Care Provider: Param Riley MD    [place]ospital Medicine [place] [facility] [place] 4 Don Orr MD    Subjective:     Principal Problem:COPD exacerbation        HPI:  74 year old female with history of severe COPD (FEV1 of 33% and 1.5-2L at home), breast cacner s/p lumpectomy and chemo on FEMARA maintenance, presented as an admission to ICU 3/5 with confusion, tremors. Denies any recent fevers/ chills/cough/sick contacts/nausea. Infectious workup only revealing of dirty UA - started ceftriaxone, although she only reports urinary frequency, has been incontinent of urine for some months. She states that she has been treated for her COPD for some time, takes her rescue inhaler and ICS/LAMA religiously. In the ICU she was noted to have a compensated respiratory alkalosis with CO2 of 89, treated with bipap, ICS/LAMA. Patient with interval improvement on BiPAP. Also assessed by hospitalsative care for goals of care/symptom treatment. She remains a full code.    Step down to hospital medicine 4    Overview/Hospital Course:  74 year old female with history of severe COPD (FEV1 of 33% and 1.5-2L at home), breast cacner s/p lumpectomy and chemo on FEMARA maintenance, presented as an admission to ICU 3/5 with COPD exacerbation. Treated with bipap and stepped down to hospital medicine - continuing with duonebs, ICS/LAMA and ceftriaxone (finishing course). Palliative care following for goals of care/symptom management    Interval History: No acute events overnight. Remains on oxygen while active, otherwise stable on RA.    Review of Systems   Constitutional: Negative for chills and fever.   HENT: Negative for rhinorrhea and sore throat.    Respiratory: Positive for shortness of breath. Negative for  cough.         Dyspnea on exertion   Cardiovascular: Negative for chest pain and palpitations.   Gastrointestinal: Negative for abdominal pain, constipation, diarrhea and nausea.   Genitourinary: Negative for dysuria and flank pain.   Neurological: Negative for light-headedness, numbness and headaches.     Objective:     Vital Signs (Most Recent):  Temp: 96.8 °F (36 °C) (03/08/20 0839)  Pulse: 76 (03/08/20 0853)  Resp: 16 (03/08/20 0853)  BP: (!) 164/74 (03/08/20 0839)  SpO2: (!) 92 % (03/08/20 0853) Vital Signs (24h Range):  Temp:  [96.4 °F (35.8 °C)-98.7 °F (37.1 °C)] 96.8 °F (36 °C)  Pulse:  [76-96] 76  Resp:  [14-18] 16  SpO2:  [81 %-96 %] 92 %  BP: (139-164)/(67-86) 164/74     Weight: 60.1 kg (132 lb 7.9 oz)  Body mass index is 20.75 kg/m².  No intake or output data in the 24 hours ending 03/08/20 0857   Physical Exam   Constitutional: She is oriented to person, place, and time. She appears well-developed and well-nourished. No distress.   HENT:   Head: Normocephalic and atraumatic.   Nose: Nose normal.   Mouth/Throat: No oropharyngeal exudate.   Eyes: Pupils are equal, round, and reactive to light. EOM are normal. Right eye exhibits no discharge. Left eye exhibits no discharge. No scleral icterus.   Neck: Normal range of motion. Neck supple. No JVD present. No tracheal deviation present.   Cardiovascular: Normal rate and regular rhythm. Exam reveals no gallop and no friction rub.   Pulmonary/Chest: Effort normal. She has wheezes. She has no rales.   Decreased breath sounds bilaterally     Abdominal: Soft. Bowel sounds are normal. There is no tenderness. There is no guarding.   Genitourinary: No vaginal discharge found.   Musculoskeletal: Normal range of motion. She exhibits no edema, tenderness or deformity.   Neurological: She is alert and oriented to person, place, and time. No cranial nerve deficit. Coordination normal.   Skin: She is not diaphoretic.       Significant Labs:   CBC:   Recent Labs   Lab  03/07/20  0551 03/08/20  0518   WBC 5.67 7.85   HGB 10.3* 11.8*   HCT 34.1* 39.9   * 161     CMP:   Recent Labs   Lab 03/07/20  0548 03/08/20  0518    143   K 3.6 4.9   CL 98 98   CO2 37* 36*   GLU 97 77   BUN 10 11   CREATININE 0.5 0.6   CALCIUM 8.7 9.8   ANIONGAP 7* 9   EGFRNONAA >60.0 >60.0       Significant Imaging: I have reviewed all pertinent imaging results/findings within the past 24 hours.      Assessment/Plan:      * COPD exacerbation  See hypercarbic hypoxic respiratory failure      Discharge planning issues  Patient stating that she does not have the strength to walk and does not feel comfortable walking. Discussed sitting in chair, patient will attempt to sit.    Plan  -PT   -OT      Hyperlipidemia  Continue home pravastatin 40 mg      Hypertension  Historical diagnosis  Home medications: metoprolol succinate 50 mg    Continue metoprolol 50    Acute cystitis without hematuria  UA in ICU with multiple organisms, altered mental status on admission, however patient now not complaining of dysuria    Finished course of ceftriaxone for UTI      Chronic low back pain  Patient with chronic back pain in setting of previous hx of breast cancer and compression fracture. Takes   Percocet 5-325 q4, flexeril 10 mg tid    Plan  Pain control with acetomenophen 650  Assess pain daily    Palliative care encounter  Appreciate pallative care help and recs      Metabolic encephalopathy  Initally on admission patient with AMS, found to be hypercarbic. Interval improvement in mental status      Acute on chronic respiratory failure with hypoxia and hypercapnia  74 year old with severe COPD presents with AMS and found to be in COPD exacerbation. Treated with bipap in ICU with interval improvement in mental status and respiratory status. Initial pCO2 100s, currently on 2 liters, with most recent CO2 74    Plan  Continue ICS/LAMA  Continue duonebs  Nightly bipap  Started 5 day course of pred 40 for COPD  Keep O2  sats 88-92        VTE Risk Mitigation (From admission, onward)         Ordered     enoxaparin injection 40 mg  Daily      03/05/20 8699                      Don Orr MD  Department of Hospital Medicine   [facility]

## 2020-03-08 NOTE — ASSESSMENT & PLAN NOTE
Patient stating that she does not have the strength to walk and does not feel comfortable walking. Discussed sitting in chair, patient will attempt to sit.    Plan  -PT   -OT

## 2020-03-08 NOTE — SUBJECTIVE & OBJECTIVE
Interval History: No acute events overnight. Remains on oxygen while active, otherwise stable on RA.    Review of Systems   Constitutional: Negative for chills and fever.   HENT: Negative for rhinorrhea and sore throat.    Respiratory: Positive for shortness of breath. Negative for cough.         Dyspnea on exertion   Cardiovascular: Negative for chest pain and palpitations.   Gastrointestinal: Negative for abdominal pain, constipation, diarrhea and nausea.   Genitourinary: Negative for dysuria and flank pain.   Neurological: Negative for light-headedness, numbness and headaches.     Objective:     Vital Signs (Most Recent):  Temp: 96.8 °F (36 °C) (03/08/20 0839)  Pulse: 76 (03/08/20 0853)  Resp: 16 (03/08/20 0853)  BP: (!) 164/74 (03/08/20 0839)  SpO2: (!) 92 % (03/08/20 0853) Vital Signs (24h Range):  Temp:  [96.4 °F (35.8 °C)-98.7 °F (37.1 °C)] 96.8 °F (36 °C)  Pulse:  [76-96] 76  Resp:  [14-18] 16  SpO2:  [81 %-96 %] 92 %  BP: (139-164)/(67-86) 164/74     Weight: 60.1 kg (132 lb 7.9 oz)  Body mass index is 20.75 kg/m².  No intake or output data in the 24 hours ending 03/08/20 0857   Physical Exam   Constitutional: She is oriented to person, place, and time. She appears well-developed and well-nourished. No distress.   HENT:   Head: Normocephalic and atraumatic.   Nose: Nose normal.   Mouth/Throat: No oropharyngeal exudate.   Eyes: Pupils are equal, round, and reactive to light. EOM are normal. Right eye exhibits no discharge. Left eye exhibits no discharge. No scleral icterus.   Neck: Normal range of motion. Neck supple. No JVD present. No tracheal deviation present.   Cardiovascular: Normal rate and regular rhythm. Exam reveals no gallop and no friction rub.   Pulmonary/Chest: Effort normal. She has wheezes. She has no rales.   Decreased breath sounds bilaterally     Abdominal: Soft. Bowel sounds are normal. There is no tenderness. There is no guarding.   Genitourinary: No vaginal discharge found.    Musculoskeletal: Normal range of motion. She exhibits no edema, tenderness or deformity.   Neurological: She is alert and oriented to person, place, and time. No cranial nerve deficit. Coordination normal.   Skin: She is not diaphoretic.       Significant Labs:   CBC:   Recent Labs   Lab 03/07/20  0551 03/08/20  0518   WBC 5.67 7.85   HGB 10.3* 11.8*   HCT 34.1* 39.9   * 161     CMP:   Recent Labs   Lab 03/07/20  0548 03/08/20  0518    143   K 3.6 4.9   CL 98 98   CO2 37* 36*   GLU 97 77   BUN 10 11   CREATININE 0.5 0.6   CALCIUM 8.7 9.8   ANIONGAP 7* 9   EGFRNONAA >60.0 >60.0       Significant Imaging: I have reviewed all pertinent imaging results/findings within the past 24 hours.

## 2020-03-08 NOTE — PLAN OF CARE
Problem: Fall Injury Risk  Goal: Absence of Fall and Fall-Related Injury  Outcome: Ongoing, Progressing     Problem: Adult Inpatient Plan of Care  Goal: Plan of Care Review  Outcome: Ongoing, Progressing    POC reviewed with pt and son. VSS afebrile O2 NC 2L pt c/o pain and discomfort to lower back prn medication given. Bed in lowest position for safety call light within reach. Will continue to monitor.

## 2020-03-08 NOTE — ASSESSMENT & PLAN NOTE
UA in ICU with multiple organisms, altered mental status on admission, however patient now not complaining of dysuria    Finished course of ceftriaxone for UTI

## 2020-03-09 PROBLEM — Z02.9 DISCHARGE PLANNING ISSUES: Status: RESOLVED | Noted: 2020-03-08 | Resolved: 2020-03-09

## 2020-03-09 PROBLEM — Z75.8 DISCHARGE PLANNING ISSUES: Status: RESOLVED | Noted: 2020-03-08 | Resolved: 2020-03-09

## 2020-03-09 LAB
ANION GAP SERPL CALC-SCNC: 9 MMOL/L (ref 8–16)
BASOPHILS # BLD AUTO: 0.09 K/UL (ref 0–0.2)
BASOPHILS NFR BLD: 1.3 % (ref 0–1.9)
BUN SERPL-MCNC: 11 MG/DL (ref 8–23)
CALCIUM SERPL-MCNC: 9.4 MG/DL (ref 8.7–10.5)
CHLORIDE SERPL-SCNC: 96 MMOL/L (ref 95–110)
CO2 SERPL-SCNC: 36 MMOL/L (ref 23–29)
CREAT SERPL-MCNC: 0.6 MG/DL (ref 0.5–1.4)
DIFFERENTIAL METHOD: ABNORMAL
EOSINOPHIL # BLD AUTO: 0.1 K/UL (ref 0–0.5)
EOSINOPHIL NFR BLD: 1.4 % (ref 0–8)
ERYTHROCYTE [DISTWIDTH] IN BLOOD BY AUTOMATED COUNT: 13 % (ref 11.5–14.5)
EST. GFR  (AFRICAN AMERICAN): >60 ML/MIN/1.73 M^2
EST. GFR  (NON AFRICAN AMERICAN): >60 ML/MIN/1.73 M^2
GLUCOSE SERPL-MCNC: 81 MG/DL (ref 70–110)
HCT VFR BLD AUTO: 37.7 % (ref 37–48.5)
HGB BLD-MCNC: 11.3 G/DL (ref 12–16)
IMM GRANULOCYTES # BLD AUTO: 0.15 K/UL (ref 0–0.04)
IMM GRANULOCYTES NFR BLD AUTO: 2.2 % (ref 0–0.5)
LYMPHOCYTES # BLD AUTO: 1.9 K/UL (ref 1–4.8)
LYMPHOCYTES NFR BLD: 27.8 % (ref 18–48)
MAGNESIUM SERPL-MCNC: 1.9 MG/DL (ref 1.6–2.6)
MCH RBC QN AUTO: 29.7 PG (ref 27–31)
MCHC RBC AUTO-ENTMCNC: 30 G/DL (ref 32–36)
MCV RBC AUTO: 99 FL (ref 82–98)
MONOCYTES # BLD AUTO: 1 K/UL (ref 0.3–1)
MONOCYTES NFR BLD: 14.1 % (ref 4–15)
NEUTROPHILS # BLD AUTO: 3.7 K/UL (ref 1.8–7.7)
NEUTROPHILS NFR BLD: 53.2 % (ref 38–73)
NRBC BLD-RTO: 0 /100 WBC
PHOSPHATE SERPL-MCNC: 3.9 MG/DL (ref 2.7–4.5)
PLATELET # BLD AUTO: 195 K/UL (ref 150–350)
PMV BLD AUTO: 9.8 FL (ref 9.2–12.9)
POTASSIUM SERPL-SCNC: 3.6 MMOL/L (ref 3.5–5.1)
RBC # BLD AUTO: 3.81 M/UL (ref 4–5.4)
SODIUM SERPL-SCNC: 141 MMOL/L (ref 136–145)
WBC # BLD AUTO: 6.94 K/UL (ref 3.9–12.7)

## 2020-03-09 PROCEDURE — 97116 GAIT TRAINING THERAPY: CPT

## 2020-03-09 PROCEDURE — 25000003 PHARM REV CODE 250: Performed by: STUDENT IN AN ORGANIZED HEALTH CARE EDUCATION/TRAINING PROGRAM

## 2020-03-09 PROCEDURE — 11000001 HC ACUTE MED/SURG PRIVATE ROOM

## 2020-03-09 PROCEDURE — 83735 ASSAY OF MAGNESIUM: CPT

## 2020-03-09 PROCEDURE — 25000003 PHARM REV CODE 250: Performed by: HOSPITALIST

## 2020-03-09 PROCEDURE — 97535 SELF CARE MNGMENT TRAINING: CPT

## 2020-03-09 PROCEDURE — 85025 COMPLETE CBC W/AUTO DIFF WBC: CPT

## 2020-03-09 PROCEDURE — 84100 ASSAY OF PHOSPHORUS: CPT

## 2020-03-09 PROCEDURE — 94640 AIRWAY INHALATION TREATMENT: CPT

## 2020-03-09 PROCEDURE — 99900035 HC TECH TIME PER 15 MIN (STAT)

## 2020-03-09 PROCEDURE — 25000242 PHARM REV CODE 250 ALT 637 W/ HCPCS: Performed by: INTERNAL MEDICINE

## 2020-03-09 PROCEDURE — 63600175 PHARM REV CODE 636 W HCPCS: Performed by: INTERNAL MEDICINE

## 2020-03-09 PROCEDURE — 99232 SBSQ HOSP IP/OBS MODERATE 35: CPT | Mod: ,,, | Performed by: INTERNAL MEDICINE

## 2020-03-09 PROCEDURE — 80048 BASIC METABOLIC PNL TOTAL CA: CPT

## 2020-03-09 PROCEDURE — 94660 CPAP INITIATION&MGMT: CPT

## 2020-03-09 PROCEDURE — 25000242 PHARM REV CODE 250 ALT 637 W/ HCPCS: Performed by: HOSPITALIST

## 2020-03-09 PROCEDURE — 27000221 HC OXYGEN, UP TO 24 HOURS

## 2020-03-09 PROCEDURE — 63600175 PHARM REV CODE 636 W HCPCS: Performed by: STUDENT IN AN ORGANIZED HEALTH CARE EDUCATION/TRAINING PROGRAM

## 2020-03-09 PROCEDURE — 25000242 PHARM REV CODE 250 ALT 637 W/ HCPCS: Performed by: STUDENT IN AN ORGANIZED HEALTH CARE EDUCATION/TRAINING PROGRAM

## 2020-03-09 PROCEDURE — 99232 PR SUBSEQUENT HOSPITAL CARE,LEVL II: ICD-10-PCS | Mod: ,,, | Performed by: INTERNAL MEDICINE

## 2020-03-09 PROCEDURE — 36415 COLL VENOUS BLD VENIPUNCTURE: CPT

## 2020-03-09 PROCEDURE — 99232 PR SUBSEQUENT HOSPITAL CARE,LEVL II: ICD-10-PCS | Mod: GC,,, | Performed by: HOSPITALIST

## 2020-03-09 PROCEDURE — 99232 SBSQ HOSP IP/OBS MODERATE 35: CPT | Mod: GC,,, | Performed by: HOSPITALIST

## 2020-03-09 RX ORDER — IPRATROPIUM BROMIDE AND ALBUTEROL SULFATE 2.5; .5 MG/3ML; MG/3ML
3 SOLUTION RESPIRATORY (INHALATION)
Status: DISCONTINUED | OUTPATIENT
Start: 2020-03-10 | End: 2020-03-10 | Stop reason: HOSPADM

## 2020-03-09 RX ORDER — PREDNISONE 20 MG/1
40 TABLET ORAL DAILY
Qty: 4 TABLET | Refills: 0 | Status: ON HOLD | OUTPATIENT
Start: 2020-03-09 | End: 2020-03-11

## 2020-03-09 RX ORDER — HYDRALAZINE HYDROCHLORIDE 10 MG/1
10 TABLET, FILM COATED ORAL EVERY 12 HOURS
Status: DISCONTINUED | OUTPATIENT
Start: 2020-03-09 | End: 2020-03-09

## 2020-03-09 RX ORDER — IPRATROPIUM BROMIDE AND ALBUTEROL SULFATE 2.5; .5 MG/3ML; MG/3ML
3 SOLUTION RESPIRATORY (INHALATION) EVERY 4 HOURS PRN
Status: DISCONTINUED | OUTPATIENT
Start: 2020-03-09 | End: 2020-03-10 | Stop reason: HOSPADM

## 2020-03-09 RX ORDER — AMLODIPINE BESYLATE 5 MG/1
5 TABLET ORAL DAILY
Status: DISCONTINUED | OUTPATIENT
Start: 2020-03-09 | End: 2020-03-09

## 2020-03-09 RX ORDER — HYDRALAZINE HYDROCHLORIDE 10 MG/1
10 TABLET, FILM COATED ORAL EVERY 12 HOURS PRN
Status: DISCONTINUED | OUTPATIENT
Start: 2020-03-09 | End: 2020-03-10 | Stop reason: HOSPADM

## 2020-03-09 RX ADMIN — FLUTICASONE FUROATE AND VILANTEROL TRIFENATATE 1 PUFF: 100; 25 POWDER RESPIRATORY (INHALATION) at 09:03

## 2020-03-09 RX ADMIN — OXYCODONE HYDROCHLORIDE AND ACETAMINOPHEN 1 TABLET: 5; 325 TABLET ORAL at 09:03

## 2020-03-09 RX ADMIN — ASPIRIN 81 MG: 81 TABLET, COATED ORAL at 09:03

## 2020-03-09 RX ADMIN — LETROZOLE 2.5 MG: 2.5 TABLET ORAL at 09:03

## 2020-03-09 RX ADMIN — IPRATROPIUM BROMIDE AND ALBUTEROL SULFATE 3 ML: .5; 3 SOLUTION RESPIRATORY (INHALATION) at 04:03

## 2020-03-09 RX ADMIN — PRAVASTATIN SODIUM 40 MG: 10 TABLET ORAL at 09:03

## 2020-03-09 RX ADMIN — IPRATROPIUM BROMIDE AND ALBUTEROL SULFATE 3 ML: .5; 3 SOLUTION RESPIRATORY (INHALATION) at 12:03

## 2020-03-09 RX ADMIN — LACTOBACILLUS ACIDOPHILUS / LACTOBACILLUS BULGARICUS 1 EACH: 100 MILLION CFU STRENGTH GRANULES at 09:03

## 2020-03-09 RX ADMIN — LOSARTAN POTASSIUM 12.5 MG: 25 TABLET, FILM COATED ORAL at 12:03

## 2020-03-09 RX ADMIN — IPRATROPIUM BROMIDE AND ALBUTEROL SULFATE 3 ML: .5; 3 SOLUTION RESPIRATORY (INHALATION) at 09:03

## 2020-03-09 RX ADMIN — ENOXAPARIN SODIUM 40 MG: 100 INJECTION SUBCUTANEOUS at 05:03

## 2020-03-09 RX ADMIN — METOPROLOL SUCCINATE 50 MG: 50 TABLET, EXTENDED RELEASE ORAL at 09:03

## 2020-03-09 RX ADMIN — TIOTROPIUM BROMIDE 18 MCG: 18 CAPSULE ORAL; RESPIRATORY (INHALATION) at 09:03

## 2020-03-09 RX ADMIN — PREDNISONE 40 MG: 10 TABLET ORAL at 09:03

## 2020-03-09 RX ADMIN — IPRATROPIUM BROMIDE AND ALBUTEROL SULFATE 3 ML: .5; 3 SOLUTION RESPIRATORY (INHALATION) at 01:03

## 2020-03-09 RX ADMIN — OXYCODONE HYDROCHLORIDE AND ACETAMINOPHEN 1 TABLET: 5; 325 TABLET ORAL at 12:03

## 2020-03-09 RX ADMIN — OXYCODONE HYDROCHLORIDE AND ACETAMINOPHEN 1 TABLET: 5; 325 TABLET ORAL at 02:03

## 2020-03-09 RX ADMIN — LIDOCAINE 3 PATCH: 50 PATCH TOPICAL at 11:03

## 2020-03-09 RX ADMIN — IPRATROPIUM BROMIDE AND ALBUTEROL SULFATE 3 ML: .5; 3 SOLUTION RESPIRATORY (INHALATION) at 08:03

## 2020-03-09 NOTE — PT/OT/SLP PROGRESS
Occupational Therapy   Treatment    Name: Radha Woods  MRN: [id number]  Admitting Diagnosis:  COPD exacerbation       Recommendations:     Discharge Recommendations: nursing facility, skilled  Discharge Equipment Recommendations:  (TBD)  Barriers to discharge:  Decreased caregiver support(increased level of assistance at this time)    Assessment:     Radha Woods is a 74 y.o. female with a medical diagnosis of COPD exacerbation. Performance deficits affecting function are weakness, impaired endurance, impaired self care skills, impaired functional mobilty, gait instability, impaired balance, impaired cardiopulmonary response to activity. Patient demonstrates tremors when sitting EOB and with activity. Patient would benefit from continued skilled acute OT 3x/wk to improve functional mobility, increase independence with ADLs, and address established goals. Patient and family concerned about patient not ambulating well at this time and needing more assistance for ADLs. Patient lives alone  and may benefit from discharging to a SNF once medically appropriate to increase maximal independence, reduce burden of care, and ensure safety prior to going home.     Rehab Prognosis:  Fair; patient would benefit from acute skilled OT services to address these deficits and reach maximum level of function.       Plan:     Patient to be seen 3 x/week to address the above listed problems via self-care/home management, therapeutic activities, therapeutic exercises  · Plan of Care Expires:  4/9/2020  · Plan of Care Reviewed with: patient(2 sons)    Subjective     Pain/Comfort:  · Pain Rating 1: 0/10  · Pain Rating Post-Intervention 1: 0/10    Objective:     Communicated with: EVELYN prior to session.  Patient found HOB elevated with telemetry, oxygen upon OT entry to room.    General Precautions: Standard, fall   Orthopedic Precautions:N/A   Braces: N/A     Occupational Performance:     Bed Mobility:    · Patient completed  Scooting/Bridging with stand by assistance  · Patient completed Supine to Sit with stand by assistance     Functional Mobility/Transfers:  · Patient completed Sit <> Stand Transfer with stand by assistance  with  rolling walker   · Patient completed bed>chair with functional ambulation using RW with CGA.     Activities of Daily Living:  · Upper Body Dressing: supervision Donning back gown  · Lower Body Dressing: supervision [place] and donning socks sitting EOB      [facility] 6 Click ADL: 20    Treatment & Education:  Role of OT and POC  Safety  ADL retraining  Functional mobility training  Discharge planning  Importance of OOB activity    Patient was noted to have a lot of anxiety with discharge planning due to living alone and demonstrating decreased weakness, limited mobility, and assistance with ADL tasks. Educated patient that she may benefit from discharging to a SNF. Educated patient on purpose and process of therapy in SNF setting. Patient verbalized understanding.     Patient sat EOB and patient's oxygen sats were 85-87% on 2L O2. MD adjusted O2 to 4L and sats increased 99%. Patient then tolerated O2 on 3L at 89-90%. Nurse notified.    Patient left up in chair with call button in reach, nurse notified, family present and all needs met. Education:       GOALS:   Multidisciplinary Problems     Occupational Therapy Goals        Problem: Occupational Therapy Goal    Goal Priority Disciplines Outcome Interventions   Occupational Therapy Goal     OT, PT/OT Ongoing, Progressing    Description:  Goals to be met by: 7 days 3/13/2020     Patient will increase functional independence with ADLs by performing:    Pt to complete g/h skills sitting with Set-up  Pt to complete LE dressing with SBA Met  Pt to complete UE dressing with set-up Met  Pt to complete toileting with supervision.  Pt to complete t/f to commode with supervision.  Pt to tolerated OOB to chair x 3-4 hours daily to increase endurance for functional  activity.                        Time Tracking:     OT Date of Treatment: 03/09/20  OT Start Time: 1032  OT Stop Time: 1101  OT Total Time (min): 29 min    Billable Minutes:Self Care/Home Management 29    TRACI Cochran  3/9/2020

## 2020-03-09 NOTE — NURSING
Pt AAOx4. VS measured. Pain addressed. Skin assessed. Free from falls. Frequent rounds made for pain and safety. Bed at lowest point, call light within reach with side rails up x2.

## 2020-03-09 NOTE — ASSESSMENT & PLAN NOTE
Resolved  Initally on admission patient with AMS, found to be hypercarbic. Improvement in mental status once oxygenation improved.

## 2020-03-09 NOTE — ASSESSMENT & PLAN NOTE
"Palliative Care Encounter / Goals of care discussion:     Narrative:   Radha Woods is a 74 y.o. female patient with advanced COPD, progressive hypercapnic/hypoxemic respiratory failure, chronic back pain due to compression fractrue.     1: Symptoms:   - pain assesment: mid back, unable to get around   - dyspnea assessment: at times, turns up her oxygen when she gets short winded. Can barely make it to the bathroom   - anxiety assessment: at times   - depression assessment: does feel sad, feels "depressed" about having to go to a nursing home    2: Code Status:   - full code    3: Psychosocial :   - Marital status:   - Children: two sons, one is caregiver and POA, one lives out of state but is involved.   - POA: Hiram You    3: Medicolegal:    - Advance directive: yes, on file    4: Support System:  - Spiritual: yes  - Family: supportive     5: Goals of care Decisions / Recommendations / Plan:     03/09/2020    - met with family at the bedside.    - pt. Feels better this AM, able to enjoy breakfast and actually being hungry   - family inquires about next steps. They are concerned with the pt. Returning to prior home situation as exposure of 2nd hand smoke and  Questions about care provided by pt. Son's family./    - we discussed plan of SNF. Questions regarding choices of facility and length of therapy answered.    - pt. Feels sad and depressed about the fact that she is going to a nursing home. She is concerned about her independence (I have my own schedule).       - pt. Not open to discuss goal of care and advance care planning today. Stepped outside the room with the family. We did discuss the fact that the pt. COPD is advanced and that it may be "end stage". We did agree that the attempt of physical strengthening is reasonable and that a transition of care and possibly hospice after SNF may need to be considered if severe functional limitation and ongoing sx. Persist.    - Daughter in law aware " of services and they will consider.      - hope to discuss advanced care planning with pt. On follow up if she is open.      Advanced Care Planning:   Advance Care Planning            Symptom Management:  - opioids an option for dyspnea and back pain, however with hypercarbia may be challenging. Low dose morphine shown to be safe in this setting though.     Disposition:  -likely SNF    6: Follow up plans:    Daily.     Thank you for your consult. I will follow along with you. Please call [phone] with questions.

## 2020-03-09 NOTE — PLAN OF CARE
Problem: Occupational Therapy Goal  Goal: Occupational Therapy Goal  Description  Goals to be met by: 7 days 3/13/2020     Patient will increase functional independence with ADLs by performing:    Pt to complete g/h skills sitting with Set-up  Pt to complete LE dressing with SBA Met  Pt to complete UE dressing with set-up Met  Pt to complete toileting with supervision.  Pt to complete t/f to commode with supervision.  Pt to tolerated OOB to chair x 3-4 hours daily to increase endurance for functional activity.       Outcome: Ongoing, Progressing   Patient's goals are appropriate.  TRACI Cochran  3/9/2020

## 2020-03-09 NOTE — PROGRESS NOTES
"[facility]  Palliative Medicine  Progress Note    Patient Name: Radha Woods  MRN: [id number]  Admission Date: 3/5/2020  Hospital Length of Stay: 4 days  Code Status: Full Code   Attending Provider: Hiram Cook MD  Consulting Provider: Luis Daniel Augustine MD  Primary Care Physician: Param Riley MD  Principal Problem:COPD exacerbation    Patient information was obtained from patient and past medical records.      Assessment/Plan:     Palliative care encounter  Palliative Care Encounter / Goals of care discussion:     Narrative:   Radha Woods is a 74 y.o. female patient with advanced COPD, progressive hypercapnic/hypoxemic respiratory failure, chronic back pain due to compression fractrue.     1: Symptoms:   - pain assesment: mid back, unable to get around   - dyspnea assessment: at times, turns up her oxygen when she gets short winded. Can barely make it to the bathroom   - anxiety assessment: at times   - depression assessment: does feel sad, feels "depressed" about having to go to a nursing home    2: Code Status:   - full code    3: Psychosocial :   - Marital status:   - Children: two sons, one is caregiver and POA, one lives out of state but is involved.   - POA: Hiram You    3: Medicolegal:    - Advance directive: yes, on file    4: Support System:  - Spiritual: yes  - Family: supportive     5: Goals of care Decisions / Recommendations / Plan:     03/09/2020    - met with family at the bedside.    - pt. Feels better this AM, able to enjoy breakfast and actually being hungry   - family inquires about next steps. They are concerned with the pt. Returning to prior home situation as exposure of 2nd hand smoke and  Questions about care provided by pt. Son's family./    - we discussed plan of SNF. Questions regarding choices of facility and length of therapy answered.    - pt. Feels sad and depressed about the fact that she is going to a nursing home. She is concerned " "about her independence (I have my own schedule).       - pt. Not open to discuss goal of care and advance care planning today. Stepped outside the room with the family. We did discuss the fact that the pt. COPD is advanced and that it may be "end stage". We did agree that the attempt of physical strengthening is reasonable and that a transition of care and possibly hospice after SNF may need to be considered if severe functional limitation and ongoing sx. Persist.    - Daughter in law aware of services and they will consider.      - hope to discuss advanced care planning with pt. On follow up if she is open.      Advanced Care Planning:   Advance Care Planning           Symptom Management:  - opioids an option for dyspnea and back pain, however with hypercarbia may be challenging. Low dose morphine shown to be safe in this setting though.     Disposition:  -likely SNF    6: Follow up plans:    Daily.     Thank you for your consult. I will follow along with you. Please call [phone] with questions.               I will follow-up with patient. Please contact us if you have any additional questions.    Subjective:     Chief Complaint:   Chief Complaint   Patient presents with    Back Pain     fall today x1 hour, history of l3, 4, 5 fracture    Altered Mental Status       HPI:   Radha Woods is a 75 yo female patient with a history of advanced COPD/emphysema, home O2, breast cancer in 2015 (on oral CTX), osteoporosis and compression fracture along with remote back surgery. She lives with her son who cares for her at his home.   Recently she has been having episodes of excessive sleepiness and had been difficult to arouse by the son. She has had times of confusion and has been requiring more oxygen, which she adjusts herself.     She was admitted for hypercapnic respiratory failure, placed on Bipap over night and has improved this morning.     Evaluation showed overall very poor pulmonary function, which " appears to have been progressive over the past months/years. The pt. Has had missed follow ups with pulmonary clinic.     I am being asked to discuss goals of care with the pt. Dr. Escobar with ICU has discussed advanced directive with the pt. And family yesterday and had recommended no mechanical ventilation or CPR.     I met with the pt. And the son at the bedside today. Ms. You is awake and able to participate in our conversation.     Hospital Course:  No notes on file    Interval History: Stepped down from ICU. No new events noted. Pain appears better controlled currently.   Family has gathered at the bedside.     Past Medical History:   Diagnosis Date    Back pain     instrumented fusion L3-L5. Spinal cord stimulator in place. L5-S1 right paracentral disc osteophyte complex resulting in moderate right neural foraminal narrowing.    Breast cancer 05/2015    (status post lumpectomy and chemoradiation, currently on maintenance therapy with FEMARA). Right breast infiltrating ductal CA with DCIS, ER positive, ID/Her2 negative     COPD (chronic obstructive pulmonary disease)     Chronic bronchitis;  (on 3 L at home)     Hyperlipidemia     Hypertension     Osteoporosis 5/21/2019       Past Surgical History:   Procedure Laterality Date    BREAST BIOPSY Right     BREAST LUMPECTOMY Right 2015    IDC & DCIS    CERVICAL FUSION      HYSTERECTOMY      with BSO    LUMBAR FUSION      LYMPH NODE DISSECTION Right     right lumpectomy         Review of patient's allergies indicates:   Allergen Reactions    Adhesive Rash     Use PAPER Tape / TEGADERM    Levaquin [levofloxacin] Diarrhea and Nausea Only    Penicillins Rash       Medications:  Continuous Infusions:  Scheduled Meds:   albuterol-ipratropium  3 mL Nebulization Q4H    aspirin  81 mg Oral Daily    enoxaparin  40 mg Subcutaneous Daily    fluticasone furoate-vilanterol  1 puff Inhalation Daily    lactobacillus acidophilus & bulgar  1 packet Oral BID     letrozole  2.5 mg Oral Daily    lidocaine  3 patch Transdermal Q24H    losartan  12.5 mg Oral Daily    metoprolol succinate  50 mg Oral Daily    pravastatin  40 mg Oral QHS    predniSONE  40 mg Oral Daily    tiotropium  1 capsule Inhalation Daily     PRN Meds:acetaminophen, Dextrose 10% Bolus, Dextrose 10% Bolus, glucagon (human recombinant), glucose, glucose, hydrALAZINE, ondansetron, oxyCODONE-acetaminophen, sodium chloride 0.9%    Family History     Problem Relation (Age of Onset)    Breast cancer Maternal Grandmother (75)        Tobacco Use    Smoking status: Current Every Day Smoker     Packs/day: 1.00     Years: 45.00     Pack years: 45.00     Types: Cigarettes    Smokeless tobacco: Current User    Tobacco comment: admits to attempting to quit multiple times; currently smoking up to 1 packs/day down from 2 packs/day at time of diagnosis   Substance and Sexual Activity    Alcohol use: Yes     Comment: occasionaly    Drug use: No    Sexual activity: Not Currently       Review of Systems   Constitutional: Positive for activity change, fatigue and unexpected weight change.   Respiratory: Negative for chest tightness and shortness of breath.    Cardiovascular: Negative for chest pain.   Gastrointestinal: Negative for abdominal distention.   Genitourinary: Negative for difficulty urinating.   Musculoskeletal: Positive for back pain.   Neurological: Negative for headaches.     Objective:     Vital Signs (Most Recent):  Temp: 97.3 °F (36.3 °C) (03/09/20 1152)  Pulse: 86 (03/09/20 1217)  Resp: 18 (03/09/20 1217)  BP: (!) 146/74 (03/09/20 1152)  SpO2: 98 % (03/09/20 1217) Vital Signs (24h Range):  Temp:  [97.3 °F (36.3 °C)-99.8 °F (37.7 °C)] 97.3 °F (36.3 °C)  Pulse:  [] 86  Resp:  [14-20] 18  SpO2:  [90 %-99 %] 98 %  BP: (128-190)/(65-82) 146/74     Weight: 60.1 kg (132 lb 7.9 oz)  Body mass index is 20.75 kg/m².    Review of Symptoms  Symptom Assessment (ESAS 0-10 scale)   ESAS 0 1 2 3 4 5 6 7 8  9 10   Pain              Dyspnea              Anxiety              Nausea              Depression               Anorexia              Fatigue              Insomnia              Restlessness               Agitation              CAM / Delirium __ --  ___+   Constipation     __ --  ___+   Diarrhea           __ --  ___+  Bowel Management Plan (BMP): Yes    Comments:     Pain Assessment: back pain    OME in 24 hours:     Performance Status: 60    ECOG Performance Status Grade: 3 - Confined to bed or chair 50% of waking hours    Physical Exam   Constitutional: She appears well-developed.   Thin female   HENT:   Head: Normocephalic.   Neck: No JVD present.   Cardiovascular: Normal rate.   No murmur heard.  Pulmonary/Chest:   Poor air movement. No wheezing.    Abdominal: Soft. She exhibits no distension.   Musculoskeletal: Normal range of motion. She exhibits no edema.   Neurological: She is alert.   A bit confused at times       Significant Labs: All pertinent labs within the past 24 hours have been reviewed.  CBC:   Recent Labs   Lab 03/09/20  0644   WBC 6.94   HGB 11.3*   HCT 37.7   MCV 99*        BMP:  Recent Labs   Lab 03/09/20  0644   GLU 81      K 3.6   CL 96   CO2 36*   BUN 11   CREATININE 0.6   CALCIUM 9.4   MG 1.9     LFT:  Lab Results   Component Value Date    AST 10 03/05/2020    ALKPHOS 68 03/05/2020    BILITOT 0.4 03/05/2020     Albumin:   Albumin   Date Value Ref Range Status   03/05/2020 3.6 3.5 - 5.2 g/dL Final     Protein:   Total Protein   Date Value Ref Range Status   03/05/2020 6.8 6.0 - 8.4 g/dL Final     Lactic acid:   Lab Results   Component Value Date    LACTATE 0.7 03/05/2020    LACTATE 1.0 03/27/2019       Significant Imaging: I have reviewed all pertinent imaging results/findings within the past 24 hours.    Advance Care Planning   Advanced Directives::  Living Will: Yes. Copy on chart: Yes  LaPOST: No  Do Not Resuscitate Status: No  Medical Power of : Yes, grace claudio is  POA    Decision-Making Capacity: Patient answered questions, Family answered questions       Living Arrangements: Lives with family    Psychosocial/Cultural:  Patient's most important priorities:  Getting back pain better, getting lung better so she can spend time with her grand children and go to see a concert etc.     Patient's biggest concerns/fears:  Not getting back pain better, loosing independence.     Previous death/end of life care history:       Patient's goals/hopes:  Getting better and going home    Spiritual:     F- Lilly and Belief: spiritual     I - Importance:   .  C - Community:     A - Address in Care:       > 50% of 35 min visit spent in chart review, face to face discussion of goals of care,  symptom assessment, coordination of care and emotional support.    Luis Daniel Augustine MD  Palliative Medicine  [facility]

## 2020-03-09 NOTE — PLAN OF CARE
NURSING HOME ORDERS    03/09/2020  [facility]  [address]  [place] [address]  Dept: [phone]  Loc: [phone]     Admit to Nursing Home:      Diagnoses:  Active Hospital Problems    Diagnosis  POA    *COPD exacerbation [J44.1]  Yes    Discharge planning issues [Z02.9]  Not Applicable    Chronic low back pain [M54.5, G89.29]  Yes    Other specified anemias [D64.89]  Unknown    Acute cystitis without hematuria [N30.00]  Yes    Hypertension [I10]  Unknown    Hyperlipidemia [E78.5]  Unknown    Acute on chronic respiratory failure with hypoxia and hypercapnia [J96.21, J96.22]  Yes    Metabolic encephalopathy [G93.41]  Yes    Palliative care encounter [Z51.5]  Not Applicable      Resolved Hospital Problems   No resolved problems to display.       Patient is homebound due to:  COPD exacerbation    Allergies:  Review of patient's allergies indicates:   Allergen Reactions    Adhesive Rash     Use PAPER Tape / TEGADERM    Levaquin [levofloxacin] Diarrhea and Nausea Only    Penicillins Rash       Vitals:  Routine    Diet: regular diet    Activities:   Activity as tolerated      Nursing Precautions:  Fall and Pressure ulcer prevention    Consults:   PT to evaluate and treat- 3 times a week and OT to evaluate and treat- 3 times a week                     Current Discharge Medication List      START taking these medications    Details   losartan (COZAAR) 25 MG tablet Take 0.5 tablets (12.5 mg total) by mouth once daily.  Qty: 15 tablet, Refills: 3      predniSONE (DELTASONE) 20 MG tablet Take 2 tablets (40 mg total) by mouth once daily. for 2 days  Qty: 4 tablet, Refills: 0         CONTINUE these medications which have NOT CHANGED    Details   albuterol 90 mcg/actuation inhaler Inhale 2 puffs into the lungs every 6 (six) hours as needed for Wheezing.      alendronate (FOSAMAX) 70 MG tablet Take 1 tablet (70 mg total) by mouth every 7 days.  Qty:  4 tablet, Refills: 11    Associated Diagnoses: Osteoporosis, unspecified osteoporosis type, unspecified pathological fracture presence      ascorbic acid, vitamin C, (VITAMIN C) 1000 MG tablet Take 1,000 mg by mouth once daily.      aspirin (ECOTRIN) 81 MG EC tablet Take 81 mg by mouth once daily.      augmented betamethasone dipropionate (DIPROLENE-AF) 0.05 % cream       azelastine 0.15 % (205.5 mcg) Spry U 2 SPRAYS IEN 1X D      calcium carbonate (OS-JENNI) 500 mg calcium (1,250 mg) chewable tablet Take 2 tablets by mouth once daily.       cetirizine (ZYRTEC) 10 MG tablet Take 10 mg by mouth every evening.       cholecalciferol, vitamin D3, (VITAMIN D3) 2,000 unit Cap Take 1,000 Units by mouth once daily.       cyclobenzaprine (FLEXERIL) 10 MG tablet Take 10 mg by mouth 3 (three) times daily as needed for Muscle spasms.      fish oil-omega-3 fatty acids 300-1,000 mg capsule Take by mouth once daily.      garlic 1,000 mg Cap Take 1,000 mg by mouth.      ibandronate (BONIVA) 150 mg tablet Take 1 tablet (150 mg total) by mouth every 30 days.  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  Qty: 12 tablet, Refills: 0      pravastatin (PRAVACHOL) 40 MG tablet Take 40 mg by mouth every evening.       tiotropium-olodaterol (STIOLTO RESPIMAT) 2.5-2.5 mcg/actuation Mist Inhale into the lungs once daily.       topiramate (TOPAMAX) 50 MG tablet Take 50 mg by mouth every evening.       varenicline (CHANTIX SABINE) 0.5 mg (11)- 1 mg (42) tablet Take by mouth. Take one 0.5mg tab by mouth once daily X3 days,then increase to one 0.5mg tab twice daily X4 days,then increase to one 1mg tab twice daily       vitamin E 400 UNIT capsule Take 400 Units by mouth once daily.         STOP taking these medications       albuterol-ipratropium (DUO-NEB) 2.5 mg-0.5 mg/3 mL nebulizer solution Comments:   Reason for Stopping:         metoprolol succinate (TOPROL-XL) 50 MG 24 hr tablet Comments:   Reason for Stopping:         metoprolol tartrate (LOPRESSOR) 50 MG tablet Comments:   Reason for Stopping:                   _________________________________  Alis Figueroa MD  03/09/2020

## 2020-03-09 NOTE — PROGRESS NOTES
[facility] Hospital Medicine  Progress Note    Patient Name: Radha Woods  MRN: [id number]  Patient Class: IP- Inpatient   Admission Date: 3/5/2020  Length of Stay: 4 days  Attending Physician: Hiram Cook MD  Primary Care Provider: Param Riley MD    [facility] Medicine Team: [facility] HOSP MED 4 Alis Figueroa MD    Subjective:     Principal Problem:COPD exacerbation        HPI:  74 year old female with history of severe COPD (FEV1 of 33% and 1.5-2L at home), breast cacner s/p lumpectomy and chemo on FEMARA maintenance, presented as an admission to ICU 3/5 with confusion, tremors. Denies any recent fevers/ chills/cough/sick contacts/nausea. Infectious workup only revealing of dirty UA - started ceftriaxone, although she only reports urinary frequency, has been incontinent of urine for some months. She states that she has been treated for her COPD for some time, takes her rescue inhaler and ICS/LAMA religiously. In the ICU she was noted to have a compensated respiratory alkalosis with CO2 of 89, treated with bipap, ICS/LAMA. Patient with interval improvement on BiPAP. Also assessed by pallative care for goals of care/symptom treatment. She remains a full code.    Step down to hospital medicine 4    Overview/Hospital Course:  74 year old female with history of severe COPD (FEV1 of 33% and 1.5-2L at home), breast cacner s/p lumpectomy and chemo on FEMARA maintenance, presented as an admission to ICU 3/5 with COPD exacerbation. Treated with bipap and stepped down to hospital medicine - continuing with duonebs, ICS/LAMA and ceftriaxone (finishing course). Started on short course Prednisone 40 mg x5 days. Oxygen weaning down as tolerated, now at 2L. Working with PT/OT, likely discharge to SNF in coming days.     Interval History: Doing well this AM. No complaints other than her chronic back pain. Feels her SOB is improving, no longer with cough. Down to 2L nasal canula which is around her baseline  level at home. Working with PT/OT today.    Review of Systems   Constitutional: Negative for chills, diaphoresis and fever.   HENT: Negative for congestion and nosebleeds.    Respiratory: Negative for cough, chest tightness, shortness of breath and wheezing.    Cardiovascular: Negative for chest pain, palpitations and leg swelling.   Gastrointestinal: Negative for abdominal pain, nausea and vomiting.   Genitourinary: Negative for dysuria.   Musculoskeletal: Positive for back pain.   Skin: Negative for rash.   Neurological: Positive for weakness. Negative for dizziness and light-headedness.   Psychiatric/Behavioral: Negative for confusion. The patient is not nervous/anxious.      Objective:     Vital Signs (Most Recent):  Temp: 97.9 °F (36.6 °C) (03/09/20 0740)  Pulse: 93 (03/09/20 1100)  Resp: 18 (03/09/20 0959)  BP: (!) 167/77 (03/09/20 0740)  SpO2: 97 % (03/09/20 0740) Vital Signs (24h Range):  Temp:  [97.9 °F (36.6 °C)-99.8 °F (37.7 °C)] 97.9 °F (36.6 °C)  Pulse:  [67-93] 93  Resp:  [14-20] 18  SpO2:  [90 %-99 %] 97 %  BP: (128-190)/(65-82) 167/77     Weight: 60.1 kg (132 lb 7.9 oz)  Body mass index is 20.75 kg/m².  No intake or output data in the 24 hours ending 03/09/20 1145   Physical Exam   Constitutional: She is oriented to person, place, and time. She appears well-developed and well-nourished. No distress.   HENT:   Head: Normocephalic and atraumatic.   Mouth/Throat: No oropharyngeal exudate.   Nasal cannula in place   Eyes: Conjunctivae and EOM are normal. No scleral icterus.   Neck: Normal range of motion. Neck supple. No JVD present.   Cardiovascular: Normal rate and regular rhythm.   No murmur heard.  Pulmonary/Chest: Effort normal. No stridor. No respiratory distress. She has no wheezes. She has rales (diffuse, mild).   Abdominal: Soft. She exhibits no distension. There is no tenderness.   Musculoskeletal: Normal range of motion. She exhibits no edema or tenderness.   Lymphadenopathy:     She has no  cervical adenopathy.   Neurological: She is alert and oriented to person, place, and time.   Skin: Skin is warm and dry. She is not diaphoretic. No erythema.   Psychiatric: She has a normal mood and affect.   Nursing note and vitals reviewed.      Significant Labs:   ABGs:   Recent Labs   Lab 03/07/20  1110   PH 7.381   PCO2 74.8*   HCO3 44.4*   POCSATURATED 92*   BE 19     Blood Culture: No results for input(s): LABBLOO in the last 48 hours.  CBC:   Recent Labs   Lab 03/08/20  0518 03/09/20  0644   WBC 7.85 6.94   HGB 11.8* 11.3*   HCT 39.9 37.7    195     CMP:   Recent Labs   Lab 03/08/20  0518 03/09/20  0644    141   K 4.9 3.6   CL 98 96   CO2 36* 36*   GLU 77 81   BUN 11 11   CREATININE 0.6 0.6   CALCIUM 9.8 9.4   ANIONGAP 9 9   EGFRNONAA >60.0 >60.0     Respiratory Culture: No results for input(s): GSRESP, RESPIRATORYC in the last 48 hours.        Assessment/Plan:      * COPD exacerbation  See hypercarbic hypoxic respiratory failure      Hyperlipidemia  Continue home pravastatin 40 mg      Hypertension  Historical diagnosis  Home medications: metoprolol succinate 50 mg, unclear why, pt reports no cardiac etiology    Adding losartan 12.5 mg daily to help due to elevated BP    Acute cystitis without hematuria  UA in ICU with multiple organisms, altered mental status on admission, however patient now not complaining of dysuria    Finished course of ceftriaxone for UTI      Chronic low back pain  Patient with chronic back pain in setting of previous hx of breast cancer and compression fracture. Takes   Percocet 5-325 q4, flexeril 10 mg tid    Plan  Pain control with acetomenophen 650  Assess pain daily    Palliative care encounter  Appreciate pallative care help and recs      Metabolic encephalopathy  Resolved  Initally on admission patient with AMS, found to be hypercarbic. Improvement in mental status once oxygenation improved.       Acute on chronic respiratory failure with hypoxia and  hypercapnia  74 year old with severe COPD presents with AMS and found to be in COPD exacerbation. Treated with bipap in ICU with interval improvement in mental status and respiratory status. Initial pCO2 100s, currently on 2 liters, with most recent CO2 74    Plan  Continue ICS/LAMA  Continue duonebs  Nightly bipap  Started 5 day course of pred 40 for COPD   Keep O2 sats 88-92, wean o2 as tolerated  Likely d/c to SNF tomorrow         VTE Risk Mitigation (From admission, onward)         Ordered     enoxaparin injection 40 mg  Daily      03/05/20 6205                      Alis Figueroa MD  Department of Hospital Medicine   [facility]

## 2020-03-09 NOTE — ASSESSMENT & PLAN NOTE
Historical diagnosis  Home medications: metoprolol succinate 50 mg, unclear why, pt reports no cardiac etiology    Adding losartan 12.5 mg daily to help due to elevated BP

## 2020-03-09 NOTE — PT/OT/SLP PROGRESS
Physical Therapy Treatment    Patient Name:  Radha Woods   MRN:  [id number]    Recommendations:     Discharge Recommendations:  nursing facility, skilled   Discharge Equipment Recommendations: (TBD)   Barriers to discharge: Decreased caregiver support and decreased functional mobility requiring increased assist      Assessment:     Radha Woods is a 74 y.o. female admitted with a medical diagnosis of COPD exacerbation.  She presents with the following impairments/functional limitations:  weakness, impaired functional mobilty, impaired cardiopulmonary response to activity, impaired endurance, gait instability, impaired balance, impaired self care skills. Pt tolerated session fairly on this date with focus on gait training. Pt and family concerned about pt returning home alone d/t current weakness and impairments in activity tolerance. Pt demonstrated heightened anxiety during activity requiring cues for calming and education. Following EOB sitting pt's O2 sats were 85-87% on 2L O2. MD adjusted O2 to 4L and sats increased >95%. Pt tolerated gait training w/RW and CGA on 4L O2 and following seated rest pt's O2 decreased to 3L and sats read 89-90%. Pt demonstrated occasional tremors throughout this session. Due to pt having decreased caregiver support and impaired functional mobility pt would benefit from additional therapy in a SNF setting following discharge. Pt would benefit from continued skilled acute PT 3x/wk to improve functional mobility.  Recommending pt receive PT services in SNF setting following discharge from hospital once medically cleared.     Rehab Prognosis: Fair; patient would benefit from acute skilled PT services to address these deficits and reach maximum level of function.    Recent Surgery: * No surgery found *      Plan:     During this hospitalization, patient to be seen 3 x/week to address the identified rehab impairments via gait training, therapeutic activities, therapeutic exercises,  neuromuscular re-education and progress toward the following goals:    · Plan of Care Expires:  20    Subjective     Chief Complaint: ALFARO  Pain/Comfort:  · Pain Rating 1: 0/10      Objective:     Communicated with NSG prior to session.  Patient found HOB elevated with telemetry, oxygen upon PT entry to room.     General Precautions: Standard, fall   Orthopedic Precautions:N/A   Braces: N/A     Functional Mobility:  · Bed Mobility:     · Scooting: stand by assistance  · Supine to Sit: stand by assistance  · Transfers:     · Sit to Stand:  stand by assistance with rolling walker  · Gait: 16ft CGA w/RW on O2 demonstrating decreased mario alberto and occasional tremors   · Balance: Sitting: SBA     Standing: CGA      AM-PAC 6 CLICK MOBILITY  Turning over in bed (including adjusting bedclothes, sheets and blankets)?: 3  Sitting down on and standing up from a chair with arms (e.g., wheelchair, bedside commode, etc.): 3  Moving from lying on back to sitting on the side of the bed?: 3  Moving to and from a bed to a chair (including a wheelchair)?: 3  Need to walk in hospital room?: 3  Climbing 3-5 steps with a railing?: 3  Basic Mobility Total Score: 18       Therapeutic Activities and Exercises:   - Sit-to-Stand: x2 trials SBA w/RW  - Pt educated on:   -PT roles, expectations, and POC    -Safety with mobility   -Benefits of OOB activities to increase strength and functional mobility    -Performing ther ex for increasing LE ROM and strength   -Discharge recommendations     Patient left up in chair with all lines intact and call button in reach..    GOALS:   Multidisciplinary Problems     Physical Therapy Goals        Problem: Physical Therapy Goal    Goal Priority Disciplines Outcome Goal Variances Interventions   Physical Therapy Goal     PT, PT/OT Ongoing, Progressing     Description:  Goals to be met by: 3/20/20    Patient will increase functional independence with mobility by performin. Supine to sit with  Stand-by Assistance -Met 3/9/2020   2. Sit to stand transfer with Stand-by Assistance -not met  3. Gait  x 100 feet with Contact Guard Assistance using Rolling Walker. -not met  4. Lower extremity exercise program x15 reps  with supervision -not met                       Time Tracking:     PT Received On: 03/09/20  PT Start Time: 1032     PT Stop Time: 1102  PT Total Time (min): 30 min     Billable Minutes: Gait Training 30    Treatment Type: Treatment  PT/PTA: PT     PTA Visit Number: 0     Kaden Merchant, PT  03/09/2020

## 2020-03-09 NOTE — PLAN OF CARE
03/09/20 1239   Post-Acute Status   Post-Acute Authorization Placement   Post-Acute Placement Status Referrals Sent   Patient and son requesting snf placement. Spoke with therapists who are recommending snf. Povided list to son and choices were made. Osnf referral sent. Will send other referrals when therapy notes available.

## 2020-03-09 NOTE — SUBJECTIVE & OBJECTIVE
Interval History: Doing well this AM. No complaints other than her chronic back pain. Feels her SOB is improving, no longer with cough. Down to 2L nasal canula which is around her baseline level at home. Working with PT/OT today.    Review of Systems   Constitutional: Negative for chills, diaphoresis and fever.   HENT: Negative for congestion and nosebleeds.    Respiratory: Negative for cough, chest tightness, shortness of breath and wheezing.    Cardiovascular: Negative for chest pain, palpitations and leg swelling.   Gastrointestinal: Negative for abdominal pain, nausea and vomiting.   Genitourinary: Negative for dysuria.   Musculoskeletal: Positive for back pain.   Skin: Negative for rash.   Neurological: Positive for weakness. Negative for dizziness and light-headedness.   Psychiatric/Behavioral: Negative for confusion. The patient is not nervous/anxious.      Objective:     Vital Signs (Most Recent):  Temp: 97.9 °F (36.6 °C) (03/09/20 0740)  Pulse: 93 (03/09/20 1100)  Resp: 18 (03/09/20 0959)  BP: (!) 167/77 (03/09/20 0740)  SpO2: 97 % (03/09/20 0740) Vital Signs (24h Range):  Temp:  [97.9 °F (36.6 °C)-99.8 °F (37.7 °C)] 97.9 °F (36.6 °C)  Pulse:  [67-93] 93  Resp:  [14-20] 18  SpO2:  [90 %-99 %] 97 %  BP: (128-190)/(65-82) 167/77     Weight: 60.1 kg (132 lb 7.9 oz)  Body mass index is 20.75 kg/m².  No intake or output data in the 24 hours ending 03/09/20 1145   Physical Exam   Constitutional: She is oriented to person, place, and time. She appears well-developed and well-nourished. No distress.   HENT:   Head: Normocephalic and atraumatic.   Mouth/Throat: No oropharyngeal exudate.   Nasal cannula in place   Eyes: Conjunctivae and EOM are normal. No scleral icterus.   Neck: Normal range of motion. Neck supple. No JVD present.   Cardiovascular: Normal rate and regular rhythm.   No murmur heard.  Pulmonary/Chest: Effort normal. No stridor. No respiratory distress. She has no wheezes. She has rales (diffuse,  mild).   Abdominal: Soft. She exhibits no distension. There is no tenderness.   Musculoskeletal: Normal range of motion. She exhibits no edema or tenderness.   Lymphadenopathy:     She has no cervical adenopathy.   Neurological: She is alert and oriented to person, place, and time.   Skin: Skin is warm and dry. She is not diaphoretic. No erythema.   Psychiatric: She has a normal mood and affect.   Nursing note and vitals reviewed.      Significant Labs:   ABGs:   Recent Labs   Lab 03/07/20  1110   PH 7.381   PCO2 74.8*   HCO3 44.4*   POCSATURATED 92*   BE 19     Blood Culture: No results for input(s): LABBLOO in the last 48 hours.  CBC:   Recent Labs   Lab 03/08/20  0518 03/09/20  0644   WBC 7.85 6.94   HGB 11.8* 11.3*   HCT 39.9 37.7    195     CMP:   Recent Labs   Lab 03/08/20  0518 03/09/20  0644    141   K 4.9 3.6   CL 98 96   CO2 36* 36*   GLU 77 81   BUN 11 11   CREATININE 0.6 0.6   CALCIUM 9.8 9.4   ANIONGAP 9 9   EGFRNONAA >60.0 >60.0     Respiratory Culture: No results for input(s): GSRESP, RESPIRATORYC in the last 48 hours.

## 2020-03-09 NOTE — PLAN OF CARE
Pt would benefit from continued skilled acute PT services to improve functional mobility. POC is to continue towards current goals set to progress towards PLOF.   Problem: Physical Therapy Goal  Goal: Physical Therapy Goal  Description  Goals to be met by: 3/20/20    Patient will increase functional independence with mobility by performin. Supine to sit with Stand-by Assistance -Met 3/9/2020   2. Sit to stand transfer with Stand-by Assistance -not met  3. Gait  x 100 feet with Contact Guard Assistance using Rolling Walker. -not met  4. Lower extremity exercise program x15 reps  with supervision -not met      Outcome: Ongoing, Progressing

## 2020-03-09 NOTE — ASSESSMENT & PLAN NOTE
74 year old with severe COPD presents with AMS and found to be in COPD exacerbation. Treated with bipap in ICU with interval improvement in mental status and respiratory status. Initial pCO2 100s, currently on 2 liters, with most recent CO2 74    Plan  Continue ICS/LAMA  Continue duonebs  Nightly bipap  Started 5 day course of pred 40 for COPD   Keep O2 sats 88-92, wean o2 as tolerated  Likely d/c to SNF tomorrow

## 2020-03-09 NOTE — PLAN OF CARE
Problem: Fall Injury Risk  Goal: Absence of Fall and Fall-Related Injury  Outcome: Ongoing, Progressing     Problem: Adult Inpatient Plan of Care  Goal: Plan of Care Review  Outcome: Ongoing, Progressing  Goal: Patient-Specific Goal (Individualization)  Outcome: Ongoing, Progressing  Goal: Absence of Hospital-Acquired Illness or Injury  Outcome: Ongoing, Progressing  Goal: Optimal Comfort and Wellbeing  Outcome: Ongoing, Progressing  Goal: Readiness for Transition of Care  Outcome: Ongoing, Progressing  Goal: Rounds/Family Conference  Outcome: Ongoing, Progressing     Problem: Coping Ineffective  Goal: Effective Coping  Outcome: Ongoing, Progressing     Problem: Wound  Goal: Optimal Wound Healing  Outcome: Ongoing, Progressing     Problem: Skin Injury Risk Increased  Goal: Skin Health and Integrity  Outcome: Ongoing, Progressing

## 2020-03-09 NOTE — SUBJECTIVE & OBJECTIVE
Interval History: Stepped down from ICU. No new events noted. Pain appears better controlled currently.   Family has gathered at the bedside.     Past Medical History:   Diagnosis Date    Back pain     instrumented fusion L3-L5. Spinal cord stimulator in place. L5-S1 right paracentral disc osteophyte complex resulting in moderate right neural foraminal narrowing.    Breast cancer 05/2015    (status post lumpectomy and chemoradiation, currently on maintenance therapy with FEMARA). Right breast infiltrating ductal CA with DCIS, ER positive, MD/Her2 negative     COPD (chronic obstructive pulmonary disease)     Chronic bronchitis;  (on 3 L at home)     Hyperlipidemia     Hypertension     Osteoporosis 5/21/2019       Past Surgical History:   Procedure Laterality Date    BREAST BIOPSY Right     BREAST LUMPECTOMY Right 2015    IDC & DCIS    CERVICAL FUSION      HYSTERECTOMY      with BSO    LUMBAR FUSION      LYMPH NODE DISSECTION Right     right lumpectomy         Review of patient's allergies indicates:   Allergen Reactions    Adhesive Rash     Use PAPER Tape / TEGADERM    Levaquin [levofloxacin] Diarrhea and Nausea Only    Penicillins Rash       Medications:  Continuous Infusions:  Scheduled Meds:   albuterol-ipratropium  3 mL Nebulization Q4H    aspirin  81 mg Oral Daily    enoxaparin  40 mg Subcutaneous Daily    fluticasone furoate-vilanterol  1 puff Inhalation Daily    lactobacillus acidophilus & bulgar  1 packet Oral BID    letrozole  2.5 mg Oral Daily    lidocaine  3 patch Transdermal Q24H    losartan  12.5 mg Oral Daily    metoprolol succinate  50 mg Oral Daily    pravastatin  40 mg Oral QHS    predniSONE  40 mg Oral Daily    tiotropium  1 capsule Inhalation Daily     PRN Meds:acetaminophen, Dextrose 10% Bolus, Dextrose 10% Bolus, glucagon (human recombinant), glucose, glucose, hydrALAZINE, ondansetron, oxyCODONE-acetaminophen, sodium chloride 0.9%    Family History     Problem Relation  (Age of Onset)    Breast cancer Maternal Grandmother (75)        Tobacco Use    Smoking status: Current Every Day Smoker     Packs/day: 1.00     Years: 45.00     Pack years: 45.00     Types: Cigarettes    Smokeless tobacco: Current User    Tobacco comment: admits to attempting to quit multiple times; currently smoking up to 1 packs/day down from 2 packs/day at time of diagnosis   Substance and Sexual Activity    Alcohol use: Yes     Comment: occasionaly    Drug use: No    Sexual activity: Not Currently       Review of Systems   Constitutional: Positive for activity change, fatigue and unexpected weight change.   Respiratory: Negative for chest tightness and shortness of breath.    Cardiovascular: Negative for chest pain.   Gastrointestinal: Negative for abdominal distention.   Genitourinary: Negative for difficulty urinating.   Musculoskeletal: Positive for back pain.   Neurological: Negative for headaches.     Objective:     Vital Signs (Most Recent):  Temp: 97.3 °F (36.3 °C) (03/09/20 1152)  Pulse: 86 (03/09/20 1217)  Resp: 18 (03/09/20 1217)  BP: (!) 146/74 (03/09/20 1152)  SpO2: 98 % (03/09/20 1217) Vital Signs (24h Range):  Temp:  [97.3 °F (36.3 °C)-99.8 °F (37.7 °C)] 97.3 °F (36.3 °C)  Pulse:  [] 86  Resp:  [14-20] 18  SpO2:  [90 %-99 %] 98 %  BP: (128-190)/(65-82) 146/74     Weight: 60.1 kg (132 lb 7.9 oz)  Body mass index is 20.75 kg/m².    Review of Symptoms  Symptom Assessment (ESAS 0-10 scale)   ESAS 0 1 2 3 4 5 6 7 8 9 10   Pain              Dyspnea              Anxiety              Nausea              Depression               Anorexia              Fatigue              Insomnia              Restlessness               Agitation              CAM / Delirium __ --  ___+   Constipation     __ --  ___+   Diarrhea           __ --  ___+  Bowel Management Plan (BMP): Yes    Comments:     Pain Assessment: back pain    OME in 24 hours:     Performance Status: 60    ECOG Performance Status Grade: 3 -  Confined to bed or chair 50% of waking hours    Physical Exam   Constitutional: She appears well-developed.   Thin female   HENT:   Head: Normocephalic.   Neck: No JVD present.   Cardiovascular: Normal rate.   No murmur heard.  Pulmonary/Chest:   Poor air movement. No wheezing.    Abdominal: Soft. She exhibits no distension.   Musculoskeletal: Normal range of motion. She exhibits no edema.   Neurological: She is alert.   A bit confused at times       Significant Labs: All pertinent labs within the past 24 hours have been reviewed.  CBC:   Recent Labs   Lab 20  0644   WBC 6.94   HGB 11.3*   HCT 37.7   MCV 99*        BMP:  Recent Labs   Lab 2044   GLU 81      K 3.6   CL 96   CO2 36*   BUN 11   CREATININE 0.6   CALCIUM 9.4   MG 1.9     LFT:  Lab Results   Component Value Date    AST 10 2020    ALKPHOS 68 2020    BILITOT 0.4 2020     Albumin:   Albumin   Date Value Ref Range Status   2020 3.6 3.5 - 5.2 g/dL Final     Protein:   Total Protein   Date Value Ref Range Status   2020 6.8 6.0 - 8.4 g/dL Final     Lactic acid:   Lab Results   Component Value Date    LACTATE 0.7 2020    LACTATE 1.0 2019       Significant Imaging: I have reviewed all pertinent imaging results/findings within the past 24 hours.    Advance Care Planning   Advanced Directives::  Living Will: Yes. Copy on chart: Yes  LaPOST: No  Do Not Resuscitate Status: No  Medical Power of : Yes, son shanon is RADHA    Decision-Making Capacity: Patient answered questions, Family answered questions       Living Arrangements: Lives with family    Psychosocial/Cultural:  Patient's most important priorities:  Getting back pain better, getting lung better so she can spend time with her grand children and go to see a concert etc.     Patient's biggest concerns/fears:  Not getting back pain better, loosing independence.     Previous death/end of life care history:       Patient's  goals/hopes:  Getting better and going home    Spiritual:     F- Lilly and Belief: spiritual     I - Importance:   .  C - Community:     A - Address in Care:

## 2020-03-10 ENCOUNTER — HOSPITAL ENCOUNTER (INPATIENT)
Facility: HOSPITAL | Age: 75
LOS: 13 days | Discharge: HOME HEALTH | DRG: 189 | End: 2020-03-23
Attending: INTERNAL MEDICINE | Admitting: INTERNAL MEDICINE
Payer: MEDICARE

## 2020-03-10 VITALS
HEIGHT: 67 IN | DIASTOLIC BLOOD PRESSURE: 82 MMHG | OXYGEN SATURATION: 95 % | TEMPERATURE: 98 F | HEART RATE: 82 BPM | WEIGHT: 132.5 LBS | RESPIRATION RATE: 16 BRPM | SYSTOLIC BLOOD PRESSURE: 130 MMHG | BODY MASS INDEX: 20.8 KG/M2

## 2020-03-10 DIAGNOSIS — M54.50 CHRONIC LOW BACK PAIN WITHOUT SCIATICA, UNSPECIFIED BACK PAIN LATERALITY: ICD-10-CM

## 2020-03-10 DIAGNOSIS — R41.82 ALTERED MENTAL STATUS: ICD-10-CM

## 2020-03-10 DIAGNOSIS — J96.21 ACUTE ON CHRONIC RESPIRATORY FAILURE WITH HYPOXIA AND HYPERCAPNIA: ICD-10-CM

## 2020-03-10 DIAGNOSIS — E78.5 HYPERLIPIDEMIA, UNSPECIFIED HYPERLIPIDEMIA TYPE: ICD-10-CM

## 2020-03-10 DIAGNOSIS — M80.00XD OSTEOPOROSIS WITH CURRENT PATHOLOGICAL FRACTURE WITH ROUTINE HEALING, UNSPECIFIED OSTEOPOROSIS TYPE, SUBSEQUENT ENCOUNTER: ICD-10-CM

## 2020-03-10 DIAGNOSIS — G93.41 METABOLIC ENCEPHALOPATHY: ICD-10-CM

## 2020-03-10 DIAGNOSIS — G89.29 CHRONIC LOW BACK PAIN WITHOUT SCIATICA, UNSPECIFIED BACK PAIN LATERALITY: ICD-10-CM

## 2020-03-10 DIAGNOSIS — J44.1 COPD EXACERBATION: ICD-10-CM

## 2020-03-10 DIAGNOSIS — E44.0 MODERATE MALNUTRITION: ICD-10-CM

## 2020-03-10 DIAGNOSIS — I10 ESSENTIAL HYPERTENSION: ICD-10-CM

## 2020-03-10 DIAGNOSIS — J96.22 ACUTE ON CHRONIC RESPIRATORY FAILURE WITH HYPOXIA AND HYPERCAPNIA: ICD-10-CM

## 2020-03-10 LAB
ANION GAP SERPL CALC-SCNC: 6 MMOL/L (ref 8–16)
BACTERIA BLD CULT: NORMAL
BACTERIA BLD CULT: NORMAL
BASOPHILS # BLD AUTO: 0.05 K/UL (ref 0–0.2)
BASOPHILS NFR BLD: 0.7 % (ref 0–1.9)
BUN SERPL-MCNC: 10 MG/DL (ref 8–23)
CALCIUM SERPL-MCNC: 9.4 MG/DL (ref 8.7–10.5)
CHLORIDE SERPL-SCNC: 94 MMOL/L (ref 95–110)
CO2 SERPL-SCNC: 40 MMOL/L (ref 23–29)
CREAT SERPL-MCNC: 0.6 MG/DL (ref 0.5–1.4)
DIFFERENTIAL METHOD: ABNORMAL
EOSINOPHIL # BLD AUTO: 0.1 K/UL (ref 0–0.5)
EOSINOPHIL NFR BLD: 1.5 % (ref 0–8)
ERYTHROCYTE [DISTWIDTH] IN BLOOD BY AUTOMATED COUNT: 13 % (ref 11.5–14.5)
EST. GFR  (AFRICAN AMERICAN): >60 ML/MIN/1.73 M^2
EST. GFR  (NON AFRICAN AMERICAN): >60 ML/MIN/1.73 M^2
GLUCOSE SERPL-MCNC: 75 MG/DL (ref 70–110)
HCT VFR BLD AUTO: 37.2 % (ref 37–48.5)
HGB BLD-MCNC: 11.1 G/DL (ref 12–16)
IMM GRANULOCYTES # BLD AUTO: 0.16 K/UL (ref 0–0.04)
IMM GRANULOCYTES NFR BLD AUTO: 2.4 % (ref 0–0.5)
LYMPHOCYTES # BLD AUTO: 1.7 K/UL (ref 1–4.8)
LYMPHOCYTES NFR BLD: 25.1 % (ref 18–48)
MAGNESIUM SERPL-MCNC: 1.8 MG/DL (ref 1.6–2.6)
MCH RBC QN AUTO: 29.6 PG (ref 27–31)
MCHC RBC AUTO-ENTMCNC: 29.8 G/DL (ref 32–36)
MCV RBC AUTO: 99 FL (ref 82–98)
MONOCYTES # BLD AUTO: 0.9 K/UL (ref 0.3–1)
MONOCYTES NFR BLD: 12.9 % (ref 4–15)
NEUTROPHILS # BLD AUTO: 3.9 K/UL (ref 1.8–7.7)
NEUTROPHILS NFR BLD: 57.4 % (ref 38–73)
NRBC BLD-RTO: 0 /100 WBC
PHOSPHATE SERPL-MCNC: 4.1 MG/DL (ref 2.7–4.5)
PLATELET # BLD AUTO: 218 K/UL (ref 150–350)
PMV BLD AUTO: 9.9 FL (ref 9.2–12.9)
POTASSIUM SERPL-SCNC: 3.5 MMOL/L (ref 3.5–5.1)
RBC # BLD AUTO: 3.75 M/UL (ref 4–5.4)
SODIUM SERPL-SCNC: 140 MMOL/L (ref 136–145)
WBC # BLD AUTO: 6.8 K/UL (ref 3.9–12.7)

## 2020-03-10 PROCEDURE — 25000003 PHARM REV CODE 250: Performed by: STUDENT IN AN ORGANIZED HEALTH CARE EDUCATION/TRAINING PROGRAM

## 2020-03-10 PROCEDURE — 84100 ASSAY OF PHOSPHORUS: CPT

## 2020-03-10 PROCEDURE — 94640 AIRWAY INHALATION TREATMENT: CPT

## 2020-03-10 PROCEDURE — 99239 HOSP IP/OBS DSCHRG MGMT >30: CPT | Mod: GC,,, | Performed by: HOSPITALIST

## 2020-03-10 PROCEDURE — 27000221 HC OXYGEN, UP TO 24 HOURS

## 2020-03-10 PROCEDURE — 63600175 PHARM REV CODE 636 W HCPCS: Performed by: INTERNAL MEDICINE

## 2020-03-10 PROCEDURE — 63600175 PHARM REV CODE 636 W HCPCS: Performed by: STUDENT IN AN ORGANIZED HEALTH CARE EDUCATION/TRAINING PROGRAM

## 2020-03-10 PROCEDURE — 25000242 PHARM REV CODE 250 ALT 637 W/ HCPCS: Performed by: STUDENT IN AN ORGANIZED HEALTH CARE EDUCATION/TRAINING PROGRAM

## 2020-03-10 PROCEDURE — 25000242 PHARM REV CODE 250 ALT 637 W/ HCPCS: Performed by: HOSPITALIST

## 2020-03-10 PROCEDURE — 36415 COLL VENOUS BLD VENIPUNCTURE: CPT

## 2020-03-10 PROCEDURE — 85025 COMPLETE CBC W/AUTO DIFF WBC: CPT

## 2020-03-10 PROCEDURE — 11000004 HC SNF PRIVATE

## 2020-03-10 PROCEDURE — 83735 ASSAY OF MAGNESIUM: CPT

## 2020-03-10 PROCEDURE — 99239 PR HOSPITAL DISCHARGE DAY,>30 MIN: ICD-10-PCS | Mod: GC,,, | Performed by: HOSPITALIST

## 2020-03-10 PROCEDURE — 94761 N-INVAS EAR/PLS OXIMETRY MLT: CPT

## 2020-03-10 PROCEDURE — 80048 BASIC METABOLIC PNL TOTAL CA: CPT

## 2020-03-10 PROCEDURE — 25000003 PHARM REV CODE 250: Performed by: HOSPITALIST

## 2020-03-10 PROCEDURE — 99900035 HC TECH TIME PER 15 MIN (STAT)

## 2020-03-10 RX ORDER — AMOXICILLIN 250 MG
1 CAPSULE ORAL 2 TIMES DAILY
Status: DISCONTINUED | OUTPATIENT
Start: 2020-03-10 | End: 2020-03-23 | Stop reason: HOSPADM

## 2020-03-10 RX ORDER — ASPIRIN 81 MG/1
81 TABLET ORAL DAILY
Status: CANCELLED | OUTPATIENT
Start: 2020-03-11

## 2020-03-10 RX ORDER — ASPIRIN 81 MG/1
81 TABLET ORAL DAILY
Status: DISCONTINUED | OUTPATIENT
Start: 2020-03-11 | End: 2020-03-23 | Stop reason: HOSPADM

## 2020-03-10 RX ORDER — PRAVASTATIN SODIUM 20 MG/1
40 TABLET ORAL NIGHTLY
Status: DISCONTINUED | OUTPATIENT
Start: 2020-03-10 | End: 2020-03-23 | Stop reason: HOSPADM

## 2020-03-10 RX ORDER — PREDNISONE 20 MG/1
40 TABLET ORAL DAILY
Status: COMPLETED | OUTPATIENT
Start: 2020-03-11 | End: 2020-03-11

## 2020-03-10 RX ORDER — CALCIUM CARBONATE 200(500)MG
500 TABLET,CHEWABLE ORAL 2 TIMES DAILY PRN
Status: DISCONTINUED | OUTPATIENT
Start: 2020-03-10 | End: 2020-03-10 | Stop reason: HOSPADM

## 2020-03-10 RX ORDER — TIOTROPIUM BROMIDE 18 UG/1
1 CAPSULE ORAL; RESPIRATORY (INHALATION) DAILY
Status: DISCONTINUED | OUTPATIENT
Start: 2020-03-11 | End: 2020-03-23 | Stop reason: HOSPADM

## 2020-03-10 RX ORDER — METOPROLOL TARTRATE 50 MG/1
50 TABLET ORAL NIGHTLY
Qty: 30 TABLET | Refills: 0
Start: 2020-03-10 | End: 2020-03-10 | Stop reason: HOSPADM

## 2020-03-10 RX ORDER — IPRATROPIUM BROMIDE AND ALBUTEROL SULFATE 2.5; .5 MG/3ML; MG/3ML
3 SOLUTION RESPIRATORY (INHALATION)
Status: DISCONTINUED | OUTPATIENT
Start: 2020-03-10 | End: 2020-03-23 | Stop reason: HOSPADM

## 2020-03-10 RX ORDER — AMOXICILLIN 250 MG
1 CAPSULE ORAL 2 TIMES DAILY
Status: CANCELLED | OUTPATIENT
Start: 2020-03-10

## 2020-03-10 RX ORDER — FLUTICASONE FUROATE AND VILANTEROL 100; 25 UG/1; UG/1
1 POWDER RESPIRATORY (INHALATION) DAILY
Status: CANCELLED | OUTPATIENT
Start: 2020-03-11

## 2020-03-10 RX ORDER — IPRATROPIUM BROMIDE AND ALBUTEROL SULFATE 2.5; .5 MG/3ML; MG/3ML
3 SOLUTION RESPIRATORY (INHALATION)
Status: CANCELLED | OUTPATIENT
Start: 2020-03-10

## 2020-03-10 RX ORDER — TALC
6 POWDER (GRAM) TOPICAL NIGHTLY PRN
Status: DISCONTINUED | OUTPATIENT
Start: 2020-03-10 | End: 2020-03-23 | Stop reason: HOSPADM

## 2020-03-10 RX ORDER — CALCIUM CARBONATE 200(500)MG
500 TABLET,CHEWABLE ORAL 2 TIMES DAILY PRN
Status: DISCONTINUED | OUTPATIENT
Start: 2020-03-10 | End: 2020-03-23 | Stop reason: HOSPADM

## 2020-03-10 RX ORDER — ACETAMINOPHEN 325 MG/1
650 TABLET ORAL EVERY 4 HOURS PRN
Status: CANCELLED | OUTPATIENT
Start: 2020-03-10

## 2020-03-10 RX ORDER — LOSARTAN POTASSIUM 25 MG/1
12.5 TABLET ORAL DAILY
Qty: 15 TABLET | Refills: 3 | Status: ON HOLD | OUTPATIENT
Start: 2020-03-11 | End: 2024-01-23 | Stop reason: HOSPADM

## 2020-03-10 RX ORDER — CALCIUM CARBONATE 200(500)MG
500 TABLET,CHEWABLE ORAL 2 TIMES DAILY PRN
Status: CANCELLED | OUTPATIENT
Start: 2020-03-10

## 2020-03-10 RX ORDER — ONDANSETRON 8 MG/1
8 TABLET, ORALLY DISINTEGRATING ORAL EVERY 8 HOURS PRN
Status: CANCELLED | OUTPATIENT
Start: 2020-03-10

## 2020-03-10 RX ORDER — TALC
6 POWDER (GRAM) TOPICAL NIGHTLY PRN
Status: CANCELLED | OUTPATIENT
Start: 2020-03-10

## 2020-03-10 RX ORDER — TIOTROPIUM BROMIDE 18 UG/1
1 CAPSULE ORAL; RESPIRATORY (INHALATION) DAILY
Status: CANCELLED | OUTPATIENT
Start: 2020-03-11

## 2020-03-10 RX ORDER — ONDANSETRON 8 MG/1
8 TABLET, ORALLY DISINTEGRATING ORAL EVERY 8 HOURS PRN
Status: DISCONTINUED | OUTPATIENT
Start: 2020-03-10 | End: 2020-03-23 | Stop reason: HOSPADM

## 2020-03-10 RX ORDER — PREDNISONE 10 MG/1
40 TABLET ORAL DAILY
Status: CANCELLED | OUTPATIENT
Start: 2020-03-11 | End: 2020-03-11

## 2020-03-10 RX ORDER — IPRATROPIUM BROMIDE AND ALBUTEROL SULFATE 2.5; .5 MG/3ML; MG/3ML
3 SOLUTION RESPIRATORY (INHALATION) EVERY 4 HOURS PRN
Status: CANCELLED | OUTPATIENT
Start: 2020-03-10

## 2020-03-10 RX ORDER — ACETAMINOPHEN 325 MG/1
650 TABLET ORAL EVERY 4 HOURS PRN
Status: DISCONTINUED | OUTPATIENT
Start: 2020-03-10 | End: 2020-03-23 | Stop reason: HOSPADM

## 2020-03-10 RX ORDER — FLUTICASONE FUROATE AND VILANTEROL 100; 25 UG/1; UG/1
1 POWDER RESPIRATORY (INHALATION) DAILY
Status: DISCONTINUED | OUTPATIENT
Start: 2020-03-11 | End: 2020-03-23 | Stop reason: HOSPADM

## 2020-03-10 RX ORDER — OXYCODONE AND ACETAMINOPHEN 5; 325 MG/1; MG/1
1 TABLET ORAL 2 TIMES DAILY PRN
Status: DISCONTINUED | OUTPATIENT
Start: 2020-03-10 | End: 2020-03-15

## 2020-03-10 RX ORDER — IPRATROPIUM BROMIDE AND ALBUTEROL SULFATE 2.5; .5 MG/3ML; MG/3ML
3 SOLUTION RESPIRATORY (INHALATION) EVERY 4 HOURS PRN
Status: DISCONTINUED | OUTPATIENT
Start: 2020-03-10 | End: 2020-03-23 | Stop reason: HOSPADM

## 2020-03-10 RX ORDER — PRAVASTATIN SODIUM 40 MG/1
40 TABLET ORAL NIGHTLY
Status: CANCELLED | OUTPATIENT
Start: 2020-03-10

## 2020-03-10 RX ADMIN — OXYCODONE HYDROCHLORIDE AND ACETAMINOPHEN 1 TABLET: 5; 325 TABLET ORAL at 08:03

## 2020-03-10 RX ADMIN — METOPROLOL SUCCINATE 50 MG: 50 TABLET, EXTENDED RELEASE ORAL at 08:03

## 2020-03-10 RX ADMIN — ENOXAPARIN SODIUM 40 MG: 100 INJECTION SUBCUTANEOUS at 04:03

## 2020-03-10 RX ADMIN — IPRATROPIUM BROMIDE AND ALBUTEROL SULFATE 3 ML: .5; 3 SOLUTION RESPIRATORY (INHALATION) at 08:03

## 2020-03-10 RX ADMIN — ACETAMINOPHEN 650 MG: 325 TABLET ORAL at 09:03

## 2020-03-10 RX ADMIN — MICONAZOLE NITRATE: 20 OINTMENT TOPICAL at 01:03

## 2020-03-10 RX ADMIN — LETROZOLE 2.5 MG: 2.5 TABLET ORAL at 08:03

## 2020-03-10 RX ADMIN — ASPIRIN 81 MG: 81 TABLET, COATED ORAL at 08:03

## 2020-03-10 RX ADMIN — PREDNISONE 40 MG: 10 TABLET ORAL at 08:03

## 2020-03-10 RX ADMIN — IPRATROPIUM BROMIDE AND ALBUTEROL SULFATE 3 ML: .5; 3 SOLUTION RESPIRATORY (INHALATION) at 01:03

## 2020-03-10 RX ADMIN — FLUTICASONE FUROATE AND VILANTEROL TRIFENATATE 1 PUFF: 100; 25 POWDER RESPIRATORY (INHALATION) at 08:03

## 2020-03-10 RX ADMIN — Medication 6 MG: at 09:03

## 2020-03-10 RX ADMIN — LACTOBACILLUS ACIDOPHILUS / LACTOBACILLUS BULGARICUS 1 EACH: 100 MILLION CFU STRENGTH GRANULES at 08:03

## 2020-03-10 RX ADMIN — TIOTROPIUM BROMIDE 18 MCG: 18 CAPSULE ORAL; RESPIRATORY (INHALATION) at 08:03

## 2020-03-10 RX ADMIN — PRAVASTATIN SODIUM 40 MG: 20 TABLET ORAL at 09:03

## 2020-03-10 RX ADMIN — LOSARTAN POTASSIUM 12.5 MG: 25 TABLET, FILM COATED ORAL at 08:03

## 2020-03-10 RX ADMIN — IPRATROPIUM BROMIDE AND ALBUTEROL SULFATE 3 ML: 2.5; .5 SOLUTION RESPIRATORY (INHALATION) at 10:03

## 2020-03-10 NOTE — CONSULTS
Wound consult received on patient. Moisture related skin breakdown noted to patient's buttocks/ gluteal cleft, macerated pink tissue noted, small area of partial thickness skin loss noted with moist red wound bed. Patient reports she is incontinent and skin breakdown itches.  Recommend applying clear barrier cream with miconazole BID/prn.  Dr. Beatriz Webb with IM 4 approved recommendations.  Nursing to continue care. Wound care to follow prn.          7cm x 2.5cm x 0.1cm

## 2020-03-10 NOTE — ASSESSMENT & PLAN NOTE
"Home medications: metoprolol succinate 50 mg, unclear why, pt reports no cardiac etiology    -Added low  losartan 12.5 mg daily on 3/10 to help due to elevated BP, will continue on discharge to SNF  -Discontinuing Metoprolol as pt states she was placed on it "years ago" and unsure why, denies cardiac hx. Also in setting of recent falls as it may be contributing to falling  "

## 2020-03-10 NOTE — DISCHARGE SUMMARY
[facility] Hospital Medicine  Discharge Summary      Patient Name: Radha Woods  MRN: [id number]  Admission Date: 3/5/2020  Hospital Length of Stay: 5 days  Discharge Date and Time:  03/10/2020 5:35 PM  Attending Physician: Hiram Cook MD   Discharging Provider: Alis Figueroa MD  Primary Care Provider: Param Riley MD  Hospital Medicine Team: [facility] MED 4 Alis Figueroa MD    HPI:   74 year old female with history of severe COPD (FEV1 of 33% and 1.5-2L at home), breast cacner s/p lumpectomy and chemo on FEMARA maintenance, presented as an admission to ICU 3/5 with confusion, tremors. Denies any recent fevers/ chills/cough/sick contacts/nausea. Infectious workup only revealing of dirty UA - started ceftriaxone, although she only reports urinary frequency, has been incontinent of urine for some months. She states that she has been treated for her COPD for some time, takes her rescue inhaler and ICS/LAMA religiously. In the ICU she was noted to have a compensated respiratory alkalosis with CO2 of 89, treated with bipap, ICS/LAMA. Patient with interval improvement on BiPAP. Also assessed by pallative care for goals of care/symptom treatment. She remains a full code.    Step down to hospital medicine 4    * No surgery found *      Hospital Course:   74 year old female with history of severe COPD (FEV1 of 33% and 1.5-2L at home), breast cacner s/p lumpectomy and chemo on FEMARA maintenance, presented as an admission to ICU 3/5 with COPD exacerbation. Treated with bipap and stepped down to hospital medicine - continuing with duonebs, ICS/LAMA and ceftriaxone (finished course). Started on Prednisone 40 mg x5 days with improvement in symptoms. Oxygen weaning down as tolerated, now at 2L. Started low dose of Losartan 12.5 mg daily in the hospital for elevated BP, will continue on discharge. Pt was on Metoprolol at home but was unsure why she was taking it, denies hx of cardiac dysfunction.  "Given recent falls at home, will discontinue as no current indication for BB. Working with PT/OT, will discharge to SNF.      Consults:   Consults (From admission, onward)        Status Ordering Provider     Inpatient consult to Critical Care Medicine  Once     Provider:  (Not yet assigned)    Completed AFSHAN SIEGEL     Inpatient consult to Palliative Care  Once     Provider:  (Not yet assigned)    Completed DESIRAE GLEASON     Inpatient consult to SN[facility]  Once     Provider:  (Not yet assigned)    Acknowledged CRISTIAN MCGHEE consult to case management  Once     Provider:  (Not yet assigned)    Acknowledged KRISTAL CHEN          * COPD exacerbation  See hypercarbic hypoxic respiratory failure      Hyperlipidemia  Continue home pravastatin 40 mg      Essential hypertension  Home medications: metoprolol succinate 50 mg, unclear why, pt reports no cardiac etiology    -Added low  losartan 12.5 mg daily on 3/10 to help due to elevated BP, will continue on discharge to SNF  -Discontinuing Metoprolol as pt states she was placed on it "years ago" and unsure why, denies cardiac hx. Also in setting of recent falls as it may be contributing to falling    Acute cystitis without hematuria  UA in ICU with multiple organisms, altered mental status on admission, however patient now not complaining of dysuria    Finished course of ceftriaxone for UTI      Chronic low back pain  Patient with chronic back pain in setting of previous hx of breast cancer and compression fracture. Takes Percocet 5-325 q4, flexeril 10 mg tid    Plan  Pain control with acetomenophen 650      Metabolic encephalopathy  Resolved  Initally on admission patient with AMS, found to be hypercarbic. Improvement in mental status once oxygenation improved.       Acute on chronic respiratory failure with hypoxia and hypercapnia  74 year old with severe COPD presents with AMS and found to be in COPD exacerbation. Treated with bipap in ICU " with interval improvement in mental status and respiratory status. Initial pCO2 100s, currently on 2 liters, last CO2 74 on 3/8/20    Plan  Continue ICS/LAMA  Continue duonebs at SNF  Started 5 day course of pred 40 for COPD (end date of 3/10/20)    D/c to SNF today          Final Active Diagnoses:    Diagnosis Date Noted POA    PRINCIPAL PROBLEM:  COPD exacerbation [J44.1] 03/05/2020 Yes    Chronic low back pain [M54.5, G89.29] 03/07/2020 Yes    Other specified anemias [D64.89] 03/07/2020 Yes    Acute cystitis without hematuria [N30.00] 03/07/2020 Yes    Essential hypertension [I10] 03/07/2020 Yes    Hyperlipidemia [E78.5] 03/07/2020 Yes    Acute on chronic respiratory failure with hypoxia and hypercapnia [J96.21, J96.22] 03/05/2020 Yes    Metabolic encephalopathy [G93.41] 03/05/2020 Yes      Problems Resolved During this Admission:    Diagnosis Date Noted Date Resolved POA    Discharge planning issues [Z02.9] 03/08/2020 03/09/2020 Not Applicable    Palliative care encounter [Z51.5]  03/10/2020 Not Applicable       Discharged Condition: stable    Disposition: Skilled Nursing Facility    Follow Up:  Follow-up Information     [facility]    Specialty:  SNF Agency  Contact information:  [address], [address]  [place] [address]  [phone]                 Patient Instructions:      Ambulatory referral/consult to Pulmonology   Standing Status: Future   Referral Priority: Routine Referral Type: Consultation   Referral Reason: Specialty Services Required   Requested Specialty: Pulmonary Disease   Number of Visits Requested: 1     Ambulatory referral/consult to Palliative Care   Standing Status: Future   Referral Priority: Routine Referral Type: Consultation   Requested Specialty: Hospice and Palliative Medicine   Number of Visits Requested: 1     Ambulatory referral/consult to Pulmonary Rehab   Standing Status: Future   Referral Priority: Routine Referral Type:  Consultation   Referral Reason: Specialty Services Required   Requested Specialty: Pulmonary Disease   Number of Visits Requested: 1       Significant Diagnostic Studies: Labs:   BMP:   Recent Labs   Lab 03/09/20  0644 03/10/20  0615   GLU 81 75    140   K 3.6 3.5   CL 96 94*   CO2 36* 40*   BUN 11 10   CREATININE 0.6 0.6   CALCIUM 9.4 9.4   MG 1.9 1.8   , CMP   Recent Labs   Lab 03/09/20  0644 03/10/20  0615    140   K 3.6 3.5   CL 96 94*   CO2 36* 40*   GLU 81 75   BUN 11 10   CREATININE 0.6 0.6   CALCIUM 9.4 9.4   ANIONGAP 9 6*   ESTGFRAFRICA >60.0 >60.0   EGFRNONAA >60.0 >60.0    and CBC   Recent Labs   Lab 03/09/20  0644 03/10/20  0615   WBC 6.94 6.80   HGB 11.3* 11.1*   HCT 37.7 37.2    218       Pending Diagnostic Studies:     None         Medications:  Transfer Medications (for Discharge Readmit only):   Current Facility-Administered Medications   Medication Dose Route Frequency Provider Last Rate Last Dose    acetaminophen tablet 650 mg  650 mg Oral Q4H PRN Marko Coello MD   650 mg at 03/07/20 0356    albuterol-ipratropium 2.5 mg-0.5 mg/3 mL nebulizer solution 3 mL  3 mL Nebulization Q6H WAKE Hiram Cook MD   3 mL at 03/10/20 1347    albuterol-ipratropium 2.5 mg-0.5 mg/3 mL nebulizer solution 3 mL  3 mL Nebulization Q4H PRN Hiram Cook MD   3 mL at 03/09/20 2118    aspirin EC tablet 81 mg  81 mg Oral Daily Marko Coello MD   81 mg at 03/10/20 0834    calcium carbonate 200 mg calcium (500 mg) chewable tablet 500 mg  500 mg Oral BID PRN Alis Figueroa MD        dextrose 10% (D10W) Bolus  12.5 g Intravenous PRN Ricki Yates MD        dextrose 10% (D10W) Bolus  25 g Intravenous PRN Ricki Yates MD        enoxaparin injection 40 mg  40 mg Subcutaneous Daily Jeff Escobar MD   40 mg at 03/10/20 1648    fluticasone furoate-vilanterol 100-25 mcg/dose diskus inhaler 1 puff  1 puff Inhalation Daily Rufus Busby MD   1 puff at 03/10/20 6940     glucagon (human recombinant) injection 1 mg  1 mg Intramuscular PRN Marko Coello MD        glucose chewable tablet 16 g  16 g Oral PRN Marko Coello MD        glucose chewable tablet 24 g  24 g Oral PRN Marko Coello MD        hydrALAZINE tablet 10 mg  10 mg Oral Q12H PRN Alis Figueroa MD        lactobacillus acidophilus & bulgar 100 million cell packet 1 each  1 packet Oral BID Hiram Cook MD   1 each at 03/10/20 0832    letrozole tablet 2.5 mg  2.5 mg Oral Daily Marko Coello MD   2.5 mg at 03/10/20 0832    lidocaine 5 % patch 3 patch  3 patch Transdermal Q24H Odette Lazar DO   3 patch at 03/09/20 2328    losartan split tablet 12.5 mg  12.5 mg Oral Daily Alis Figueroa MD   12.5 mg at 03/10/20 0834    miconazole nitrate 2% ointment   Topical (Top) BID Beatriz Webb MD        ondansetron disintegrating tablet 8 mg  8 mg Oral Q8H PRN Marko Coello MD        oxyCODONE-acetaminophen 5-325 mg per tablet 1 tablet  1 tablet Oral Q8H PRN Don Orr MD   1 tablet at 03/10/20 0841    pravastatin tablet 40 mg  40 mg Oral QHS Marko Coello MD   40 mg at 03/09/20 2128    predniSONE tablet 40 mg  40 mg Oral Daily Alis Figueroa MD   40 mg at 03/10/20 0834    sodium chloride 0.9% flush 10 mL  10 mL Intravenous PRN Marko Coello MD        tiotropium inhalation capsule 18 mcg  1 capsule Inhalation Daily Marko Coello MD   18 mcg at 03/10/20 0837     Facility-Administered Medications Ordered in Other Encounters   Medication Dose Route Frequency Provider Last Rate Last Dose    ondansetron HCl (PF) 4 mg/2 mL injection    PRN Shy Montague CRNA   4 mg at 06/29/15 1246       Indwelling Lines/Drains at time of discharge:   Lines/Drains/Airways     None                 Time spent on the discharge of patient: 35 minutes  Patient was seen and examined on the date of discharge and determined to be suitable for discharge.         Alis Figueroa MD  Department of Hospital  Medicine  [facility]

## 2020-03-10 NOTE — PROGRESS NOTES
[facility] Hospital Medicine  Progress Note    Patient Name: Radha Woods  MRN: [id number]  Patient Class: IP- Inpatient   Admission Date: 3/5/2020  Length of Stay: 5 days  Attending Physician: Hiram Cook MD  Primary Care Provider: Param Riley MD    [place] [facility] Medicine Team: [facility] 4 Alis Figueroa MD    Subjective:     Principal Problem:COPD exacerbation        HPI:  74 year old female with history of severe COPD (FEV1 of 33% and 1.5-2L at home), breast cacner s/p lumpectomy and chemo on FEMARA maintenance, presented as an admission to ICU 3/5 with confusion, tremors. Denies any recent fevers/ chills/cough/sick contacts/nausea. Infectious workup only revealing of dirty UA - started ceftriaxone, although she only reports urinary frequency, has been incontinent of urine for some months. She states that she has been treated for her COPD for some time, takes her rescue inhaler and ICS/LAMA religiously. In the ICU she was noted to have a compensated respiratory alkalosis with CO2 of 89, treated with bipap, ICS/LAMA. Patient with interval improvement on BiPAP. Also assessed by pallative care for goals of care/symptom treatment. She remains a full code.    Step down to hospital medicine 4    Overview/Hospital Course:  74 year old female with history of severe COPD (FEV1 of 33% and 1.5-2L at home), breast cacner s/p lumpectomy and chemo on FEMARA maintenance, presented as an admission to ICU 3/5 with COPD exacerbation. Treated with bipap and stepped down to hospital medicine - continuing with duonebs, ICS/LAMA and ceftriaxone (finishing course). Started on short course Prednisone 40 mg x5 days. Oxygen weaning down as tolerated, now at 2L. Working with PT/OT, likely discharge to SNF in coming days.     Interval History: Doing well, chatting with friends at bedside. Waiting on [facility], likely will go tomorrow. Discontinuing telemetry, IV, and labs to help with  comfort.    Review of Systems   Constitutional: Negative for chills, diaphoresis, fatigue and fever.   HENT: Negative for congestion and sore throat.    Eyes: Negative for visual disturbance.   Respiratory: Positive for shortness of breath (chronic, improved). Negative for cough, choking and stridor.    Cardiovascular: Negative for leg swelling.   Gastrointestinal: Negative for abdominal pain, constipation, diarrhea, nausea and vomiting.   Genitourinary: Negative for dysuria, hematuria and urgency.   Musculoskeletal: Positive for back pain.   Neurological: Negative for dizziness, light-headedness and headaches.   Psychiatric/Behavioral: Negative for agitation and confusion.     Objective:     Vital Signs (Most Recent):  Temp: 98.7 °F (37.1 °C) (03/10/20 1200)  Pulse: 80 (03/10/20 1347)  Resp: 16 (03/10/20 1347)  BP: (!) 157/72 (03/10/20 1200)  SpO2: (!) 93 % (03/10/20 1347) Vital Signs (24h Range):  Temp:  [97.2 °F (36.2 °C)-98.8 °F (37.1 °C)] 98.7 °F (37.1 °C)  Pulse:  [60-96] 80  Resp:  [16-20] 16  SpO2:  [90 %-99 %] 93 %  BP: (129-168)/(68-83) 157/72     Weight: 60.1 kg (132 lb 7.9 oz)  Body mass index is 20.75 kg/m².    Intake/Output Summary (Last 24 hours) at 3/10/2020 1419  Last data filed at 3/10/2020 0900  Gross per 24 hour   Intake 360 ml   Output --   Net 360 ml      Physical Exam   Constitutional: She is oriented to person, place, and time. She appears well-developed and well-nourished. No distress.   Awake, alert, happily talking at the bedside    HENT:   Head: Normocephalic and atraumatic.   Mouth/Throat: No oropharyngeal exudate.   Nasal cannula in place   Eyes: Conjunctivae and EOM are normal. No scleral icterus.   Neck: Normal range of motion. Neck supple.   Cardiovascular: Normal rate and regular rhythm.   No murmur heard.  Pulmonary/Chest: Effort normal. No stridor. No respiratory distress. She has no wheezes. She has rales (chronic, diffuse, bilateral ). She exhibits no tenderness.   Abdominal:  "Soft. She exhibits no distension. There is no tenderness.   Musculoskeletal: Normal range of motion. She exhibits no edema or tenderness.   Neurological: She is alert and oriented to person, place, and time.   Skin: Skin is warm and dry. She is not diaphoretic. No erythema.   Psychiatric: She has a normal mood and affect.   Nursing note and vitals reviewed.      Significant Labs:   CBC:   Recent Labs   Lab 03/09/20  0644 03/10/20  0615   WBC 6.94 6.80   HGB 11.3* 11.1*   HCT 37.7 37.2    218     CMP:   Recent Labs   Lab 03/09/20  0644 03/10/20  0615    140   K 3.6 3.5   CL 96 94*   CO2 36* 40*   GLU 81 75   BUN 11 10   CREATININE 0.6 0.6   CALCIUM 9.4 9.4   ANIONGAP 9 6*   EGFRNONAA >60.0 >60.0       Significant Imaging: I have reviewed all pertinent imaging results/findings within the past 24 hours.      Assessment/Plan:      * COPD exacerbation  See hypercarbic hypoxic respiratory failure      Hyperlipidemia  Continue home pravastatin 40 mg      Essential hypertension  Home medications: metoprolol succinate 50 mg, unclear why, pt reports no cardiac etiology    -Added low  losartan 12.5 mg daily on 3/10 to help due to elevated BP, will continue on discharge   -Discontinuing Metoprolol as pt states she was placed on it "years ago" and unsure why, denies cardiac hx. Also in setting of recent falls as it may be contributing     Acute cystitis without hematuria  UA in ICU with multiple organisms, altered mental status on admission, however patient now not complaining of dysuria    Finished course of ceftriaxone for UTI      Chronic low back pain  Patient with chronic back pain in setting of previous hx of breast cancer and compression fracture. Takes   Percocet 5-325 q4, flexeril 10 mg tid    Plan  Pain control with acetomenophen 650      Palliative care encounter  Appreciate pallative care assistance    -Likely discharge to [facility] tomorrow, 3/11    Metabolic encephalopathy  Resolved  Initally on " admission patient with AMS, found to be hypercarbic. Improvement in mental status once oxygenation improved.       Acute on chronic respiratory failure with hypoxia and hypercapnia  74 year old with severe COPD presents with AMS and found to be in COPD exacerbation. Treated with bipap in ICU with interval improvement in mental status and respiratory status. Initial pCO2 100s, currently on 2 liters, last CO2 74 on 3/8/20    Plan  Continue ICS/LAMA  Continue duonebs  Nightly bipap as desired  Started 5 day course of pred 40 for COPD   Keep O2 sats 88-92, wean o2 as tolerated  Likely d/c to SNF tomorrow, will discontinue daily labs, remove IV, and discontinue telemetry for patient comfort         VTE Risk Mitigation (From admission, onward)         Ordered     enoxaparin injection 40 mg  Daily      03/05/20 8422                      Alis Figueroa MD  Department of Hospital Medicine   [facility]

## 2020-03-10 NOTE — ASSESSMENT & PLAN NOTE
Patient with chronic back pain in setting of previous hx of breast cancer and compression fracture. Takes Percocet 5-325 q4, flexeril 10 mg tid    Plan  Pain control with acetomenophen 650

## 2020-03-10 NOTE — NURSING
Pt complained of sharp CP, non radiating and without, SOB, N-V or diaphoresis.  Notified IM4 MD and he stated to give her pain meds and notify him if it happens again.

## 2020-03-10 NOTE — PLAN OF CARE
Discharge planning: Called Chantelle at [facility] and left VM checking on status of patient acceptance.  Beds not available until tomorrow.

## 2020-03-10 NOTE — ASSESSMENT & PLAN NOTE
74 year old with severe COPD presents with AMS and found to be in COPD exacerbation. Treated with bipap in ICU with interval improvement in mental status and respiratory status. Initial pCO2 100s, currently on 2 liters, last CO2 74 on 3/8/20    Plan  Continue ICS/LAMA  Continue duonebs  Nightly bipap as desired  Started 5 day course of pred 40 for COPD   Keep O2 sats 88-92, wean o2 as tolerated  Likely d/c to SNF tomorrow, will discontinue daily labs, remove IV, and discontinue telemetry for patient comfort

## 2020-03-10 NOTE — SUBJECTIVE & OBJECTIVE
Interval History: Doing well, chatting with friends at bedside. Waiting on [facility], likely will go tomorrow. Discontinuing telemetry, IV, and labs to help with comfort.    Review of Systems   Constitutional: Negative for chills, diaphoresis, fatigue and fever.   HENT: Negative for congestion and sore throat.    Eyes: Negative for visual disturbance.   Respiratory: Positive for shortness of breath (chronic, improved). Negative for cough, choking and stridor.    Cardiovascular: Negative for leg swelling.   Gastrointestinal: Negative for abdominal pain, constipation, diarrhea, nausea and vomiting.   Genitourinary: Negative for dysuria, hematuria and urgency.   Musculoskeletal: Positive for back pain.   Neurological: Negative for dizziness, light-headedness and headaches.   Psychiatric/Behavioral: Negative for agitation and confusion.     Objective:     Vital Signs (Most Recent):  Temp: 98.7 °F (37.1 °C) (03/10/20 1200)  Pulse: 80 (03/10/20 1347)  Resp: 16 (03/10/20 1347)  BP: (!) 157/72 (03/10/20 1200)  SpO2: (!) 93 % (03/10/20 1347) Vital Signs (24h Range):  Temp:  [97.2 °F (36.2 °C)-98.8 °F (37.1 °C)] 98.7 °F (37.1 °C)  Pulse:  [60-96] 80  Resp:  [16-20] 16  SpO2:  [90 %-99 %] 93 %  BP: (129-168)/(68-83) 157/72     Weight: 60.1 kg (132 lb 7.9 oz)  Body mass index is 20.75 kg/m².    Intake/Output Summary (Last 24 hours) at 3/10/2020 1419  Last data filed at 3/10/2020 0900  Gross per 24 hour   Intake 360 ml   Output --   Net 360 ml      Physical Exam   Constitutional: She is oriented to person, place, and time. She appears well-developed and well-nourished. No distress.   Awake, alert, happily talking at the bedside    HENT:   Head: Normocephalic and atraumatic.   Mouth/Throat: No oropharyngeal exudate.   Nasal cannula in place   Eyes: Conjunctivae and EOM are normal. No scleral icterus.   Neck: Normal range of motion. Neck supple.   Cardiovascular: Normal rate and regular rhythm.   No murmur  heard.  Pulmonary/Chest: Effort normal. No stridor. No respiratory distress. She has no wheezes. She has rales (chronic, diffuse, bilateral ). She exhibits no tenderness.   Abdominal: Soft. She exhibits no distension. There is no tenderness.   Musculoskeletal: Normal range of motion. She exhibits no edema or tenderness.   Neurological: She is alert and oriented to person, place, and time.   Skin: Skin is warm and dry. She is not diaphoretic. No erythema.   Psychiatric: She has a normal mood and affect.   Nursing note and vitals reviewed.      Significant Labs:   CBC:   Recent Labs   Lab 03/09/20  0644 03/10/20  0615   WBC 6.94 6.80   HGB 11.3* 11.1*   HCT 37.7 37.2    218     CMP:   Recent Labs   Lab 03/09/20  0644 03/10/20  0615    140   K 3.6 3.5   CL 96 94*   CO2 36* 40*   GLU 81 75   BUN 11 10   CREATININE 0.6 0.6   CALCIUM 9.4 9.4   ANIONGAP 9 6*   EGFRNONAA >60.0 >60.0       Significant Imaging: I have reviewed all pertinent imaging results/findings within the past 24 hours.

## 2020-03-10 NOTE — ASSESSMENT & PLAN NOTE
74 year old with severe COPD presents with AMS and found to be in COPD exacerbation. Treated with bipap in ICU with interval improvement in mental status and respiratory status. Initial pCO2 100s, currently on 2 liters, last CO2 74 on 3/8/20    Plan  Continue ICS/LAMA  Continue duonebs at SNF  Started 5 day course of pred 40 for COPD (end date of 3/10/20)    D/c to SNF today

## 2020-03-10 NOTE — PLAN OF CARE
Message received from Chantelle at [facility] and she can take patient today. Informed team that dc/readmit gen snf orders need to be placed.  Report to be called to [phone].

## 2020-03-10 NOTE — PHYSICIAN QUERY
PT Name: Radha Woods  MR #: [id number]     PHYSICIAN QUERY - INTEGUMENTARY CLARIFICATION     CDS: Amado Koch RN CCDS               Contact information: [email or web address] or [phone]  This form is a permanent document in the medical record.     Query Date: March 10, 2020    By submitting this query, we are merely seeking further clarification of documentation.  Please utilize your independent clinical judgment when addressing the question(s) below.    The Medical Record contains the following:   Indicators   Supporting Clinical Findings Location in Medical Record    Redness      Decubitus, Pressure, Ulcer, etc.      Deep Tissue Injury       x Wound care consult   Wound consult received on patient. Moisture related skin breakdown noted to patient's buttocks/ gluteal cleft, macerated pink tissue noted, small area of partial thickness skin loss noted with moist red wound bed. Patient reports she is incontinent and skin breakdown itches. 3/10/2020 Wound care consult    Medication:       x Treatment: Recommend applying clear barrier cream with miconazole BID/prn.  Dr. Beatriz Webb with IM 4 approved recommendations.  Nursing to continue care. Wound care to follow prn. 3/10/2020 Wound care consult    Other:          National Pressure Ulcer Advisory Panel (2007) Pressure Ulcer Definitions & Stages:  Stage I:                     Intact skin with non-blanchable redness of a localized area usually over a bony prominence.   Stage II:                    Partial thickness loss of dermis presenting as a shallow open ulcer with a red pink wound bed, without slough.   Stage III:                   Full thickness tissue loss. Subcutaneous fat may be visible but bone, tendon or muscle is not exposed. Slough may be                                      present but does not obscure the depth of tissue loss. May include undermining and tunneling.  Stage IV:                  Full thickness tissue loss with exposed bone,  tendon or muscle. Slough or eschar may be present on some parts of the                                      wound bed. Often include undermining and tunneling.  Unstageable:           Full thickness tissue loss but base of ulcer is covered by slough and/or eschar in the wound bed. Until enough                                        slough and/or eschar is removed to expose wound base, its true depth, and therefore stage, cannot be determined  Deep Tissue Injury: Purple or maroon localized area of discolored intact skin or blood-filled blister due to damage of underlying soft tissue   from pressure and/or shear.  Suspected deep tissue injuries may develop into a stageable ulcer or turn out to be another diagnosis (e.g. an ecchymosis)     Provider, please clarify/ provide the diagnosis related to the documentation:  [   ] Decubitus (Pressure) Ulcer to Bilateral Buttocks  [  x ] Moisture associated Dermatitis to Bilateral Buttocks and gluteal cleft  [   ] Other Integumentary Diagnosis (please specify):______________  [   ] Diagnosis Clinically Undetermined      Please document in your progress notes daily for the duration of treatment until resolved and include in your discharge summary.

## 2020-03-10 NOTE — ASSESSMENT & PLAN NOTE
"Home medications: metoprolol succinate 50 mg, unclear why, pt reports no cardiac etiology    -Added low  losartan 12.5 mg daily on 3/10 to help due to elevated BP, will continue on discharge   -Discontinuing Metoprolol as pt states she was placed on it "years ago" and unsure why, denies cardiac hx. Also in setting of recent falls as it may be contributing   "

## 2020-03-11 PROBLEM — E44.0 MODERATE MALNUTRITION: Status: ACTIVE | Noted: 2020-03-11

## 2020-03-11 PROCEDURE — 27000221 HC OXYGEN, UP TO 24 HOURS

## 2020-03-11 PROCEDURE — 97165 OT EVAL LOW COMPLEX 30 MIN: CPT

## 2020-03-11 PROCEDURE — 63600175 PHARM REV CODE 636 W HCPCS: Performed by: STUDENT IN AN ORGANIZED HEALTH CARE EDUCATION/TRAINING PROGRAM

## 2020-03-11 PROCEDURE — 94761 N-INVAS EAR/PLS OXIMETRY MLT: CPT

## 2020-03-11 PROCEDURE — 97162 PT EVAL MOD COMPLEX 30 MIN: CPT

## 2020-03-11 PROCEDURE — 99356 PR PROLONGED SERV,INPATIENT,1ST HR: CPT | Mod: ,,, | Performed by: INTERNAL MEDICINE

## 2020-03-11 PROCEDURE — 97802 MEDICAL NUTRITION INDIV IN: CPT

## 2020-03-11 PROCEDURE — 99356 PR PROLONGED SERV,INPATIENT,1ST HR: ICD-10-PCS | Mod: ,,, | Performed by: INTERNAL MEDICINE

## 2020-03-11 PROCEDURE — 94640 AIRWAY INHALATION TREATMENT: CPT

## 2020-03-11 PROCEDURE — 97535 SELF CARE MNGMENT TRAINING: CPT

## 2020-03-11 PROCEDURE — 97110 THERAPEUTIC EXERCISES: CPT

## 2020-03-11 PROCEDURE — 11000004 HC SNF PRIVATE

## 2020-03-11 PROCEDURE — 99306 1ST NF CARE HIGH MDM 50: CPT | Mod: ,,, | Performed by: INTERNAL MEDICINE

## 2020-03-11 PROCEDURE — 25000003 PHARM REV CODE 250: Performed by: STUDENT IN AN ORGANIZED HEALTH CARE EDUCATION/TRAINING PROGRAM

## 2020-03-11 PROCEDURE — 99900035 HC TECH TIME PER 15 MIN (STAT)

## 2020-03-11 PROCEDURE — 97530 THERAPEUTIC ACTIVITIES: CPT

## 2020-03-11 PROCEDURE — 99306 PR NURSING FACILITY CARE, INIT, HIGH SEVERITY: ICD-10-PCS | Mod: ,,, | Performed by: INTERNAL MEDICINE

## 2020-03-11 PROCEDURE — 25000003 PHARM REV CODE 250: Performed by: INTERNAL MEDICINE

## 2020-03-11 PROCEDURE — 25000242 PHARM REV CODE 250 ALT 637 W/ HCPCS: Performed by: STUDENT IN AN ORGANIZED HEALTH CARE EDUCATION/TRAINING PROGRAM

## 2020-03-11 PROCEDURE — 97116 GAIT TRAINING THERAPY: CPT

## 2020-03-11 RX ORDER — CHOLECALCIFEROL (VITAMIN D3) 25 MCG
2000 TABLET ORAL DAILY
Status: DISCONTINUED | OUTPATIENT
Start: 2020-03-12 | End: 2020-03-23 | Stop reason: HOSPADM

## 2020-03-11 RX ORDER — LOSARTAN POTASSIUM 25 MG/1
25 TABLET ORAL DAILY
Status: DISCONTINUED | OUTPATIENT
Start: 2020-03-12 | End: 2020-03-23 | Stop reason: HOSPADM

## 2020-03-11 RX ORDER — LETROZOLE 2.5 MG/1
2.5 TABLET, FILM COATED ORAL DAILY
Status: DISCONTINUED | OUTPATIENT
Start: 2020-03-12 | End: 2020-03-23 | Stop reason: HOSPADM

## 2020-03-11 RX ADMIN — FLUTICASONE FUROATE AND VILANTEROL TRIFENATATE 1 PUFF: 100; 25 POWDER RESPIRATORY (INHALATION) at 08:03

## 2020-03-11 RX ADMIN — IPRATROPIUM BROMIDE AND ALBUTEROL SULFATE 3 ML: 2.5; .5 SOLUTION RESPIRATORY (INHALATION) at 02:03

## 2020-03-11 RX ADMIN — SENNOSIDES AND DOCUSATE SODIUM 1 TABLET: 8.6; 5 TABLET ORAL at 08:03

## 2020-03-11 RX ADMIN — IPRATROPIUM BROMIDE AND ALBUTEROL SULFATE 3 ML: 2.5; .5 SOLUTION RESPIRATORY (INHALATION) at 08:03

## 2020-03-11 RX ADMIN — PRAVASTATIN SODIUM 40 MG: 20 TABLET ORAL at 08:03

## 2020-03-11 RX ADMIN — TIOTROPIUM BROMIDE 18 MCG: 18 CAPSULE ORAL; RESPIRATORY (INHALATION) at 08:03

## 2020-03-11 RX ADMIN — OXYCODONE HYDROCHLORIDE AND ACETAMINOPHEN 1 TABLET: 5; 325 TABLET ORAL at 08:03

## 2020-03-11 RX ADMIN — ASPIRIN 81 MG: 81 TABLET, COATED ORAL at 08:03

## 2020-03-11 RX ADMIN — PREDNISONE 40 MG: 20 TABLET ORAL at 08:03

## 2020-03-11 RX ADMIN — ACETAMINOPHEN 650 MG: 325 TABLET ORAL at 05:03

## 2020-03-11 RX ADMIN — IPRATROPIUM BROMIDE AND ALBUTEROL SULFATE 3 ML: 2.5; .5 SOLUTION RESPIRATORY (INHALATION) at 07:03

## 2020-03-11 RX ADMIN — OXYCODONE HYDROCHLORIDE AND ACETAMINOPHEN 1 TABLET: 5; 325 TABLET ORAL at 12:03

## 2020-03-11 RX ADMIN — LOSARTAN POTASSIUM 12.5 MG: 25 TABLET, FILM COATED ORAL at 08:03

## 2020-03-11 NOTE — H&P
Hospital Medicine  History and Physical Exam    Admit Date: 3/10/2020  RUFINO TBD  Principal Problem:  <principal problem not specified>   Primary care Physician: Param Riley MD  Code status: Full Code    HPI:   Ms. You is a 75 yo female with severe COPD on 3 lt oxygen at home, osteoporosis on bisphosphonate, history of left breast cancer s/p lumpectomy and chemoradiation currently on maintenance therapy (Femara) who presents with acute confusion for the past few days on 3/5/2020. She was admitted to ICU with COPD exacerbation. She improved with BiPAP, around the clock nebs, prednisone and course of ceftriaxone. She was weaned to home oxygen levels of 2L. Patient states SOB at baseline. She complains of feeling of depression, hopelessness and anxiety over her increased need for assitance. She additionally complains of chronic back pain. Pain is is intermittent and sometimes radiate down to buttocks and bilateral legs. Pain medications are adequate.     Patient transferred to [facility] with PT and OT to improve functional status and ability to perform ADLs.     Past Medical History: Patient has a past medical history of Back pain, Breast cancer (05/2015), COPD (chronic obstructive pulmonary disease), Hyperlipidemia, Hypertension, and Osteoporosis (5/21/2019).    Past Surgical History: Patient has a past surgical history that includes Lumbar fusion; Cervical fusion; Hysterectomy; right lumpectomy; Lymph node dissection (Right); Breast biopsy (Right); and Breast lumpectomy (Right, 2015).    Social History: Patient reports that she has been smoking cigarettes. She has a 45.00 pack-year smoking history. She uses smokeless tobacco. She reports that she drinks alcohol. She reports that she does not use drugs.    Family History: family history includes Breast cancer (age of onset: 75) in her maternal grandmother.    Medications: reviewed     Allergies: Patient is allergic to adhesive; levaquin [levofloxacin]; and  penicillins.    ROS  Constitutional: no fever or chills  Respiratory: no cough or shortness of breath  Cardiovascular: no chest pain or palpitations  Gastrointestinal: no nausea or vomiting, no abdominal pain or change in bowel habits  Genitourinary: no hematuria or dysuria  Integument/Breast: no rash or pruritis  Hematologic/Lymphatic: no easy bruising or lymphadenopathy  Musculoskeletal: no arthralgias or myalgias  Neurological: no seizures or tremors  Behavioral/Psych: no depression or anxiety    PEx  Temp:  [97.7 °F (36.5 °C)-98.2 °F (36.8 °C)]   Pulse:  [73-90]   Resp:  [14-18]   BP: (130-155)/(71-86)   SpO2:  [93 %-97 %]   Body mass index is 20.13 kg/m².     General appearance: no distress  Mental status: Alert and oriented x 3  HEENT:  conjunctivae/corneas clear, PERRL  Neck: supple, thyroid not enlarged  Pulm:   normal respiratory effort, CTA B, no c/w/r  Card: RRR, no murmur  Abd: soft, NT, ND, BS present; no masses, no organomegaly  Ext: no edema  Pulses: 2+, symmetric  Skin: color, texture, turgor normal. No rashes or lesions  Neuro: CN II-XII grossly intact, no focal numbness or weakness, normal strength and tone     Recent Labs   Lab 03/08/20 0518 03/09/20  0644 03/10/20  0615    141 140   K 4.9 3.6 3.5   CL 98 96 94*   CO2 36* 36* 40*   BUN 11 11 10   CREATININE 0.6 0.6 0.6   GLU 77 81 75   CALCIUM 9.8 9.4 9.4   MG 2.2 1.9 1.8   PHOS 3.5 3.9 4.1     Recent Labs   Lab 03/05/20  1746   ALKPHOS 68   ALT 6*   AST 10   ALBUMIN 3.6   PROT 6.8   BILITOT 0.4       Recent Labs   Lab 03/08/20 0518 03/09/20  0644 03/10/20  0615   WBC 7.85 6.94 6.80   HGB 11.8* 11.3* 11.1*   HCT 39.9 37.7 37.2    195 218   GRAN 63.7  5.0 53.2  3.7 57.4  3.9   LYMPH 21.7  1.7 27.8  1.9 25.1  1.7   MONO 12.5  1.0 14.1  1.0 12.9  0.9     Assessment and Plan:    COPD exacerbation  hypercarbic hypoxic respiratory failure  S/p course of prednisone and ceftriaxone  Continue duoneb QID, breo one puff daily,  tiotropium 18 mcg daily  Keep O2 sats 88-92, wean o2 as tolerated   Educated patient on the need for limiting her oxygen for sats 88-92%. Keep sats in high 90s will increase her hypercapnia    Hyperlipidemia  Continue home pravastatin 40 mg    Essential hypertension  Home medications: metoprolol succinate 50 mg daily  Changed to losartan during hospitalization due to unclear reason  Goal SBP <150 - increase losartan to 25 mg daily      Chronic low back pain  Patient with chronic back pain in setting of previous hx of breast cancer and compression fracture.   Acetaminophen 650 mg prn and percocet 5-325 mg BID prn      Acute Metabolic encephalopathy Resolved  Delirium - resolved  Improved once hypercapnia resolved     Anemia of critical illness  Should resolve without intervention    DJD of multiple sites - PT/OT  Pain management of above    At risk of malnutrition - dietary consult    Chronic diastolic dysfunction - euvolemic    Breast cancer - continue letrozole 2.5 mg daily    Osteoporosis - continue boniva after discharge  Vitamin D 2000 units daily    Continue the following medications for treatment of the indicated conditions:  ·  Indication Medication Dose Route  Frequency       COPD exacerbation albuterol-ipratropium  3 mL Nebulization Q6H WAKE    MI prevention aspirin  81 mg Oral Daily    COPD fluticasone furoate-vilanteroL  1 puff Inhalation Daily    HTN [START ON 3/12/2020] losartan  25 mg Oral Daily    hyperlipidemia pravastatin  40 mg Oral QHS    BM reigmen senna-docusate 8.6-50 mg  1 tablet Oral BID    COPD tiotropium  1 capsule Inhalation Daily       Future Appointments   Date Time Provider Department Center   3/19/2020  1:30 PM Rosio Allen MD [facility] H[facility]     I certify that SNF services are required to be given on an inpatient basis because Radha Woods needs for skilled nursing care and/or skilled rehabilitation are required on a daily basis and such services can only  practically be provided in a skilled nursing facility setting and are for an ongoing condition for which she received inpatient care in the hospital.     Time (minutes) spent in care of the patient including face-to-face contact and coordination of care while on unit: 105    Elisha Starkey MD

## 2020-03-11 NOTE — PLAN OF CARE
PT eval completed. Pt will begin PT POC.  Sasha Rock, PT  3/11/2020    Problem: Physical Therapy Goal  Goal: Physical Therapy Goal  Description  Goals to be met by: 3/18/20     Patient will increase functional independence with mobility by performin. Supine to sit with Modified Alexander  2. Sit to supine with Modified Alexander  3. Sit to stand transfer with Supervision  4. Bed to chair transfer with Supervision using Rolling Walker  5. Gait  x 150 feet with Supervision using Rolling Walker.   6. Wheelchair propulsion x150 feet with Supervision using bilateral uppper extremities  7. Ascend/Descend 4 inch curb step with Supervision using Rolling Walker.  8. Stand for 3 minutes with Stand-by Assistance using Rolling Walker while performing a dynamic standing activity  9. Pt will perform a car transfer (actual or simulated) w/ RW and SBA  10. Pt will  object from floor using reacher w/ RW and SPV  11. Pt will walk over an uneven surface (outside ramp) w/ RW and SBA     Outcome: Ongoing, Progressing

## 2020-03-11 NOTE — PLAN OF CARE
Problem: Adult Inpatient Plan of Care  Goal: Plan of Care Review  Outcome: Ongoing, Progressing     Problem: Adult Inpatient Plan of Care  Goal: Patient-Specific Goal (Individualization)  Outcome: Ongoing, Progressing     Problem: Fall Injury Risk  Goal: Absence of Fall and Fall-Related Injury  Intervention: Promote Injury-Free Environment  Flowsheets (Taken 3/11/2020 1451)  Safety Promotion/Fall Prevention: assistive device/personal item within reach; bedside commode chair; commode/urinal/bedpan at bedside; Fall Risk signage in place; Fall Risk reviewed with patient/family; medications reviewed; nonskid shoes/socks when out of bed; side rails raised x 2  Environmental Safety Modification: assistive device/personal items within reach; room organization consistent; clutter free environment maintained; lighting adjusted; mobility aid in reach     Problem: Skin Injury Risk Increased  Goal: Skin Health and Integrity  Intervention: Promote and Optimize Oral Intake  Flowsheets (Taken 3/11/2020 1451)  Oral Nutrition Promotion: safe use of adaptive equipment encouraged; rest periods promoted; social interaction promoted     Problem: Malnutrition  Goal: Improved Nutritional Intake  Intervention: Promote and Optimize Oral Intake  Flowsheets (Taken 3/11/2020 1451)  Oral Nutrition Promotion: safe use of adaptive equipment encouraged; rest periods promoted; social interaction promoted     Problem: Malnutrition  Goal: Improved Nutritional Intake  Intervention: Promote and Optimize Oral Intake  Flowsheets (Taken 3/11/2020 1451)  Oral Nutrition Promotion: safe use of adaptive equipment encouraged; rest periods promoted; social interaction promoted     Problem: Malnutrition  Goal: Improved Nutritional Intake  Intervention: Promote and Optimize Oral Intake  Flowsheets (Taken 3/11/2020 1451)  Oral Nutrition Promotion: safe use of adaptive equipment encouraged; rest periods promoted; social interaction promoted     Problem:  Malnutrition  Goal: Improved Nutritional Intake  Intervention: Promote and Optimize Oral Intake  Flowsheets (Taken 3/11/2020 1451)  Oral Nutrition Promotion: safe use of adaptive equipment encouraged; rest periods promoted; social interaction promoted

## 2020-03-11 NOTE — PLAN OF CARE
Problem: Wound  Goal: Optimal Wound Healing  Outcome: Ongoing, Progressing     Problem: Malnutrition  Goal: Improved Nutritional Intake  Outcome: Ongoing, Progressing     Recommendation:   1. Continue current regular diet order.   2. Addition of Boost Plus daily to supplement calorie and protein needs.     Goals:   PO intake >/= 75% EEN/EPN daily.     Nutrition Goal Status: new  Communication of RD Recs: other (comment)(POC)

## 2020-03-11 NOTE — CONSULTS
[facility] - Skilled Nursing Care  Adult Nutrition  Consult Note    SUMMARY     Recommendations    Recommendation:   1. Continue current regular diet order.   2. Addition of Boost Plus daily to supplement calorie and protein needs.     Goals:   PO intake >/= 75% EEN/EPN daily.    Nutrition Goal Status: new  Communication of RD Recs: other (comment)(POC)    Reason for Assessment    Reason For Assessment: consult  Diagnosis: (AMS)  Relevant Medical History: COPD, HLD, Breast Cancer (FEMARA maintenance therapy), Osteoporosis  Interdisciplinary Rounds: did not attend    General Information Comments:   3/11/20: Pt was alert and friendly. Reports not great appetite, but eating 100% of meals when she likes them. Denies NV. States she had diarrhea 4 days ago and they provided her medication that has now caused constipation. RD obtained pt food preferences. Reports no difficult chewing or swallowing. States she is willing to drink Boost Plus-chocolate daily with milk to dilute it. Pt reports UBW is 145 lbs but it has been 5 years since she weighed that. Per chart review pt weighed 135 lbs on 2/17/20, indicating wt loss of 5% in 1 month. NFPE completed today 3/11/20 pt has moderate muscle wasting and severe subcutaneous fat loss.      Nutrition Discharge Planning: d/c on regular diet with high protein or ONS to supplement protein needs    Nutrition Risk Screen    Nutrition Risk Screen: no indicators present    Nutrition/Diet History    Patient Reported Diet/Restrictions/Preferences: general  Food Preferences: Likes bland foods with salt and pepper, pasta with butter and grilled chicken, brussel sprouts, salt and pepper packets, salads with vinegarette Dislikes: spicy foods, carrots, sweet potatoes, and green peas (will not eat any food that has touched green peas)(no cultural or spiritual preferences identified)  Spiritual, Cultural Beliefs, Scientology Practices, Values that Affect Care: no  Supplemental Drinks or Food Habits:  "Boost Plus(daily mixed with milk )  Food Allergies: NKFA  Factors Affecting Nutritional Intake: decreased appetite    Anthropometrics    Temp: 97.7 °F (36.5 °C)  Height: 5' 7" (170.2 cm)  Height (inches): 67 in  Weight Method: Standard Scale  Weight: 58.3 kg (128 lb 8.5 oz)  Weight (lb): 128.53 lb  Ideal Body Weight (IBW), Female: 135 lb  % Ideal Body Weight, Female (lb): 95.21 %  BMI (Calculated): 20.1  Usual Body Weight (UBW), k.36 kg(20)  % Usual Body Weight: 95.21  % Weight Change From Usual Weight: -4.99 %       Lab/Procedures/Meds    Pertinent Labs Reviewed: reviewed  Pertinent Labs Comments: H/h 11.1/37.2, Cl 94, CO2 40  Pertinent Medications Reviewed: reviewed  Pertinent Medications Comments: aspirin, losartan, pravastatin, senna docusate    Physical Findings/Assessment    Rocco Score: 16  Wound   Midline Coccyx     Estimated/Assessed Needs    Weight Used For Calorie Calculations: 58.3 kg (128 lb 8.5 oz)  Energy Calorie Requirements (kcal): 0362-0822(30-35 kcal/kg)  Energy Need Method: Kcal/kg(30-35 kcal/kg)  Protein Requirements: 70(1.2 gm/kg)  Weight Used For Protein Calculations: 58.3 kg (128 lb 8.5 oz)     Estimated Fluid Requirement Method: RDA Method(or Per MD)  RDA Method (mL): 1749         Nutrition Prescription Ordered    Current Diet Order: Regular    Evaluation of Received Nutrient/Fluid Intake    I/O: N/A  Energy Calories Required: not meeting needs  Protein Required: not meeting needs  Fluid Required: meeting needs  Comments: LBM 3/9/20  Tolerance: tolerating  % Intake of Estimated Energy Needs: 75 - 100 %  % Meal Intake: 75 - 100 %    Nutrition Risk    Level of Risk/Frequency of Follow-up: (1 x/week)     Assessment and Plan    Moderate malnutrition  Nutrition Problem:  Moderate Protein-Calorie Malnutrition  Malnutrition in the context of Chronic Illness/Injury    Related to (etiology):  Malignant neoplasm of female breast, (currently on FEMARA maintenance therapy)    Signs and " Symptoms (as evidenced by):  Energy Intake: <75% of estimated energy requirement for greater than 3 months  Body Fat Depletion: severe depletion of orbitals, triceps and thoracic and lumbar region   Muscle Mass Depletion: moderate depletion of temples, clavicle region, scapular region and interosseous muscle   Weight Loss: 5% x 1 month   Fluid Accumulation: none noted    Interventions(treatment strategy):  Collaboration with other providers  [employer]    Nutrition Diagnosis Status:  New           Monitor and Evaluation    Food and Nutrient Intake: energy intake, food and beverage intake  Food and Nutrient Adminstration: diet order  Knowledge/Beliefs/Attitudes: food and nutrition knowledge/skill  Physical Activity and Function: nutrition-related ADLs and IADLs  Anthropometric Measurements: weight, weight change, body mass index  Biochemical Data, Medical Tests and Procedures: electrolyte and renal panel, gastrointestinal profile, glucose/endocrine profile, inflammatory profile, lipid profile  Nutrition-Focused Physical Findings: overall appearance     Malnutrition Assessment  Malnutrition Type: chronic illness  Energy Intake: moderate energy intake          Weight Loss (Malnutrition): 5% in 1 month  Energy Intake (Malnutrition): less than 75% for greater than or equal to 3 months  Subcutaneous Fat (Malnutrition): severe depletion  Muscle Mass (Malnutrition): moderate depletion   Orbital Region (Subcutaneous Fat Loss): severe depletion  Upper Arm Region (Subcutaneous Fat Loss): severe depletion  Thoracic and Lumbar Region: severe depletion   [place] Region (Muscle Loss): moderate depletion  Clavicle Bone Region (Muscle Loss): moderate depletion  Clavicle and Acromion Bone Region (Muscle Loss): moderate depletion  Scapular Bone Region (Muscle Loss): moderate depletion  Dorsal Hand (Muscle Loss): moderate depletion   Edema (Fluid Accumulation): 0-->no edema present   Subcutaneous Fat Loss (Final Summary):  severe protein-calorie malnutrition  Muscle Loss Evaluation (Final Summary): moderate protein-calorie malnutrition    Moderate Weight Loss (Malnutrition): 5% in 1 month    Nutrition Follow-Up    RD Follow-up?: Yes

## 2020-03-11 NOTE — PT/OT/SLP EVAL
PhysicalTherapy   Evaluation    Radha Woods   MRN: [id number]     PT Received On: 03/11/20  PT Start Time: 1108     PT Stop Time: 1205    PT Total Time (min): 57 min       Billable Minutes:  Evaluation 15, Gait Training 12, Therapeutic Activity 15, Therapeutic Exercise 15 and Total Time 42    Diagnosis: COPD exacerbation  Past Medical History:   Diagnosis Date    Back pain     instrumented fusion L3-L5. Spinal cord stimulator in place. L5-S1 right paracentral disc osteophyte complex resulting in moderate right neural foraminal narrowing.    Breast cancer 05/2015    (status post lumpectomy and chemoradiation, currently on maintenance therapy with FEMARA). Right breast infiltrating ductal CA with DCIS, ER positive, LA/Her2 negative     COPD (chronic obstructive pulmonary disease)     Chronic bronchitis;  (on 3 L at home)     Hyperlipidemia     Hypertension     Osteoporosis 5/21/2019      Past Surgical History:   Procedure Laterality Date    BREAST BIOPSY Right     BREAST LUMPECTOMY Right 2015    IDC & DCIS    CERVICAL FUSION      HYSTERECTOMY      with BSO    LUMBAR FUSION      LYMPH NODE DISSECTION Right     right lumpectomy           General Precautions: Standard, fall  Orthopedic Precautions: N/A   Braces: N/A    Spiritual, Cultural Beliefs, Jehovah's witness Practices, Values that Affect Care: no    Patient History:  Lives With: child(uriel), adult, grandchild(uriel)  Living Arrangements: house  Home Accessibility: stairs to enter home, stairs within home  Home Layout: Able to live on 1st floor  Stair Railings at Home: none  Transportation Anticipated: family or friend will provide  Living Environment Comment: PTA pt lived alone in a mobile home w/ 2 GLADYS and BHR. She has a tub/shower combo w/ a grab bar. Upon D/C pt she will move in w/ her son, daughter in law, and adult grandson. They live in a 2SH w/ 1 GLADYS. Pt will stay on the 1st floor where she will have a tub/shower combo. Pt's son and daughter-in-law both  work but her grandson is home t/o the day.   Equipment Currently Used at Home: walker, rolling, shower chair, oxygen  DME owned (not currently used): bedside commode and transfer tub bench    Previous Level of Function: PTA pt was Nadine w/ mobility using RW only as needed for mobility. She only used her RW if she was feeling weak/tired. Pt was Nadine w/ ADLs using shower chair in her tub/shower combo. She has had 2 falls on the past 6 months- 1 caused by tripping over her O2 cord and the other being the reason for her current admission- COPD exacerbation. Pt wears O2 at all times.   Ambulation Skills: needs device  Transfer Skills: needs device  ADL Skills: needs device    Subjective:  Communicated with patient and OT prior to session.  Pt agreeable to session.  Chief Complaint: weakness  Patient goals: return to PLOF    Pain/Comfort  Pain Rating 1: 5/10  Location - Side 1: Bilateral  Location - Orientation 1: lower  Location 1: back  Pain Addressed 1: Pre-medicate for activity  Pain Rating Post-Intervention 1: 5/10    Objective:  Patient found sitting in w/c. Patient found with: oxygen    Cognitive Exam:  Oriented to: Person, Place, Time and Situation  Follows Commands/attention: Follows multistep  commands  Communication: clear/fluent  Safety awareness/insight to disability: intact    Physical Exam:  Postural examination/scapula alignment:    -       Rounded shoulders    Skin integrity: Visible skin intact  Edema: None noted     Sensation:      -       Intact    Upper Extremity Range of Motion:  Right Upper Extremity: WFL  Left Upper Extremity: WFL    Upper Extremity Strength:  Right Upper Extremity: WFL  Left Upper Extremity: WFL    Lower Extremity Range of Motion:  Right Lower Extremity: WFL  Left Lower Extremity: WFL    Lower Extremity Strength:  Right Lower Extremity: WFL except HF 4-/5, KE 4/5  Left Lower Extremity: WFL except HF 4-/5, KF 4/5     Fine motor coordination:     -       Intact  Left hand  "thumb/finger opposition skills and Right hand thumb/finger opposition skills    Gross motor coordination: WFL      AM-PAC 6 CLICK MOBILITY  Total Score:18    Bed Mobility:  Sit>Supine:on mat w/ SPV  Supine>Sit: on mat w/ SPV  Roll Left/Right: on mat Nadine    Transfers:  Sit<>Stand: to/from w/c, multiple trials, w/ RW and CGA  Stand Pivot Transfer: w/c<>EOM and w/c<>nustep; all w/ RW and CGA  Cues for hand placement    Gait:  Amb 100ft w/ RW and CGA  Swing through gait pattern w/ good step length/height and floor clearance  Cues for posture and to remain inside RW  Limited in distance by fatigue     Advanced Gait:  Stairs: not applicable since pt will not return to her mobile home or go upstairs at her son's home  Curb Step: asc/chuy 4" curb w/ RW and CGA for safety, cues for technique/sequencing    Wheelchair Mobility:  Patient propels w/c 100ft w/ BUE and SBA  Cues for propulsion technique and hand placement     Balance:  Pt picked up bean bags from floor using reacher while standing w/ a RW, CGA for safety, [id number] in duration    Additional Treatment:  Recumbent cross , Level 3, x15min to inc overall strength/endurance  Pt was educated on PT role and POC. She verb understanding.     Patient left up in chair with all lines intact and call button in reach.    Assessment:  Radha Woods is a 74 y.o. female with a medical diagnosis of COPD exacerbation. Pt lety session well w/ good participation. PTA she was Nadine w/ mobility and ADLs but is currently limited by the below listed deficits requiring CGA for safety w/ functional mobility using RW. She is appropriate for skilled PT services and will begin PT POC.    Rehab identified problem list/impairments: weakness, impaired endurance, impaired self care skills, impaired functional mobilty, gait instability, impaired balance, decreased lower extremity function, pain, impaired cardiopulmonary response to activity    Rehab potential is good.    Activity tolerance: " Good    Discharge recommendations: home with home health(w/ light family assist recomended)     Barriers to discharge: None    Equipment recommendations: wheelchair     GOALS:   Multidisciplinary Problems     Physical Therapy Goals        Problem: Physical Therapy Goal    Goal Priority Disciplines Outcome Goal Variances Interventions   Physical Therapy Goal     PT, PT/OT Ongoing, Progressing     Description:  Goals to be met by: 3/18/20     Patient will increase functional independence with mobility by performin. Supine to sit with Modified [place]  2. Sit to supine with Modified [place]  3. Sit to stand transfer with Supervision  4. Bed to chair transfer with Supervision using Rolling Walker  5. Gait  x 150 feet with Supervision using Rolling Walker.   6. Wheelchair propulsion x150 feet with Supervision using bilateral uppper extremities  7. Ascend/Descend 4 inch curb step with Supervision using Rolling Walker.  8. Stand for 3 minutes with Stand-by Assistance using Rolling Walker while performing a dynamic standing activity  9. Pt will perform a car transfer (actual or simulated) w/ RW and SBA  10. Pt will  object from floor using reacher w/ RW and SPV  11. Pt will walk over an uneven surface (outside ramp) w/ RW and SBA                      PLAN:    Patient to be seen 5 x/week  to address the above listed problems via gait training, therapeutic activities, therapeutic exercises, neuromuscular re-education, wheelchair management/training  Plan of Care Expires: 04/10/20    Sasha Rock PT 3/11/2020

## 2020-03-12 LAB
ANION GAP SERPL CALC-SCNC: 8 MMOL/L (ref 8–16)
BASOPHILS # BLD AUTO: 0.03 K/UL (ref 0–0.2)
BASOPHILS NFR BLD: 0.4 % (ref 0–1.9)
BUN SERPL-MCNC: 11 MG/DL (ref 8–23)
CALCIUM SERPL-MCNC: 9 MG/DL (ref 8.7–10.5)
CHLORIDE SERPL-SCNC: 94 MMOL/L (ref 95–110)
CO2 SERPL-SCNC: 38 MMOL/L (ref 23–29)
CREAT SERPL-MCNC: 0.5 MG/DL (ref 0.5–1.4)
DIFFERENTIAL METHOD: ABNORMAL
EOSINOPHIL # BLD AUTO: 0.1 K/UL (ref 0–0.5)
EOSINOPHIL NFR BLD: 0.9 % (ref 0–8)
ERYTHROCYTE [DISTWIDTH] IN BLOOD BY AUTOMATED COUNT: 13.2 % (ref 11.5–14.5)
EST. GFR  (AFRICAN AMERICAN): >60 ML/MIN/1.73 M^2
EST. GFR  (NON AFRICAN AMERICAN): >60 ML/MIN/1.73 M^2
GLUCOSE SERPL-MCNC: 77 MG/DL (ref 70–110)
HCT VFR BLD AUTO: 34.2 % (ref 37–48.5)
HGB BLD-MCNC: 10.5 G/DL (ref 12–16)
IMM GRANULOCYTES # BLD AUTO: 0.09 K/UL (ref 0–0.04)
IMM GRANULOCYTES NFR BLD AUTO: 1.3 % (ref 0–0.5)
LYMPHOCYTES # BLD AUTO: 1.8 K/UL (ref 1–4.8)
LYMPHOCYTES NFR BLD: 26.4 % (ref 18–48)
MAGNESIUM SERPL-MCNC: 1.8 MG/DL (ref 1.6–2.6)
MCH RBC QN AUTO: 29.7 PG (ref 27–31)
MCHC RBC AUTO-ENTMCNC: 30.7 G/DL (ref 32–36)
MCV RBC AUTO: 97 FL (ref 82–98)
MONOCYTES # BLD AUTO: 0.9 K/UL (ref 0.3–1)
MONOCYTES NFR BLD: 13.4 % (ref 4–15)
NEUTROPHILS # BLD AUTO: 4 K/UL (ref 1.8–7.7)
NEUTROPHILS NFR BLD: 57.6 % (ref 38–73)
NRBC BLD-RTO: 0 /100 WBC
PHOSPHATE SERPL-MCNC: 4 MG/DL (ref 2.7–4.5)
PLATELET # BLD AUTO: 210 K/UL (ref 150–350)
PMV BLD AUTO: 9.6 FL (ref 9.2–12.9)
POTASSIUM SERPL-SCNC: 3 MMOL/L (ref 3.5–5.1)
RBC # BLD AUTO: 3.53 M/UL (ref 4–5.4)
SODIUM SERPL-SCNC: 140 MMOL/L (ref 136–145)
WBC # BLD AUTO: 6.86 K/UL (ref 3.9–12.7)

## 2020-03-12 PROCEDURE — 99900035 HC TECH TIME PER 15 MIN (STAT)

## 2020-03-12 PROCEDURE — 83735 ASSAY OF MAGNESIUM: CPT

## 2020-03-12 PROCEDURE — 36415 COLL VENOUS BLD VENIPUNCTURE: CPT

## 2020-03-12 PROCEDURE — 97116 GAIT TRAINING THERAPY: CPT | Mod: CQ

## 2020-03-12 PROCEDURE — 84100 ASSAY OF PHOSPHORUS: CPT

## 2020-03-12 PROCEDURE — 85025 COMPLETE CBC W/AUTO DIFF WBC: CPT

## 2020-03-12 PROCEDURE — 27000221 HC OXYGEN, UP TO 24 HOURS

## 2020-03-12 PROCEDURE — 25000003 PHARM REV CODE 250: Performed by: STUDENT IN AN ORGANIZED HEALTH CARE EDUCATION/TRAINING PROGRAM

## 2020-03-12 PROCEDURE — 94640 AIRWAY INHALATION TREATMENT: CPT

## 2020-03-12 PROCEDURE — 25000003 PHARM REV CODE 250: Performed by: INTERNAL MEDICINE

## 2020-03-12 PROCEDURE — 11000004 HC SNF PRIVATE

## 2020-03-12 PROCEDURE — 94761 N-INVAS EAR/PLS OXIMETRY MLT: CPT

## 2020-03-12 PROCEDURE — 80048 BASIC METABOLIC PNL TOTAL CA: CPT

## 2020-03-12 PROCEDURE — 97530 THERAPEUTIC ACTIVITIES: CPT | Mod: CQ

## 2020-03-12 PROCEDURE — 97535 SELF CARE MNGMENT TRAINING: CPT

## 2020-03-12 PROCEDURE — 25000003 PHARM REV CODE 250: Performed by: NURSE PRACTITIONER

## 2020-03-12 PROCEDURE — 25000242 PHARM REV CODE 250 ALT 637 W/ HCPCS: Performed by: STUDENT IN AN ORGANIZED HEALTH CARE EDUCATION/TRAINING PROGRAM

## 2020-03-12 PROCEDURE — 97110 THERAPEUTIC EXERCISES: CPT

## 2020-03-12 PROCEDURE — 97110 THERAPEUTIC EXERCISES: CPT | Mod: CQ

## 2020-03-12 RX ORDER — POTASSIUM CHLORIDE 20 MEQ/1
40 TABLET, EXTENDED RELEASE ORAL 3 TIMES DAILY
Status: COMPLETED | OUTPATIENT
Start: 2020-03-12 | End: 2020-03-13

## 2020-03-12 RX ORDER — CETIRIZINE HYDROCHLORIDE 5 MG/1
10 TABLET ORAL NIGHTLY
Status: DISCONTINUED | OUTPATIENT
Start: 2020-03-12 | End: 2020-03-23 | Stop reason: HOSPADM

## 2020-03-12 RX ORDER — LANOLIN ALCOHOL/MO/W.PET/CERES
400 CREAM (GRAM) TOPICAL 2 TIMES DAILY
Status: COMPLETED | OUTPATIENT
Start: 2020-03-12 | End: 2020-03-13

## 2020-03-12 RX ADMIN — ASPIRIN 81 MG: 81 TABLET, COATED ORAL at 08:03

## 2020-03-12 RX ADMIN — POTASSIUM CHLORIDE 40 MEQ: 1500 TABLET, EXTENDED RELEASE ORAL at 09:03

## 2020-03-12 RX ADMIN — SENNOSIDES AND DOCUSATE SODIUM 1 TABLET: 8.6; 5 TABLET ORAL at 08:03

## 2020-03-12 RX ADMIN — TIOTROPIUM BROMIDE 18 MCG: 18 CAPSULE ORAL; RESPIRATORY (INHALATION) at 09:03

## 2020-03-12 RX ADMIN — POTASSIUM CHLORIDE 40 MEQ: 1500 TABLET, EXTENDED RELEASE ORAL at 02:03

## 2020-03-12 RX ADMIN — Medication 400 MG: at 09:03

## 2020-03-12 RX ADMIN — FLUTICASONE FUROATE AND VILANTEROL TRIFENATATE 1 PUFF: 100; 25 POWDER RESPIRATORY (INHALATION) at 09:03

## 2020-03-12 RX ADMIN — IPRATROPIUM BROMIDE AND ALBUTEROL SULFATE 3 ML: 2.5; .5 SOLUTION RESPIRATORY (INHALATION) at 07:03

## 2020-03-12 RX ADMIN — CETIRIZINE HYDROCHLORIDE 10 MG: 5 TABLET ORAL at 09:03

## 2020-03-12 RX ADMIN — LOSARTAN POTASSIUM 25 MG: 25 TABLET, FILM COATED ORAL at 08:03

## 2020-03-12 RX ADMIN — OXYCODONE HYDROCHLORIDE AND ACETAMINOPHEN 1 TABLET: 5; 325 TABLET ORAL at 04:03

## 2020-03-12 RX ADMIN — OXYCODONE HYDROCHLORIDE AND ACETAMINOPHEN 1 TABLET: 5; 325 TABLET ORAL at 12:03

## 2020-03-12 RX ADMIN — LETROZOLE 2.5 MG: 2.5 TABLET ORAL at 08:03

## 2020-03-12 RX ADMIN — PRAVASTATIN SODIUM 40 MG: 20 TABLET ORAL at 09:03

## 2020-03-12 RX ADMIN — MELATONIN 2000 UNITS: at 08:03

## 2020-03-12 RX ADMIN — IPRATROPIUM BROMIDE AND ALBUTEROL SULFATE 3 ML: 2.5; .5 SOLUTION RESPIRATORY (INHALATION) at 06:03

## 2020-03-12 RX ADMIN — IPRATROPIUM BROMIDE AND ALBUTEROL SULFATE 3 ML: 2.5; .5 SOLUTION RESPIRATORY (INHALATION) at 01:03

## 2020-03-12 NOTE — CLINICAL REVIEW
Clinical Pharmacy Chart Review Note      Admit Date: 3/10/2020   LOS: 2 days       Radha Woods is a 74 y.o. female admitted to SNF for PT/OT after hospitalization for acute on chronic respiratory failure with hypoxia and hypercapnia.    Active Hospital Problems    Diagnosis  POA    *Acute on chronic respiratory failure with hypoxia and hypercapnia [J96.21, J96.22]  Yes    Moderate malnutrition [E44.0]  Yes    Hyperlipidemia [E78.5]  Yes    Chronic low back pain [M54.5, G89.29]  Yes    Essential hypertension [I10]  Yes    Metabolic encephalopathy [G93.41]  Yes    COPD exacerbation [J44.1]  Yes    Osteoporosis [M81.0]  Yes      Resolved Hospital Problems   No resolved problems to display.     Review of patient's allergies indicates:   Allergen Reactions    Adhesive Rash     Use PAPER Tape / TEGADERM    Levaquin [levofloxacin] Diarrhea and Nausea Only    Penicillins Rash     Patient Active Problem List    Diagnosis Date Noted    Moderate malnutrition 03/11/2020    Chronic low back pain 03/07/2020    Other specified anemias 03/07/2020    Essential hypertension 03/07/2020    Hyperlipidemia 03/07/2020    Acute on chronic respiratory failure with hypoxia and hypercapnia 03/05/2020    COPD exacerbation 03/05/2020    Metabolic encephalopathy 03/05/2020    Osteoporosis 05/21/2019    Carcinoma of right breast metastatic to axillary lymph node 03/02/2018    Depression 09/20/2017    Breast cancer metastasized to axillary lymph node 06/15/2016    ALFARO (dyspnea on exertion) 04/29/2016    Use of aromatase inhibitors 01/06/2016    Candidiasis 08/23/2015    Encounter for antineoplastic chemotherapy 08/12/2015    Secondary and unspecified malignant neoplasm of lymph nodes 07/21/2015    Breast cancer 06/29/2015    Malignant neoplasm of other specified sites of female breast 05/22/2015       Scheduled Meds:    albuterol-ipratropium  3 mL Nebulization Q6H WAKE    aspirin  81 mg Oral Daily     fluticasone furoate-vilanteroL  1 puff Inhalation Daily    letrozole  2.5 mg Oral Daily    losartan  25 mg Oral Daily    magnesium oxide  400 mg Oral BID    potassium chloride  40 mEq Oral TID    pravastatin  40 mg Oral QHS    senna-docusate 8.6-50 mg  1 tablet Oral BID    tiotropium  1 capsule Inhalation Daily    vitamin D  2,000 Units Oral Daily     Continuous Infusions:   PRN Meds: acetaminophen, albuterol-ipratropium, calcium carbonate, melatonin, ondansetron, oxyCODONE-acetaminophen    OBJECTIVE:     Vital Signs (Last 24H)  Temp:  [97.1 °F (36.2 °C)-98.3 °F (36.8 °C)]   Pulse:  []   Resp:  [15-20]   BP: (130-140)/(60-68)   SpO2:  [91 %-97 %]     Laboratory:  CBC:   Recent Labs   Lab 03/09/20  0644 03/10/20  0615 03/12/20  0515   WBC 6.94 6.80 6.86   RBC 3.81* 3.75* 3.53*   HGB 11.3* 11.1* 10.5*   HCT 37.7 37.2 34.2*    218 210   MCV 99* 99* 97   MCH 29.7 29.6 29.7   MCHC 30.0* 29.8* 30.7*     BMP:   Recent Labs   Lab 03/09/20  0644 03/10/20  0615 03/12/20  0515   GLU 81 75 77    140 140   K 3.6 3.5 3.0*   CL 96 94* 94*   CO2 36* 40* 38*   BUN 11 10 11   CREATININE 0.6 0.6 0.5   CALCIUM 9.4 9.4 9.0   MG 1.9 1.8 1.8     CMP:   Recent Labs   Lab 03/05/20  1746  03/09/20  0644 03/10/20  0615 03/12/20  0515   GLU 81   < > 81 75 77   CALCIUM 10.1   < > 9.4 9.4 9.0   ALBUMIN 3.6  --   --   --   --    PROT 6.8  --   --   --   --       < > 141 140 140   K 4.0   < > 3.6 3.5 3.0*   CO2 48*   < > 36* 40* 38*   CL 93*   < > 96 94* 94*   BUN 9   < > 11 10 11   CREATININE 0.6   < > 0.6 0.6 0.5   ALKPHOS 68  --   --   --   --    ALT 6*  --   --   --   --    AST 10  --   --   --   --    BILITOT 0.4  --   --   --   --     < > = values in this interval not displayed.     LFTs:   Recent Labs   Lab 03/05/20  1746   ALT 6*   AST 10   ALKPHOS 68   BILITOT 0.4   PROT 6.8   ALBUMIN 3.6     ABGs:   Recent Labs   Lab 03/06/20  0852 03/06/20  2019 03/07/20  1110   PH 7.385 7.327* 7.381   PCO2 73.5* 77.1*  74.8*   PO2 29* 24* 70*   HCO3 44.0* 40.4* 44.4*   POCSATURATED 51* 34* 92*   BE 19 14 19       Recent Labs   Lab 03/05/20 2059   COLORU Yellow   SPECGRAV 1.015   PHUR >8.0*   PROTEINUA 1+*   BACTERIA Few*   NITRITE Negative   LEUKOCYTESUR 2+*   HYALINECASTS 0         ASSESSMENT/PLAN:     I have reviewed the medications in compliance with CMS Regulation F756 of the [place]. Based on information gathered, the following items may need to be addressed:    **Patient takes metoprolol at home. This medication was discontinued prior to SNF admission. Pulse:  []     Medications reviewed by PharmD, please re-consult if needed.      Marisela Corrales, Pharm. D.  Clinical Pharmacist  [facility]

## 2020-03-12 NOTE — HPI
Ms. You is a 75 yo female with severe COPD on 3 lt oxygen at home, osteoporosis on bisphosphonate, history of left breast cancer s/p lumpectomy and chemoradiation currently on maintenance therapy (Femara) who presents with acute confusion for the past few days on 3/5/2020. She was admitted to ICU with COPD exacerbation. She improved with BiPAP, around the clock nebs, prednisone and course of ceftriaxone. She was weaned to home oxygen levels of 2L. Patient states SOB at baseline. She complains of feeling of depression, hopelessness and anxiety over her increased need for assitance. She additionally complains of chronic back pain. Pain is is intermittent and sometimes radiate down to buttocks and bilateral legs. Pain medications are adequate.      Patient will be treated at [facility] with PT and OT to improve functional status and ability to perform ADLs.

## 2020-03-12 NOTE — HOSPITAL COURSE
Patient progressed well with PT and OT. Patient had no significant events during their stay at SNF. Home health was set up. DME was ordered if needed. Follow up appointment to be made by patient within one week. All prescriptions and discharge instructions were ordered to be given to the patient prior to discharge.       PEx  Constitutional: Patient appears well-developed.  No distress noted  HENT:   Head: Normocephalic and atraumatic.   Eyes: Pupils are equal, round  Neck: Normal range of motion. Neck supple.   Cardiovascular: Normal rate, regular rhythm and normal heart sounds.    Pulmonary/Chest: Effort normal and breath sounds are clear with diminished bases.  Supplemental O2 in progress  Abdominal: Soft. Bowel sounds are normal.   Musculoskeletal: Normal range of motion.   Neurological: Alert and oriented to person, place, and time.   Psychiatric: Normal mood and affect. Behavior is normal.   Skin: Skin is warm and dry. Scattered areas of ecchymosis

## 2020-03-12 NOTE — PLAN OF CARE
Problem: Physical Therapy Goal  Goal: Physical Therapy Goal  Description  Goals to be met by: 3/18/20     Patient will increase functional independence with mobility by performin. Supine to sit with Modified [place]  2. Sit to supine with Modified [place]  3. Sit to stand transfer with Supervision   4. Bed to chair transfer with Supervision using Rolling Walker  5. Gait  x 150 feet with Supervision using Rolling Walker.   6. Wheelchair propulsion x150 feet with Supervision using bilateral uppper extremities  7. Ascend/Descend 4 inch curb step with Supervision using Rolling Walker.  8. Stand for 3 minutes with Stand-by Assistance using Rolling Walker while performing a dynamic standing activity  Met 3/12/2020  9. Pt will perform a car transfer (actual or simulated) w/ RW and SBA  10. Pt will  object from floor using reacher w/ RW and SPV  11. Pt will walk over an uneven surface (outside ramp) w/ RW and SBA      Outcome: Ongoing, Progressing

## 2020-03-12 NOTE — PROGRESS NOTES
[facility]                                  Skilled Nursing Facility                   Progress Note     Admit Date: 3/10/2020  RUFINO TBD  Principal Problem:  Acute on chronic respiratory failure with hypoxia and hypercapnia   HPI obtained from patient interview and chart review     Chief Complaint: Establish Care/ Initial Visit     HPI:   Ms. You is a 74 year old female with PMHx of severe COPD on 3 lt oxygen at home, osteoporosis on bisphosphonate, history of left breast cancer s/p lumpectomy and chemoradiation currently on maintenance therapy (Femara) who presents to SNF following a hospitalization for COPD exacerbation.  Admission to SNF for secondary weakness and debility    Patient originally presented to [facility] [place] [place] ED on 03/05 with acute confusion for the past few days. She was admitted to ICU with COPD exacerbation. She improved with BiPAP, around the clock nebs, prednisone and course of ceftriaxone. She was weaned to home oxygen levels of 2L. Patient states SOB at baseline. She complains of feeling of depression, hopelessness and anxiety over her increased need for assitance. She additionally complains of chronic back pain. Pain is is intermittent and sometimes radiate down to buttocks and bilateral legs. Pain medications are adequate.     Patient will be treated at [facility] with PT and OT to improve functional status and ability to perform ADLs.     Past Medical History: Patient has a past medical history of Back pain, Breast cancer (05/2015), COPD (chronic obstructive pulmonary disease), Hyperlipidemia, Hypertension, and Osteoporosis (5/21/2019).    Past Surgical History: Patient has a past surgical history that includes Lumbar fusion; Cervical fusion; Hysterectomy; right lumpectomy; Lymph node dissection (Right); Breast biopsy (Right); and Breast lumpectomy (Right, 2015).    Social History: Patient reports that she has been  smoking cigarettes. She has a 45.00 pack-year smoking history. She uses smokeless tobacco. She reports that she drinks alcohol. She reports that she does not use drugs.    Family History: family history includes Breast cancer (age of onset: 75) in her maternal grandmother.    Allergies: Patient is allergic to adhesive; levaquin [levofloxacin]; and penicillins.    ROS  Constitutional: Negative for fever or fatigue.   Eyes: Negative for blurred vision, double vision and discharge.   Respiratory: + for cough, shortness of breath, at baseline    Cardiovascular: Negative for chest pain, palpitations, and leg swelling.   Gastrointestinal: Negative for abdominal pain, constipation, diarrhea, nausea and vomiting.   Genitourinary: Negative for dysuria, frequency and urgency.   Musculoskeletal:  + generalized weakness. Negative for back pain and myalgias.   Skin: Negative for itching and rash.   Neurological: Negative for dizziness, speech change, and headaches.   Psychiatric/Behavioral: Negative for depression. The patient is not nervous/anxious.      PEx  Temp:  [97.1 °F (36.2 °C)]   Pulse:  [80-91]   Resp:  [15-18]   BP: (130)/(60)   SpO2:  [91 %-97 %]      Constitutional: Patient appears well-developed.  No distress noted  HENT:   Head: Normocephalic and atraumatic.   Eyes: Pupils are equal, round  Neck: Normal range of motion. Neck supple.   Cardiovascular: Normal rate, regular rhythm and normal heart sounds.    Pulmonary/Chest: Effort normal and breath sounds are clear with diminished bases.  Supplemental O2 in progress  Abdominal: Soft. Bowel sounds are normal.   Musculoskeletal: Normal range of motion.   Neurological: Alert and oriented to person, place, and time.   Psychiatric: Normal mood and affect. Behavior is normal.   Skin: Skin is warm and dry. Full skin assessment completed.  Scattered areas of ecchymosis    Recent Labs   Lab 03/12/20  0515      K 3.0*   CL 94*   CO2 38*   BUN 11   CREATININE 0.5   MG  1.8       Recent Labs   Lab 03/12/20  0515   WBC 6.86   RBC 3.53*   HGB 10.5*   HCT 34.2*      MCV 97   MCH 29.7   MCHC 30.7*         Assessment and Plan:    COPD exacerbation  hypercarbic hypoxic respiratory failure  - S/p course of prednisone and ceftriaxone  - Continue duoneb QID, breo one puff daily, tiotropium 18 mcg daily  - Keep O2 sats 88-92, wean o2 as tolerated              Educated patient on the need for limiting her oxygen for sats 88-92%. Keep sats in high 90s will increase her hypercapnia     Hyperlipidemia  - Continue home pravastatin 40 mg     Essential hypertension  - Home medications: metoprolol succinate 50 mg daily  - Changed to losartan during hospitalization due to unclear reason  Goal SBP <150 - increase losartan to 25 mg daily      Chronic low back pain  - Patient with chronic back pain in setting of previous hx of breast cancer and compression fracture.   - continue Acetaminophen 650 mg prn and percocet 5-325 mg BID prn      Acute Metabolic encephalopathy Resolved  Delirium - resolved  Improved once hypercapnia resolved  Delirium precautions:  - Avoid antihistamines, anticholinergics, hypnotics, and minimize opiates while controlling for pain as these medications may exacerbate delirium. Cues for day/night will assist with keeping patient calm and oriented - during daytime, please keep adequate light in room (open windows, lights on) and please keep room dim at night-time to encourage normal sleep-wake cycles. Continuing to have nursing and family reorient the patient and encourage family to visit     Anemia of critical illness  - Should resolve without intervention     DJD of multiple sites   - PT/OT  - Pain management of above     At risk of malnutrition   - dietary consult     Chronic diastolic dysfunction   - euvolemic     Breast cancer   - continue letrozole 2.5 mg daily     Osteoporosis   - continue boniva after discharge  - continue Vitamin D 2000 units daily    Hypokalemia  -  initiated potassium 40 mEq TID x3 doses    Hypomagnesemia  - initiated magnesium oxide 400 mg BID X2 days    Seasonal allergies  Rhinitis  - initiated Zyrtec 10 mg qHS.    Debility   - Continue with PT/OT for gait training and strengthening and restoration of ADL's   - Encourage mobility, OOB in chair, and early ambulation as appropriate  - Fall precautions   - Monitor for bowel and bladder dysfunction  - Monitor for and prevent skin breakdown and pressure ulcers  - Continue DVT prophylaxis with  frequent ambulation      Future Appointments   Date Time Provider Department Center   3/19/2020  1:30 PM Rosio Allen MD [facility] HEM ONC [facility]         I certify that SNF services are required to be given on an inpatient basis because Radha Woods needs for skilled nursing care and/or skilled rehabilitation are required on a daily basis and such services can only practically be provided in a skilled nursing facility setting and are for an ongoing condition for which she received inpatient care in the hospital.     Total time of the visit 66 minutes [id number]   Non physical exam/ non charting time: 47 minutes   Description of non physical exam/non charting time: counseling patient on clinical conditions and therapies provided regarding COPD treatment, antihypertensive regimen, allergy medication, electrolyte replacements, importance of a bowel regimen and beginning of discharge planning.  Extensive chart review completed including all consultation notes.  All pertinent laboratory and radiographical images reviewed.        Allison Best NP      DOS: 3/12/2020       Patient note was created using MModal Dictation.  Any errors in syntax or even information may not have been identified and edited on initial review prior to signing this note.

## 2020-03-13 PROCEDURE — 25000003 PHARM REV CODE 250: Performed by: STUDENT IN AN ORGANIZED HEALTH CARE EDUCATION/TRAINING PROGRAM

## 2020-03-13 PROCEDURE — 27000221 HC OXYGEN, UP TO 24 HOURS

## 2020-03-13 PROCEDURE — 25000003 PHARM REV CODE 250: Performed by: NURSE PRACTITIONER

## 2020-03-13 PROCEDURE — 11000004 HC SNF PRIVATE

## 2020-03-13 PROCEDURE — 94640 AIRWAY INHALATION TREATMENT: CPT

## 2020-03-13 PROCEDURE — 97110 THERAPEUTIC EXERCISES: CPT | Mod: CQ

## 2020-03-13 PROCEDURE — 99900035 HC TECH TIME PER 15 MIN (STAT)

## 2020-03-13 PROCEDURE — 94760 N-INVAS EAR/PLS OXIMETRY 1: CPT

## 2020-03-13 PROCEDURE — 25000242 PHARM REV CODE 250 ALT 637 W/ HCPCS: Performed by: STUDENT IN AN ORGANIZED HEALTH CARE EDUCATION/TRAINING PROGRAM

## 2020-03-13 PROCEDURE — 25000003 PHARM REV CODE 250: Performed by: INTERNAL MEDICINE

## 2020-03-13 PROCEDURE — 97530 THERAPEUTIC ACTIVITIES: CPT

## 2020-03-13 PROCEDURE — 97530 THERAPEUTIC ACTIVITIES: CPT | Mod: CQ

## 2020-03-13 PROCEDURE — 94761 N-INVAS EAR/PLS OXIMETRY MLT: CPT

## 2020-03-13 PROCEDURE — 97116 GAIT TRAINING THERAPY: CPT | Mod: CQ

## 2020-03-13 RX ADMIN — IPRATROPIUM BROMIDE AND ALBUTEROL SULFATE 3 ML: 2.5; .5 SOLUTION RESPIRATORY (INHALATION) at 07:03

## 2020-03-13 RX ADMIN — IPRATROPIUM BROMIDE AND ALBUTEROL SULFATE 3 ML: 2.5; .5 SOLUTION RESPIRATORY (INHALATION) at 01:03

## 2020-03-13 RX ADMIN — Medication 400 MG: at 09:03

## 2020-03-13 RX ADMIN — CETIRIZINE HYDROCHLORIDE 10 MG: 5 TABLET ORAL at 10:03

## 2020-03-13 RX ADMIN — OXYCODONE HYDROCHLORIDE AND ACETAMINOPHEN 1 TABLET: 5; 325 TABLET ORAL at 09:03

## 2020-03-13 RX ADMIN — SENNOSIDES AND DOCUSATE SODIUM 1 TABLET: 8.6; 5 TABLET ORAL at 01:03

## 2020-03-13 RX ADMIN — LETROZOLE 2.5 MG: 2.5 TABLET ORAL at 01:03

## 2020-03-13 RX ADMIN — ASPIRIN 81 MG: 81 TABLET, COATED ORAL at 09:03

## 2020-03-13 RX ADMIN — OXYCODONE HYDROCHLORIDE AND ACETAMINOPHEN 1 TABLET: 5; 325 TABLET ORAL at 10:03

## 2020-03-13 RX ADMIN — TIOTROPIUM BROMIDE 18 MCG: 18 CAPSULE ORAL; RESPIRATORY (INHALATION) at 01:03

## 2020-03-13 RX ADMIN — MELATONIN 2000 UNITS: at 09:03

## 2020-03-13 RX ADMIN — LOSARTAN POTASSIUM 25 MG: 25 TABLET, FILM COATED ORAL at 09:03

## 2020-03-13 RX ADMIN — Medication 400 MG: at 10:03

## 2020-03-13 RX ADMIN — IPRATROPIUM BROMIDE AND ALBUTEROL SULFATE 3 ML: .5; 3 SOLUTION RESPIRATORY (INHALATION) at 01:03

## 2020-03-13 RX ADMIN — PRAVASTATIN SODIUM 40 MG: 20 TABLET ORAL at 10:03

## 2020-03-13 RX ADMIN — POTASSIUM CHLORIDE 40 MEQ: 1500 TABLET, EXTENDED RELEASE ORAL at 09:03

## 2020-03-13 RX ADMIN — FLUTICASONE FUROATE AND VILANTEROL TRIFENATATE 1 PUFF: 100; 25 POWDER RESPIRATORY (INHALATION) at 02:03

## 2020-03-13 NOTE — PT/OT/SLP PROGRESS
Occupational Therapy  Treatment    Radha Woods   MRN: [id number]   Admitting Diagnosis: Acute on chronic respiratory failure with hypoxia and hypercapnia    OT Date of Treatment: 03/13/20       Billable Minutes:  Therapeutic Activity 45    General Precautions: Standard, fall  Orthopedic Precautions: N/A  Braces: N/A    Spiritual, Cultural Beliefs, Restorationist Practices, Values that Affect Care: no    Subjective:  Communicated with nursing prior to session.  Pt c/o feelings of sudden onset weakness with knee buckling which has been ongoing for several months. She attributes her falls to this. She feels this is related to prior back surgery.    Pain/Comfort  Pain Rating 1: 4/10  Location - Side 1: Bilateral  Location - Orientation 1: lower  Location 1: back  Pain Addressed 1: Reposition  Pain Rating Post-Intervention 1: 4/10    Objective:     Bed Mobility:    · Patient completed Supine to Sit with contact guard assistance  · Patient completed Sit to Supine with contact guard assistance     Functional Mobility/Transfers:  · Patient completed Sit <> Stand Transfer with contact guard assistance  with  rolling walker   · Patient completed Chair <> Mat Step Transfer technique with contact guard assistance with rolling walker  · Patient completed Toilet Transfer Step Transfer technique with contact guard assistance with  rolling walker and bedside commode  · Functional Mobility: Pt seen for standing balance/ tolerance and BUE strengthening, education and training in proper breathing control during activity and work simplification maximizing her tolerance of leisure activities and meaningful activities.    Activities of Daily Living:  · Toileting: independence and contact guard assistance RW level    [facility] 6 Click:  [facility] Total Score: 19    OT Exercises: AROM 2x15 reps all planes of motion with 1# dumbbell.    Patient left up in chair with call button in reach    ASSESSMENT:  Radha Woods is a 74 y.o. female with a  medical diagnosis of Acute on chronic respiratory failure with hypoxia and hypercapnia who continues to be seen for skilled OT to maximize safe and feasible return to PLOF. At this time, this patient remains a significant fall risk and with increased activity demonstrates SOB with poor ability to self manage breathing control during activity.    Rehab identified problem list/impairments: weakness, impaired endurance, impaired self care skills, impaired functional mobilty, impaired cardiopulmonary response to activity    Rehab potential is good    Activity tolerance: Good    Discharge recommendations: home with home health     Barriers to discharge: Other (Comment)(undetermined d/c destination, pt will require A upon d/c.)     Equipment recommendations: wheelchair     GOALS:   Multidisciplinary Problems     Occupational Therapy Goals        Problem: Occupational Therapy Goal    Goal Priority Disciplines Outcome Interventions   Occupational Therapy Goal     OT, PT/OT Ongoing, Progressing    Description:  Pt to be seen by OT focusing on the following STG's:  Pt to perform LB dressing independently.  Pt to perform self showering on TTB RW level with modified [place].  Pt to demonstrate proper breathing control during activity maintaining O2 sats within parameters set by MD.  Pt to demonstrate increased BUE strength to 4/5 throughout.  Pt to perform simple meal prep standing level with modified [place].  Pt to perform BUE HEP as instructed by OT independently.                    Plan:  Patient to be seen 5 x/week to address the above listed problems via self-care/home management, therapeutic activities, therapeutic exercises  Plan of Care expires:    Plan of Care reviewed with: patient    Regina Gutierrez OT  03/13/2020

## 2020-03-13 NOTE — PT/OT/SLP PROGRESS
"Physical Therapy  Treatment    Radha Woods   MRN: [id number]   Admitting Diagnosis: Acute on chronic respiratory failure with hypoxia and hypercapnia    PT Received On: 03/13/20          Billable Minutes:  Gait Training 10, Therapeutic Activity 15 and Therapeutic Exercise 20    Treatment Type: Treatment  PT/PTA: PTA     PTA Visit Number: 2       General Precautions: Standard, fall  Orthopedic Precautions: N/A   Braces: N/A    Spiritual, Cultural Beliefs, Orthodox Practices, Values that Affect Care: no    Subjective:  "I'm good" Pt agreebale to therapy    Pain/Comfort  Pain Rating 1: 6/10  Location - Side 1: Bilateral  Location - Orientation 1: lower  Location 1: back  Pain Addressed 1: Reposition, Distraction, Cessation of Activity  Pain Rating Post-Intervention 1: 6/10    Objective:  Patient found in  in gym with oxygen 2L       AM-PAC 6 CLICK MOBILITY  Total Score:18    Bed Mobility:  Sit>Supine:S on mat and bed HOB flat  Supine>Sit: on mat S  Scooting to HOB seated EOB S    Transfers:  Sit<>Stand: t/f wc bed mat RW S  Stand Pivot Transfer: wc<>mat, wc>bed RW S    Gait:  Amb 200' RW S, c/o dizziness oxygen checked at end of trial 89% able to recover to 95% after few minutes of rest and focused breathing      Therex:  LBE x 5 minutes, pt limited by increased low back pain  Supine 2 x 10 SAQ, AP, QS, GS attempted deferred due to increased pain, HS    Balance:  No LOB noted throughout session    Additional Treatment:  Patient educated on importance of increased time out of bed and DOROTHEA throughout the day    Patient left HOB elevated with call button in reach and oxygen to wall 1.5 L.    Assessment:  Radha Woods is a 74 y.o. female with a medical diagnosis of Acute on chronic respiratory failure with hypoxia and hypercapnia.  Patient tolerated treatment fairly well focusing on bed mobility, transfers, gait and therex. Patient limited on this date due to increased low back pain. Patient will continue to improve " with skilled physical therapy services in order to return to functional baseline.    Rehab identified problem list/impairments: weakness, impaired endurance, impaired self care skills, impaired functional mobilty, gait instability, impaired balance, decreased lower extremity function, pain, impaired cardiopulmonary response to activity    Rehab potential is good.    Activity tolerance: Fair    Discharge recommendations: home with home health(w/ light family assist recomended)     Barriers to discharge: None    Equipment recommendations: wheelchair     GOALS:   Multidisciplinary Problems     Physical Therapy Goals        Problem: Physical Therapy Goal    Goal Priority Disciplines Outcome Goal Variances Interventions   Physical Therapy Goal     PT, PT/OT Ongoing, Progressing     Description:  Goals to be met by: 3/18/20     Patient will increase functional independence with mobility by performin. Supine to sit with Modified [place]  2. Sit to supine with Modified [place]  3. Sit to stand transfer with Supervision met 3/13/2020  4. Bed to chair transfer with Supervision using Rolling Walker met 3/13/2020  5. Gait  x 150 feet with Supervision using Rolling Walker. Met 3/13/2020  6. Wheelchair propulsion x150 feet with Supervision using bilateral uppper extremities  7. Ascend/Descend 4 inch curb step with Supervision using Rolling Walker.  8. Stand for 3 minutes with Stand-by Assistance using Rolling Walker while performing a dynamic standing activity  Met 3/12/2020  9. Pt will perform a car transfer (actual or simulated) w/ RW and SBA  10. Pt will  object from floor using reacher w/ RW and SPV  11. Pt will walk over an uneven surface (outside ramp) w/ RW and SBA                        PLAN:    Patient to be seen 5 x/week  to address the above listed problems via gait training, therapeutic activities, therapeutic exercises, neuromuscular re-education, wheelchair management/training  Plan of Care  expires: 04/10/20  Plan of Care reviewed with: patient    Regine Soliman, PTA  03/13/2020

## 2020-03-13 NOTE — PLAN OF CARE
Problem: Physical Therapy Goal  Goal: Physical Therapy Goal  Description  Goals to be met by: 3/18/20     Patient will increase functional independence with mobility by performin. Supine to sit with Modified [place]  2. Sit to supine with Modified [place]  3. Sit to stand transfer with Supervision met 3/13/2020  4. Bed to chair transfer with Supervision using Rolling Walker met 3/13/2020  5. Gait  x 150 feet with Supervision using Rolling Walker. Met 3/13/2020  6. Wheelchair propulsion x150 feet with Supervision using bilateral uppper extremities  7. Ascend/Descend 4 inch curb step with Supervision using Rolling Walker.  8. Stand for 3 minutes with Stand-by Assistance using Rolling Walker while performing a dynamic standing activity  Met 3/12/2020  9. Pt will perform a car transfer (actual or simulated) w/ RW and SBA  10. Pt will  object from floor using reacher w/ RW and SPV  11. Pt will walk over an uneven surface (outside ramp) w/ RW and SBA       Outcome: Ongoing, Progressing

## 2020-03-13 NOTE — PT/OT/SLP EVAL
Occupational Therapy  Evaluation    Radha Woods   MRN: [id number]   Admitting Diagnosis: Acute on chronic respiratory failure with hypoxia and hypercapnia     OT Date of Treatment: 03/11/20   OT Start Time: 0930  OT Stop Time: 1018  OT Total Time (min): 48 min    Billable Minutes:  Evaluation 10  Self Care/Home Management 23  Therapeutic Activity 15    Diagnosis: Acute on chronic respiratory failure with hypoxia and hypercapnia    Past Medical History:   Diagnosis Date    Back pain     instrumented fusion L3-L5. Spinal cord stimulator in place. L5-S1 right paracentral disc osteophyte complex resulting in moderate right neural foraminal narrowing.    Breast cancer 05/2015    (status post lumpectomy and chemoradiation, currently on maintenance therapy with FEMARA). Right breast infiltrating ductal CA with DCIS, ER positive, [place]/Her2 negative     COPD (chronic obstructive pulmonary disease)     Chronic bronchitis;  (on 3 L at home)     Hyperlipidemia     Hypertension     Osteoporosis 5/21/2019      Past Surgical History:   Procedure Laterality Date    BREAST BIOPSY Right     BREAST LUMPECTOMY Right 2015    IDC & DCIS    CERVICAL FUSION      HYSTERECTOMY      with BSO    LUMBAR FUSION      LYMPH NODE DISSECTION Right     right lumpectomy           General Precautions: Standard, fall  Orthopedic Precautions: N/A  Braces: N/A    Spiritual, Cultural Beliefs, Pentecostal Practices, Values that Affect Care: no     Patient History:  Lives With: alone  Living Arrangements: house  Home Accessibility: stairs to enter home  Home Layout: Able to live on 1st floor  Stair Railings at Home: none  Transportation Anticipated: family or friend will provide  Living Environment Comment: Pt lives alone in [facility] and was in [place] for medical f/u appt and staying temporarily with her adult son. Pt may d/c to her son's home with assistance from grandson. D/c plan is TBD.  Equipment Currently Used at Home: oxygen, shower chair,  walker, rolling    Prior level of function:    Bed Mobility/Transfers: independent  Grooming: independent  Bathing: independent  Upper Body Dressing: independent  Lower Body Dressing: independent  Toileting: independent        Dominant hand: right    Subjective:  Communicated with nursing prior to session.  Pt reports she was in town for a medical appt and lives in [facility]. She reports several obstacles to being d/c to her son's home.   Chief Complaint: SOB with activity  Patient/Family stated goals: To be able to take care of myself.     Objective:        Cognitive Exam:  Oriented to: Person, Place, Time and Situation  Follows Commands/attention: Follows two-step commands  Communication: clear/fluent  Memory:  No Deficits noted  Safety awareness/insight to disability: intact  Coping skills/emotional control: Appropriate to situation    Visual/perceptual:  Intact    Physical Exam:  Postural examination/scapula alignment:    -       No postural abnormalities identified  Skin integrity: Visible skin intact  Edema: None noted throughout    Sensation:      -       Intact    Upper Extremity Range of Motion:  Right Upper Extremity: WFL  Left Upper Extremity: WFL    Upper Extremity Strength:  Right Upper Extremity: grossly 3/5  Left Upper Extremity: grossly 3/5   Strength: WFL BUE    Fine motor coordination:      -       Intact    Gross motor coordination: WFL    Occupational Performance:    Bed Mobility:    · Patient completed Rolling/Turning to Left with  contact guard assistance  · Patient completed Rolling/Turning to Right with contact guard assistance  · Patient completed Supine to Sit with contact guard assistance  · Patient completed Sit to Supine with contact guard assistance     Functional Mobility/Transfers:  · Patient completed Sit <> Stand Transfer with contact guard assistance  with  rolling walker   · Patient completed Bed <> Chair Transfer using Step Transfer technique with contact guard assistance with  rolling walker  · Patient completed Toilet Transfer Step Transfer technique with contact guard assistance with  rolling walker and bedside commode  · Patient completed  Shower Transfer Step Transfer technique with minimum assistance with rolling walker and ttb  · Functional Mobility: Pt performed simple grooming in standing yet was unable to maintain for entire task. Currently performing all ADL's w/c level due to SOB with standing tasks.    Activities of Daily Living:  · Feeding:  independence    · Grooming: minimum assistance w/c level  · Bathing: minimum assistance seated on ttb  · Upper Body Dressing: minimum assistance    · Lower Body Dressing: minimum assistance    · Toileting: contact guard assistance      OT Exercises: AROM 1x10 reps shoulder flexion/ abduction, bicep curls with no resistance    Additional Treatment:  OT educated pt on the importance of proper breathing control during activity.     Patient left up in chair with call button in reach    Assessment:  Radha Woods is a 74 y.o. female with a medical diagnosis of Acute on chronic respiratory failure with hypoxia and hypercapnia who presents with poor knowledge/ understanding of proper breathing control during activity, poor work simplification and energy conservation to maximize her ability to engage in leisure activities, ADL's and simple homemaking tasks. Per report she would increase her O2 with increased activity which may have contributed to elevated CO2 levels. Pt also presents requiring assistance with all community ADL's. Prior to admission she was living alone several hours from , requiring her son to visit and assist every several weeks. This OT recommended need for intermittent assist consistently upon d/c.     Rehab identified problem list/impairments: weakness, impaired endurance, impaired self care skills, impaired functional mobilty, impaired cardiopulmonary response to activity    Rehab potential is good    Activity  tolerance: Good    Discharge recommendations: home with home health     Barriers to discharge: Other (Comment)(undetermined d/c destination, pt will require A upon d/c.)     Equipment recommendations: wheelchair     GOALS:   Multidisciplinary Problems     Occupational Therapy Goals        Problem: Occupational Therapy Goal    Goal Priority Disciplines Outcome Interventions   Occupational Therapy Goal     OT, PT/OT Ongoing, Progressing    Description:  Pt to be seen by OT focusing on the following STG's:  Pt to perform LB dressing independently.  Pt to perform self showering on TTB RW level with modified [place].  Pt to demonstrate proper breathing control during activity maintaining O2 sats within parameters set by MD.  Pt to demonstrate increased BUE strength to 4/5 throughout.  Pt to perform simple meal prep standing level with modified [place].  Pt to perform BUE HEP as instructed by OT independently.                    PLAN: Patient to be seen 5 x/week to address the above listed problems via self-care/home management, therapeutic activities, therapeutic exercises  Plan of Care expires:    Plan of Care reviewed with: patient    Reginabrook Hernandezsudarshan OT  03/11/2020

## 2020-03-13 NOTE — PT/OT/SLP PROGRESS
Occupational Therapy  Treatment    Radha Woods   MRN: [id number]   Admitting Diagnosis: Acute on chronic respiratory failure with hypoxia and hypercapnia    OT Date of Treatment: 03/11/20       Billable Minutes:  Self Care/Home Management 25 and Therapeutic Activity 30    General Precautions: Standard, fall  Orthopedic Precautions: N/A  Braces: N/A    Spiritual, Cultural Beliefs, Congregation Practices, Values that Affect Care: no    Subjective:  Communicated with nursing prior to session.  Pt reports fear of standing and losing her balance.     Objective:    Functional Mobility/Transfers:  · Patient completed Sit <> Stand Transfer with contact guard assistance  with  rolling walker   · Patient completed Toilet Transfer Step Transfer technique with contact guard assistance with  rolling walker and bedside commode  · Patient completed  Shower Transfer Step Transfer technique with minimum assistance with rolling walker and ttb  · Functional Mobility: Pt performed ADL's w/c level this day.    Activities of Daily Living:  · Grooming: contact guard assistance w/c level  · Bathing: minimum assistance on ttb  · Upper Body Dressing: minimum assistance    · Lower Body Dressing: minimum assistance    · Toileting: stand by assistance        OT Exercises: AROM 2x15 reps all planes of [place]  UE Ergometer 6 minutes    Additional Treatment:  Pt was seen for standing balance/ tolerance ex's, education and training in breathing control during activity,     Patient left up in chair with call button in reach    ASSESSMENT:  Radha Woods is a 74 y.o. female with a medical diagnosis of Acute on chronic respiratory failure with hypoxia and hypercapnia who presents with below noted impairments, poor institution of breathing control to maximize ability to participate in ADL's, requirnig frequent rest breaks.    Rehab identified problem list/impairments: weakness, impaired endurance, impaired self care skills, impaired functional  mobilty, impaired cardiopulmonary response to activity    Rehab potential is good    Activity tolerance: Good    Discharge recommendations: home with home health     Barriers to discharge: Other (Comment)(undetermined d/c destination, pt will require A upon d/c.)     Equipment recommendations: wheelchair     GOALS:   Multidisciplinary Problems     Occupational Therapy Goals        Problem: Occupational Therapy Goal    Goal Priority Disciplines Outcome Interventions   Occupational Therapy Goal     OT, PT/OT Ongoing, Progressing    Description:  Pt to be seen by OT focusing on the following STG's:  Pt to perform LB dressing independently.  Pt to perform self showering on TTB RW level with modified [place].  Pt to demonstrate proper breathing control during activity maintaining O2 sats within parameters set by MD.  Pt to demonstrate increased BUE strength to 4/5 throughout.  Pt to perform simple meal prep standing level with modified [place].  Pt to perform BUE HEP as instructed by OT independently.                    Plan:  Patient to be seen 5 x/week to address the above listed problems via self-care/home management, therapeutic activities, therapeutic exercises  Plan of Care expires:    Plan of Care reviewed with: patient    Regina Gutierrez OT  03/12/2020

## 2020-03-13 NOTE — PLAN OF CARE
Problem: Occupational Therapy Goal  Goal: Occupational Therapy Goal  Description  Pt to be seen by OT focusing on the following STG's:  Pt to perform LB dressing independently.  Pt to perform self showering on TTB RW level with modified [place].  Pt to demonstrate proper breathing control during activity maintaining O2 sats within parameters set by MD.  Pt to demonstrate increased BUE strength to 4/5 throughout.  Pt to perform simple meal prep standing level with modified [place].  Pt to perform BUE HEP as instructed by OT independently.   3/12/2020 2251 by Regina Gutierrez OT  Outcome: Ongoing, Progressing  3/12/2020 2248 by Regina Gutierrez, OT  Outcome: Ongoing, Progressing

## 2020-03-14 PROCEDURE — 97116 GAIT TRAINING THERAPY: CPT | Mod: CQ

## 2020-03-14 PROCEDURE — 94761 N-INVAS EAR/PLS OXIMETRY MLT: CPT

## 2020-03-14 PROCEDURE — 97110 THERAPEUTIC EXERCISES: CPT | Mod: CQ

## 2020-03-14 PROCEDURE — 94640 AIRWAY INHALATION TREATMENT: CPT

## 2020-03-14 PROCEDURE — 27000221 HC OXYGEN, UP TO 24 HOURS

## 2020-03-14 PROCEDURE — 25000003 PHARM REV CODE 250: Performed by: STUDENT IN AN ORGANIZED HEALTH CARE EDUCATION/TRAINING PROGRAM

## 2020-03-14 PROCEDURE — 25000003 PHARM REV CODE 250: Performed by: NURSE PRACTITIONER

## 2020-03-14 PROCEDURE — 25000003 PHARM REV CODE 250: Performed by: INTERNAL MEDICINE

## 2020-03-14 PROCEDURE — 11000004 HC SNF PRIVATE

## 2020-03-14 PROCEDURE — 25000242 PHARM REV CODE 250 ALT 637 W/ HCPCS: Performed by: STUDENT IN AN ORGANIZED HEALTH CARE EDUCATION/TRAINING PROGRAM

## 2020-03-14 PROCEDURE — 99900035 HC TECH TIME PER 15 MIN (STAT)

## 2020-03-14 PROCEDURE — 97530 THERAPEUTIC ACTIVITIES: CPT | Mod: CQ

## 2020-03-14 RX ADMIN — CETIRIZINE HYDROCHLORIDE 10 MG: 5 TABLET ORAL at 09:03

## 2020-03-14 RX ADMIN — PRAVASTATIN SODIUM 40 MG: 20 TABLET ORAL at 09:03

## 2020-03-14 RX ADMIN — IPRATROPIUM BROMIDE AND ALBUTEROL SULFATE 3 ML: 2.5; .5 SOLUTION RESPIRATORY (INHALATION) at 02:03

## 2020-03-14 RX ADMIN — IPRATROPIUM BROMIDE AND ALBUTEROL SULFATE 3 ML: 2.5; .5 SOLUTION RESPIRATORY (INHALATION) at 08:03

## 2020-03-14 RX ADMIN — LETROZOLE 2.5 MG: 2.5 TABLET ORAL at 09:03

## 2020-03-14 RX ADMIN — OXYCODONE HYDROCHLORIDE AND ACETAMINOPHEN 1 TABLET: 5; 325 TABLET ORAL at 05:03

## 2020-03-14 RX ADMIN — FLUTICASONE FUROATE AND VILANTEROL TRIFENATATE 1 PUFF: 100; 25 POWDER RESPIRATORY (INHALATION) at 09:03

## 2020-03-14 RX ADMIN — TIOTROPIUM BROMIDE 18 MCG: 18 CAPSULE ORAL; RESPIRATORY (INHALATION) at 09:03

## 2020-03-14 RX ADMIN — MELATONIN 2000 UNITS: at 09:03

## 2020-03-14 RX ADMIN — LOSARTAN POTASSIUM 25 MG: 25 TABLET, FILM COATED ORAL at 09:03

## 2020-03-14 RX ADMIN — ASPIRIN 81 MG: 81 TABLET, COATED ORAL at 09:03

## 2020-03-14 RX ADMIN — IPRATROPIUM BROMIDE AND ALBUTEROL SULFATE 3 ML: 2.5; .5 SOLUTION RESPIRATORY (INHALATION) at 09:03

## 2020-03-14 RX ADMIN — OXYCODONE HYDROCHLORIDE AND ACETAMINOPHEN 1 TABLET: 5; 325 TABLET ORAL at 09:03

## 2020-03-14 NOTE — PT/OT/SLP PROGRESS
Physical Therapy  Treatment    Radha Woods   MRN: [id number]   Admitting Diagnosis: Acute on chronic respiratory failure with hypoxia and hypercapnia    PT Received On: 03/14/20  Total Time (min): (--)       Billable Minutes:  Gait Training 10, Therapeutic Activity 10 and Therapeutic Exercise 18    Treatment Type: Treatment  PT/PTA: PTA     PTA Visit Number: 3       General Precautions: Standard, fall  Orthopedic Precautions: N/A   Braces: N/A    Spiritual, Cultural Beliefs, Methodist Practices, Values that Affect Care: no    Subjective:  Communicated with nursing prior to session.  Pt agreed to work with therapy.     Pain/Comfort  Pain Rating 1: 4/10  Location - Side 1: Bilateral  Location - Orientation 1: lower  Location 1: back  Pain Addressed 1: Reposition  Pain Rating Post-Intervention 1: 4/10    Objective:  Patient found seated w/c w/ O2.         AM-PAC 6 CLICK MOBILITY  Total Score:18    Transfers:  Sit<>Stand: to/from w/c x2 trials w/ RW and SPV  Stand Pivot Transfer: w/c to recumbent stepper w/ RW and SPV    Gait:  Amb x2 trials of 150ft w/ RW and SPV. No LOB noted. Cueing for proper breathing technique.      Therex:  BLE therex 2x10 reps:    -AP   -LAQ   -hip flexion    -GS   -Hip abd/add    Additional Treatment:  -Recumbent stepper 9 min L2 w/ rest breaks as needed.     Patient left up in chair with all lines intact, call button in reach and nursing notified.    Assessment:  Radha Woods is a 74 y.o. female with a medical diagnosis of Acute on chronic respiratory failure with hypoxia and hypercapnia.  Pt tolerated treatment well, and will continue to benefit from PT services at this time. Continue with PT POC as indicated..    Rehab identified problem list/impairments: weakness, impaired endurance, impaired self care skills, impaired functional mobilty, gait instability, impaired balance, decreased lower extremity function, pain, impaired cardiopulmonary response to activity    Rehab potential is  good.    Activity tolerance: Fair    Discharge recommendations: home with home health(w/ light family assist recomended)     Barriers to discharge: None    Equipment recommendations: wheelchair     GOALS:   Multidisciplinary Problems     Physical Therapy Goals        Problem: Physical Therapy Goal    Goal Priority Disciplines Outcome Goal Variances Interventions   Physical Therapy Goal     PT, PT/OT Ongoing, Progressing     Description:  Goals to be met by: 3/18/20     Patient will increase functional independence with mobility by performin. Supine to sit with Modified [place]  2. Sit to supine with Modified [place]  3. Sit to stand transfer with Supervision met 3/13/2020  4. Bed to chair transfer with Supervision using Rolling Walker met 3/13/2020  5. Gait  x 150 feet with Supervision using Rolling Walker. Met 3/13/2020  6. Wheelchair propulsion x150 feet with Supervision using bilateral uppper extremities  7. Ascend/Descend 4 inch curb step with Supervision using Rolling Walker.  8. Stand for 3 minutes with Stand-by Assistance using Rolling Walker while performing a dynamic standing activity  Met 3/12/2020  9. Pt will perform a car transfer (actual or simulated) w/ RW and SBA  10. Pt will  object from floor using reacher w/ RW and SPV  11. Pt will walk over an uneven surface (outside ramp) w/ RW and SBA                        PLAN:    Patient to be seen 5 x/week  to address the above listed problems via gait training, therapeutic activities, therapeutic exercises, neuromuscular re-education, wheelchair management/training  Plan of Care expires: 04/10/20  Plan of Care reviewed with: patient    Marisela Lima, PTA  2020

## 2020-03-14 NOTE — PLAN OF CARE
Patient aaox3, amb,contx2, vss, POC explained, patient verbalize under standing will cont to monitor.

## 2020-03-15 PROCEDURE — 27000221 HC OXYGEN, UP TO 24 HOURS

## 2020-03-15 PROCEDURE — 99900035 HC TECH TIME PER 15 MIN (STAT)

## 2020-03-15 PROCEDURE — 94761 N-INVAS EAR/PLS OXIMETRY MLT: CPT

## 2020-03-15 PROCEDURE — 25000003 PHARM REV CODE 250: Performed by: INTERNAL MEDICINE

## 2020-03-15 PROCEDURE — 25000242 PHARM REV CODE 250 ALT 637 W/ HCPCS: Performed by: STUDENT IN AN ORGANIZED HEALTH CARE EDUCATION/TRAINING PROGRAM

## 2020-03-15 PROCEDURE — 94640 AIRWAY INHALATION TREATMENT: CPT

## 2020-03-15 PROCEDURE — 25000003 PHARM REV CODE 250: Performed by: HOSPITALIST

## 2020-03-15 PROCEDURE — 11000004 HC SNF PRIVATE

## 2020-03-15 PROCEDURE — 25000003 PHARM REV CODE 250: Performed by: NURSE PRACTITIONER

## 2020-03-15 PROCEDURE — 25000003 PHARM REV CODE 250: Performed by: STUDENT IN AN ORGANIZED HEALTH CARE EDUCATION/TRAINING PROGRAM

## 2020-03-15 RX ORDER — OXYCODONE AND ACETAMINOPHEN 5; 325 MG/1; MG/1
1 TABLET ORAL EVERY 6 HOURS PRN
Status: DISCONTINUED | OUTPATIENT
Start: 2020-03-15 | End: 2020-03-23 | Stop reason: HOSPADM

## 2020-03-15 RX ADMIN — IPRATROPIUM BROMIDE AND ALBUTEROL SULFATE 3 ML: 2.5; .5 SOLUTION RESPIRATORY (INHALATION) at 08:03

## 2020-03-15 RX ADMIN — LOSARTAN POTASSIUM 25 MG: 25 TABLET, FILM COATED ORAL at 08:03

## 2020-03-15 RX ADMIN — SENNOSIDES AND DOCUSATE SODIUM 1 TABLET: 8.6; 5 TABLET ORAL at 08:03

## 2020-03-15 RX ADMIN — FLUTICASONE FUROATE AND VILANTEROL TRIFENATATE 1 PUFF: 100; 25 POWDER RESPIRATORY (INHALATION) at 08:03

## 2020-03-15 RX ADMIN — ASPIRIN 81 MG: 81 TABLET, COATED ORAL at 08:03

## 2020-03-15 RX ADMIN — IPRATROPIUM BROMIDE AND ALBUTEROL SULFATE 3 ML: 2.5; .5 SOLUTION RESPIRATORY (INHALATION) at 06:03

## 2020-03-15 RX ADMIN — OXYCODONE HYDROCHLORIDE AND ACETAMINOPHEN 1 TABLET: 5; 325 TABLET ORAL at 02:03

## 2020-03-15 RX ADMIN — MELATONIN 2000 UNITS: at 08:03

## 2020-03-15 RX ADMIN — LETROZOLE 2.5 MG: 2.5 TABLET ORAL at 08:03

## 2020-03-15 RX ADMIN — PRAVASTATIN SODIUM 40 MG: 20 TABLET ORAL at 09:03

## 2020-03-15 RX ADMIN — Medication 6 MG: at 02:03

## 2020-03-15 RX ADMIN — CETIRIZINE HYDROCHLORIDE 10 MG: 5 TABLET ORAL at 09:03

## 2020-03-15 RX ADMIN — OXYCODONE HYDROCHLORIDE AND ACETAMINOPHEN 1 TABLET: 5; 325 TABLET ORAL at 10:03

## 2020-03-15 RX ADMIN — OXYCODONE HYDROCHLORIDE AND ACETAMINOPHEN 1 TABLET: 5; 325 TABLET ORAL at 05:03

## 2020-03-15 RX ADMIN — IPRATROPIUM BROMIDE AND ALBUTEROL SULFATE 3 ML: 2.5; .5 SOLUTION RESPIRATORY (INHALATION) at 01:03

## 2020-03-15 NOTE — PLAN OF CARE
Problem: Wound  Goal: Optimal Wound Healing  Outcome: Ongoing, Progressing     Problem: Adult Inpatient Plan of Care  Goal: Plan of Care Review  Outcome: Ongoing, Progressing     Problem: Adult Inpatient Plan of Care  Goal: Patient-Specific Goal (Individualization)  Outcome: Ongoing, Progressing     Problem: Adult Inpatient Plan of Care  Goal: Absence of Hospital-Acquired Illness or Injury  Outcome: Ongoing, Progressing

## 2020-03-15 NOTE — PLAN OF CARE
Problem: Adult Inpatient Plan of Care  Goal: Plan of Care Review  Outcome: Ongoing, Progressing     Problem: Adult Inpatient Plan of Care  Goal: Patient-Specific Goal (Individualization)  Outcome: Ongoing, Progressing     Problem: Fall Injury Risk  Goal: Absence of Fall and Fall-Related Injury  Intervention: Promote Injury-Free Environment  Flowsheets (Taken 3/15/2020 1037)  Safety Promotion/Fall Prevention: assistive device/personal item within reach; Fall Risk reviewed with patient/family; Fall Risk signage in place; medications reviewed; nonskid shoes/socks when out of bed; side rails raised x 2; supervised activity  Environmental Safety Modification: assistive device/personal items within reach; clutter free environment maintained; room organization consistent; mobility aid in reach     Problem: Skin Injury Risk Increased  Goal: Skin Health and Integrity  Intervention: Promote and Optimize Oral Intake  Flowsheets (Taken 3/15/2020 1037)  Oral Nutrition Promotion: safe use of adaptive equipment encouraged; rest periods promoted; social interaction promoted; medicated     Problem: Malnutrition  Goal: Improved Nutritional Intake  Intervention: Promote and Optimize Oral Intake  Flowsheets (Taken 3/15/2020 1037)  Oral Nutrition Promotion: safe use of adaptive equipment encouraged; rest periods promoted; social interaction promoted; medicated

## 2020-03-16 LAB
ANION GAP SERPL CALC-SCNC: 6 MMOL/L (ref 8–16)
BASOPHILS # BLD AUTO: 0.05 K/UL (ref 0–0.2)
BASOPHILS NFR BLD: 0.7 % (ref 0–1.9)
BUN SERPL-MCNC: 12 MG/DL (ref 8–23)
CALCIUM SERPL-MCNC: 9.1 MG/DL (ref 8.7–10.5)
CHLORIDE SERPL-SCNC: 97 MMOL/L (ref 95–110)
CO2 SERPL-SCNC: 39 MMOL/L (ref 23–29)
CREAT SERPL-MCNC: 0.6 MG/DL (ref 0.5–1.4)
DIFFERENTIAL METHOD: ABNORMAL
EOSINOPHIL # BLD AUTO: 0.3 K/UL (ref 0–0.5)
EOSINOPHIL NFR BLD: 3.8 % (ref 0–8)
ERYTHROCYTE [DISTWIDTH] IN BLOOD BY AUTOMATED COUNT: 13.3 % (ref 11.5–14.5)
EST. GFR  (AFRICAN AMERICAN): >60 ML/MIN/1.73 M^2
EST. GFR  (NON AFRICAN AMERICAN): >60 ML/MIN/1.73 M^2
GLUCOSE SERPL-MCNC: 88 MG/DL (ref 70–110)
HCT VFR BLD AUTO: 35.4 % (ref 37–48.5)
HGB BLD-MCNC: 10.3 G/DL (ref 12–16)
IMM GRANULOCYTES # BLD AUTO: 0.08 K/UL (ref 0–0.04)
IMM GRANULOCYTES NFR BLD AUTO: 1.1 % (ref 0–0.5)
LYMPHOCYTES # BLD AUTO: 1.7 K/UL (ref 1–4.8)
LYMPHOCYTES NFR BLD: 22.3 % (ref 18–48)
MAGNESIUM SERPL-MCNC: 2.1 MG/DL (ref 1.6–2.6)
MCH RBC QN AUTO: 29.8 PG (ref 27–31)
MCHC RBC AUTO-ENTMCNC: 29.1 G/DL (ref 32–36)
MCV RBC AUTO: 102 FL (ref 82–98)
MONOCYTES # BLD AUTO: 1 K/UL (ref 0.3–1)
MONOCYTES NFR BLD: 13.2 % (ref 4–15)
NEUTROPHILS # BLD AUTO: 4.4 K/UL (ref 1.8–7.7)
NEUTROPHILS NFR BLD: 58.9 % (ref 38–73)
NRBC BLD-RTO: 0 /100 WBC
PHOSPHATE SERPL-MCNC: 4.3 MG/DL (ref 2.7–4.5)
PLATELET # BLD AUTO: 273 K/UL (ref 150–350)
PMV BLD AUTO: 10 FL (ref 9.2–12.9)
POTASSIUM SERPL-SCNC: 4.5 MMOL/L (ref 3.5–5.1)
RBC # BLD AUTO: 3.46 M/UL (ref 4–5.4)
SODIUM SERPL-SCNC: 142 MMOL/L (ref 136–145)
WBC # BLD AUTO: 7.43 K/UL (ref 3.9–12.7)

## 2020-03-16 PROCEDURE — 97110 THERAPEUTIC EXERCISES: CPT

## 2020-03-16 PROCEDURE — 85025 COMPLETE CBC W/AUTO DIFF WBC: CPT

## 2020-03-16 PROCEDURE — 94761 N-INVAS EAR/PLS OXIMETRY MLT: CPT

## 2020-03-16 PROCEDURE — 84100 ASSAY OF PHOSPHORUS: CPT

## 2020-03-16 PROCEDURE — 97535 SELF CARE MNGMENT TRAINING: CPT

## 2020-03-16 PROCEDURE — 97116 GAIT TRAINING THERAPY: CPT

## 2020-03-16 PROCEDURE — 97530 THERAPEUTIC ACTIVITIES: CPT

## 2020-03-16 PROCEDURE — 83735 ASSAY OF MAGNESIUM: CPT

## 2020-03-16 PROCEDURE — 36415 COLL VENOUS BLD VENIPUNCTURE: CPT

## 2020-03-16 PROCEDURE — 25000242 PHARM REV CODE 250 ALT 637 W/ HCPCS: Performed by: STUDENT IN AN ORGANIZED HEALTH CARE EDUCATION/TRAINING PROGRAM

## 2020-03-16 PROCEDURE — 11000004 HC SNF PRIVATE

## 2020-03-16 PROCEDURE — 94640 AIRWAY INHALATION TREATMENT: CPT

## 2020-03-16 PROCEDURE — 25000003 PHARM REV CODE 250: Performed by: NURSE PRACTITIONER

## 2020-03-16 PROCEDURE — 25000003 PHARM REV CODE 250: Performed by: STUDENT IN AN ORGANIZED HEALTH CARE EDUCATION/TRAINING PROGRAM

## 2020-03-16 PROCEDURE — 80048 BASIC METABOLIC PNL TOTAL CA: CPT

## 2020-03-16 PROCEDURE — 99900035 HC TECH TIME PER 15 MIN (STAT)

## 2020-03-16 PROCEDURE — 27000221 HC OXYGEN, UP TO 24 HOURS

## 2020-03-16 PROCEDURE — 25000003 PHARM REV CODE 250: Performed by: HOSPITALIST

## 2020-03-16 PROCEDURE — 25000003 PHARM REV CODE 250: Performed by: INTERNAL MEDICINE

## 2020-03-16 RX ADMIN — LETROZOLE 2.5 MG: 2.5 TABLET ORAL at 09:03

## 2020-03-16 RX ADMIN — OXYCODONE HYDROCHLORIDE AND ACETAMINOPHEN 1 TABLET: 5; 325 TABLET ORAL at 07:03

## 2020-03-16 RX ADMIN — ASPIRIN 81 MG: 81 TABLET, COATED ORAL at 11:03

## 2020-03-16 RX ADMIN — OXYCODONE HYDROCHLORIDE AND ACETAMINOPHEN 1 TABLET: 5; 325 TABLET ORAL at 11:03

## 2020-03-16 RX ADMIN — SENNOSIDES AND DOCUSATE SODIUM 1 TABLET: 8.6; 5 TABLET ORAL at 08:03

## 2020-03-16 RX ADMIN — IPRATROPIUM BROMIDE AND ALBUTEROL SULFATE 3 ML: 2.5; .5 SOLUTION RESPIRATORY (INHALATION) at 08:03

## 2020-03-16 RX ADMIN — FLUTICASONE FUROATE AND VILANTEROL TRIFENATATE 1 PUFF: 100; 25 POWDER RESPIRATORY (INHALATION) at 11:03

## 2020-03-16 RX ADMIN — PRAVASTATIN SODIUM 40 MG: 20 TABLET ORAL at 08:03

## 2020-03-16 RX ADMIN — CETIRIZINE HYDROCHLORIDE 10 MG: 5 TABLET ORAL at 08:03

## 2020-03-16 RX ADMIN — LOSARTAN POTASSIUM 25 MG: 25 TABLET, FILM COATED ORAL at 11:03

## 2020-03-16 RX ADMIN — SENNOSIDES AND DOCUSATE SODIUM 1 TABLET: 8.6; 5 TABLET ORAL at 11:03

## 2020-03-16 RX ADMIN — IPRATROPIUM BROMIDE AND ALBUTEROL SULFATE 3 ML: 2.5; .5 SOLUTION RESPIRATORY (INHALATION) at 02:03

## 2020-03-16 RX ADMIN — TIOTROPIUM BROMIDE 18 MCG: 18 CAPSULE ORAL; RESPIRATORY (INHALATION) at 11:03

## 2020-03-16 RX ADMIN — MELATONIN 2000 UNITS: at 11:03

## 2020-03-16 RX ADMIN — IPRATROPIUM BROMIDE AND ALBUTEROL SULFATE 3 ML: 2.5; .5 SOLUTION RESPIRATORY (INHALATION) at 06:03

## 2020-03-16 NOTE — TREATMENT PLAN
Rehab Services' DME recommendations    Radha Woods  MRN: [id number]  [x] Wheelchair  Number of hours up in a wheelchair per day 8       Style Light weight        Justification for light weight w/c: patient can and does self-propel in a lightweight wheelchair, patient has impaired ability to participate in MRADLs, a wheelchair will significantly improve the ability to participate in MRADLs and will be used in the home on a regular basis, the patient is willing to use a wheelchair in the home and the patient has a caregiver who is available, willing, and able to provide assistance with the wheelchair    Seat Width 18 (Standard adult)    Seat Depth 18    Back Height Standard    Leg Support Standard and Swing Away    Arm Height Full and Swing Away    Lap Belt Velcro      Cushion Basic    Justification for Cushion maintain skin integrity    Justification for wheelchair order: (Please select all that apply) Caregiver is capable and willing to operate wheelchair safely, Patient's upper body strength is sufficient for propulsion and The patient requires the use of a wheelchair for ADLs within the home    [x] Home health PT and OT      TRACI Redd 3/16/2020

## 2020-03-16 NOTE — PT/OT/SLP PROGRESS
Occupational Therapy  Treatment    Radha Woods   MRN: [id number]   Admitting Diagnosis: Acute on chronic respiratory failure with hypoxia and hypercapnia    OT Date of Treatment: 03/16/20       Billable Minutes:  Self Care/Home Management 30 and Therapeutic Exercise 14    General Precautions: Standard, fall  Orthopedic Precautions: N/A  Braces: N/A    Spiritual, Cultural Beliefs, Buddhism Practices, Values that Affect Care: no    Subjective:  Communicated with nurse prior to session.  Pt. Reported that she did not want therapy earlier than breakfast. OT notified Techs.     Pain/Comfort  Pain Rating 1: (reported her ack was hurting)  Location - Orientation 1: lower  Location 1: back  Pain Addressed 1: Reposition, Distraction, Nurse notified  Pain Rating Post-Intervention 1: (pain persisted)    Objective:  Patient found with: (seated EOB with 02 (but no 02 in tank) pt. issued different )    Occupational Performance:    Bed Mobility:    · Not tested as pt. Was sitting up in chair     Functional Mobility/Transfers:  · Patient completed Sit <> Stand Transfer with stand by assistance  with  no assistive device   · Patient completed Bed <> Chair Transfer using Stand Pivot technique with stand by assistance with no assistive device  · Functional Mobility: not tested    Activities of Daily Living:  · Grooming: modified independence seated at sink  · Upper Body Dressing: modified independence seated EOB  · Lower Body Dressing: supervision to don pants and socks with education on energy conservation    [facility] 6 Click:  [facility] Total Score: 21    OT Exercises: AROM with 1 # dowel x 2 sets 10 reps for all major planes of BUE motion     Additional Treatment:  Pt. Educated on energy conservation techniques and safety with ADL task performance  HEP issued for BUE  on this date with good understanding noted.     Patient left up in chair with all lines intact in room with call button in reach    ASSESSMENT:  Radha Woods is a 74  y.o. female with a medical diagnosis of Acute on chronic respiratory failure with hypoxia and hypercapnia and presents with deficits in self-care skills, functional mobility and endurance. Pt. Tolerated session well on this date but is limited by pain in BLE and lower back. .    Rehab identified problem list/impairments: weakness, impaired endurance, impaired self care skills, impaired functional mobilty, impaired cardiopulmonary response to activity    Rehab potential is good    Activity tolerance: Good    Discharge recommendations: home with home health     Barriers to discharge: Other (Comment)(undetermined d/c destination, pt will require A upon d/c.)     Equipment recommendations: wheelchair     GOALS:   Multidisciplinary Problems     Occupational Therapy Goals        Problem: Occupational Therapy Goal    Goal Priority Disciplines Outcome Interventions   Occupational Therapy Goal     OT, PT/OT Ongoing, Progressing    Description:  Pt to be seen by OT focusing on the following STG's:  Pt to perform LB dressing independently.  Pt to perform self showering on TTB RW level with modified [place].  Pt to demonstrate proper breathing control during activity maintaining O2 sats within parameters set by MD.  Pt to demonstrate increased BUE strength to 4/5 throughout.  Pt to perform simple meal prep standing level with modified [place].  Pt to perform BUE HEP as instructed by OT independently.                    Plan:  Patient to be seen 5 x/week to address the above listed problems via self-care/home management, therapeutic activities, therapeutic exercises  Plan of Care expires:    Plan of Care reviewed with: patient    TRACI Redd  03/16/2020

## 2020-03-16 NOTE — PT/OT/SLP PROGRESS
"Physical Therapy  Treatment    Radha Woods   MRN: [id number]   Admitting Diagnosis: Acute on chronic respiratory failure with hypoxia and hypercapnia    PT Received On: 03/16/20          Billable Minutes:  Gait Training 15, Therapeutic Activity 15 and Therapeutic Exercise 15    Treatment Type: Treatment  PT/PTA: PT     PTA Visit Number: 0       General Precautions: Standard, fall  Orthopedic Precautions: N/A   Braces: N/A    Spiritual, Cultural Beliefs, Mandaeism Practices, Values that Affect Care: no    Subjective:  Communicated with pt prior to session.  Agreeable to PT services. "My back is the issue. I won't be able to go to outpatient because my son works."    Pain/Comfort  Pain Rating 1: 8/10  Location - Side 1: Bilateral  Location - Orientation 1: lower  Location 1: back  Pain Addressed 1: Reposition  Pain Rating Post-Intervention 1: 8/10    Objective:  Patient found seated at edge of bed with 2L O2 with Patient found with: oxygen     AM-PAC 6 CLICK MOBILITY  Total Score:20    Bed Mobility:  Sit>Supine:Mod I on mat (slowly)  Supine>Sit: Mod I on mat (slowly)    Transfers:  Sit<>Stand: SBA  Stand Pivot Transfer: SBA with UE support/RW    Gait:  Amb 139 ft ambulated within 1.5 mins. Attempted 2 min walk test, but pt was unable to perform due to dizziness that ensued.  Used RW, SBA.  Out of breath thereafter: 91% O2, 115 bpm.     Advanced Gait:  Curb Step: 4 inch curb step with RW, SBA    Therex:  Supine: marches x 5 reps with TA isometric  Neural glides: supine knee extension over bolster with DF/PF x 10 reps BLE    Balance:  SBA with RW.    Additional Treatment:  Pt had pain with hip flexion and/or resisted hip flexion, seemingly due to prior lumber (L4) fracture. May have instability.  (+) SLR test in all positions (DF + eversion + toe extension, DF+eversion, PF + inversion, DF + inversion). Possible neural impingement. Will continue neural glides and strengthening of core/legs.    Patient left up in chair " with call button in reach.    Assessment:  Radha Woods is a 74 y.o. female with a medical diagnosis of Acute on chronic respiratory failure with hypoxia and hypercapnia.  Ms. You is most limited by pain in her low back and impaired breathing (limiting her endurance). Will benefit from continued PT services, focusing on both.    Rehab identified problem list/impairments: weakness, impaired endurance, impaired self care skills, impaired functional mobilty, gait instability, impaired balance, decreased lower extremity function, pain, impaired cardiopulmonary response to activity    Rehab potential is fair.    Activity tolerance: Fair    Discharge recommendations: home with home health(w/ light family assist recomended)     Barriers to discharge: None    Equipment recommendations: wheelchair     GOALS:   Multidisciplinary Problems     Physical Therapy Goals        Problem: Physical Therapy Goal    Goal Priority Disciplines Outcome Goal Variances Interventions   Physical Therapy Goal     PT, PT/OT Ongoing, Progressing     Description:  Goals to be met by: 3/18/20     Patient will increase functional independence with mobility by performin. Supine to sit with Modified [place]  2. Sit to supine with Modified [place]  3. Sit to stand transfer with Supervision met 3/13/2020  4. Bed to chair transfer with Supervision using Rolling Walker met 3/13/2020  5. Gait  x 150 feet with Supervision using Rolling Walker. Met 3/13/2020  6. Wheelchair propulsion x150 feet with Supervision using bilateral uppper extremities  7. Ascend/Descend 4 inch curb step with Supervision using Rolling Walker.  8. Stand for 3 minutes with Stand-by Assistance using Rolling Walker while performing a dynamic standing activity  Met 3/12/2020  9. Pt will perform a car transfer (actual or simulated) w/ RW and SBA  10. Pt will  object from floor using reacher w/ RW and SPV  11. Pt will walk over an uneven surface (outside  ramp) w/ RW and SBA                        PLAN:    Patient to be seen (5-7x/wk)  to address the above listed problems via gait training, therapeutic activities, therapeutic exercises, neuromuscular re-education, wheelchair management/training  Plan of Care expires: 04/10/20  Plan of Care reviewed with: patient    Nida WINSLOW Charmaine, PT  03/16/2020

## 2020-03-16 NOTE — PROGRESS NOTES
[facility]                                  Skilled Nursing Facility                   Progress Note     Admit Date: 3/10/2020  RUFINO 3/18/2020TBD  Principal Problem:  Acute on chronic respiratory failure with hypoxia and hypercapnia   HPI obtained from patient interview and chart review     Chief Complaint: Revaluation of medical treatment and therapy status: Lab review    HPI:   Ms. You is a 74 year old female with PMHx of severe COPD on 3 lt oxygen at home, osteoporosis on bisphosphonate, history of left breast cancer s/p lumpectomy and chemoradiation currently on maintenance therapy (Femara) who presents to SNF following a hospitalization for COPD exacerbation.  Admission to SNF for secondary weakness and debility    Interval History All of today's labs reviewed and are listed below.  24 hr vital sign ranges listed below.  Patient complains of back pain that is mostly relieved with her pain control regimen.  Patient continues to endorse productive cough.  Patient continues to utilize supplemental O2 less than her home dose. Patient denies dysuria.  Patient reports having regular bowel movements.  Patient progessing with PT/OT. Continuing to follow and treat all acute and chronic conditions.    Past Medical History: Patient has a past medical history of Back pain, Breast cancer (05/2015), COPD (chronic obstructive pulmonary disease), Hyperlipidemia, Hypertension, and Osteoporosis (5/21/2019).    Past Surgical History: Patient has a past surgical history that includes Lumbar fusion; Cervical fusion; Hysterectomy; right lumpectomy; Lymph node dissection (Right); Breast biopsy (Right); and Breast lumpectomy (Right, 2015).    Social History: Patient reports that she has been smoking cigarettes. She has a 45.00 pack-year smoking history. She uses smokeless tobacco. She reports that she drinks alcohol. She reports that she does not use  drugs.    Family History: family history includes Breast cancer (age of onset: 75) in her maternal grandmother.    Allergies: Patient is allergic to adhesive; levaquin [levofloxacin]; and penicillins.    ROS  Constitutional: Negative for fever or fatigue.   Eyes: Negative for blurred vision, double vision and discharge.   Respiratory: + for cough, shortness of breath, at baseline    Cardiovascular: Negative for chest pain, palpitations, and leg swelling.   Gastrointestinal: Negative for abdominal pain, constipation, diarrhea, nausea and vomiting.   Genitourinary: Negative for dysuria, frequency and urgency.   Musculoskeletal:  + generalized weakness. Negative for back pain and myalgias.   Skin: Negative for itching and rash.   Neurological: Negative for dizziness, speech change, and headaches.   Psychiatric/Behavioral: Negative for depression. The patient is not nervous/anxious.      PEx  Temp:  [97.2 °F (36.2 °C)]   Pulse:  []   Resp:  [16-18]   BP: (134)/(72)   SpO2:  [94 %-99 %]      Constitutional: Patient appears well-developed.  No distress noted  HENT:   Head: Normocephalic and atraumatic.   Eyes: Pupils are equal, round  Neck: Normal range of motion. Neck supple.   Cardiovascular: Normal rate, regular rhythm and normal heart sounds.    Pulmonary/Chest: Effort normal and breath sounds are clear with diminished bases.  Supplemental O2 in progress  Abdominal: Soft. Bowel sounds are normal.   Musculoskeletal: Normal range of motion.   Neurological: Alert and oriented to person, place, and time.   Psychiatric: Normal mood and affect. Behavior is normal.   Skin: Skin is warm and dry. Scattered areas of ecchymosis    Recent Labs   Lab 03/16/20  0550      K 4.5   CL 97   CO2 39*   BUN 12   CREATININE 0.6   MG 2.1       Recent Labs   Lab 03/16/20  0550   WBC 7.43   RBC 3.46*   HGB 10.3*   HCT 35.4*      *   MCH 29.8   MCHC 29.1*         Assessment and Plan:       Problems addressed  today    COPD exacerbation  hypercarbic hypoxic respiratory failure  - S/p course of prednisone and ceftriaxone  - Continue duoneb QID, breo one puff daily, tiotropium 18 mcg daily  - Keep O2 sats 88-92, wean o2 as tolerated              Educated patient on the need for limiting her oxygen for sats 88-92%. Keep sats in high 90s will increase her hypercapnia  - 3/16 stable, maintains on supplemental oxygen at less than home dose     Essential hypertension  - Home medications: metoprolol succinate 50 mg daily  - Changed to losartan during hospitalization due to unclear reason  Goal SBP <150 - increase losartan to 25 mg daily   - 3/16 stable, continue current treatment     Chronic low back pain  - Patient with chronic back pain in setting of previous hx of breast cancer and compression fracture.   - 3/16 stable, continue Acetaminophen 650 mg prn and percocet 5-325 mg BID prn     Hypokalemia  Hypomagnesemia  - resolved with replacements from 03/12       Ongoing but stable problems       Acute Metabolic encephalopathy Resolved  Delirium - resolved  Improved once hypercapnia resolved  Delirium precautions:  - Avoid antihistamines, anticholinergics, hypnotics, and minimize opiates while controlling for pain as these medications may exacerbate delirium. Cues for day/night will assist with keeping patient calm and oriented - during daytime, please keep adequate light in room (open windows, lights on) and please keep room dim at night-time to encourage normal sleep-wake cycles. Continuing to have nursing and family reorient the patient and encourage family to visit     Anemia of critical illness  - Should resolve without intervention     DJD of multiple sites   - PT/OT  - Pain management of above     At risk of malnutrition   - dietary consult     Chronic diastolic dysfunction   - euvolemic     Breast cancer   - continue letrozole 2.5 mg daily     Osteoporosis   - continue boniva after discharge  - continue Vitamin D 2000  units daily    Seasonal allergies  Rhinitis  - initiated Zyrtec 10 mg qHS.    Debility   - Continue with PT/OT for gait training and strengthening and restoration of ADL's   - Encourage mobility, OOB in chair, and early ambulation as appropriate  - Fall precautions   - Monitor for bowel and bladder dysfunction  - Monitor for and prevent skin breakdown and pressure ulcers  - Continue DVT prophylaxis with  frequent ambulation      Future Appointments   Date Time Provider Department Center   3/19/2020  1:30 PM Rosio Allen MD [facility] HEM ONC Maximilian Best NP      DOS: 3/16/2020       Patient note was created using MModal Dictation.  Any errors in syntax or even information may not have been identified and edited on initial review prior to signing this note.

## 2020-03-17 PROCEDURE — 25000003 PHARM REV CODE 250: Performed by: NURSE PRACTITIONER

## 2020-03-17 PROCEDURE — 25000003 PHARM REV CODE 250: Performed by: STUDENT IN AN ORGANIZED HEALTH CARE EDUCATION/TRAINING PROGRAM

## 2020-03-17 PROCEDURE — 99900035 HC TECH TIME PER 15 MIN (STAT)

## 2020-03-17 PROCEDURE — 25000242 PHARM REV CODE 250 ALT 637 W/ HCPCS: Performed by: STUDENT IN AN ORGANIZED HEALTH CARE EDUCATION/TRAINING PROGRAM

## 2020-03-17 PROCEDURE — 97530 THERAPEUTIC ACTIVITIES: CPT | Mod: CQ

## 2020-03-17 PROCEDURE — 97116 GAIT TRAINING THERAPY: CPT | Mod: CQ

## 2020-03-17 PROCEDURE — 97803 MED NUTRITION INDIV SUBSEQ: CPT

## 2020-03-17 PROCEDURE — 97535 SELF CARE MNGMENT TRAINING: CPT

## 2020-03-17 PROCEDURE — 97530 THERAPEUTIC ACTIVITIES: CPT

## 2020-03-17 PROCEDURE — 97110 THERAPEUTIC EXERCISES: CPT | Mod: CQ

## 2020-03-17 PROCEDURE — 94761 N-INVAS EAR/PLS OXIMETRY MLT: CPT

## 2020-03-17 PROCEDURE — 11000004 HC SNF PRIVATE

## 2020-03-17 PROCEDURE — 25000003 PHARM REV CODE 250: Performed by: HOSPITALIST

## 2020-03-17 PROCEDURE — 27000221 HC OXYGEN, UP TO 24 HOURS

## 2020-03-17 PROCEDURE — 25000003 PHARM REV CODE 250: Performed by: INTERNAL MEDICINE

## 2020-03-17 PROCEDURE — 94640 AIRWAY INHALATION TREATMENT: CPT

## 2020-03-17 PROCEDURE — 97110 THERAPEUTIC EXERCISES: CPT

## 2020-03-17 RX ADMIN — TIOTROPIUM BROMIDE 18 MCG: 18 CAPSULE ORAL; RESPIRATORY (INHALATION) at 08:03

## 2020-03-17 RX ADMIN — SENNOSIDES AND DOCUSATE SODIUM 1 TABLET: 8.6; 5 TABLET ORAL at 09:03

## 2020-03-17 RX ADMIN — IPRATROPIUM BROMIDE AND ALBUTEROL SULFATE 3 ML: 2.5; .5 SOLUTION RESPIRATORY (INHALATION) at 01:03

## 2020-03-17 RX ADMIN — MICONAZOLE NITRATE: 20 OINTMENT TOPICAL at 09:03

## 2020-03-17 RX ADMIN — ASPIRIN 81 MG: 81 TABLET, COATED ORAL at 08:03

## 2020-03-17 RX ADMIN — LOSARTAN POTASSIUM 25 MG: 25 TABLET, FILM COATED ORAL at 08:03

## 2020-03-17 RX ADMIN — PRAVASTATIN SODIUM 40 MG: 20 TABLET ORAL at 09:03

## 2020-03-17 RX ADMIN — OXYCODONE HYDROCHLORIDE AND ACETAMINOPHEN 1 TABLET: 5; 325 TABLET ORAL at 08:03

## 2020-03-17 RX ADMIN — FLUTICASONE FUROATE AND VILANTEROL TRIFENATATE 1 PUFF: 100; 25 POWDER RESPIRATORY (INHALATION) at 08:03

## 2020-03-17 RX ADMIN — OXYCODONE HYDROCHLORIDE AND ACETAMINOPHEN 1 TABLET: 5; 325 TABLET ORAL at 05:03

## 2020-03-17 RX ADMIN — IPRATROPIUM BROMIDE AND ALBUTEROL SULFATE 3 ML: 2.5; .5 SOLUTION RESPIRATORY (INHALATION) at 09:03

## 2020-03-17 RX ADMIN — IPRATROPIUM BROMIDE AND ALBUTEROL SULFATE 3 ML: 2.5; .5 SOLUTION RESPIRATORY (INHALATION) at 08:03

## 2020-03-17 RX ADMIN — OXYCODONE HYDROCHLORIDE AND ACETAMINOPHEN 1 TABLET: 5; 325 TABLET ORAL at 11:03

## 2020-03-17 RX ADMIN — MELATONIN 2000 UNITS: at 08:03

## 2020-03-17 RX ADMIN — CETIRIZINE HYDROCHLORIDE 10 MG: 5 TABLET ORAL at 09:03

## 2020-03-17 RX ADMIN — LETROZOLE 2.5 MG: 2.5 TABLET ORAL at 08:03

## 2020-03-17 NOTE — PT/OT/SLP PROGRESS
"Physical Therapy  Treatment    Radha Woods   MRN: [id number]   Admitting Diagnosis: Acute on chronic respiratory failure with hypoxia and hypercapnia    PT Received On: 03/17/20  Total Time (min): (--)       Billable Minutes:  Gait Training 10, Therapeutic Activity 10 and Therapeutic Exercise 18    Treatment Type: Treatment  PT/PTA: PTA     PTA Visit Number: 1       General Precautions: Standard, fall  Orthopedic Precautions: N/A   Braces: N/A    Spiritual, Cultural Beliefs, Taoist Practices, Values that Affect Care: no    Subjective:  Communicated with nursing prior to session.  "I did too much yesterday, but I will go."     Pain/Comfort  Pain Rating 1: (Pt did not rate. )  Location - Side 1: Bilateral  Location - Orientation 1: lower  Location 1: back  Pain Addressed 1: Reposition, Distraction, Pre-medicate for activity  Pain Rating Post-Intervention 1: (Pt did not rate. )    Objective:  Patient found supine in bed with O2.        AM-PAC 6 CLICK MOBILITY  Total Score:20    Bed Mobility:  Sit>Supine: on bed Mod I   Supine>Sit: on bed Mod I     Transfers:  Sit<>Stand: to/from SBA  Stand Pivot Transfer: SBA w/ RW/UE support    Gait:  Amb 120ft w/ RW and SBA. Distance limited 2* to lower back pain and fatigue.      Advanced Gait:  Curb Step: Declined 2* to lower back pain.     Therex:  Pt declined performing HEP handout given yesterday 2* to pt with reports of soreness from them. Reviewed exercise handout with patient.   BLE therex 2x10 reps:    -AP   -LAQ   -Hip Flexion    -GS  **Pt w/o reports of pain.     Additional Treatment:  -Pt picked up 5 objects with reacher using RW and SBA for safety.   -Mini-Elliptical 12 min     Patient left supine with all lines intact and nursing notified.    Assessment:  Radha Woods is a 74 y.o. female with a medical diagnosis of Acute on chronic respiratory failure with hypoxia and hypercapnia.  Pt with limited gait distance on this date 2* to pain and fatigue. Pt will " continue to benefit from PT services at this time. Continue with PT POC as indicated.    Rehab identified problem list/impairments: weakness, impaired endurance, impaired self care skills, impaired functional mobilty, gait instability, impaired balance, decreased lower extremity function, pain, impaired cardiopulmonary response to activity    Rehab potential is fair.    Activity tolerance: Fair    Discharge recommendations: home with home health(w/ light family assist recomended)     Barriers to discharge: None    Equipment recommendations: wheelchair     GOALS:   Multidisciplinary Problems     Physical Therapy Goals        Problem: Physical Therapy Goal    Goal Priority Disciplines Outcome Goal Variances Interventions   Physical Therapy Goal     PT, PT/OT Ongoing, Progressing     Description:  Goals to be met by: 3/18/20     Patient will increase functional independence with mobility by performin. Supine to sit with Modified [place]. Met (3/16/2020)  2. Sit to supine with Modified [place]. Met (3/16/2020)  3. Sit to stand transfer with Supervision met 3/13/2020  4. Bed to chair transfer with Supervision using Rolling Walker met 3/13/2020  5. Gait  x 150 feet with Supervision using Rolling Walker. Met 3/13/2020  6. Wheelchair propulsion x150 feet with Supervision using bilateral uppper extremities.   7. Ascend/Descend 4 inch curb step with Supervision using Rolling Walker.  8. Stand for 3 minutes with Stand-by Assistance using Rolling Walker while performing a dynamic standing activity  Met 3/12/2020  9. Pt will perform a car transfer (actual or simulated) w/ RW and SBA.  10. Pt will  object from floor using reacher w/ RW and SPV.  11. Pt will walk over an uneven surface (outside ramp) w/ RW and SBA.                         PLAN:    Patient to be seen (5-7x/wk)  to address the above listed problems via gait training, therapeutic activities, therapeutic exercises, neuromuscular re-education,  wheelchair management/training  Plan of Care expires: 04/10/20  Plan of Care reviewed with: patient    Marisela Clarence, PTA  03/17/2020

## 2020-03-17 NOTE — PROGRESS NOTES
[facility] - Skilled Nursing Care  Adult Nutrition  Progress Note    SUMMARY       Recommendations    Recommendation:   1. Encourage PO intake on regular diet.   2. Continue Boost Plus daily to meet nutrient needs.    Goals: PO intake >/= 75% EEN/EPN daily.  Nutrition Goal Status: goal met  Communication of RD Recs: other (comment)(POC)    Reason for Assessment    Reason For Assessment: RD follow-up  Diagnosis: other (see comments)  Relevant Medical History: COPD, HLD, Breast Cancer (FEMARA maintenance therapy), Osteoporosis  Interdisciplinary Rounds: did not attend  General Information Comments:  3/17/20: Pt in room resting at time of RD visit. Pt reports having a good appetite; PO intake 100% of meals. Pt drinking boost daily. Denies N/V/D/C.   3/11/20: Pt was alert and friendly. Reports not great appetite, but eating 100% of meals when she likes them. Denies NV. States she had diarrhea 4 days ago and they provided her medication that has now caused constipation. RD obtained pt food preferences. Reports no difficult chewing or swallowing. States she is willing to drink Boost Plus-chocolate daily with milk to dilute it. Pt reports UBW is 145 lbs but it has been 5 years since she weighed that. Per chart review pt weighed 135 lbs on 2/17/20, indicating wt loss of 5% in 1 month. NFPE completed today 3/11/20 pt has moderate muscle wasting and severe subcutaneous fat loss.    Nutrition Discharge Planning: d/c on regular diet with high protein or ONS to supplement protein needs    Nutrition Risk Screen    Nutrition Risk Screen: no indicators present    Nutrition/Diet History    Patient Reported Diet/Restrictions/Preferences: general  Food Preferences: Likes bland foods with salt and pepper, pasta with butter and grilled chicken, brussel sprouts, salt and pepper packets, salads with vinegarette Dislikes: spicy foods, carrots, sweet potatoes, and green peas (will not eat any food that has touched green peas)(no cultural or  "spiritual preferences identified)  Spiritual, Cultural Beliefs, Confucianist Practices, Values that Affect Care: no  Supplemental Drinks or Food Habits: Boost Plus(daily mixed with milk )  Food Allergies: NKFA  Factors Affecting Nutritional Intake: None identified at this time    Anthropometrics    Temp: 97.9 °F (36.6 °C)  Height: 5' 7" (170.2 cm)  Height (inches): 67 in  Weight Method: Standard Scale  Weight: 58.3 kg (128 lb 8.5 oz)  Weight (lb): 128.53 lb  Ideal Body Weight (IBW), Female: 135 lb  % Ideal Body Weight, Female (lb): 95.21 %  BMI (Calculated): 20.1  Usual Body Weight (UBW), k.36 kg(20)  % Usual Body Weight: 95.21  % Weight Change From Usual Weight: -4.99 %     Lab/Procedures/Meds    Pertinent Labs Reviewed: reviewed  Pertinent Labs Comments: WNL  Pertinent Medications Reviewed: reviewed  Pertinent Medications Comments: aspirin, losartan, pravastatin, senna docusate    Physical Findings/Assessment    Rocco Score: 16  Wound - Midline Coccyx     Estimated/Assessed Needs    Weight Used For Calorie Calculations: 58.3 kg (128 lb 8.5 oz)  Energy Calorie Requirements (kcal): 0343-5103(30-35 kcal/kg)  Energy Need Method: Kcal/kg(30-35 kcal/kg)  Protein Requirements: 70(1.2 gm/kg)  Weight Used For Protein Calculations: 58.3 kg (128 lb 8.5 oz)  Estimated Fluid Requirement Method: RDA Method(or Per MD)  RDA Method (mL): 1749    Nutrition Prescription Ordered    Current Diet Order: Regular  Oral Nutrition Supplement: Boost Plus daily    Evaluation of Received Nutrient/Fluid Intake    I/O: +360  Energy Calories Required: meeting needs  Protein Required: meeting needs  Fluid Required: meeting needs  Comments: LBM 3/16  Tolerance: tolerating  % Intake of Estimated Energy Needs: 75 - 100 %   % Meal Intake: 75 - 100 %     Nutrition Risk    Level of Risk/Frequency of Follow-up: low     Assessment and Plan    Moderate malnutrition  Nutrition Problem:  Moderate Protein-Calorie Malnutrition  Malnutrition in the " context of Chronic Illness/Injury    Related to (etiology):  Malignant neoplasm of female breast, (currently on FEMARA maintenance therapy)    Signs and Symptoms (as evidenced by):  Energy Intake: <75% of estimated energy requirement for greater than 3 months  Body Fat Depletion: severe depletion of orbitals, triceps and thoracic and lumbar region   Muscle Mass Depletion: moderate depletion of temples, clavicle region, scapular region and interosseous muscle   Weight Loss: 5% x 1 month   Fluid Accumulation: none noted    Interventions(treatment strategy):  Collaboration with other providers  [email or web address]    Nutrition Diagnosis Status:  Continues     Monitor and Evaluation    Food and Nutrient Intake: energy intake, food and beverage intake  Food and Nutrient Adminstration: diet order  Knowledge/Beliefs/Attitudes: food and nutrition knowledge/skill  Physical Activity and Function: nutrition-related ADLs and IADLs  Anthropometric Measurements: weight, weight change, body mass index  Biochemical Data, Medical Tests and Procedures: electrolyte and renal panel, gastrointestinal profile, glucose/endocrine profile, inflammatory profile, lipid profile  Nutrition-Focused Physical Findings: overall appearance     Malnutrition Assessment  Malnutrition Type: chronic illness  Energy Intake: moderate energy intake  Weight Loss (Malnutrition): 5% in 1 month  Energy Intake (Malnutrition): less than 75% for greater than or equal to 3 months  Subcutaneous Fat (Malnutrition): severe depletion  Muscle Mass (Malnutrition): moderate depletion   Orbital Region (Subcutaneous Fat Loss): severe depletion  Upper Arm Region (Subcutaneous Fat Loss): severe depletion  Thoracic and Lumbar Region: severe depletion   Cement Region (Muscle Loss): moderate depletion  Clavicle Bone Region (Muscle Loss): moderate depletion  Clavicle and Acromion Bone Region (Muscle Loss): moderate depletion  Scapular Bone Region (Muscle Loss): moderate  depletion  Dorsal Hand (Muscle Loss): moderate depletion   Edema (Fluid Accumulation): 0-->no edema present   Subcutaneous Fat Loss (Final Summary): severe protein-calorie malnutrition  Muscle Loss Evaluation (Final Summary): moderate protein-calorie malnutrition    Moderate Weight Loss (Malnutrition): 5% in 1 month    Nutrition Follow-Up    RD Follow-up?: Yes

## 2020-03-17 NOTE — PT/OT/SLP PROGRESS
Occupational Therapy  Treatment    Radha Woods   MRN: [id number]   Admitting Diagnosis: Acute on chronic respiratory failure with hypoxia and hypercapnia    OT Date of Treatment: 03/17/20       Billable Minutes:  Self Care/Home Management 10, Therapeutic Activity 20 and Therapeutic Exercise 10    General Precautions: Standard, fall  Orthopedic Precautions: N/A  Braces: N/A    Spiritual, Cultural Beliefs, Baptist Practices, Values that Affect Care: no    Subjective:  Communicated with nurse prior to session.  Pt. Reported that she was not doing any back exercises today    Pain/Comfort  Pain Rating 1: 6/10  Location - Orientation 1: lower  Location 1: back  Pain Addressed 1: Reposition, Distraction  Pain Rating Post-Intervention 1: (no increases in pain noted)    Objective:  Patient found with: oxygen(seated at EOB )    Occupational Performance:    Bed Mobility:    · Not tested     Functional Mobility/Transfers:  · Patient completed Sit <> Stand Transfer with supervision  with  no assistive device   · Patient completed Bed <> Chair Transfer using Stand Pivot technique with supervision with no assistive device  · Functional Mobility: not tested    Activities of Daily Living:  · OT reviewed safety with transfers and mobility as well as energy conservation with task performance  ·     [facility] 6 Click:  [facility] Total Score: 21    OT Exercises: UE Ergometer x 10 minutes with min resistance    Additional Treatment:  Pt. engaged in dynamic standing task at counter x 2 trials involving reaching into overhead cabinet to place items on shelving and then to remove items from shelf with S/SBA required for task completion  Pt. Engaged in bilateral coordination task of folding large towels in stand at counter with S/SBA    Patient left seated at EOB with all lines intact and call button in reach    ASSESSMENT:  Radha Woods is a 74 y.o. female with a medical diagnosis of Acute on chronic respiratory failure with hypoxia and  hypercapnia and presents with mild deficits in higher level self care tasks as wel as endurance. Pt. Tolerated session well on this date. At times with activity pt. 02 sats decreased to mid 80's but recovered to 90's with rest breaks and deep breathing.     Rehab identified problem list/impairments: weakness, impaired endurance, impaired self care skills, impaired functional mobilty, impaired cardiopulmonary response to activity    Rehab potential is good    Activity tolerance: Good    Discharge recommendations: home with home health and S initially    Barriers to discharge: Other (Comment)(undetermined d/c destination, pt will require A upon d/c.)     Equipment recommendations: wheelchair     GOALS:   Multidisciplinary Problems     Occupational Therapy Goals        Problem: Occupational Therapy Goal    Goal Priority Disciplines Outcome Interventions   Occupational Therapy Goal     OT, PT/OT Ongoing, Progressing    Description:  Pt to be seen by OT focusing on the following STG's:  Pt to perform LB dressing independently.  Pt to perform self showering on TTB RW level with modified [place].  Pt to demonstrate proper breathing control during activity maintaining O2 sats within parameters set by MD.  Pt to demonstrate increased BUE strength to 4/5 throughout.  Pt to perform simple meal prep standing level with modified [place].  Pt to perform BUE HEP as instructed by OT independently.                    Plan:  Patient to be seen 5 x/week to address the above listed problems via self-care/home management, therapeutic activities, therapeutic exercises  Plan of Care expires:    Plan of Care reviewed with: patient    TRACI Redd  03/17/2020

## 2020-03-17 NOTE — PLAN OF CARE
Problem: Malnutrition  Goal: Improved Nutritional Intake  Outcome: Ongoing, Progressing     Recommendation:   1. Encourage PO intake on regular diet.   2. Continue Boost Plus daily to meet nutrient needs.    Goals: PO intake >/= 75% EEN/EPN daily.  Nutrition Goal Status: goal met  Communication of RD Recs: other (comment)(POC)

## 2020-03-18 PROCEDURE — 25000003 PHARM REV CODE 250: Performed by: HOSPITALIST

## 2020-03-18 PROCEDURE — 25000242 PHARM REV CODE 250 ALT 637 W/ HCPCS: Performed by: STUDENT IN AN ORGANIZED HEALTH CARE EDUCATION/TRAINING PROGRAM

## 2020-03-18 PROCEDURE — 27000221 HC OXYGEN, UP TO 24 HOURS

## 2020-03-18 PROCEDURE — 99900035 HC TECH TIME PER 15 MIN (STAT)

## 2020-03-18 PROCEDURE — 25000003 PHARM REV CODE 250: Performed by: NURSE PRACTITIONER

## 2020-03-18 PROCEDURE — 94640 AIRWAY INHALATION TREATMENT: CPT

## 2020-03-18 PROCEDURE — 25000003 PHARM REV CODE 250: Performed by: STUDENT IN AN ORGANIZED HEALTH CARE EDUCATION/TRAINING PROGRAM

## 2020-03-18 PROCEDURE — 94761 N-INVAS EAR/PLS OXIMETRY MLT: CPT

## 2020-03-18 PROCEDURE — 11000004 HC SNF PRIVATE

## 2020-03-18 PROCEDURE — 25000003 PHARM REV CODE 250: Performed by: INTERNAL MEDICINE

## 2020-03-18 RX ADMIN — SENNOSIDES AND DOCUSATE SODIUM 1 TABLET: 8.6; 5 TABLET ORAL at 09:03

## 2020-03-18 RX ADMIN — IPRATROPIUM BROMIDE AND ALBUTEROL SULFATE 3 ML: 2.5; .5 SOLUTION RESPIRATORY (INHALATION) at 08:03

## 2020-03-18 RX ADMIN — IPRATROPIUM BROMIDE AND ALBUTEROL SULFATE 3 ML: 2.5; .5 SOLUTION RESPIRATORY (INHALATION) at 01:03

## 2020-03-18 RX ADMIN — OXYCODONE HYDROCHLORIDE AND ACETAMINOPHEN 1 TABLET: 5; 325 TABLET ORAL at 09:03

## 2020-03-18 RX ADMIN — SENNOSIDES AND DOCUSATE SODIUM 1 TABLET: 8.6; 5 TABLET ORAL at 08:03

## 2020-03-18 RX ADMIN — MELATONIN 2000 UNITS: at 09:03

## 2020-03-18 RX ADMIN — LOSARTAN POTASSIUM 25 MG: 25 TABLET, FILM COATED ORAL at 09:03

## 2020-03-18 RX ADMIN — PRAVASTATIN SODIUM 40 MG: 20 TABLET ORAL at 08:03

## 2020-03-18 RX ADMIN — MICONAZOLE NITRATE: 20 OINTMENT TOPICAL at 09:03

## 2020-03-18 RX ADMIN — TIOTROPIUM BROMIDE 18 MCG: 18 CAPSULE ORAL; RESPIRATORY (INHALATION) at 09:03

## 2020-03-18 RX ADMIN — ASPIRIN 81 MG: 81 TABLET, COATED ORAL at 09:03

## 2020-03-18 RX ADMIN — LETROZOLE 2.5 MG: 2.5 TABLET ORAL at 09:03

## 2020-03-18 RX ADMIN — OXYCODONE HYDROCHLORIDE AND ACETAMINOPHEN 1 TABLET: 5; 325 TABLET ORAL at 08:03

## 2020-03-18 RX ADMIN — MICONAZOLE NITRATE: 20 OINTMENT TOPICAL at 08:03

## 2020-03-18 RX ADMIN — FLUTICASONE FUROATE AND VILANTEROL TRIFENATATE 1 PUFF: 100; 25 POWDER RESPIRATORY (INHALATION) at 09:03

## 2020-03-18 RX ADMIN — CETIRIZINE HYDROCHLORIDE 10 MG: 5 TABLET ORAL at 08:03

## 2020-03-19 LAB
ANION GAP SERPL CALC-SCNC: 4 MMOL/L (ref 8–16)
BASOPHILS # BLD AUTO: 0.05 K/UL (ref 0–0.2)
BASOPHILS NFR BLD: 0.9 % (ref 0–1.9)
BUN SERPL-MCNC: 13 MG/DL (ref 8–23)
CALCIUM SERPL-MCNC: 9 MG/DL (ref 8.7–10.5)
CHLORIDE SERPL-SCNC: 97 MMOL/L (ref 95–110)
CO2 SERPL-SCNC: 39 MMOL/L (ref 23–29)
CREAT SERPL-MCNC: 0.6 MG/DL (ref 0.5–1.4)
DIFFERENTIAL METHOD: ABNORMAL
EOSINOPHIL # BLD AUTO: 0.2 K/UL (ref 0–0.5)
EOSINOPHIL NFR BLD: 4 % (ref 0–8)
ERYTHROCYTE [DISTWIDTH] IN BLOOD BY AUTOMATED COUNT: 13.2 % (ref 11.5–14.5)
EST. GFR  (AFRICAN AMERICAN): >60 ML/MIN/1.73 M^2
EST. GFR  (NON AFRICAN AMERICAN): >60 ML/MIN/1.73 M^2
GLUCOSE SERPL-MCNC: 89 MG/DL (ref 70–110)
HCT VFR BLD AUTO: 35.3 % (ref 37–48.5)
HGB BLD-MCNC: 10.1 G/DL (ref 12–16)
IMM GRANULOCYTES # BLD AUTO: 0.04 K/UL (ref 0–0.04)
IMM GRANULOCYTES NFR BLD AUTO: 0.7 % (ref 0–0.5)
LYMPHOCYTES # BLD AUTO: 1.2 K/UL (ref 1–4.8)
LYMPHOCYTES NFR BLD: 21.9 % (ref 18–48)
MAGNESIUM SERPL-MCNC: 2.1 MG/DL (ref 1.6–2.6)
MCH RBC QN AUTO: 28.9 PG (ref 27–31)
MCHC RBC AUTO-ENTMCNC: 28.6 G/DL (ref 32–36)
MCV RBC AUTO: 101 FL (ref 82–98)
MONOCYTES # BLD AUTO: 0.6 K/UL (ref 0.3–1)
MONOCYTES NFR BLD: 10.2 % (ref 4–15)
NEUTROPHILS # BLD AUTO: 3.4 K/UL (ref 1.8–7.7)
NEUTROPHILS NFR BLD: 62.3 % (ref 38–73)
NRBC BLD-RTO: 0 /100 WBC
PHOSPHATE SERPL-MCNC: 4.4 MG/DL (ref 2.7–4.5)
PLATELET # BLD AUTO: 271 K/UL (ref 150–350)
PMV BLD AUTO: 9.7 FL (ref 9.2–12.9)
POTASSIUM SERPL-SCNC: 4 MMOL/L (ref 3.5–5.1)
RBC # BLD AUTO: 3.5 M/UL (ref 4–5.4)
SODIUM SERPL-SCNC: 140 MMOL/L (ref 136–145)
WBC # BLD AUTO: 5.47 K/UL (ref 3.9–12.7)

## 2020-03-19 PROCEDURE — 97116 GAIT TRAINING THERAPY: CPT | Mod: CQ

## 2020-03-19 PROCEDURE — 97530 THERAPEUTIC ACTIVITIES: CPT

## 2020-03-19 PROCEDURE — 25000003 PHARM REV CODE 250: Performed by: NURSE PRACTITIONER

## 2020-03-19 PROCEDURE — 27000221 HC OXYGEN, UP TO 24 HOURS

## 2020-03-19 PROCEDURE — 25000003 PHARM REV CODE 250: Performed by: HOSPITALIST

## 2020-03-19 PROCEDURE — 85025 COMPLETE CBC W/AUTO DIFF WBC: CPT

## 2020-03-19 PROCEDURE — 99900035 HC TECH TIME PER 15 MIN (STAT)

## 2020-03-19 PROCEDURE — 11000004 HC SNF PRIVATE

## 2020-03-19 PROCEDURE — 94760 N-INVAS EAR/PLS OXIMETRY 1: CPT

## 2020-03-19 PROCEDURE — 94640 AIRWAY INHALATION TREATMENT: CPT

## 2020-03-19 PROCEDURE — 97110 THERAPEUTIC EXERCISES: CPT | Mod: CQ

## 2020-03-19 PROCEDURE — 94761 N-INVAS EAR/PLS OXIMETRY MLT: CPT

## 2020-03-19 PROCEDURE — 25000242 PHARM REV CODE 250 ALT 637 W/ HCPCS: Performed by: STUDENT IN AN ORGANIZED HEALTH CARE EDUCATION/TRAINING PROGRAM

## 2020-03-19 PROCEDURE — 80048 BASIC METABOLIC PNL TOTAL CA: CPT

## 2020-03-19 PROCEDURE — 84100 ASSAY OF PHOSPHORUS: CPT

## 2020-03-19 PROCEDURE — 97530 THERAPEUTIC ACTIVITIES: CPT | Mod: CQ

## 2020-03-19 PROCEDURE — 83735 ASSAY OF MAGNESIUM: CPT

## 2020-03-19 PROCEDURE — 25000003 PHARM REV CODE 250: Performed by: INTERNAL MEDICINE

## 2020-03-19 PROCEDURE — 94660 CPAP INITIATION&MGMT: CPT

## 2020-03-19 PROCEDURE — 25000003 PHARM REV CODE 250: Performed by: STUDENT IN AN ORGANIZED HEALTH CARE EDUCATION/TRAINING PROGRAM

## 2020-03-19 PROCEDURE — 36415 COLL VENOUS BLD VENIPUNCTURE: CPT

## 2020-03-19 PROCEDURE — 97535 SELF CARE MNGMENT TRAINING: CPT

## 2020-03-19 PROCEDURE — 97110 THERAPEUTIC EXERCISES: CPT

## 2020-03-19 RX ORDER — TOPIRAMATE 25 MG/1
25 TABLET ORAL 2 TIMES DAILY
Status: DISCONTINUED | OUTPATIENT
Start: 2020-03-19 | End: 2020-03-23 | Stop reason: HOSPADM

## 2020-03-19 RX ADMIN — ASPIRIN 81 MG: 81 TABLET, COATED ORAL at 08:03

## 2020-03-19 RX ADMIN — MICONAZOLE NITRATE: 20 OINTMENT TOPICAL at 09:03

## 2020-03-19 RX ADMIN — MELATONIN 2000 UNITS: at 08:03

## 2020-03-19 RX ADMIN — TIOTROPIUM BROMIDE 18 MCG: 18 CAPSULE ORAL; RESPIRATORY (INHALATION) at 08:03

## 2020-03-19 RX ADMIN — CETIRIZINE HYDROCHLORIDE 10 MG: 5 TABLET ORAL at 08:03

## 2020-03-19 RX ADMIN — IPRATROPIUM BROMIDE AND ALBUTEROL SULFATE 3 ML: 2.5; .5 SOLUTION RESPIRATORY (INHALATION) at 07:03

## 2020-03-19 RX ADMIN — FLUTICASONE FUROATE AND VILANTEROL TRIFENATATE 1 PUFF: 100; 25 POWDER RESPIRATORY (INHALATION) at 08:03

## 2020-03-19 RX ADMIN — OXYCODONE HYDROCHLORIDE AND ACETAMINOPHEN 1 TABLET: 5; 325 TABLET ORAL at 01:03

## 2020-03-19 RX ADMIN — PRAVASTATIN SODIUM 40 MG: 20 TABLET ORAL at 08:03

## 2020-03-19 RX ADMIN — OXYCODONE HYDROCHLORIDE AND ACETAMINOPHEN 1 TABLET: 5; 325 TABLET ORAL at 08:03

## 2020-03-19 RX ADMIN — LETROZOLE 2.5 MG: 2.5 TABLET ORAL at 08:03

## 2020-03-19 RX ADMIN — OXYCODONE HYDROCHLORIDE AND ACETAMINOPHEN 1 TABLET: 5; 325 TABLET ORAL at 04:03

## 2020-03-19 RX ADMIN — SENNOSIDES AND DOCUSATE SODIUM 1 TABLET: 8.6; 5 TABLET ORAL at 08:03

## 2020-03-19 RX ADMIN — LOSARTAN POTASSIUM 25 MG: 25 TABLET, FILM COATED ORAL at 08:03

## 2020-03-19 RX ADMIN — TOPIRAMATE 25 MG: 25 TABLET, FILM COATED ORAL at 08:03

## 2020-03-19 RX ADMIN — IPRATROPIUM BROMIDE AND ALBUTEROL SULFATE 3 ML: 2.5; .5 SOLUTION RESPIRATORY (INHALATION) at 01:03

## 2020-03-19 NOTE — PLAN OF CARE
Problem: Physical Therapy Goal  Goal: Physical Therapy Goal  Description  Goals to be met by: 3/18/20     Patient will increase functional independence with mobility by performin. Supine to sit with Modified [place]. Met (3/16/2020)  2. Sit to supine with Modified [place]. Met (3/16/2020)  3. Sit to stand transfer with Supervision met 3/13/2020  4. Bed to chair transfer with Supervision using Rolling Walker met 3/13/2020  5. Gait  x 150 feet with Supervision using Rolling Walker. Met 3/13/2020  6. Wheelchair propulsion x150 feet with Supervision using bilateral uppper extremities.   7. Ascend/Descend 4 inch curb step with Supervision using Rolling Walker.  8. Stand for 3 minutes with Stand-by Assistance using Rolling Walker while performing a dynamic standing activity  Met 3/12/2020  9. Pt will perform a car transfer (actual or simulated) w/ RW and SBA.  10. Pt will  object from floor using reacher w/ RW and SPV. Met 3/19/2020  11. Pt will walk over an uneven surface (outside ramp) w/ RW and SBA.         Outcome: Ongoing, Progressing

## 2020-03-19 NOTE — PT/OT/SLP PROGRESS
Occupational Therapy  Treatment    Radha Woods   MRN: [id number]   Admitting Diagnosis: Acute on chronic respiratory failure with hypoxia and hypercapnia    OT Date of Treatment: 03/19/20       Billable Minutes:  Self Care/Home Management 14, Therapeutic Activity 14 and Therapeutic Exercise 10    General Precautions: Standard, fall  Orthopedic Precautions: N/A  Braces: N/A    Spiritual, Cultural Beliefs, Christian Practices, Values that Affect Care: no    Subjective:  Communicated with nurse prior to session.  Pt. Reported that she had some back pain    Pain/Comfort  Pain Rating 1: 6/10  Location - Side 1: Bilateral  Location - Orientation 1: lower  Location 1: back  Pain Addressed 1: Reposition, Distraction  Pain Rating Post-Intervention 1: (no increases in pain noted)    Objective:  Patient found with: oxygen(seated EOB)    Occupational Performance:    Bed Mobility:    · Patient completed Supine to Sit with modified independence     Functional Mobility/Transfers:  · Patient completed Sit <> Stand Transfer with supervision  with  no assistive device   · Patient completed Bed <> Chair Transfer using Stand Pivot technique with supervision with no assistive device  · Patient completed Toilet Transfer Stand Pivot technique with supervision with  grab bars  · Functional Mobility: not tested    Activities of Daily Living:  · Toileting: supervision with use of grab barand w/c to transfer to toilet    [facility] 6 Click:  [facility] Total Score: 21    OT Exercises: UE Ergometer x 10 minutes with min/mod resistance    Additional Treatment:  Pt. Educated on safety with toileting and transfers in room  Pt. Engaged in dynamic standing task at table with S  02 sats taken during session and remained at 90% or above with activity    Patient left seated EOB with 02 in place with call button in reach    ASSESSMENT:  Radha Woods is a 74 y.o. female with a medical diagnosis of Acute on chronic respiratory failure with hypoxia and  hypercapnia and presents with deficits in self-care skills that are higher level but has improved to a S level.  Pt. Tolerated session well on this date.     Rehab identified problem list/impairments: weakness, impaired endurance, impaired self care skills, impaired functional mobilty, impaired cardiopulmonary response to activity    Rehab potential is good    Activity tolerance: Good    Discharge recommendations: home with home health  And S initially    Barriers to discharge: Other (Comment)(undetermined d/c destination, pt will require A upon d/c.)     Equipment recommendations: wheelchair     GOALS:   Multidisciplinary Problems     Occupational Therapy Goals        Problem: Occupational Therapy Goal    Goal Priority Disciplines Outcome Interventions   Occupational Therapy Goal     OT, PT/OT Ongoing, Progressing    Description:  Pt to be seen by OT focusing on the following STG's:  Pt to perform LB dressing independently.  Pt to perform self showering on TTB RW level with modified [place].  Pt to demonstrate proper breathing control during activity maintaining O2 sats within parameters set by MD.  Pt to demonstrate increased BUE strength to 4/5 throughout.  Pt to perform simple meal prep standing level with modified [place].  Pt to perform BUE HEP as instructed by OT independently.                    Plan:  Patient to be seen 5 x/week to address the above listed problems via self-care/home management, therapeutic activities, therapeutic exercises  Plan of Care expires:    Plan of Care reviewed with: patient    Jocelyn TRACI Tran  03/19/2020

## 2020-03-19 NOTE — TREATMENT PLAN
Rehab Services' DME recommendations    Radha Woods  MRN: [id number]    [x] Wheelchair  Number of hours up in a wheelchair per day 8+       Style Light weight        Justification for light weight w/c: patient has impaired ability to participate in MRADLs, mobility limitations cannot be sifficiently resolved with a cane/walker, the home provides adequate access between rooms for a wheelchair, a wheelchair will significantly improve the ability to participate in MRADLs and will be used in the home on a regular basis, the patient is willing to use a wheelchair in the home and the patient has a caregiver who is available, willing, and able to provide assistance with the wheelchair    Seat Width 18 (Standard adult)    Seat Depth 18    Back Height Standard    Leg Support Standard, Elevated leg rest and Swing Away    Arm Height Detachable, Full and Swing Away    Lap Belt Velcro    Accessories Front Brakes, Anti-tippers and Safety belt    Cushion Foam    Justification for Cushion to decrease chance of injury from prolonged pressure    Justification for wheelchair order: (Please select all that apply) Patient's upper body strength is sufficient for propulsion, The patient requires the use of a wheelchair for ADLs within the home and Patient mobility limitations cannot be sufficiently resolved by the use of other ambulatory therapies    [x] Shower Chair With back    [x] Home health PT and OT      Regine Soliman, PTA 3/19/2020

## 2020-03-19 NOTE — PT/OT/SLP PROGRESS
"2Physical Therapy  Treatment    Radha Woods   MRN: [id number]   Admitting Diagnosis: Acute on chronic respiratory failure with hypoxia and hypercapnia    PT Received On: 03/19/20          Billable Minutes:  Gait Training 15, Therapeutic Activity 15 and Therapeutic Exercise 8    Treatment Type: Treatment  PT/PTA: PTA     PTA Visit Number: 2       General Precautions: Standard, fall  Orthopedic Precautions: N/A   Braces: N/A    Spiritual, Cultural Beliefs, Spiritism Practices, Values that Affect Care: no    Subjective:  "I'm alright just in a lot of pain" Pt agreebale to therapy    Pain/Comfort  Pain Rating 1: other (see comments)(not rated "a little")  Location - Side 1: Bilateral  Location - Orientation 1: lower  Location 1: back  Pain Addressed 1: Pre-medicate for activity, Reposition, Distraction, Cessation of Activity  Pain Rating Post-Intervention 1: other (see comments)(increased, not rated)         Objective:  Patient found in  in gym with oxygen 2L     AM-P[facility] 6 CLICK MOBILITY  Total Score:20    Transfers:  Sit<>Stand: t/f  bed SRW    Gait:  Amb 95' and 90' limited by increase in pain, slow mario alberto vcs for upright posture and to improve breathing tech     Advanced Gait:  Curb Step: 4" CGA RW     Therex:  Mini elliptical 8 minutes, c/o increased pain    Balance:  Pickup object x 5 with reacher RW and CGA    Additional Treatment:  Patient educated on importance of increased time out of bed and DOROTHEA throughout the day    Discussed DME needs and HHPT    Patient left seated EOB with nurse present.    Assessment:  Maame Galeano is a 85 y.o. female with a medical diagnosis of Compression fracture of L1 vertebra.  Patient tolerated treatment fairly well focusing on transfers, gait and therex. Patient limited on this date due to increased pain. Patient will continue to improve with skilled physical therapy services in order to return to functional baseline.    Rehab identified problem list/impairments: " weakness, impaired endurance, impaired functional mobilty, impaired balance, gait instability, decreased lower extremity function, decreased upper extremity function, pain    Rehab potential is good.    Activity tolerance: Fair    Discharge recommendations: home with home health(w/ light family assist recomended)     Barriers to discharge: None    Equipment recommendations: wheelchair     GOALS:   Multidisciplinary Problems     Physical Therapy Goals        Problem: Physical Therapy Goal    Goal Priority Disciplines Outcome Goal Variances Interventions   Physical Therapy Goal     PT, PT/OT Ongoing, Progressing     Description:  Goals to be met by: 3/18/20     Patient will increase functional independence with mobility by performin. Supine to sit with Modified [place]. Met (3/16/2020)  2. Sit to supine with Modified [place]. Met (3/16/2020)  3. Sit to stand transfer with Supervision met 3/13/2020  4. Bed to chair transfer with Supervision using Rolling Walker met 3/13/2020  5. Gait  x 150 feet with Supervision using Rolling Walker. Met 3/13/2020  6. Wheelchair propulsion x150 feet with Supervision using bilateral uppper extremities.   7. Ascend/Descend 4 inch curb step with Supervision using Rolling Walker.  8. Stand for 3 minutes with Stand-by Assistance using Rolling Walker while performing a dynamic standing activity  Met 3/12/2020  9. Pt will perform a car transfer (actual or simulated) w/ RW and SBA.  10. Pt will  object from floor using reacher w/ RW and SPV. Met 3/19/2020  11. Pt will walk over an uneven surface (outside ramp) w/ RW and SBA.                          PLAN:    Patient to be seen (5-7x/wk)  to address the above listed problems via gait training, therapeutic activities, therapeutic exercises, neuromuscular re-education, wheelchair management/training  Plan of Care expires: 04/10/20  Plan of Care reviewed with: patient    Regine Soliman, PTA  2020

## 2020-03-19 NOTE — PROGRESS NOTES
[facility]                                  Skilled Nursing Facility                   Progress Note     Admit Date: 3/10/2020  RUFINO 3/23/2020  Principal Problem:  Acute on chronic respiratory failure with hypoxia and hypercapnia   HPI obtained from patient interview and chart review     Chief Complaint: Revaluation of medical treatment and therapy status: Lab review; patient reports peripheral neuropathy    HPI:   Ms. You is a 74 year old female with PMHx of severe COPD on 3 lt oxygen at home, osteoporosis on bisphosphonate, history of left breast cancer s/p lumpectomy and chemoradiation currently on maintenance therapy (Femara) who presents to SNF following a hospitalization for COPD exacerbation.  Admission to SNF for secondary weakness and debility    Interval History All of today's labs reviewed and are listed below.  24 hr vital sign ranges listed below.  Pt reports peripheral neuropathy pain; patient states pain is greatest in her left great toe.  Patient states that she takes topiramate at home and is currently not prescribed here at SNF.  Patient complains of back pain that is mostly relieved with her pain control regimen.  Patient continues to endorse productive cough.  Patient continues to utilize supplemental O2 less than her home dose. Patient denies dysuria.  Patient reports having regular bowel movements.  Patient progessing with PT/OT. Continuing to follow and treat all acute and chronic conditions.    Past Medical History: Patient has a past medical history of Back pain, Breast cancer (05/2015), COPD (chronic obstructive pulmonary disease), Hyperlipidemia, Hypertension, and Osteoporosis (5/21/2019).    Past Surgical History: Patient has a past surgical history that includes Lumbar fusion; Cervical fusion; Hysterectomy; right lumpectomy; Lymph node dissection (Right); Breast biopsy (Right); and Breast lumpectomy (Right,  2015).    Social History: Patient reports that she has been smoking cigarettes. She has a 45.00 pack-year smoking history. She uses smokeless tobacco. She reports that she drinks alcohol. She reports that she does not use drugs.    Family History: family history includes Breast cancer (age of onset: 75) in her maternal grandmother.    Allergies: Patient is allergic to adhesive; levaquin [levofloxacin]; and penicillins.    ROS  Constitutional: Negative for fever or fatigue.   Eyes: Negative for blurred vision, double vision and discharge.   Respiratory: + for cough, shortness of breath, at baseline    Cardiovascular: Negative for chest pain, palpitations, and leg swelling.   Gastrointestinal: Negative for abdominal pain, constipation, diarrhea, nausea and vomiting.   Genitourinary: Negative for dysuria, frequency and urgency.   Musculoskeletal:  + generalized weakness. + for back pain and peripheral neuropathy pain.   Skin: Negative for itching and rash.   Neurological: Negative for dizziness, speech change, and headaches.   Psychiatric/Behavioral: Negative for depression. The patient is not nervous/anxious.      PEx  Temp:  [98.1 °F (36.7 °C)]   Pulse:  []   Resp:  [16-20]   BP: (122-136)/(61-68)   SpO2:  [92 %-98 %]      Constitutional: Patient appears well-developed.  No distress noted  HENT:   Head: Normocephalic and atraumatic.   Eyes: Pupils are equal, round  Neck: Normal range of motion. Neck supple.   Cardiovascular: Normal rate, regular rhythm and normal heart sounds.    Pulmonary/Chest: Effort normal and breath sounds are clear with diminished bases.  Supplemental O2 in progress  Abdominal: Soft. Bowel sounds are normal.   Musculoskeletal: Normal range of motion.   Neurological: Alert and oriented to person, place, and time.   Psychiatric: Normal mood and affect. Behavior is normal.   Skin: Skin is warm and dry. Scattered areas of ecchymosis    Recent Labs   Lab 03/19/20  0542      K 4.0   CL 97    CO2 39*   BUN 13   CREATININE 0.6   MG 2.1       Recent Labs   Lab 03/19/20  0542   WBC 5.47   RBC 3.50*   HGB 10.1*   HCT 35.3*      *   MCH 28.9   MCHC 28.6*         Assessment and Plan:       Problems addressed today      Peripheral neuropathy  - initiated topiramate 25 mg BID; patient could not remember her dose.  According to med rec patient took 50 mg BID at home, will increase as appropriate    COPD exacerbation  hypercarbic hypoxic respiratory failure  - S/p course of prednisone and ceftriaxone  - Continue duoneb QID, breo one puff daily, tiotropium 18 mcg daily  - Keep O2 sats 88-92, wean o2 as tolerated              Educated patient on the need for limiting her oxygen for sats 88-92%. Keep sats in high 90s will increase her hypercapnia  - 3/19 stable, maintains on supplemental oxygen at less than home dose     Essential hypertension  - Home medications: metoprolol succinate 50 mg daily  - Changed to losartan during hospitalization due to unclear reason  Goal SBP <150 - increase losartan to 25 mg daily   - 3/19 stable, continue current treatment     Chronic low back pain  - Patient with chronic back pain in setting of previous hx of breast cancer and compression fracture.   - 3/19 stable, continue Acetaminophen 650 mg prn and percocet 5-325 mg BID prn        Ongoing but stable problems       Acute Metabolic encephalopathy Resolved  Delirium - resolved  Improved once hypercapnia resolved  Delirium precautions:  - Avoid antihistamines, anticholinergics, hypnotics, and minimize opiates while controlling for pain as these medications may exacerbate delirium. Cues for day/night will assist with keeping patient calm and oriented - during daytime, please keep adequate light in room (open windows, lights on) and please keep room dim at night-time to encourage normal sleep-wake cycles. Continuing to have nursing and family reorient the patient and encourage family to visit     Anemia of critical  illness  - Should resolve without intervention     DJD of multiple sites   - PT/OT  - Pain management of above     At risk of malnutrition   - dietary consult     Chronic diastolic dysfunction   - euvolemic     Breast cancer   - continue letrozole 2.5 mg daily     Osteoporosis   - continue boniva after discharge  - continue Vitamin D 2000 units daily    Seasonal allergies  Rhinitis  - initiated Zyrtec 10 mg qHS.    Debility   - Continue with PT/OT for gait training and strengthening and restoration of ADL's   - Encourage mobility, OOB in chair, and early ambulation as appropriate  - Fall precautions   - Monitor for bowel and bladder dysfunction  - Monitor for and prevent skin breakdown and pressure ulcers  - Continue DVT prophylaxis with  frequent ambulation      Future Appointments   Date Time Provider Department Center   3/25/2020  1:00 PM Xena Lafleur NP [facility] HEM ONC Maximilian Best NP      DOS: 3/19/2020       Patient note was created using MModal Dictation.  Any errors in syntax or even information may not have been identified and edited on initial review prior to signing this note.

## 2020-03-19 NOTE — PLAN OF CARE
Problem: Adult Inpatient Plan of Care  Goal: Plan of Care Review  Outcome: Ongoing, Progressing  Goal: Patient-Specific Goal (Individualization)  Outcome: Ongoing, Progressing  Goal: Absence of Hospital-Acquired Illness or Injury  Outcome: Ongoing, Progressing  Goal: Optimal Comfort and Wellbeing  Outcome: Ongoing, Progressing  Goal: Readiness for Transition of Care  Outcome: Ongoing, Progressing  Goal: Rounds/Family Conference  Outcome: Ongoing, Progressing     Afebrile. Pain medication effective. No new skin wounds. Safety maintained, no falls. Medication regimen reviewed with patient, patient verbalized understanding. Will continue to monitor for pain and safety.

## 2020-03-19 NOTE — PLAN OF CARE
Problem: Wound  Goal: Optimal Wound Healing  Outcome: Ongoing, Progressing     Problem: Adult Inpatient Plan of Care  Goal: Plan of Care Review  Outcome: Ongoing, Progressing  Goal: Patient-Specific Goal (Individualization)  Outcome: Ongoing, Progressing  Goal: Absence of Hospital-Acquired Illness or Injury  Outcome: Ongoing, Progressing  Goal: Optimal Comfort and Wellbeing  Outcome: Ongoing, Progressing  Goal: Readiness for Transition of Care  Outcome: Ongoing, Progressing  Goal: Rounds/Family Conference  Outcome: Ongoing, Progressing     Problem: Fall Injury Risk  Goal: Absence of Fall and Fall-Related Injury  Outcome: Ongoing, Progressing     Problem: Skin Injury Risk Increased  Goal: Skin Health and Integrity  Outcome: Ongoing, Progressing     Problem: Malnutrition  Goal: Improved Nutritional Intake  Outcome: Ongoing, Progressing

## 2020-03-20 PROCEDURE — 99900035 HC TECH TIME PER 15 MIN (STAT)

## 2020-03-20 PROCEDURE — 27000221 HC OXYGEN, UP TO 24 HOURS

## 2020-03-20 PROCEDURE — 25000003 PHARM REV CODE 250: Performed by: HOSPITALIST

## 2020-03-20 PROCEDURE — 25000003 PHARM REV CODE 250: Performed by: INTERNAL MEDICINE

## 2020-03-20 PROCEDURE — 94761 N-INVAS EAR/PLS OXIMETRY MLT: CPT

## 2020-03-20 PROCEDURE — 11000004 HC SNF PRIVATE

## 2020-03-20 PROCEDURE — 94640 AIRWAY INHALATION TREATMENT: CPT

## 2020-03-20 PROCEDURE — 25000003 PHARM REV CODE 250: Performed by: NURSE PRACTITIONER

## 2020-03-20 PROCEDURE — 97530 THERAPEUTIC ACTIVITIES: CPT | Mod: CO

## 2020-03-20 PROCEDURE — 25000242 PHARM REV CODE 250 ALT 637 W/ HCPCS: Performed by: STUDENT IN AN ORGANIZED HEALTH CARE EDUCATION/TRAINING PROGRAM

## 2020-03-20 PROCEDURE — 97530 THERAPEUTIC ACTIVITIES: CPT | Mod: CQ

## 2020-03-20 PROCEDURE — 25000003 PHARM REV CODE 250: Performed by: STUDENT IN AN ORGANIZED HEALTH CARE EDUCATION/TRAINING PROGRAM

## 2020-03-20 PROCEDURE — 97116 GAIT TRAINING THERAPY: CPT | Mod: CQ

## 2020-03-20 RX ADMIN — TIOTROPIUM BROMIDE 18 MCG: 18 CAPSULE ORAL; RESPIRATORY (INHALATION) at 09:03

## 2020-03-20 RX ADMIN — LOSARTAN POTASSIUM 25 MG: 25 TABLET, FILM COATED ORAL at 08:03

## 2020-03-20 RX ADMIN — TOPIRAMATE 25 MG: 25 TABLET, FILM COATED ORAL at 08:03

## 2020-03-20 RX ADMIN — IPRATROPIUM BROMIDE AND ALBUTEROL SULFATE 3 ML: 2.5; .5 SOLUTION RESPIRATORY (INHALATION) at 07:03

## 2020-03-20 RX ADMIN — MICONAZOLE NITRATE: 20 OINTMENT TOPICAL at 08:03

## 2020-03-20 RX ADMIN — PRAVASTATIN SODIUM 40 MG: 20 TABLET ORAL at 08:03

## 2020-03-20 RX ADMIN — CETIRIZINE HYDROCHLORIDE 10 MG: 5 TABLET ORAL at 08:03

## 2020-03-20 RX ADMIN — MELATONIN 2000 UNITS: at 08:03

## 2020-03-20 RX ADMIN — SENNOSIDES AND DOCUSATE SODIUM 1 TABLET: 8.6; 5 TABLET ORAL at 08:03

## 2020-03-20 RX ADMIN — OXYCODONE HYDROCHLORIDE AND ACETAMINOPHEN 1 TABLET: 5; 325 TABLET ORAL at 08:03

## 2020-03-20 RX ADMIN — IPRATROPIUM BROMIDE AND ALBUTEROL SULFATE 3 ML: 2.5; .5 SOLUTION RESPIRATORY (INHALATION) at 06:03

## 2020-03-20 RX ADMIN — OXYCODONE HYDROCHLORIDE AND ACETAMINOPHEN 1 TABLET: 5; 325 TABLET ORAL at 07:03

## 2020-03-20 RX ADMIN — IPRATROPIUM BROMIDE AND ALBUTEROL SULFATE 3 ML: 2.5; .5 SOLUTION RESPIRATORY (INHALATION) at 01:03

## 2020-03-20 RX ADMIN — FLUTICASONE FUROATE AND VILANTEROL TRIFENATATE 1 PUFF: 100; 25 POWDER RESPIRATORY (INHALATION) at 09:03

## 2020-03-20 RX ADMIN — OXYCODONE HYDROCHLORIDE AND ACETAMINOPHEN 1 TABLET: 5; 325 TABLET ORAL at 03:03

## 2020-03-20 RX ADMIN — ASPIRIN 81 MG: 81 TABLET, COATED ORAL at 08:03

## 2020-03-20 RX ADMIN — LETROZOLE 2.5 MG: 2.5 TABLET ORAL at 08:03

## 2020-03-20 NOTE — PT/OT/SLP PROGRESS
"Physical Therapy  Treatment    Radha Woods   MRN: [id number]   Admitting Diagnosis: Acute on chronic respiratory failure with hypoxia and hypercapnia    PT Received On: 03/20/20          Billable Minutes:  Gait Training 15 and Therapeutic Activity 8    Treatment Type: Treatment  PT/PTA: PTA     PTA Visit Number: 3       General Precautions: Standard, fall  Orthopedic Precautions: N/A   Braces: N/A    Spiritual, Cultural Beliefs, Yazidism Practices, Values that Affect Care: no    Subjective:  "I'm am not doing good today, I am in a lot of pain" Pt agreebale to therapy    Pain/Comfort  Pain Rating 1: 5/10  Location - Side 1: Bilateral  Location - Orientation 1: generalized  Location 1: back  Pain Addressed 1: Distraction, Cessation of Activity, Nurse notified  Pain Rating Post-Intervention 1: 8/10         Objective:  Patient found in wc in gym with OT with oxygen 2L     AM-PAC 6 CLICK MOBILITY  Total Score:20    Transfers:  Sit<>Stand: t/f wc bed Mod I RW  Stand Pivot Transfer: wc>bed Mod I RW    Gait:  Amb 95' and 25' limited by increase in pain, slow mario alberto vcs for upright posture and increase in step width for improved balance     Advanced Gait:  Curb Step: 4" CGA RW     Therex:  Refused on this date due to increased pain    Balance:  No LOB noted throughout session    Additional Treatment:  Patient educated on importance of increased time out of bed and DOROTHEA throughout the day    Patient left seated EOB with nurse present.    Assessment:  Maame Galeano is a 85 y.o. female with a medical diagnosis of Compression fracture of L1 vertebra.  Patient tolerated treatment fairly focusing on transfers, gait and therex. Patient limited on this date due to increased pain. Patient will continue to improve with skilled physical therapy services in order to return to functional baseline.    Rehab identified problem list/impairments: weakness, impaired endurance, impaired functional mobilty, impaired balance, gait " instability, decreased lower extremity function, decreased upper extremity function, pain    Rehab potential is good.    Activity tolerance: Fair    Discharge recommendations: home with home health(w/ light family assist recomended)     Barriers to discharge: None    Equipment recommendations: wheelchair     GOALS:   Multidisciplinary Problems     Physical Therapy Goals        Problem: Physical Therapy Goal    Goal Priority Disciplines Outcome Goal Variances Interventions   Physical Therapy Goal     PT, PT/OT Ongoing, Progressing     Description:  Goals to be met by: 3/18/20     Patient will increase functional independence with mobility by performin. Supine to sit with Modified [place]. Met (3/16/2020)  2. Sit to supine with Modified [place]. Met (3/16/2020)  3. Sit to stand transfer with Supervision met 3/13/2020  4. Bed to chair transfer with Supervision using Rolling Walker met 3/13/2020  5. Gait  x 150 feet with Supervision using Rolling Walker. Met 3/13/2020  6. Wheelchair propulsion x150 feet with Supervision using bilateral uppper extremities.   7. Ascend/Descend 4 inch curb step with Supervision using Rolling Walker.  8. Stand for 3 minutes with Stand-by Assistance using Rolling Walker while performing a dynamic standing activity  Met 3/12/2020  9. Pt will perform a car transfer (actual or simulated) w/ RW and SBA.  10. Pt will  object from floor using reacher w/ RW and SPV. Met 3/19/2020  11. Pt will walk over an uneven surface (outside ramp) w/ RW and SBA.                          PLAN:    Patient to be seen (5-7x/wk)  to address the above listed problems via gait training, therapeutic activities, therapeutic exercises, neuromuscular re-education, wheelchair management/training  Plan of Care expires: 04/10/20  Plan of Care reviewed with: patient    Regine Soliman, PTA  2020

## 2020-03-20 NOTE — PT/OT/SLP PROGRESS
"Occupational Therapy  Treatment    Radha Woods   MRN: [id number]   Admitting Diagnosis: Acute on chronic respiratory failure with hypoxia and hypercapnia    OT Date of Treatment: 03/20/20       Billable Minutes:  Therapeutic Activity 42    General Precautions: Standard, fall  Orthopedic Precautions: N/A  Braces: N/A    Spiritual, Cultural Beliefs, Jewish Practices, Values that Affect Care: no    Subjective:  Communicated with patient prior to session.  "My toes get numb when I'm on my feet and it causes me to fall."    Pain/Comfort  Location - Side 1: Bilateral  Location - Orientation 1: generalized  Location 1: neck  Pain Addressed 1: Pre-medicate for activity, Reposition, Cessation of Activity, Distraction    Objective:  Patient found with: oxygen, seated EOB, SpO2 sat 99%    Occupational Performance:      Functional Mobility/Transfers:  · Patient completed Sit <> Stand Transfer with stand by assistance  with  rolling walker   · Patient completed Bed <> Chair Transfer using Stand Pivot technique with stand by assistance with rolling walker  · Functional Mobility: Pt self-propelled w/c Room>gym       [facility] 6 Click:  [facility] Total Score: 21    OT Exercises: AROM Pt completed UE dowel exercises with 1# x10 reps : Sh flexion, horiz abd, chest press, int/ext rot, and bicep curls  UE Ergometer x10 min with x1 rest break.  Pt performed therex to increase strength and endurance required for independence and safety with ADLs, transfers, and mobility.      Additional Treatment:  Pt performed functional activity in standing with RW and SBA ~6.5 min to increase balance and standing tolerance for ADLs and IADLs.  Pt educated on safety with t/fs, functional mobility and ADLs.      Patient left up in chair with PTA    ASSESSMENT:  Radha Woods is a 74 y.o. female with a medical diagnosis of Acute on chronic respiratory failure with hypoxia and hypercapnia. Pt with decreased UE strength and endurance, with c/o of neck pain " in attempting added reps. Pt initiates pursed lip breathing technique upon SOB and applies energy conservation when appropriate. Patient will benefit from continued OT services to progress toward goals and promote independence.      Rehab identified problem list/impairments: weakness, impaired endurance, impaired self care skills, impaired functional mobilty, impaired cardiopulmonary response to activity    Rehab potential is good    Activity tolerance: Good    Discharge recommendations: home with home health     Barriers to discharge: Other (Comment)(undetermined d/c destination, pt will require A upon d/c.)     Equipment recommendations: wheelchair     GOALS:   Multidisciplinary Problems     Occupational Therapy Goals        Problem: Occupational Therapy Goal    Goal Priority Disciplines Outcome Interventions   Occupational Therapy Goal     OT, PT/OT Ongoing, Progressing    Description:  Pt to be seen by OT focusing on the following STG's:  Pt to perform LB dressing independently.  Pt to perform self showering on TTB RW level with modified [place].  Pt to demonstrate proper breathing control during activity maintaining O2 sats within parameters set by MD.  Pt to demonstrate increased BUE strength to 4/5 throughout.  Pt to perform simple meal prep standing level with modified [place].  Pt to perform BUE HEP as instructed by OT independently.                    Plan:  Patient to be seen 5 x/week to address the above listed problems via self-care/home management, therapeutic activities, therapeutic exercises  Plan of Care expires:    Plan of Care reviewed with: patient    GILBERTO Dias  03/20/2020           The TRACI and KALLIE have collaborated and discussed the patient's status, treatment plan and progress toward established goals.   GILBERTO Dias 3/20/2020

## 2020-03-20 NOTE — PLAN OF CARE
Problem: Wound  Goal: Optimal Wound Healing  Outcome: Ongoing, Progressing     Problem: Wound  Goal: Optimal Wound Healing  Outcome: Ongoing, Progressing     Problem: Wound  Goal: Optimal Wound Healing  Outcome: Ongoing, Progressing     Problem: Wound  Goal: Optimal Wound Healing  Outcome: Ongoing, Progressing     Problem: Wound  Goal: Optimal Wound Healing  Outcome: Ongoing, Progressing     Problem: Wound  Goal: Optimal Wound Healing  Outcome: Ongoing, Progressing     Problem: Wound  Goal: Optimal Wound Healing  Outcome: Ongoing, Progressing     Problem: Wound  Goal: Optimal Wound Healing  Outcome: Ongoing, Progressing

## 2020-03-21 PROCEDURE — 25000242 PHARM REV CODE 250 ALT 637 W/ HCPCS: Performed by: STUDENT IN AN ORGANIZED HEALTH CARE EDUCATION/TRAINING PROGRAM

## 2020-03-21 PROCEDURE — 25000003 PHARM REV CODE 250: Performed by: STUDENT IN AN ORGANIZED HEALTH CARE EDUCATION/TRAINING PROGRAM

## 2020-03-21 PROCEDURE — 25000003 PHARM REV CODE 250: Performed by: NURSE PRACTITIONER

## 2020-03-21 PROCEDURE — 27000221 HC OXYGEN, UP TO 24 HOURS

## 2020-03-21 PROCEDURE — 25000003 PHARM REV CODE 250: Performed by: HOSPITALIST

## 2020-03-21 PROCEDURE — 99900035 HC TECH TIME PER 15 MIN (STAT)

## 2020-03-21 PROCEDURE — 94640 AIRWAY INHALATION TREATMENT: CPT

## 2020-03-21 PROCEDURE — 25000003 PHARM REV CODE 250: Performed by: INTERNAL MEDICINE

## 2020-03-21 PROCEDURE — 94761 N-INVAS EAR/PLS OXIMETRY MLT: CPT

## 2020-03-21 PROCEDURE — 11000004 HC SNF PRIVATE

## 2020-03-21 RX ADMIN — IPRATROPIUM BROMIDE AND ALBUTEROL SULFATE 3 ML: 2.5; .5 SOLUTION RESPIRATORY (INHALATION) at 06:03

## 2020-03-21 RX ADMIN — LOSARTAN POTASSIUM 25 MG: 25 TABLET, FILM COATED ORAL at 08:03

## 2020-03-21 RX ADMIN — TOPIRAMATE 25 MG: 25 TABLET, FILM COATED ORAL at 08:03

## 2020-03-21 RX ADMIN — IPRATROPIUM BROMIDE AND ALBUTEROL SULFATE 3 ML: 2.5; .5 SOLUTION RESPIRATORY (INHALATION) at 01:03

## 2020-03-21 RX ADMIN — OXYCODONE HYDROCHLORIDE AND ACETAMINOPHEN 1 TABLET: 5; 325 TABLET ORAL at 04:03

## 2020-03-21 RX ADMIN — LETROZOLE 2.5 MG: 2.5 TABLET ORAL at 08:03

## 2020-03-21 RX ADMIN — SENNOSIDES AND DOCUSATE SODIUM 1 TABLET: 8.6; 5 TABLET ORAL at 08:03

## 2020-03-21 RX ADMIN — IPRATROPIUM BROMIDE AND ALBUTEROL SULFATE 3 ML: 2.5; .5 SOLUTION RESPIRATORY (INHALATION) at 07:03

## 2020-03-21 RX ADMIN — MICONAZOLE NITRATE: 20 OINTMENT TOPICAL at 09:03

## 2020-03-21 RX ADMIN — CETIRIZINE HYDROCHLORIDE 10 MG: 5 TABLET ORAL at 08:03

## 2020-03-21 RX ADMIN — FLUTICASONE FUROATE AND VILANTEROL TRIFENATATE 1 PUFF: 100; 25 POWDER RESPIRATORY (INHALATION) at 08:03

## 2020-03-21 RX ADMIN — MELATONIN 2000 UNITS: at 08:03

## 2020-03-21 RX ADMIN — MICONAZOLE NITRATE: 20 OINTMENT TOPICAL at 08:03

## 2020-03-21 RX ADMIN — PRAVASTATIN SODIUM 40 MG: 20 TABLET ORAL at 08:03

## 2020-03-21 RX ADMIN — ASPIRIN 81 MG: 81 TABLET, COATED ORAL at 08:03

## 2020-03-21 NOTE — NURSING
Pt reported nose bleed on  left side nares.  Small amount of streaked  blood noted Pt reported she uses humidifier at home and requested  If she could have it here, Charge informed. Ice provide to assist in blood flow. Pt tolerated well.

## 2020-03-22 PROCEDURE — 25000003 PHARM REV CODE 250: Performed by: INTERNAL MEDICINE

## 2020-03-22 PROCEDURE — 94640 AIRWAY INHALATION TREATMENT: CPT

## 2020-03-22 PROCEDURE — 97530 THERAPEUTIC ACTIVITIES: CPT | Mod: CQ

## 2020-03-22 PROCEDURE — 94761 N-INVAS EAR/PLS OXIMETRY MLT: CPT

## 2020-03-22 PROCEDURE — 11000004 HC SNF PRIVATE

## 2020-03-22 PROCEDURE — 25000003 PHARM REV CODE 250: Performed by: STUDENT IN AN ORGANIZED HEALTH CARE EDUCATION/TRAINING PROGRAM

## 2020-03-22 PROCEDURE — 97110 THERAPEUTIC EXERCISES: CPT | Mod: CQ

## 2020-03-22 PROCEDURE — 27000221 HC OXYGEN, UP TO 24 HOURS

## 2020-03-22 PROCEDURE — 25000242 PHARM REV CODE 250 ALT 637 W/ HCPCS: Performed by: STUDENT IN AN ORGANIZED HEALTH CARE EDUCATION/TRAINING PROGRAM

## 2020-03-22 PROCEDURE — 97116 GAIT TRAINING THERAPY: CPT | Mod: CQ

## 2020-03-22 PROCEDURE — 99900035 HC TECH TIME PER 15 MIN (STAT)

## 2020-03-22 PROCEDURE — 25000003 PHARM REV CODE 250: Performed by: HOSPITALIST

## 2020-03-22 PROCEDURE — 25000003 PHARM REV CODE 250: Performed by: NURSE PRACTITIONER

## 2020-03-22 RX ADMIN — MELATONIN 2000 UNITS: at 08:03

## 2020-03-22 RX ADMIN — OXYCODONE HYDROCHLORIDE AND ACETAMINOPHEN 1 TABLET: 5; 325 TABLET ORAL at 12:03

## 2020-03-22 RX ADMIN — FLUTICASONE FUROATE AND VILANTEROL TRIFENATATE 1 PUFF: 100; 25 POWDER RESPIRATORY (INHALATION) at 08:03

## 2020-03-22 RX ADMIN — TOPIRAMATE 25 MG: 25 TABLET, FILM COATED ORAL at 08:03

## 2020-03-22 RX ADMIN — ASPIRIN 81 MG: 81 TABLET, COATED ORAL at 08:03

## 2020-03-22 RX ADMIN — LETROZOLE 2.5 MG: 2.5 TABLET ORAL at 08:03

## 2020-03-22 RX ADMIN — MICONAZOLE NITRATE: 20 OINTMENT TOPICAL at 08:03

## 2020-03-22 RX ADMIN — IPRATROPIUM BROMIDE AND ALBUTEROL SULFATE 3 ML: 2.5; .5 SOLUTION RESPIRATORY (INHALATION) at 07:03

## 2020-03-22 RX ADMIN — SENNOSIDES AND DOCUSATE SODIUM 1 TABLET: 8.6; 5 TABLET ORAL at 08:03

## 2020-03-22 RX ADMIN — LOSARTAN POTASSIUM 25 MG: 25 TABLET, FILM COATED ORAL at 08:03

## 2020-03-22 RX ADMIN — MICONAZOLE NITRATE: 20 OINTMENT TOPICAL at 09:03

## 2020-03-22 RX ADMIN — OXYCODONE HYDROCHLORIDE AND ACETAMINOPHEN 1 TABLET: 5; 325 TABLET ORAL at 09:03

## 2020-03-22 RX ADMIN — PRAVASTATIN SODIUM 40 MG: 20 TABLET ORAL at 08:03

## 2020-03-22 RX ADMIN — CETIRIZINE HYDROCHLORIDE 10 MG: 5 TABLET ORAL at 08:03

## 2020-03-22 RX ADMIN — OXYCODONE HYDROCHLORIDE AND ACETAMINOPHEN 1 TABLET: 5; 325 TABLET ORAL at 08:03

## 2020-03-22 RX ADMIN — TIOTROPIUM BROMIDE 18 MCG: 18 CAPSULE ORAL; RESPIRATORY (INHALATION) at 08:03

## 2020-03-22 NOTE — PT/OT/SLP PROGRESS
"Physical Therapy  Treatment    Radha Woods   MRN: [id number]   Admitting Diagnosis: Acute on chronic respiratory failure with hypoxia and hypercapnia    PT Received On: 03/22/20        Billable Minutes:  Gait Training 10, Therapeutic Activity 21 and Therapeutic Exercise 15    Treatment Type: Treatment  PT/PTA: PTA     PTA Visit Number: 4       General Precautions: Standard, fall  Orthopedic Precautions: N/A   Braces: N/A    Spiritual, Cultural Beliefs, Islam Practices, Values that Affect Care: no    Subjective:  "alright"    Pain/Comfort  Pain Rating 1: 4/10  Location - Orientation 1: generalized  Location 1: back  Pain Addressed 1: Pre-medicate for activity, Reposition, Distraction, Cessation of Activity  Pain Rating Post-Intervention 1: (inc with mobility/did not rate)    Objective:  Patient found with: oxygen     AM-PAC 6 CLICK MOBILITY  Total Score:20    Bed Mobility:  Sit>Supine:mod I HOB flat no rail vcs for log roll  Supine>Sit: mod I HOB flat no rail vcs for log roll    Transfers:  Sit<>Stand: with RW mod I  Stand Pivot Transfer: with RW S/mod I WC<>EOB    Gait:  Amb with RW SBA ~ 100 ft no LOB to include curb step     Advanced Gait:  Curb Step: asc/chuy 4" curb with RW CGA    Therex:  2x10 reps AP,hip flex    Additional Treatment:  Mini elliptical x 10 min    Patient left up in chair with call button in reach and belongings in reach.    Assessment:  Radha Woods is a 74 y.o. female with a medical diagnosis of Acute on chronic respiratory failure with hypoxia and hypercapnia.  Pt tolerated well, pt would continue to benefit from skilled PT services to improve overall functional mobility, strength and endurance.  .    Rehab identified problem list/impairments: weakness, impaired endurance, impaired self care skills, impaired functional mobilty, gait instability, impaired balance, decreased lower extremity function, pain, impaired cardiopulmonary response to activity    Rehab potential is " good.    Activity tolerance: Fair    Discharge recommendations: home with home health(w/ light family assist recomended)     Barriers to discharge: None    Equipment recommendations: wheelchair     GOALS:   Multidisciplinary Problems     Physical Therapy Goals        Problem: Physical Therapy Goal    Goal Priority Disciplines Outcome Goal Variances Interventions   Physical Therapy Goal     PT, PT/OT Ongoing, Progressing     Description:  Goals to be met by: 3/18/20     Patient will increase functional independence with mobility by performin. Supine to sit with Modified [place]. Met (3/16/2020)  2. Sit to supine with Modified [place]. Met (3/16/2020)  3. Sit to stand transfer with Supervision met 3/13/2020  4. Bed to chair transfer with Supervision using Rolling Walker met 3/13/2020  5. Gait  x 150 feet with Supervision using Rolling Walker. Met 3/13/2020  6. Wheelchair propulsion x150 feet with Supervision using bilateral uppper extremities.   7. Ascend/Descend 4 inch curb step with Supervision using Rolling Walker.  8. Stand for 3 minutes with Stand-by Assistance using Rolling Walker while performing a dynamic standing activity  Met 3/12/2020  9. Pt will perform a car transfer (actual or simulated) w/ RW and SBA.  10. Pt will  object from floor using reacher w/ RW and SPV. Met 3/19/2020  11. Pt will walk over an uneven surface (outside ramp) w/ RW and SBA.                          PLAN:    Patient to be seen (5-7x/wk)  to address the above listed problems via gait training, therapeutic activities, therapeutic exercises, neuromuscular re-education, wheelchair management/training  Plan of Care expires: 04/10/20  Plan of Care reviewed with: patient    Arlyn Orellana, PTA  2020

## 2020-03-23 VITALS
OXYGEN SATURATION: 94 % | WEIGHT: 124.31 LBS | HEART RATE: 82 BPM | HEIGHT: 67 IN | SYSTOLIC BLOOD PRESSURE: 128 MMHG | RESPIRATION RATE: 18 BRPM | BODY MASS INDEX: 19.51 KG/M2 | DIASTOLIC BLOOD PRESSURE: 55 MMHG | TEMPERATURE: 98 F

## 2020-03-23 LAB
ANION GAP SERPL CALC-SCNC: 9 MMOL/L (ref 8–16)
BASOPHILS # BLD AUTO: 0.04 K/UL (ref 0–0.2)
BASOPHILS NFR BLD: 0.8 % (ref 0–1.9)
BUN SERPL-MCNC: 14 MG/DL (ref 8–23)
CALCIUM SERPL-MCNC: 9.2 MG/DL (ref 8.7–10.5)
CHLORIDE SERPL-SCNC: 100 MMOL/L (ref 95–110)
CO2 SERPL-SCNC: 31 MMOL/L (ref 23–29)
CREAT SERPL-MCNC: 0.6 MG/DL (ref 0.5–1.4)
DIFFERENTIAL METHOD: ABNORMAL
EOSINOPHIL # BLD AUTO: 0.2 K/UL (ref 0–0.5)
EOSINOPHIL NFR BLD: 3.3 % (ref 0–8)
ERYTHROCYTE [DISTWIDTH] IN BLOOD BY AUTOMATED COUNT: 13.2 % (ref 11.5–14.5)
EST. GFR  (AFRICAN AMERICAN): >60 ML/MIN/1.73 M^2
EST. GFR  (NON AFRICAN AMERICAN): >60 ML/MIN/1.73 M^2
GLUCOSE SERPL-MCNC: 76 MG/DL (ref 70–110)
HCT VFR BLD AUTO: 35.2 % (ref 37–48.5)
HGB BLD-MCNC: 10.4 G/DL (ref 12–16)
IMM GRANULOCYTES # BLD AUTO: 0.02 K/UL (ref 0–0.04)
IMM GRANULOCYTES NFR BLD AUTO: 0.4 % (ref 0–0.5)
LYMPHOCYTES # BLD AUTO: 1.4 K/UL (ref 1–4.8)
LYMPHOCYTES NFR BLD: 26.8 % (ref 18–48)
MAGNESIUM SERPL-MCNC: 2.1 MG/DL (ref 1.6–2.6)
MCH RBC QN AUTO: 30 PG (ref 27–31)
MCHC RBC AUTO-ENTMCNC: 29.5 G/DL (ref 32–36)
MCV RBC AUTO: 101 FL (ref 82–98)
MONOCYTES # BLD AUTO: 0.6 K/UL (ref 0.3–1)
MONOCYTES NFR BLD: 10.7 % (ref 4–15)
NEUTROPHILS # BLD AUTO: 3 K/UL (ref 1.8–7.7)
NEUTROPHILS NFR BLD: 58 % (ref 38–73)
NRBC BLD-RTO: 0 /100 WBC
PHOSPHATE SERPL-MCNC: 4.4 MG/DL (ref 2.7–4.5)
PLATELET # BLD AUTO: 237 K/UL (ref 150–350)
PMV BLD AUTO: 9.8 FL (ref 9.2–12.9)
POTASSIUM SERPL-SCNC: 3.9 MMOL/L (ref 3.5–5.1)
RBC # BLD AUTO: 3.47 M/UL (ref 4–5.4)
SODIUM SERPL-SCNC: 140 MMOL/L (ref 136–145)
WBC # BLD AUTO: 5.23 K/UL (ref 3.9–12.7)

## 2020-03-23 PROCEDURE — 25000003 PHARM REV CODE 250: Performed by: NURSE PRACTITIONER

## 2020-03-23 PROCEDURE — 99900035 HC TECH TIME PER 15 MIN (STAT)

## 2020-03-23 PROCEDURE — 25000242 PHARM REV CODE 250 ALT 637 W/ HCPCS: Performed by: STUDENT IN AN ORGANIZED HEALTH CARE EDUCATION/TRAINING PROGRAM

## 2020-03-23 PROCEDURE — G0180 PR HOME HEALTH MD CERTIFICATION: ICD-10-PCS | Mod: ,,, | Performed by: INTERNAL MEDICINE

## 2020-03-23 PROCEDURE — 25000003 PHARM REV CODE 250: Performed by: INTERNAL MEDICINE

## 2020-03-23 PROCEDURE — 25000003 PHARM REV CODE 250: Performed by: HOSPITALIST

## 2020-03-23 PROCEDURE — 85025 COMPLETE CBC W/AUTO DIFF WBC: CPT

## 2020-03-23 PROCEDURE — 83735 ASSAY OF MAGNESIUM: CPT

## 2020-03-23 PROCEDURE — G0180 MD CERTIFICATION HHA PATIENT: HCPCS | Mod: ,,, | Performed by: INTERNAL MEDICINE

## 2020-03-23 PROCEDURE — 94640 AIRWAY INHALATION TREATMENT: CPT

## 2020-03-23 PROCEDURE — 80048 BASIC METABOLIC PNL TOTAL CA: CPT

## 2020-03-23 PROCEDURE — 27000221 HC OXYGEN, UP TO 24 HOURS

## 2020-03-23 PROCEDURE — 36415 COLL VENOUS BLD VENIPUNCTURE: CPT

## 2020-03-23 PROCEDURE — 25000003 PHARM REV CODE 250: Performed by: STUDENT IN AN ORGANIZED HEALTH CARE EDUCATION/TRAINING PROGRAM

## 2020-03-23 PROCEDURE — 84100 ASSAY OF PHOSPHORUS: CPT

## 2020-03-23 PROCEDURE — 94761 N-INVAS EAR/PLS OXIMETRY MLT: CPT

## 2020-03-23 RX ORDER — TALC
6 POWDER (GRAM) TOPICAL NIGHTLY PRN
Refills: 0 | Status: ON HOLD | COMMUNITY
Start: 2020-03-23 | End: 2022-04-06

## 2020-03-23 RX ORDER — OXYCODONE AND ACETAMINOPHEN 5; 325 MG/1; MG/1
1 TABLET ORAL EVERY 6 HOURS PRN
Qty: 30 TABLET | Refills: 0 | Status: ON HOLD | OUTPATIENT
Start: 2020-03-23 | End: 2024-01-23 | Stop reason: HOSPADM

## 2020-03-23 RX ORDER — ACETAMINOPHEN 325 MG/1
650 TABLET ORAL EVERY 4 HOURS PRN
Refills: 0
Start: 2020-03-23 | End: 2022-04-04

## 2020-03-23 RX ORDER — AMOXICILLIN 250 MG
1 CAPSULE ORAL 2 TIMES DAILY
Start: 2020-03-23 | End: 2022-04-04

## 2020-03-23 RX ADMIN — LETROZOLE 2.5 MG: 2.5 TABLET ORAL at 08:03

## 2020-03-23 RX ADMIN — IPRATROPIUM BROMIDE AND ALBUTEROL SULFATE 3 ML: 2.5; .5 SOLUTION RESPIRATORY (INHALATION) at 07:03

## 2020-03-23 RX ADMIN — OXYCODONE HYDROCHLORIDE AND ACETAMINOPHEN 1 TABLET: 5; 325 TABLET ORAL at 06:03

## 2020-03-23 RX ADMIN — TOPIRAMATE 25 MG: 25 TABLET, FILM COATED ORAL at 08:03

## 2020-03-23 RX ADMIN — SENNOSIDES AND DOCUSATE SODIUM 1 TABLET: 8.6; 5 TABLET ORAL at 09:03

## 2020-03-23 RX ADMIN — ASPIRIN 81 MG: 81 TABLET, COATED ORAL at 08:03

## 2020-03-23 RX ADMIN — MELATONIN 2000 UNITS: at 08:03

## 2020-03-23 RX ADMIN — MICONAZOLE NITRATE: 20 OINTMENT TOPICAL at 09:03

## 2020-03-23 RX ADMIN — FLUTICASONE FUROATE AND VILANTEROL TRIFENATATE 1 PUFF: 100; 25 POWDER RESPIRATORY (INHALATION) at 08:03

## 2020-03-23 RX ADMIN — LOSARTAN POTASSIUM 25 MG: 25 TABLET, FILM COATED ORAL at 08:03

## 2020-03-23 RX ADMIN — TIOTROPIUM BROMIDE 18 MCG: 18 CAPSULE ORAL; RESPIRATORY (INHALATION) at 08:03

## 2020-03-23 NOTE — NURSING
DISCHARGE INSTRUCTIONS GIVEN AND PATIENT UNDERSTANDS.PORTABLE O2 AT 1.5 LPM NASAL CANNULA IN USE.PERSONAL BELONGINGS WITH PATIENT.DISCHARGED BY WHEELCHAIR ACCOMPANIED BY PCT X2 TO LOBBY AND FAMILY MEMBER TO HOME.

## 2020-03-23 NOTE — DISCHARGE SUMMARY
[facility] - [facility]  Discharge Summary      Patient Name: Radha Woods  MRN: [id number]  Admission Date: 3/10/2020  Hospital Length of Stay: 13 days  Discharge Date and Time:  03/23/2020 11:15 AM  Attending Physician: Elisha Starkey MD   Discharging Provider: Allison Best NP  Primary Care Provider: Param Riley MD      HPI:   Ms. You is a 75 yo female with severe COPD on 3 lt oxygen at home, osteoporosis on bisphosphonate, history of left breast cancer s/p lumpectomy and chemoradiation currently on maintenance therapy (Femara) who presents with acute confusion for the past few days on 3/5/2020. She was admitted to ICU with COPD exacerbation. She improved with BiPAP, around the clock nebs, prednisone and course of ceftriaxone. She was weaned to home oxygen levels of 2L. Patient states SOB at baseline. She complains of feeling of depression, hopelessness and anxiety over her increased need for assitance. She additionally complains of chronic back pain. Pain is is intermittent and sometimes radiate down to buttocks and bilateral legs. Pain medications are adequate.      Patient will be treated at [facility] with PT and OT to improve functional status and ability to perform ADLs.     * No surgery found *      Hospital Course:   Patient progressed well with PT and OT. Patient had no significant events during their stay at SNF. Home health was set up. DME was ordered if needed. Follow up appointment to be made by patient within one week. All prescriptions and discharge instructions were ordered to be given to the patient prior to discharge.       PEx  Constitutional: Patient appears well-developed.  No distress noted  HENT:   Head: Normocephalic and atraumatic.   Eyes: Pupils are equal, round  Neck: Normal range of motion. Neck supple.   Cardiovascular: Normal rate, regular rhythm and normal heart sounds.    Pulmonary/Chest: Effort normal and breath sounds are clear with  diminished bases.  Supplemental O2 in progress  Abdominal: Soft. Bowel sounds are normal.   Musculoskeletal: Normal range of motion.   Neurological: Alert and oriented to person, place, and time.   Psychiatric: Normal mood and affect. Behavior is normal.   Skin: Skin is warm and dry. Scattered areas of ecchymosis     Consults:   Consults (From admission, onward)        Status Ordering Provider     Inpatient consult to Registered Dietitian/Nutritionist  Once     Provider:  (Not yet assigned)    Completed ALE ZUÑIGA          No new Assessment & Plan notes have been filed under this hospital service since the last note was generated.  Service: Hospital Medicine    Final Active Diagnoses:    Diagnosis Date Noted POA    PRINCIPAL PROBLEM:  Acute on chronic respiratory failure with hypoxia and hypercapnia [J96.21, J96.22] 03/05/2020 Yes    Moderate malnutrition [E44.0] 03/11/2020 Yes    Hyperlipidemia [E78.5] 03/07/2020 Yes    Chronic low back pain [M54.5, G89.29] 03/07/2020 Yes    Essential hypertension [I10] 03/07/2020 Yes    Metabolic encephalopathy [G93.41] 03/05/2020 Yes    COPD exacerbation [J44.1] 03/05/2020 Yes    Osteoporosis [M81.0] 05/21/2019 Yes      Problems Resolved During this Admission:       Discharged Condition: good    Disposition: Home-Health Care [facility]    Follow Up:  Follow-up Information     Schedule an appointment as soon as possible for a visit with Param Riley MD.    Specialty:  Family Medicine  Why:  WITHIN 1-2 WEEKS  Contact information:  [address] [facility] [address]  [phone]             [facility].    Specialty:  Home Health Services  Why:  Agency will call pt to schedule a home health assessment.  Contact information:  [address] [place] [address].  [address]  [facility] [address] [phone]                 Patient Instructions:      WHEELCHAIR FOR HOME USE     Order Specific Question Answer Comments   Hours in W/C per day: 8    Type of  "Wheelchair: Lightweight    Patient unable to propel in Standard wheelchair? Yes    Size(Width): 18"(STD adult)    Leg Support: STD footrests    Leg Support: Swing Away    Arm Height: Full length    Arm Height: Swing away    Desired seat depth: 18    Lap Belt: Velcro    Cushion: Basic    Height: 5' 7" (1.702 m)    Weight: 56.4 kg (124 lb 5.4 oz)    Does patient have medical equipment at home? walker, rolling    Does patient have medical equipment at home? shower chair    Does patient have medical equipment at home? oxygen    Length of need (1-99 months): 99    Please check all that apply: Caregiver is capable and willing to operate wheelchair safely.    Please check all that apply: Patient mobility limitations cannot be sufficiently resolved by the use of other ambulatory therapies.    Please check all that apply: The patient requires the use of a w/c for activities of daily living within the Home.    Vendor: [facility] approved   Expected Date of Delivery: 3/23/2020      No driving until:   Order Comments: Cleared by PCP     Notify your health care provider if you experience any of the following:  temperature >100.4     Notify your health care provider if you experience any of the following:  persistent nausea and vomiting or diarrhea     Notify your health care provider if you experience any of the following:  severe uncontrolled pain     Notify your health care provider if you experience any of the following:  redness, tenderness, or signs of infection (pain, swelling, redness, odor or green/yellow discharge around incision site)     Notify your health care provider if you experience any of the following:  difficulty breathing or increased cough     Notify your health care provider if you experience any of the following:  persistent dizziness, light-headedness, or visual disturbances     Notify your health care provider if you experience any of the following:  increased confusion or weakness "     Activity as tolerated       Significant Diagnostic Studies: Labs:   BMP:   Recent Labs   Lab 03/23/20  0531   GLU 76      K 3.9      CO2 31*   BUN 14   CREATININE 0.6   CALCIUM 9.2   MG 2.1    and CBC   Recent Labs   Lab 03/23/20  0531   WBC 5.23   HGB 10.4*   HCT 35.2*          Pending Diagnostic Studies:     None         Medications:  Reconciled Home Medications:      Medication List      START taking these medications    acetaminophen 325 MG tablet  Commonly known as:  TYLENOL  Take 2 tablets (650 mg total) by mouth every 4 (four) hours as needed.     melatonin 3 mg tablet  Commonly known as:  MELATIN  Take 2 tablets (6 mg total) by mouth nightly as needed for Insomnia.     senna-docusate 8.6-50 mg 8.6-50 mg per tablet  Commonly known as:  PERICOLACE  Take 1 tablet by mouth 2 (two) times daily.        CHANGE how you take these medications    oxyCODONE-acetaminophen 5-325 mg per tablet  Commonly known as:  PERCOCET  Take 1 tablet by mouth every 6 (six) hours as needed.  What changed:    · when to take this  · reasons to take this        CONTINUE taking these medications    albuterol 90 mcg/actuation inhaler  Commonly known as:  PROVENTIL/VENTOLIN HFA  Inhale 2 puffs into the lungs every 6 (six) hours as needed for Wheezing.     aspirin 81 MG EC tablet  Commonly known as:  ECOTRIN  Take 81 mg by mouth once daily.     calcium carbonate 500 mg calcium (1,250 mg) chewable tablet  Commonly known as:  OS-JENNI  Take 2 tablets by mouth once daily.     cetirizine 10 MG tablet  Commonly known as:  ZYRTEC  Take 10 mg by mouth every evening.     cholecalciferol (vitamin D3) 50 mcg (2,000 unit) Cap  Commonly known as:  VITAMIN D3  Take 1,000 Units by mouth once daily.     fish oil-omega-3 fatty acids 300-1,000 mg capsule  Take by mouth once daily.     garlic 1,000 mg Cap  Take 1,000 mg by mouth.     ibandronate 150 mg tablet  Commonly known as:  BONIVA  Take 1 tablet (150 mg total) by mouth every 30  days.     letrozole 2.5 mg Tab  Commonly known as:  FEMARA  TAKE 1 TABLET BY MOUTH ONCE DAILY     losartan 25 MG tablet  Commonly known as:  COZAAR  Take 0.5 tablets (12.5 mg total) by mouth once daily.     pravastatin 40 MG tablet  Commonly known as:  PRAVACHOL  Take 40 mg by mouth every evening.     STIOLTO RESPIMAT 2.5-2.5 mcg/actuation Mist  Generic drug:  tiotropium-olodateroL  Inhale into the lungs once daily.     topiramate 50 MG tablet  Commonly known as:  TOPAMAX  Take 50 mg by mouth every evening.     VITAMIN C 1000 MG tablet  Generic drug:  ascorbic acid (vitamin C)  Take 1,000 mg by mouth once daily.        STOP taking these medications    alendronate 70 MG tablet  Commonly known as:  FOSAMAX     augmented betamethasone dipropionate 0.05 % cream  Commonly known as:  DIPROLENE-AF     azelastine 0.15 % (205.5 mcg) Spry     cyclobenzaprine 10 MG tablet  Commonly known as:  FLEXERIL     metoprolol succinate 50 MG 24 hr tablet  Commonly known as:  TOPROL-XL     metoprolol tartrate 50 MG tablet  Commonly known as:  LOPRESSOR     predniSONE 20 MG tablet  Commonly known as:  DELTASONE     varenicline 0.5 mg (11)- 1 mg (42) tablet  Commonly known as:  CHANTIX SABINE     vitamin E 400 UNIT capsule            Indwelling Lines/Drains at time of discharge:   Lines/Drains/Airways     None                 Time spent on the discharge of patient: 35 minutes  Patient was seen and examined on the date of discharge and determined to be suitable for discharge.       Allison Best NP  Department of Hospital Medicine  [facility] [place] [facility] - Skilled Nursing Care

## 2020-03-23 NOTE — PLAN OF CARE
[facility] [place] [facility] - [facility] Nursing Care    HOME HEALTH ORDERS  FACE TO FACE ENCOUNTER    Patient Name: Radha Woods  YOB: 1945    PCP: Param Riley MD   PCP Address: [address] [place] [address] / [place] [address]  PCP Phone Number: [phone]  PCP Fax: [phone]    Encounter Date: 03/23/2020    Admit to Home Health    Diagnoses:  Active Hospital Problems    Diagnosis  POA    *Acute on chronic respiratory failure with hypoxia and hypercapnia [J96.21, J96.22]  Yes    Moderate malnutrition [E44.0]  Yes    Hyperlipidemia [E78.5]  Yes    Chronic low back pain [M54.5, G89.29]  Yes    Essential hypertension [I10]  Yes    Metabolic encephalopathy [G93.41]  Yes    COPD exacerbation [J44.1]  Yes    Osteoporosis [M81.0]  Yes      Resolved Hospital Problems   No resolved problems to display.       Future Appointments   Date Time Provider Department Center   3/25/2020  1:00 PM Xena Lafleur NP [facility] HEM ONC [facility]     Follow-up Information     Schedule an appointment as soon as possible for a visit with Param Riley MD.    Specialty:  Family Medicine  Why:  WITHIN 1-2 WEEKS  Contact information[facility]  [facility] [address]  [phone]                 I have seen and examined this patient face to face today. My clinical findings that support the need for the home health skilled services and home bound status are the following:  Weakness/numbness causing balance and gait disturbance due to COPD Exacerbation making it taxing to leave home.    Allergies:  Review of patient's allergies indicates:   Allergen Reactions    Adhesive Rash     Use PAPER Tape / TEGADERM    Levaquin [levofloxacin] Diarrhea and Nausea Only    Penicillins Rash       Diet: regular diet    Activities: activity as tolerated    Nursing:   SN to complete comprehensive assessment including routine vital signs. Instruct on disease process and s/s of complications to report to MD.  Review/verify medication list sent home with the patient at time of discharge  and instruct patient/caregiver as needed. Frequency may be adjusted depending on start of care date.    Notify MD if SBP > 160 or < 90; DBP > 90 or < 50; HR > 120 or < 50; Temp > 101    CONSULTS:    Physical Therapy to evaluate and treat. Evaluate for home safety and equipment needs; Establish/upgrade home exercise program. Perform / instruct on therapeutic exercises, gait training, transfer training, and Range of Motion.  Occupational Therapy to evaluate and treat. Evaluate home environment for safety and equipment needs. Perform/Instruct on transfers, ADL training, ROM, and therapeutic exercises.  Aide to provide assistance with personal care, ADLs, and vital signs.    MISCELLANEOUS CARE:  Home Oxygen:  No change      Medications: Review discharge medications with patient and family and provide education.      I certify that this patient is confined to her home and needs intermittent skilled nursing care, physical therapy and occupational therapy.

## 2020-03-23 NOTE — PROGRESS NOTES
Patient discharge is complete pt will discharge to her son's home [address] [place] [address]. Patient has been referred to [facility]. Nurse informed discharge complete.

## 2020-03-23 NOTE — PROGRESS NOTES
[facility] [place] [facility]    HOME HEALTH ORDERS  FACE TO FACE ENCOUNTER    Patient Name: Radha Woods  YOB: 1945    PCP: Param Riley MD   PCP Address: [address] [place] [address] / [place] [address]  PCP Phone Number: [phone]  PCP Fax: [phone]    Encounter Date: 03/23/2020    Admit to Home Health    Diagnoses:  Active Hospital Problems    Diagnosis  POA    *Acute on chronic respiratory failure with hypoxia and hypercapnia [J96.21, J96.22]  Yes    Moderate malnutrition [E44.0]  Yes    Hyperlipidemia [E78.5]  Yes    Chronic low back pain [M54.5, G89.29]  Yes    Essential hypertension [I10]  Yes    Metabolic encephalopathy [G93.41]  Yes    COPD exacerbation [J44.1]  Yes    Osteoporosis [M81.0]  Yes      Resolved Hospital Problems   No resolved problems to display.       Future Appointments   Date Time Provider Department Center   3/25/2020  1:00 PM Xena Lafleur NP [facility] HEM ONC [facility]     Follow-up Information     Schedule an appointment as soon as possible for a visit with Param Riley MD.    Specialty:  Family Medicine  Why:  WITHIN 1-2 WEEKS  [facility] information[facility]  [facility] [address]  [phone]                 I have seen and examined this patient face to face today. My clinical findings that support the need for the home health skilled services and home bound status are the following:  Weakness/numbness causing balance and gait disturbance due to COPD Exacerbation making it taxing to leave home.    Allergies:  Review of patient's allergies indicates:   Allergen Reactions    Adhesive Rash     Use PAPER Tape / TEGADERM    Levaquin [levofloxacin] Diarrhea and Nausea Only    Penicillins Rash       Diet: regular diet    Activities: activity as tolerated    Nursing:   SN to complete comprehensive assessment including routine vital signs. Instruct on disease process and s/s of complications to report to MD.  Review/verify medication list sent home with the patient at time of discharge  and instruct patient/caregiver as needed. Frequency may be adjusted depending on start of care date.    Notify MD if SBP > 160 or < 90; DBP > 90 or < 50; HR > 120 or < 50; Temp > 101    CONSULTS:    Physical Therapy to evaluate and treat. Evaluate for home safety and equipment needs; Establish/upgrade home exercise program. Perform / instruct on therapeutic exercises, gait training, transfer training, and Range of Motion.  Occupational Therapy to evaluate and treat. Evaluate home environment for safety and equipment needs. Perform/Instruct on transfers, ADL training, ROM, and therapeutic exercises.  Aide to provide assistance with personal care, ADLs, and vital signs.    MISCELLANEOUS CARE:  Home Oxygen:  No change      Medications: Review discharge medications with patient and family and provide education.      I certify that this patient is confined to her home and needs intermittent skilled nursing care, physical therapy and occupational therapy.

## 2020-04-07 ENCOUNTER — EXTERNAL HOME HEALTH (OUTPATIENT)
Dept: HOME HEALTH SERVICES | Facility: HOSPITAL | Age: 75
End: 2020-04-07
Payer: MEDICARE

## 2020-04-20 DIAGNOSIS — C50.911 BREAST CANCER METASTASIZED TO AXILLARY LYMPH NODE, RIGHT: ICD-10-CM

## 2020-04-20 DIAGNOSIS — C77.3 BREAST CANCER METASTASIZED TO AXILLARY LYMPH NODE, RIGHT: ICD-10-CM

## 2020-04-21 RX ORDER — LETROZOLE 2.5 MG/1
TABLET, FILM COATED ORAL
Qty: 90 TABLET | Refills: 3 | Status: SHIPPED | OUTPATIENT
Start: 2020-04-21 | End: 2022-04-04

## 2020-04-30 DIAGNOSIS — C50.911 CARCINOMA OF RIGHT BREAST METASTATIC TO AXILLARY LYMPH NODE: Primary | ICD-10-CM

## 2020-04-30 DIAGNOSIS — C77.3 CARCINOMA OF RIGHT BREAST METASTATIC TO AXILLARY LYMPH NODE: Primary | ICD-10-CM

## 2020-04-30 DIAGNOSIS — E55.9 VITAMIN D DEFICIENCY: ICD-10-CM

## 2020-05-01 ENCOUNTER — DOCUMENT SCAN (OUTPATIENT)
Dept: HOME HEALTH SERVICES | Facility: HOSPITAL | Age: 75
End: 2020-05-01
Payer: MEDICARE

## 2022-04-04 PROBLEM — G93.41 METABOLIC ENCEPHALOPATHY: Status: RESOLVED | Noted: 2020-03-05 | Resolved: 2022-04-04

## 2022-04-04 PROBLEM — J44.1 COPD EXACERBATION: Status: RESOLVED | Noted: 2020-03-05 | Resolved: 2022-04-04

## 2022-04-06 PROBLEM — J96.11 CHRONIC RESPIRATORY FAILURE WITH HYPOXIA: Status: ACTIVE | Noted: 2022-04-06

## 2023-09-22 NOTE — PLAN OF CARE
03/10/20 0909   Post-Acute Status   Post-Acute Authorization Placement   Post-Acute Placement Status Referrals Sent      Hemigard Postcare Instructions: The HEMIGARD strips are to remain completely dry for at least 5-7 days.

## 2023-12-12 DIAGNOSIS — C25.9 MALIGNANT NEOPLASM OF PANCREAS, UNSPECIFIED LOCATION OF MALIGNANCY: Primary | ICD-10-CM

## 2023-12-13 ENCOUNTER — TELEPHONE (OUTPATIENT)
Dept: SURGICAL ONCOLOGY | Facility: CLINIC | Age: 78
End: 2023-12-13
Payer: MEDICARE

## 2023-12-13 ENCOUNTER — TELEPHONE (OUTPATIENT)
Dept: HEMATOLOGY/ONCOLOGY | Facility: CLINIC | Age: 78
End: 2023-12-13
Payer: MEDICARE

## 2023-12-13 DIAGNOSIS — C25.9 MALIGNANT NEOPLASM OF PANCREAS, UNSPECIFIED LOCATION OF MALIGNANCY: Primary | ICD-10-CM

## 2023-12-13 DIAGNOSIS — C25.9 ADENOCARCINOMA OF PANCREAS: Primary | ICD-10-CM

## 2023-12-13 NOTE — TELEPHONE ENCOUNTER
Called this pt to let her know when she is scheduled for Ct, Labs and exam. Pt wishes to aidee Dr. Weems. Sent message to his office to sched pt asap

## 2024-01-03 ENCOUNTER — HOSPITAL ENCOUNTER (OUTPATIENT)
Dept: RADIOLOGY | Facility: HOSPITAL | Age: 79
Discharge: HOME | End: 2024-01-03
Attending: INTERNAL MEDICINE
Payer: MEDICARE

## 2024-01-03 DIAGNOSIS — C25.9 ADENOCARCINOMA OF PANCREAS: ICD-10-CM

## 2024-01-03 PROCEDURE — 74177 CT ABD & PELVIS W/CONTRAST: CPT | Mod: TC

## 2024-01-03 PROCEDURE — 74177 CT ABD & PELVIS W/CONTRAST: CPT | Mod: 26,,, | Performed by: RADIOLOGY

## 2024-01-03 PROCEDURE — 25500020 PHARM REV CODE 255: Performed by: INTERNAL MEDICINE

## 2024-01-03 PROCEDURE — 71260 CT THORAX DX C+: CPT | Mod: 26,,, | Performed by: RADIOLOGY

## 2024-01-03 RX ADMIN — IOHEXOL 100 ML: 350 INJECTION, SOLUTION INTRAVENOUS at 01:01

## 2024-01-04 ENCOUNTER — LAB VISIT (OUTPATIENT)
Dept: LAB | Facility: HOSPITAL | Age: 79
End: 2024-01-04
Attending: INTERNAL MEDICINE
Payer: MEDICARE

## 2024-01-04 ENCOUNTER — OFFICE VISIT (OUTPATIENT)
Dept: HEMATOLOGY/ONCOLOGY | Facility: CLINIC | Age: 79
End: 2024-01-04
Payer: MEDICARE

## 2024-01-04 VITALS
WEIGHT: 150.88 LBS | HEIGHT: 67 IN | BODY MASS INDEX: 23.68 KG/M2 | HEART RATE: 93 BPM | TEMPERATURE: 98 F | SYSTOLIC BLOOD PRESSURE: 131 MMHG | OXYGEN SATURATION: 94 % | DIASTOLIC BLOOD PRESSURE: 74 MMHG

## 2024-01-04 DIAGNOSIS — J96.22 ACUTE ON CHRONIC RESPIRATORY FAILURE WITH HYPOXIA AND HYPERCAPNIA: ICD-10-CM

## 2024-01-04 DIAGNOSIS — I10 ESSENTIAL HYPERTENSION: ICD-10-CM

## 2024-01-04 DIAGNOSIS — C25.9 ADENOCARCINOMA OF PANCREAS: Primary | ICD-10-CM

## 2024-01-04 DIAGNOSIS — C25.9 ADENOCARCINOMA OF PANCREAS: ICD-10-CM

## 2024-01-04 DIAGNOSIS — Z17.0 MALIGNANT NEOPLASM OF RIGHT BREAST IN FEMALE, ESTROGEN RECEPTOR POSITIVE, UNSPECIFIED SITE OF BREAST: ICD-10-CM

## 2024-01-04 DIAGNOSIS — J96.21 ACUTE ON CHRONIC RESPIRATORY FAILURE WITH HYPOXIA AND HYPERCAPNIA: ICD-10-CM

## 2024-01-04 DIAGNOSIS — C50.911 MALIGNANT NEOPLASM OF RIGHT BREAST IN FEMALE, ESTROGEN RECEPTOR POSITIVE, UNSPECIFIED SITE OF BREAST: ICD-10-CM

## 2024-01-04 PROCEDURE — 99999 PR PBB SHADOW E&M-EST. PATIENT-LVL IV: CPT | Mod: PBBFAC,,, | Performed by: INTERNAL MEDICINE

## 2024-01-04 PROCEDURE — 36415 COLL VENOUS BLD VENIPUNCTURE: CPT | Performed by: INTERNAL MEDICINE

## 2024-01-04 PROCEDURE — 99204 OFFICE O/P NEW MOD 45 MIN: CPT | Mod: S$PBB,,, | Performed by: INTERNAL MEDICINE

## 2024-01-04 PROCEDURE — 99214 OFFICE O/P EST MOD 30 MIN: CPT | Mod: PBBFAC | Performed by: INTERNAL MEDICINE

## 2024-01-04 RX ORDER — AZITHROMYCIN 250 MG/1
250 TABLET, FILM COATED ORAL
Status: ON HOLD | COMMUNITY
Start: 2023-09-28 | End: 2024-01-23 | Stop reason: HOSPADM

## 2024-01-04 RX ORDER — ONDANSETRON 4 MG/1
4 TABLET, FILM COATED ORAL EVERY 8 HOURS PRN
Status: ON HOLD | COMMUNITY
Start: 2023-12-22 | End: 2024-01-23 | Stop reason: HOSPADM

## 2024-01-04 RX ORDER — OXYCODONE HYDROCHLORIDE 10 MG/1
10 TABLET ORAL 4 TIMES DAILY
Status: ON HOLD | COMMUNITY
Start: 2023-09-17 | End: 2024-01-23 | Stop reason: HOSPADM

## 2024-01-04 RX ORDER — OXYCODONE AND ACETAMINOPHEN 10; 325 MG/1; MG/1
TABLET ORAL
Status: ON HOLD | COMMUNITY
Start: 2023-12-26 | End: 2024-01-23 | Stop reason: HOSPADM

## 2024-01-04 NOTE — PROGRESS NOTES
MEDICAL ONCOLOGY - NEW PATIENT VISIT    Reason for visit:     Best Contact Phone Number(s): [phone] (home)      Cancer/Stage/TNM:    Cancer Staging   No matching staging information was found for the patient.     Oncology History    No history exists.        HPI:   78 y.o. female with hypertension, hyperlipidemia, COPD on 1L home O2, history of breast cancer who presents for new diagnosis of pancreatic cancer.     Ms. You initally had pancreatic tail mass discovered incidentally during a bronchoscopic procedure. She then underwent EUS at [facility], which revealed a 26 x 22 mm mass in the tail of the pancreas. This was biopsied and consistent with pancreatic ductal adenocarcinoma. No other abnormalities were seen on EUS. CT Chest/Abd/Pelvis was obtained and also showed peritoneal and omental soft tissue stranding and innumerable soft tissue lesions and omental caking concerning for peritoneal metastatic disease.     She has some mild epigastric abdominal pain now but this was not present prior to EUS. She has been taking her pain medications more frequently and has noticed more constipation as a result. No n/v, no fevers, no jaundice.   She has a smoking history but has quit. No history of heavy alcohol use, no pancreatitis. No family history of pancreatic cancer or melanoma.     Patient presents with son.  ECOG PS is 1.  History has been obtained by chart review and discussion with the patient.    ROS:   Review of Systems   Constitutional:  Negative for chills, fever and malaise/fatigue.   HENT:  Negative for ear pain, nosebleeds and sore throat.    Eyes:  Negative for pain and discharge.   Respiratory:  Negative for cough, hemoptysis and shortness of breath.    Cardiovascular:  Negative for chest pain, palpitations and leg swelling.   Gastrointestinal:  Negative for abdominal pain, blood in stool, diarrhea, nausea and vomiting.   Genitourinary:  Negative for dysuria and hematuria.   Musculoskeletal:   Negative for back pain, myalgias and neck pain.   Skin:  Negative for itching and rash.   Neurological:  Negative for dizziness, weakness and headaches.   Psychiatric/Behavioral:  Negative for depression. The patient is not nervous/anxious.        Past Medical History:   Past Medical History:   Diagnosis Date    Back pain     instrumented fusion L3-L5. Spinal cord stimulator in place. L5-S1 right paracentral disc osteophyte complex resulting in moderate right neural foraminal narrowing.    Breast cancer 05/2015    (status post lumpectomy and chemoradiation, currently on maintenance therapy with FEMARA). Right breast infiltrating ductal CA with DCIS, ER positive, SC/Her2 negative     COPD (chronic obstructive pulmonary disease)     Chronic bronchitis;  (on 3 L at home)     Hyperlipidemia     Hypertension     Osteoporosis 5/21/2019        Past Surgical History:   Past Surgical History:   Procedure Laterality Date    BREAST BIOPSY Right     BREAST LUMPECTOMY Right 2015    IDC & DCIS    CERVICAL FUSION      HYSTERECTOMY      with BSO    LUMBAR FUSION      LYMPH NODE DISSECTION Right     right lumpectomy          Family History:   Family History   Problem Relation Age of Onset    Breast cancer Maternal Grandmother 75    Ovarian cancer Neg Hx         Social History:   Social History     Tobacco Use    Smoking status: Former     Current packs/day: 1.00     Average packs/day: 1 pack/day for 45.0 years (45.0 ttl pk-yrs)     Types: Cigarettes    Smokeless tobacco: Current    Tobacco comments:     admits to attempting to quit multiple times; currently smoking up to 1 packs/day down from 2 packs/day at time of diagnosis   Substance Use Topics    Alcohol use: Yes     Comment: occasionaly        I have reviewed and updated the patient's past medical, surgical, family and social histories.    Allergies:   Review of patient's allergies indicates:   Allergen Reactions    Adhesive Rash     Use PAPER Tape / TEGADERM    Levaquin  [levofloxacin] Diarrhea and Nausea Only    Penicillins Rash        Medications:   Current Outpatient Medications   Medication Sig Dispense Refill    albuterol 90 mcg/actuation inhaler Inhale 2 puffs into the lungs every 6 (six) hours as needed for Wheezing.      albuterol-ipratropium (DUO-NEB) 2.5 mg-0.5 mg/3 mL nebulizer solution Take 3 mLs by nebulization every 6 (six) hours as needed.      azithromycin (Z-SABINE) 250 MG tablet Take 250 mg by mouth 3 (three) times a week.      calcium carbonate (OS-JENNI) 600 mg calcium (1,500 mg) Tab Take 1 tablet by mouth once daily.      cyclobenzaprine (FLEXERIL) 10 MG tablet Take 10 mg by mouth 3 (three) times daily.      METOPROLOL SUCCINATE ORAL Take by mouth.      MYRBETRIQ 25 mg Tb24 ER tablet Take 25 mg by mouth once daily.      oxyCODONE (ROXICODONE) 10 mg Tab immediate release tablet Take 10 mg by mouth 4 (four) times daily.      pravastatin (PRAVACHOL) 40 MG tablet Take 40 mg by mouth every evening.       topiramate (TOPAMAX) 50 MG tablet Take 50 mg by mouth every evening.       TRELEGY ELLIPTA 200-62.5-25 mcg inhaler Inhale 1 puff into the lungs once daily.      azithromycin (ZITHROMAX) 500 MG tablet Take one tablet Monday, then Wednesday, then Friday per week (Patient not taking: Reported on 1/4/2024) 12 tablet 3    DALIRESP 500 mcg Tab Take 1 tablet by mouth once daily.      losartan (COZAAR) 25 MG tablet Take 0.5 tablets (12.5 mg total) by mouth once daily. (Patient taking differently: Take 25 mg by mouth once daily.) 15 tablet 3    montelukast (SINGULAIR) 10 mg tablet Take 10 mg by mouth every evening.      ondansetron (ZOFRAN) 4 MG tablet Take 4 mg by mouth every 8 (eight) hours as needed.      oxyCODONE-acetaminophen (PERCOCET)  mg per tablet       oxyCODONE-acetaminophen (PERCOCET) 5-325 mg per tablet Take 1 tablet by mouth every 6 (six) hours as needed. (Patient not taking: Reported on 1/4/2024) 30 tablet 0    pantoprazole (PROTONIX) 40 MG tablet Take 40 mg  "by mouth once daily.      predniSONE (DELTASONE) 10 MG tablet Take 1 tablet by mouth every other day.       No current facility-administered medications for this visit.        Physical Exam:   /74 (BP Location: Right arm, Patient Position: Sitting, BP Method: Medium (Automatic))   Pulse 93   Temp 97.9 °F (36.6 °C) (Oral)   Ht 5' 7" (1.702 m)   Wt 68.5 kg (150 lb 14.5 oz)   SpO2 (!) 94%   BMI 23.64 kg/m²                Physical Exam  Constitutional:       General: She is not in acute distress.     Appearance: She is well-developed. She is not diaphoretic.   HENT:      Head: Normocephalic and atraumatic.   Eyes:      General: No scleral icterus.     Conjunctiva/sclera: Conjunctivae normal.   Cardiovascular:      Rate and Rhythm: Normal rate and regular rhythm.      Heart sounds: Normal heart sounds. No murmur heard.     No friction rub. No gallop.   Pulmonary:      Effort: Pulmonary effort is normal. No respiratory distress.      Breath sounds: Normal breath sounds. No wheezing or rales.   Abdominal:      General: Bowel sounds are normal. There is no distension.      Palpations: Abdomen is soft.      Tenderness: There is no abdominal tenderness. There is no guarding.   Musculoskeletal:         General: No tenderness. Normal range of motion.      Cervical back: Normal range of motion and neck supple.   Skin:     General: Skin is warm and dry.      Findings: No rash.   Neurological:      Mental Status: She is alert and oriented to person, place, and time.   Psychiatric:         Behavior: Behavior normal.           Labs:   Recent Results (from the past 48 hour(s))   CBC Auto Differential    Collection Time: 01/03/24 11:48 AM   Result Value Ref Range    WBC 7.65 3.90 - 12.70 K/uL    RBC 4.59 4.00 - 5.40 M/uL    Hemoglobin 13.2 12.0 - 16.0 g/dL    Hematocrit 41.2 37.0 - 48.5 %    MCV 90 82 - 98 fL    MCH 28.8 27.0 - 31.0 pg    MCHC 32.0 32.0 - 36.0 g/dL    RDW 13.2 11.5 - 14.5 %    Platelets 239 150 - 450 K/uL "    MPV 10.4 9.2 - 12.9 fL    Immature Granulocytes 0.5 0.0 - 0.5 %    Gran # (ANC) 5.7 1.8 - 7.7 K/uL    Immature Grans (Abs) 0.04 0.00 - 0.04 K/uL    Lymph # 0.9 (L) 1.0 - 4.8 K/uL    Mono # 0.5 0.3 - 1.0 K/uL    Eos # 0.4 0.0 - 0.5 K/uL    Baso # 0.08 0.00 - 0.20 K/uL    nRBC 0 0 /100 WBC    Gran % 74.4 (H) 38.0 - 73.0 %    Lymph % 11.9 (L) 18.0 - 48.0 %    Mono % 6.8 4.0 - 15.0 %    Eosinophil % 5.4 0.0 - 8.0 %    Basophil % 1.0 0.0 - 1.9 %    Differential Method Automated    CMP    Collection Time: 24 11:48 AM   Result Value Ref Range    Sodium 142 136 - 145 mmol/L    Potassium 3.5 3.5 - 5.1 mmol/L    Chloride 104 95 - 110 mmol/L    CO2 30 (H) 23 - 29 mmol/L    Glucose 97 70 - 110 mg/dL    BUN 10 8 - 23 mg/dL    Creatinine 0.8 0.5 - 1.4 mg/dL    Calcium 9.4 8.7 - 10.5 mg/dL    Total Protein 6.6 6.0 - 8.4 g/dL    Albumin 3.4 (L) 3.5 - 5.2 g/dL    Total Bilirubin 0.4 0.1 - 1.0 mg/dL    Alkaline Phosphatase 75 55 - 135 U/L    AST 14 10 - 40 U/L    ALT 8 (L) 10 - 44 U/L    eGFR >60.0 >60 mL/min/1.73 m^2    Anion Gap 8 8 - 16 mmol/L   CANCER ANTIGEN 19-9    Collection Time: 24 11:48 AM   Result Value Ref Range    CA 19-9 87220.7 (H) 0.0 - 40.0 U/mL        Imagin/3/24  CT CHEST ABDOMEN PELVIS WITH IV CONTRAST (XPD)     CLINICAL HISTORY:  View of pancreas cancer; Malignant neoplasm of pancreas, unspecified     TECHNIQUE:  2.5 mm axial CT images of the abdomen were obtained using helical multidetector CT technique during arterial phase per the pancreatic mass protocol. Additionally, 3.75 mm axial images of the abdomen were obtained during portal venous phase. Coronal and sagittal reconstructions were provided.     COMPARISON:  Nuclear medicine PET-CT 2023, outside hospital CT 03/10/2022 chest , CTA chest 2015, CT lumbar spine 2019     FINDINGS:  Heart: Normal in size. No pericardial effusion.     Lungs: Emphysematous changes of the bilateral lungs without consolidation or pleural  fluid.  Volume loss right upper lobe with endobronchial plug.     Liver: Normal in size. Normal contour.  Heterogeneous enhancement though no convincing focal hepatic lesion.     Gallbladder: Normally distended with a 7 mm soft tissue nodule versus non calcified gallstone in the gallbladder neck (series 3, image 130).  No pericholecsystic fluid or gallbladder wall thickening.     Bile Ducts: No evidence of intrahepatic or extrahepatic biliary ductal dilatation.     Pancreas: At the pancreatic tail, abutting the splenic hilum there is a heterogeneous predominantly hypodense soft tissue lesion measuring 2.4 x 2.4 x 2.4 cm (series 2, image 91).  No pancreatic ductal dilatation.  No significant peripancreatic soft tissue stranding.     Spleen: Unremarkable.     Adrenals: 2.2 cm heterogeneously enhancing irregular left adrenal nodule appears not significantly changed compared to prior CTA 08/17/2015.     Kidneys/Ureters: The kidneys enhance symmetrically and are normal in size.  Few subcentimeter cortical hypodensities in the right kidney (series 3, image 151, 154), too small to characterize.  No hydronephrosis or nephrolithiasis. No ureteral dilatation.  The urinary bladder is incompletely distended though appears unremarkable.     Reproductive organs: Uterus is surgically absent.     Peritoneum/Retroperitoneum: Innumerable soft tissue lesions throughout the omentum likely representing omental caking.  Numerous scattered mesenteric soft tissue nodules as well as peritoneal nodules noted.     Bowel/Mesentery: Stomach is unremarkable. Small bowel is normal in caliber with no evidence of obstruction or inflammation.  At the level of the cecum there is soft tissue stranding and a small volume of free fluid, no discrete cecal mass is identified.  The appendix is not directly visualized.  The large bowel is otherwise normal in caliber with no evidence of obstruction.  There is moderate amount of retained stool.     There is  additional free fluid throughout the peritoneal (series 2, image 177) and at the level of the hepatic flexure (series 2, image 142).  Diffuse soft tissue stranding throughout the mesentery/peritoneum with innumerable soft tissue lesions.  Soft tissue attenuating structure along the anterior abdominal wall concerning for omental caking (series 2, image 151).     Vasculature: No aneurysm. Calcific atherosclerosis abdominal aorta and distal branch vessels.     Bones: L2 compression fracture, not significantly changed compared to CT 03/27/2019.  Postoperative changes of L3-L5 posterior surgical fusion.  Spinal cord stimulator in place.  No suspicious osseous lesions.     Impression:     2.4 cm hypodense pancreatic tail mass with peritoneal and omental soft tissue stranding and innumerable soft tissue lesions and omental caking concerning for peritoneal metastatic disease.     At the level of the cecum there is soft tissue stranding and free fluid, no discrete cecal mass is identified and the appendix is not directly visualized.  This likely represents malignant ascites.     2.2 cm irregular left adrenal nodule, reportedly previously biopsied with pathology demonstrating benign adrenal cortical tissue, not significantly changed compared to prior CT 08/17/2015.     Heterogeneous enhancement of the liver.  No convincing focal hepatic mass.     This report was flagged in Epic as abnormal.    Path:         Assessment:       1. Adenocarcinoma of pancreas    2. Acute on chronic respiratory failure with hypoxia and hypercapnia    3. Essential hypertension    4. Malignant neoplasm of right breast in female, estrogen receptor positive, unspecified site of breast          Plan:           # Pancreatic adenocarcinoma  Ms. You presents with new diagnosis of pancreatic adenocarcinoma with peritoneal metastases. MMR not checked. We discussed the prognosis and treatment of unresectable pancreatic cancer today. We discussed the role  of palliative chemotherapy. She is not sure if she would like to proceed with palliative chemo vs best supportive care/hospice. She lives in [place], [place] and is concerned that transportation to the nearest cancer center may be difficult for her. She also had a bad experience with chemo and radiation when she had breast cancer. She will consider chemotherapy over the next several days. We provided information for a local oncologist, Dr. Rosio Turner, who may be able to provide more convenient infusions for her if she is interested.   - will check [facility] blood today  - no dedicated follow up necessary, but she was given clinic phone number in case she has any questions or would like a follow up visit  - provided [facility] information sheets for gemcitabine and abraxane to review    # COPD  - on 1-3 liters at home  - follows with pulmonology    # HTN  - continue home regimen  - BP controlled today    # History of Breast Cancer  Status post lumpectomy and chemoradiation in 2015, completed maintenance therapy with femara. Right breast infiltrating ductal CA with DCIS, ER positive, CO/Her2 negative     The above information has been reviewed with the patient and all questions have been answered to their apparent satisfaction.  They understand that they can call the clinic with any questions.    Discussed with Dr. Magen Garrison, DO  PGY-VI  Hematology/Oncology Fellow    Route Chart for Scheduling    Med Onc Chart Routing      Follow up with physician No follow up needed.   Follow up with KIRAN    Infusion scheduling note    Injection scheduling note    Labs    Imaging    Pharmacy appointment    Other referrals                    Therapy Plan Information  heparin, porcine (PF) 100 unit/mL injection flush 500 Units  500 Units, Intravenous, Every visit  sodium chloride 0.9% flush 10 mL  10 mL, Intravenous, Every visit

## 2024-01-09 ENCOUNTER — HOSPITAL ENCOUNTER (INPATIENT)
Facility: HOSPITAL | Age: 79
LOS: 13 days | Discharge: HOME HEALTH | DRG: 436 | End: 2024-01-23
Attending: STUDENT IN AN ORGANIZED HEALTH CARE EDUCATION/TRAINING PROGRAM | Admitting: INTERNAL MEDICINE
Payer: MEDICARE

## 2024-01-09 DIAGNOSIS — Z51.5 PALLIATIVE CARE ENCOUNTER: ICD-10-CM

## 2024-01-09 DIAGNOSIS — K56.609 SBO (SMALL BOWEL OBSTRUCTION): ICD-10-CM

## 2024-01-09 DIAGNOSIS — Z46.59 ENCOUNTER FOR NASOGASTRIC (NG) TUBE PLACEMENT: ICD-10-CM

## 2024-01-09 DIAGNOSIS — R00.0 TACHYCARDIA: ICD-10-CM

## 2024-01-09 DIAGNOSIS — R07.9 CHEST PAIN: ICD-10-CM

## 2024-01-09 DIAGNOSIS — C25.9 PANCREATIC ADENOCARCINOMA: Primary | Chronic | ICD-10-CM

## 2024-01-09 DIAGNOSIS — R10.9 ABDOMINAL PAIN, UNSPECIFIED ABDOMINAL LOCATION: ICD-10-CM

## 2024-01-09 LAB
ALBUMIN SERPL BCP-MCNC: 3.8 G/DL (ref 3.5–5.2)
ALP SERPL-CCNC: 81 U/L (ref 55–135)
ALT SERPL W/O P-5'-P-CCNC: 9 U/L (ref 10–44)
ANION GAP SERPL CALC-SCNC: 13 MMOL/L (ref 8–16)
AST SERPL-CCNC: 18 U/L (ref 10–40)
BASOPHILS # BLD AUTO: 0.06 K/UL (ref 0–0.2)
BASOPHILS NFR BLD: 0.4 % (ref 0–1.9)
BILIRUB SERPL-MCNC: 0.8 MG/DL (ref 0.1–1)
BUN SERPL-MCNC: 17 MG/DL (ref 8–23)
CALCIUM SERPL-MCNC: 9.9 MG/DL (ref 8.7–10.5)
CHLORIDE SERPL-SCNC: 98 MMOL/L (ref 95–110)
CO2 SERPL-SCNC: 27 MMOL/L (ref 23–29)
CREAT SERPL-MCNC: 0.9 MG/DL (ref 0.5–1.4)
DIFFERENTIAL METHOD BLD: ABNORMAL
EOSINOPHIL # BLD AUTO: 0.1 K/UL (ref 0–0.5)
EOSINOPHIL NFR BLD: 0.4 % (ref 0–8)
ERYTHROCYTE [DISTWIDTH] IN BLOOD BY AUTOMATED COUNT: 13.3 % (ref 11.5–14.5)
EST. GFR  (NO RACE VARIABLE): >60 ML/MIN/1.73 M^2
GLUCOSE SERPL-MCNC: 135 MG/DL (ref 70–110)
HCT VFR BLD AUTO: 46.3 % (ref 37–48.5)
HGB BLD-MCNC: 15 G/DL (ref 12–16)
IMM GRANULOCYTES # BLD AUTO: 0.09 K/UL (ref 0–0.04)
IMM GRANULOCYTES NFR BLD AUTO: 0.6 % (ref 0–0.5)
LIPASE SERPL-CCNC: 17 U/L (ref 4–60)
LYMPHOCYTES # BLD AUTO: 1.4 K/UL (ref 1–4.8)
LYMPHOCYTES NFR BLD: 8.9 % (ref 18–48)
MCH RBC QN AUTO: 28.5 PG (ref 27–31)
MCHC RBC AUTO-ENTMCNC: 32.4 G/DL (ref 32–36)
MCV RBC AUTO: 88 FL (ref 82–98)
MONOCYTES # BLD AUTO: 1.4 K/UL (ref 0.3–1)
MONOCYTES NFR BLD: 8.6 % (ref 4–15)
NEUTROPHILS # BLD AUTO: 12.9 K/UL (ref 1.8–7.7)
NEUTROPHILS NFR BLD: 81.1 % (ref 38–73)
NRBC BLD-RTO: 0 /100 WBC
PLATELET # BLD AUTO: 431 K/UL (ref 150–450)
PMV BLD AUTO: 10.4 FL (ref 9.2–12.9)
POTASSIUM SERPL-SCNC: 4.2 MMOL/L (ref 3.5–5.1)
PROT SERPL-MCNC: 7.9 G/DL (ref 6–8.4)
RBC # BLD AUTO: 5.27 M/UL (ref 4–5.4)
SODIUM SERPL-SCNC: 138 MMOL/L (ref 136–145)
TROPONIN I SERPL DL<=0.01 NG/ML-MCNC: 0.01 NG/ML (ref 0–0.03)
WBC # BLD AUTO: 15.89 K/UL (ref 3.9–12.7)

## 2024-01-09 PROCEDURE — 25500020 PHARM REV CODE 255: Performed by: STUDENT IN AN ORGANIZED HEALTH CARE EDUCATION/TRAINING PROGRAM

## 2024-01-09 PROCEDURE — 96375 TX/PRO/DX INJ NEW DRUG ADDON: CPT

## 2024-01-09 PROCEDURE — 93010 ELECTROCARDIOGRAM REPORT: CPT | Mod: ,,, | Performed by: INTERNAL MEDICINE

## 2024-01-09 PROCEDURE — 85025 COMPLETE CBC W/AUTO DIFF WBC: CPT | Performed by: EMERGENCY MEDICINE

## 2024-01-09 PROCEDURE — 93005 ELECTROCARDIOGRAM TRACING: CPT

## 2024-01-09 PROCEDURE — 96361 HYDRATE IV INFUSION ADD-ON: CPT

## 2024-01-09 PROCEDURE — 63600175 PHARM REV CODE 636 W HCPCS: Performed by: EMERGENCY MEDICINE

## 2024-01-09 PROCEDURE — 96374 THER/PROPH/DIAG INJ IV PUSH: CPT

## 2024-01-09 PROCEDURE — 80053 COMPREHEN METABOLIC PANEL: CPT | Performed by: EMERGENCY MEDICINE

## 2024-01-09 PROCEDURE — 83690 ASSAY OF LIPASE: CPT | Performed by: EMERGENCY MEDICINE

## 2024-01-09 PROCEDURE — 99285 EMERGENCY DEPT VISIT HI MDM: CPT | Mod: 25

## 2024-01-09 PROCEDURE — 84484 ASSAY OF TROPONIN QUANT: CPT | Performed by: EMERGENCY MEDICINE

## 2024-01-09 PROCEDURE — 63600175 PHARM REV CODE 636 W HCPCS: Performed by: STUDENT IN AN ORGANIZED HEALTH CARE EDUCATION/TRAINING PROGRAM

## 2024-01-09 RX ORDER — ONDANSETRON HYDROCHLORIDE 2 MG/ML
4 INJECTION, SOLUTION INTRAVENOUS
Status: COMPLETED | OUTPATIENT
Start: 2024-01-10 | End: 2024-01-09

## 2024-01-09 RX ORDER — MORPHINE SULFATE 4 MG/ML
4 INJECTION, SOLUTION INTRAMUSCULAR; INTRAVENOUS
Status: COMPLETED | OUTPATIENT
Start: 2024-01-09 | End: 2024-01-09

## 2024-01-09 RX ORDER — ONDANSETRON HYDROCHLORIDE 2 MG/ML
INJECTION, SOLUTION INTRAVENOUS
Status: DISPENSED
Start: 2024-01-09 | End: 2024-01-10

## 2024-01-09 RX ADMIN — SODIUM CHLORIDE, POTASSIUM CHLORIDE, SODIUM LACTATE AND CALCIUM CHLORIDE 1000 ML: 600; 310; 30; 20 INJECTION, SOLUTION INTRAVENOUS at 10:01

## 2024-01-09 RX ADMIN — MORPHINE SULFATE 4 MG: 4 INJECTION INTRAVENOUS at 11:01

## 2024-01-09 RX ADMIN — ONDANSETRON 4 MG: 2 INJECTION INTRAMUSCULAR; INTRAVENOUS at 11:01

## 2024-01-09 RX ADMIN — IOHEXOL 75 ML: 350 INJECTION, SOLUTION INTRAVENOUS at 09:01

## 2024-01-10 PROBLEM — C25.9 PANCREATIC ADENOCARCINOMA: Status: ACTIVE | Noted: 2024-01-10

## 2024-01-10 PROBLEM — C25.9 PANCREATIC ADENOCARCINOMA: Chronic | Status: ACTIVE | Noted: 2024-01-10

## 2024-01-10 PROBLEM — C25.9 PANCREATIC ADENOCARCINOMA: Chronic | Status: ACTIVE | Noted: 2023-12-12

## 2024-01-10 PROBLEM — E78.5 HYPERLIPIDEMIA: Chronic | Status: ACTIVE | Noted: 2020-03-07

## 2024-01-10 PROBLEM — J96.11 CHRONIC RESPIRATORY FAILURE WITH HYPOXIA: Chronic | Status: ACTIVE | Noted: 2022-04-06

## 2024-01-10 PROBLEM — K56.600 PARTIAL SMALL BOWEL OBSTRUCTION: Status: ACTIVE | Noted: 2024-01-10

## 2024-01-10 PROBLEM — I10 ESSENTIAL HYPERTENSION: Chronic | Status: ACTIVE | Noted: 2020-03-07

## 2024-01-10 LAB
ANION GAP SERPL CALC-SCNC: 10 MMOL/L (ref 8–16)
BUN SERPL-MCNC: 18 MG/DL (ref 8–23)
CALCIUM SERPL-MCNC: 8.7 MG/DL (ref 8.7–10.5)
CHLORIDE SERPL-SCNC: 101 MMOL/L (ref 95–110)
CO2 SERPL-SCNC: 24 MMOL/L (ref 23–29)
CREAT SERPL-MCNC: 0.8 MG/DL (ref 0.5–1.4)
ERYTHROCYTE [DISTWIDTH] IN BLOOD BY AUTOMATED COUNT: 13.4 % (ref 11.5–14.5)
EST. GFR  (NO RACE VARIABLE): >60 ML/MIN/1.73 M^2
GLUCOSE SERPL-MCNC: 93 MG/DL (ref 70–110)
HCT VFR BLD AUTO: 39 % (ref 37–48.5)
HGB BLD-MCNC: 12.4 G/DL (ref 12–16)
MAGNESIUM SERPL-MCNC: 1.9 MG/DL (ref 1.6–2.6)
MCH RBC QN AUTO: 27.9 PG (ref 27–31)
MCHC RBC AUTO-ENTMCNC: 31.8 G/DL (ref 32–36)
MCV RBC AUTO: 88 FL (ref 82–98)
PHOSPHATE SERPL-MCNC: 3.3 MG/DL (ref 2.7–4.5)
PLATELET # BLD AUTO: 309 K/UL (ref 150–450)
PMV BLD AUTO: 9.8 FL (ref 9.2–12.9)
POTASSIUM SERPL-SCNC: 4 MMOL/L (ref 3.5–5.1)
RBC # BLD AUTO: 4.44 M/UL (ref 4–5.4)
SODIUM SERPL-SCNC: 135 MMOL/L (ref 136–145)
WBC # BLD AUTO: 12.46 K/UL (ref 3.9–12.7)

## 2024-01-10 PROCEDURE — 63600175 PHARM REV CODE 636 W HCPCS: Performed by: STUDENT IN AN ORGANIZED HEALTH CARE EDUCATION/TRAINING PROGRAM

## 2024-01-10 PROCEDURE — 20600001 HC STEP DOWN PRIVATE ROOM

## 2024-01-10 PROCEDURE — 80048 BASIC METABOLIC PNL TOTAL CA: CPT | Performed by: STUDENT IN AN ORGANIZED HEALTH CARE EDUCATION/TRAINING PROGRAM

## 2024-01-10 PROCEDURE — 0D9670Z DRAINAGE OF STOMACH WITH DRAINAGE DEVICE, VIA NATURAL OR ARTIFICIAL OPENING: ICD-10-PCS | Performed by: INTERNAL MEDICINE

## 2024-01-10 PROCEDURE — 84100 ASSAY OF PHOSPHORUS: CPT | Performed by: STUDENT IN AN ORGANIZED HEALTH CARE EDUCATION/TRAINING PROGRAM

## 2024-01-10 PROCEDURE — 25000003 PHARM REV CODE 250: Performed by: EMERGENCY MEDICINE

## 2024-01-10 PROCEDURE — 83735 ASSAY OF MAGNESIUM: CPT | Performed by: STUDENT IN AN ORGANIZED HEALTH CARE EDUCATION/TRAINING PROGRAM

## 2024-01-10 PROCEDURE — 63700000 PHARM REV CODE 250 ALT 637 W/O HCPCS: Performed by: STUDENT IN AN ORGANIZED HEALTH CARE EDUCATION/TRAINING PROGRAM

## 2024-01-10 PROCEDURE — 63600175 PHARM REV CODE 636 W HCPCS: Performed by: EMERGENCY MEDICINE

## 2024-01-10 PROCEDURE — 85027 COMPLETE CBC AUTOMATED: CPT | Performed by: STUDENT IN AN ORGANIZED HEALTH CARE EDUCATION/TRAINING PROGRAM

## 2024-01-10 PROCEDURE — 25000003 PHARM REV CODE 250: Performed by: STUDENT IN AN ORGANIZED HEALTH CARE EDUCATION/TRAINING PROGRAM

## 2024-01-10 RX ORDER — NALOXONE HCL 0.4 MG/ML
0.02 VIAL (ML) INJECTION
Status: DISCONTINUED | OUTPATIENT
Start: 2024-01-10 | End: 2024-01-23 | Stop reason: HOSPADM

## 2024-01-10 RX ORDER — TOPIRAMATE 25 MG/1
50 TABLET ORAL NIGHTLY
Status: DISCONTINUED | OUTPATIENT
Start: 2024-01-10 | End: 2024-01-23 | Stop reason: HOSPADM

## 2024-01-10 RX ORDER — TALC
6 POWDER (GRAM) TOPICAL NIGHTLY PRN
Status: DISCONTINUED | OUTPATIENT
Start: 2024-01-10 | End: 2024-01-23 | Stop reason: HOSPADM

## 2024-01-10 RX ORDER — CYCLOBENZAPRINE HCL 5 MG
10 TABLET ORAL 3 TIMES DAILY
Status: DISCONTINUED | OUTPATIENT
Start: 2024-01-10 | End: 2024-01-23 | Stop reason: HOSPADM

## 2024-01-10 RX ORDER — PRAVASTATIN SODIUM 40 MG/1
40 TABLET ORAL NIGHTLY
Status: DISCONTINUED | OUTPATIENT
Start: 2024-01-10 | End: 2024-01-23 | Stop reason: HOSPADM

## 2024-01-10 RX ORDER — IBUPROFEN 200 MG
16 TABLET ORAL
Status: DISCONTINUED | OUTPATIENT
Start: 2024-01-10 | End: 2024-01-23 | Stop reason: HOSPADM

## 2024-01-10 RX ORDER — IBUPROFEN 200 MG
24 TABLET ORAL
Status: DISCONTINUED | OUTPATIENT
Start: 2024-01-10 | End: 2024-01-23 | Stop reason: HOSPADM

## 2024-01-10 RX ORDER — ACETAMINOPHEN 325 MG/1
650 TABLET ORAL EVERY 8 HOURS PRN
Status: DISCONTINUED | OUTPATIENT
Start: 2024-01-10 | End: 2024-01-23 | Stop reason: HOSPADM

## 2024-01-10 RX ORDER — PRAVASTATIN SODIUM 10 MG/1
40 TABLET ORAL NIGHTLY
Status: DISCONTINUED | OUTPATIENT
Start: 2024-01-10 | End: 2024-01-10

## 2024-01-10 RX ORDER — SCOLOPAMINE TRANSDERMAL SYSTEM 1 MG/1
1 PATCH, EXTENDED RELEASE TRANSDERMAL
Status: DISCONTINUED | OUTPATIENT
Start: 2024-01-10 | End: 2024-01-23 | Stop reason: HOSPADM

## 2024-01-10 RX ORDER — HALOPERIDOL 5 MG/ML
2 INJECTION INTRAMUSCULAR EVERY 6 HOURS PRN
Status: DISCONTINUED | OUTPATIENT
Start: 2024-01-10 | End: 2024-01-23 | Stop reason: HOSPADM

## 2024-01-10 RX ORDER — MORPHINE SULFATE 4 MG/ML
4 INJECTION, SOLUTION INTRAMUSCULAR; INTRAVENOUS EVERY 4 HOURS PRN
Status: DISCONTINUED | OUTPATIENT
Start: 2024-01-10 | End: 2024-01-11

## 2024-01-10 RX ORDER — LIDOCAINE HYDROCHLORIDE 20 MG/ML
5 SOLUTION OROPHARYNGEAL
Status: COMPLETED | OUTPATIENT
Start: 2024-01-10 | End: 2024-01-10

## 2024-01-10 RX ORDER — SIMETHICONE 80 MG
1 TABLET,CHEWABLE ORAL 4 TIMES DAILY PRN
Status: DISCONTINUED | OUTPATIENT
Start: 2024-01-10 | End: 2024-01-23 | Stop reason: HOSPADM

## 2024-01-10 RX ORDER — SODIUM CHLORIDE, SODIUM LACTATE, POTASSIUM CHLORIDE, CALCIUM CHLORIDE 600; 310; 30; 20 MG/100ML; MG/100ML; MG/100ML; MG/100ML
INJECTION, SOLUTION INTRAVENOUS CONTINUOUS
Status: DISCONTINUED | OUTPATIENT
Start: 2024-01-10 | End: 2024-01-12

## 2024-01-10 RX ORDER — IPRATROPIUM BROMIDE AND ALBUTEROL SULFATE 2.5; .5 MG/3ML; MG/3ML
3 SOLUTION RESPIRATORY (INHALATION) EVERY 4 HOURS PRN
Status: DISCONTINUED | OUTPATIENT
Start: 2024-01-10 | End: 2024-01-23 | Stop reason: HOSPADM

## 2024-01-10 RX ORDER — GLUCAGON 1 MG
1 KIT INJECTION
Status: DISCONTINUED | OUTPATIENT
Start: 2024-01-10 | End: 2024-01-23 | Stop reason: HOSPADM

## 2024-01-10 RX ORDER — OXYBUTYNIN CHLORIDE 5 MG/1
5 TABLET, EXTENDED RELEASE ORAL DAILY
Status: DISCONTINUED | OUTPATIENT
Start: 2024-01-10 | End: 2024-01-10

## 2024-01-10 RX ORDER — AZITHROMYCIN 250 MG/1
250 TABLET, FILM COATED ORAL
Status: DISCONTINUED | OUTPATIENT
Start: 2024-01-10 | End: 2024-01-23 | Stop reason: HOSPADM

## 2024-01-10 RX ORDER — TOPIRAMATE 25 MG/1
50 TABLET ORAL NIGHTLY
Status: DISCONTINUED | OUTPATIENT
Start: 2024-01-10 | End: 2024-01-10

## 2024-01-10 RX ORDER — ENOXAPARIN SODIUM 100 MG/ML
40 INJECTION SUBCUTANEOUS EVERY 24 HOURS
Status: DISCONTINUED | OUTPATIENT
Start: 2024-01-10 | End: 2024-01-23 | Stop reason: HOSPADM

## 2024-01-10 RX ORDER — SODIUM CHLORIDE 0.9 % (FLUSH) 0.9 %
10 SYRINGE (ML) INJECTION EVERY 12 HOURS PRN
Status: DISCONTINUED | OUTPATIENT
Start: 2024-01-10 | End: 2024-01-23 | Stop reason: HOSPADM

## 2024-01-10 RX ORDER — METOPROLOL SUCCINATE 25 MG/1
25 TABLET, EXTENDED RELEASE ORAL DAILY
Status: DISCONTINUED | OUTPATIENT
Start: 2024-01-10 | End: 2024-01-23 | Stop reason: HOSPADM

## 2024-01-10 RX ADMIN — CYCLOBENZAPRINE HYDROCHLORIDE 10 MG: 5 TABLET, FILM COATED ORAL at 08:01

## 2024-01-10 RX ADMIN — ENOXAPARIN SODIUM 40 MG: 40 INJECTION SUBCUTANEOUS at 04:01

## 2024-01-10 RX ADMIN — SODIUM CHLORIDE, SODIUM LACTATE, POTASSIUM CHLORIDE, AND CALCIUM CHLORIDE: 600; 310; 30; 20 INJECTION, SOLUTION INTRAVENOUS at 11:01

## 2024-01-10 RX ADMIN — METOPROLOL SUCCINATE 25 MG: 25 TABLET, EXTENDED RELEASE ORAL at 08:01

## 2024-01-10 RX ADMIN — LIDOCAINE HYDROCHLORIDE 5 ML: 20 SOLUTION OROPHARYNGEAL at 03:01

## 2024-01-10 RX ADMIN — AZITHROMYCIN 250 MG: 250 TABLET, FILM COATED ORAL at 08:01

## 2024-01-10 RX ADMIN — SCOPALAMINE 1 PATCH: 1 PATCH, EXTENDED RELEASE TRANSDERMAL at 06:01

## 2024-01-10 RX ADMIN — MORPHINE SULFATE 4 MG: 4 INJECTION INTRAVENOUS at 01:01

## 2024-01-10 RX ADMIN — CYCLOBENZAPRINE HYDROCHLORIDE 10 MG: 5 TABLET, FILM COATED ORAL at 03:01

## 2024-01-10 RX ADMIN — SODIUM CHLORIDE, SODIUM LACTATE, POTASSIUM CHLORIDE, AND CALCIUM CHLORIDE: 600; 310; 30; 20 INJECTION, SOLUTION INTRAVENOUS at 05:01

## 2024-01-10 RX ADMIN — MORPHINE SULFATE 4 MG: 4 INJECTION INTRAVENOUS at 11:01

## 2024-01-10 RX ADMIN — MORPHINE SULFATE 4 MG: 4 INJECTION INTRAVENOUS at 06:01

## 2024-01-10 NOTE — SUBJECTIVE & OBJECTIVE
Interval History: Feeling a little better this am, NGT in place. Is [facility] and lives alone.    Review of Systems   Constitutional:  Positive for appetite change. Negative for fatigue and fever.   HENT:  Negative for rhinorrhea, sneezing, sore throat and tinnitus.    Eyes:  Negative for photophobia and visual disturbance.   Respiratory:  Negative for cough and chest tightness.    Cardiovascular:  Negative for chest pain and leg swelling.   Gastrointestinal:  Positive for abdominal pain, constipation, nausea and vomiting. Negative for diarrhea.   Endocrine: Negative for polyphagia and polyuria.   Genitourinary:  Negative for difficulty urinating, dysuria, hematuria and menstrual problem.   Musculoskeletal:  Negative for gait problem and joint swelling.   Skin:  Negative for rash and wound.   Neurological:  Negative for seizures, syncope and facial asymmetry.   Psychiatric/Behavioral:  Negative for agitation, behavioral problems and confusion.      Objective:     Vital Signs (Most Recent):  Temp: 98.5 °F (36.9 °C) (01/10/24 1016)  Pulse: 68 (01/10/24 1016)  Resp: 18 (01/10/24 0624)  BP: (!) 143/77 (01/10/24 1016)  SpO2: 100 % (01/10/24 1017) Vital Signs (24h Range):  Temp:  [97.6 °F (36.4 °C)-98.5 °F (36.9 °C)] 98.5 °F (36.9 °C)  Pulse:  [] 68  Resp:  [14-20] 18  SpO2:  [97 %-100 %] 100 %  BP: (120-147)/(67-85) 143/77     Weight: 68 kg (150 lb)  Body mass index is 23.49 kg/m².    Intake/Output Summary (Last 24 hours) at 1/10/2024 1056  Last data filed at 1/9/2024 2347  Gross per 24 hour   Intake 1000 ml   Output --   Net 1000 ml         Physical Exam  Vitals reviewed.   Constitutional:       General: She is not in acute distress.     Appearance: Normal appearance. She is not ill-appearing or toxic-appearing.      Comments: Pleasant woman in no acute distress. Cooperative with examination and conversant with interview.   HENT:      Head: Normocephalic and atraumatic.      Right Ear: External ear  normal.      Left Ear: External ear normal.      Nose:      Comments: Nasogatric tube in the nares.     Mouth/Throat:      Comments: Oropharynx appearing dry with tongue fissuring.  Eyes:      General:         Right eye: No discharge.         Left eye: No discharge.      Extraocular Movements: Extraocular movements intact.   Cardiovascular:      Rate and Rhythm: Normal rate and regular rhythm.      Pulses: Normal pulses.      Heart sounds: No murmur heard.     No friction rub. No gallop.   Pulmonary:      Effort: Pulmonary effort is normal.      Breath sounds: Normal breath sounds.   Abdominal:      Tenderness: There is abdominal tenderness.      Comments: Slight abdominal fullness and tenderness to the right side of the abdomen but no rigidity or peritoneal signs.   Musculoskeletal:      Cervical back: Neck supple.      Right lower leg: No edema.      Left lower leg: No edema.   Skin:     General: Skin is warm and dry.   Neurological:      General: No focal deficit present.      Mental Status: She is alert and oriented to person, place, and time. Mental status is at baseline.      Sensory: No sensory deficit.      Motor: No weakness.   Psychiatric:         Mood and Affect: Mood normal.         Behavior: Behavior normal.             Significant Labs: All pertinent labs within the past 24 hours have been reviewed.  BMP:   Recent Labs   Lab 01/10/24  0427   GLU 93   *   K 4.0      CO2 24   BUN 18   CREATININE 0.8   CALCIUM 8.7   MG 1.9       Significant Imaging: I have reviewed all pertinent imaging results/findings within the past 24 hours.

## 2024-01-10 NOTE — ASSESSMENT & PLAN NOTE
Blood pressure elevated which may be secondary to the patient's pain and discomfort.   - resume home metoprolol succinate 25 mg daily

## 2024-01-10 NOTE — HPI
Radha You is a 78 year old white woman with former cigarette smoking, hypertension, hyperlipidemia, chronic obstructive pulmonary disease, chronic hypoxic respiratory failure(on 1 liter/minute of supplemental oxygen), osteoporosis, history of estrogen receptor positive progesterone receptor negative, 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 She lives in [place], [place]. She is . Her hematologist-oncologist is Dr. Charles Us.               She was seen at [facility] on 1/4/2024 for her pancreatic adenocarcinoma, which was found incidentally during a bronchoscopy and biopsied by endoscopic ultrasound at [facility]. She was offered palliative chemotherapy and declined it.               She presented to [facility] Emergency Department on 1/9/2024 with abdominal pain for 3 to 4 days, associated with severe constipation with no bowel movement for several days despite over-the-counter laxatives, including suppositories. She was able to pass a small bowel movement earlier on the day of presentation but still felt constipated. She also had increase in pain in abdominal pain associated with nausea and vomiting causing inability to tolerate oral intake.  In the emergency department, heart rate was 115 bpm. Labs showed mild leukocytosis. CT of the abdomen and pelvis with contrast revealed the known pancreatic tail mass, abutting and possibly invading the spleen with new geographic region of decreased enhancement in the lower splenic pole, suspicious for splenic infarct. There were multiple small bowel strictures suggested, possibly on the basis of metastatic disease that is possibly  associated with low-grade or partial small bowel obstruction versus ileus. General Surgery was consulted and brought up the possibility of a palliative gastrostomy for venting as a treatment option. She deferred and opted for nasogastric decompression. She was given a liter of lactated Ringer's solution, 4 mg of intravenous morphine, and 4 mg of intravenous ondansetron. She was admitted to Hospital Medicine Team S.

## 2024-01-10 NOTE — ASSESSMENT & PLAN NOTE
Known pancreatic tail mass with multiple small bowel strictures that are likely secondary to low-grade or a partial small bowel obstruction. General Surgery following, patient declining surgical or operative intervention at this time and would prefer nasogastric decompression.  - General Surgery following, appreciate additional recommendations  - patient NPO at this time  - continue nasogastric decompression at this time  - begin lactated ringer's at 75 mL/hour  - begin scopolamine patch q3 days for anti-nausea and anti-secretory effects  - begin IV haloperidol 2 mg q6h PRN nausea/vomiting  - begin IV morphine 4 mg q4h PRN pain

## 2024-01-10 NOTE — ASSESSMENT & PLAN NOTE
Patient with a history of chronic hypoxic respiratory failure that is on 1-3 liters of oxygen from her COPD. No signs of respiratory distress at this time, breathing is even and unlabored. Patient currently stable on supplemental oxygen via nasal cannula.

## 2024-01-10 NOTE — ED NOTES
LOC: The patient is awake, alert, aware of environment with an appropriate affect. Oriented x4, speaking appropriately  APPEARANCE: Pt resting comfortably, in no acute distress, pt is clean and well groomed, clothing properly fastened  SKIN:The skin is warm and dry, color consistent with ethnicity, patient has normal skin turgor and moist mucus membranes, no bruising noted   RESPIRATORY:Airway is open and patent, respirations are spontaneous, patient has a normal effort and rate, no accessory muscle use noted.  CARDIAC: Normal rate and rhythm, no peripheral edema noted, capillary refill < 3 seconds, bilateral radial pulses 2+.  ABDOMEN:tender, distended. NG in place. Brown fluid seen in NG tube. Pt tolerating tube without difficulty  NEUROLOGIC: PERRLA, facial expression is symmetrical, patient moving all extremities spontaneously, normal sensation in all extremities when touched with a finger.  Follows all commands appropriately  MUSCULOSKELETAL: Patient moving all extremities spontaneously, no obvious swelling or deformities noted.

## 2024-01-10 NOTE — ED NOTES
Assumed care of patient. Pt resting comfortably in stretcher at this time, pending CT results. Fluids infusing at this time. Pt c/o continued 6/10 abd pain. Dr. Chou notified. Continuing POC.

## 2024-01-10 NOTE — SUBJECTIVE & OBJECTIVE
Past Medical History:   Diagnosis Date    Back pain     instrumented fusion L3-L5. Spinal cord stimulator in place. L5-S1 right paracentral disc osteophyte complex resulting in moderate right neural foraminal narrowing.    Breast cancer 05/2015    (status post lumpectomy and chemoradiation, currently on maintenance therapy with FEMARA). Right breast infiltrating ductal CA with DCIS, ER positive, CO/Her2 negative     COPD (chronic obstructive pulmonary disease)     Chronic bronchitis;  (on 3 L at home)     Hyperlipidemia     Hypertension     Osteoporosis 5/21/2019       Past Surgical History:   Procedure Laterality Date    BREAST BIOPSY Right     BREAST LUMPECTOMY Right 2015    IDC & DCIS    CERVICAL FUSION      HYSTERECTOMY      with BSO    LUMBAR FUSION      LYMPH NODE DISSECTION Right     right lumpectomy         Review of patient's allergies indicates:   Allergen Reactions    Adhesive Rash     Use PAPER Tape / TEGADERM    Levaquin [levofloxacin] Diarrhea and Nausea Only    Penicillins Rash       No current facility-administered medications on file prior to encounter.     Current Outpatient Medications on File Prior to Encounter   Medication Sig    albuterol 90 mcg/actuation inhaler Inhale 2 puffs into the lungs every 6 (six) hours as needed for Wheezing.    albuterol-ipratropium (DUO-NEB) 2.5 mg-0.5 mg/3 mL nebulizer solution Take 3 mLs by nebulization every 6 (six) hours as needed.    azithromycin (Z-SABINE) 250 MG tablet Take 250 mg by mouth 3 (three) times a week.    azithromycin (ZITHROMAX) 500 MG tablet Take one tablet Monday, then Wednesday, then Friday per week (Patient not taking: Reported on 1/4/2024)    calcium carbonate (OS-JENNI) 600 mg calcium (1,500 mg) Tab Take 1 tablet by mouth once daily.    cyclobenzaprine (FLEXERIL) 10 MG tablet Take 10 mg by mouth 3 (three) times daily.    DALIRESP 500 mcg Tab Take 1 tablet by mouth once daily.    losartan (COZAAR) 25 MG tablet Take 0.5 tablets (12.5 mg total) by  mouth once daily. (Patient taking differently: Take 25 mg by mouth once daily.)    METOPROLOL SUCCINATE ORAL Take by mouth.    montelukast (SINGULAIR) 10 mg tablet Take 10 mg by mouth every evening.    MYRBETRIQ 25 mg Tb24 ER tablet Take 25 mg by mouth once daily.    ondansetron (ZOFRAN) 4 MG tablet Take 4 mg by mouth every 8 (eight) hours as needed.    oxyCODONE (ROXICODONE) 10 mg Tab immediate release tablet Take 10 mg by mouth 4 (four) times daily.    oxyCODONE-acetaminophen (PERCOCET)  mg per tablet     oxyCODONE-acetaminophen (PERCOCET) 5-325 mg per tablet Take 1 tablet by mouth every 6 (six) hours as needed. (Patient not taking: Reported on 1/4/2024)    pantoprazole (PROTONIX) 40 MG tablet Take 40 mg by mouth once daily.    pravastatin (PRAVACHOL) 40 MG tablet Take 40 mg by mouth every evening.     topiramate (TOPAMAX) 50 MG tablet Take 50 mg by mouth every evening.     TRELEGY ELLIPTA 200-62.5-25 mcg inhaler Inhale 1 puff into the lungs once daily.    [DISCONTINUED] aspirin (ECOTRIN) 81 MG EC tablet Take 81 mg by mouth once daily.    [DISCONTINUED] predniSONE (DELTASONE) 10 MG tablet Take 1 tablet by mouth every other day.     Family History       Problem Relation (Age of Onset)    Breast cancer Maternal Grandmother (75)          Tobacco Use    Smoking status: Former     Current packs/day: 1.00     Average packs/day: 1 pack/day for 45.0 years (45.0 ttl pk-yrs)     Types: Cigarettes    Smokeless tobacco: Current    Tobacco comments:     admits to attempting to quit multiple times; currently smoking up to 1 packs/day down from 2 packs/day at time of diagnosis   Substance and Sexual Activity    Alcohol use: Yes     Comment: occasionaly    Drug use: No    Sexual activity: Not Currently     Review of Systems   Constitutional:  Positive for appetite change. Negative for fatigue and fever.   HENT:  Negative for rhinorrhea, sneezing, sore throat and tinnitus.    Eyes:  Negative for photophobia and visual  disturbance.   Respiratory:  Negative for cough and chest tightness.    Cardiovascular:  Negative for chest pain and leg swelling.   Gastrointestinal:  Positive for abdominal pain, constipation, nausea and vomiting. Negative for diarrhea.   Endocrine: Negative for polyphagia and polyuria.   Genitourinary:  Negative for difficulty urinating, dysuria, hematuria and menstrual problem.   Musculoskeletal:  Negative for gait problem and joint swelling.   Skin:  Negative for rash and wound.   Neurological:  Negative for seizures, syncope and facial asymmetry.   Psychiatric/Behavioral:  Negative for agitation, behavioral problems and confusion.      Objective:     Vital Signs (Most Recent):  Temp: 97.6 °F (36.4 °C) (01/09/24 2310)  Pulse: 67 (01/10/24 0230)  Resp: 16 (01/10/24 0230)  BP: 135/77 (01/10/24 0230)  SpO2: 97 % (01/10/24 0230) Vital Signs (24h Range):  Temp:  [97.6 °F (36.4 °C)-98 °F (36.7 °C)] 97.6 °F (36.4 °C)  Pulse:  [] 67  Resp:  [14-20] 16  SpO2:  [97 %-100 %] 97 %  BP: (126-141)/(77-85) 135/77     Weight: 68 kg (150 lb)  Body mass index is 23.49 kg/m².     Physical Exam  Vitals reviewed.   Constitutional:       General: She is not in acute distress.     Appearance: Normal appearance. She is not ill-appearing or toxic-appearing.      Comments: Pleasant woman in no acute distress. Cooperative with examination and conversant with interview.   HENT:      Head: Normocephalic and atraumatic.      Right Ear: External ear normal.      Left Ear: External ear normal.      Nose:      Comments: Nasogatric tube in the nares.     Mouth/Throat:      Comments: Oropharynx appearing dry with tongue fissuring.  Eyes:      General:         Right eye: No discharge.         Left eye: No discharge.      Extraocular Movements: Extraocular movements intact.   Cardiovascular:      Rate and Rhythm: Normal rate and regular rhythm.      Pulses: Normal pulses.      Heart sounds: No murmur heard.     No friction rub. No gallop.    Pulmonary:      Effort: Pulmonary effort is normal.      Breath sounds: Normal breath sounds.   Abdominal:      Tenderness: There is abdominal tenderness.      Comments: Slight abdominal fullness and tenderness to the right side of the abdomen but no rigidity or peritoneal signs.   Musculoskeletal:      Cervical back: Neck supple.      Right lower leg: No edema.      Left lower leg: No edema.   Skin:     General: Skin is warm and dry.   Neurological:      General: No focal deficit present.      Mental Status: She is alert and oriented to person, place, and time. Mental status is at baseline.      Sensory: No sensory deficit.      Motor: No weakness.   Psychiatric:         Mood and Affect: Mood normal.         Behavior: Behavior normal.                Significant Labs: All pertinent labs within the past 24 hours have been reviewed.  CBC:   Recent Labs   Lab 01/09/24 2034   WBC 15.89*   HGB 15.0   HCT 46.3        CMP:   Recent Labs   Lab 01/09/24 2034      K 4.2   CL 98   CO2 27   *   BUN 17   CREATININE 0.9   CALCIUM 9.9   PROT 7.9   ALBUMIN 3.8   BILITOT 0.8   ALKPHOS 81   AST 18   ALT 9*   ANIONGAP 13       Significant Imaging: I have reviewed all pertinent imaging results/findings within the past 24 hours.

## 2024-01-10 NOTE — PLAN OF CARE
Spoke to son, he feels maybe venting g tube is best option. Even if NGT resolves issue obstruction likely to return and at this time pateint doesn't seem to be in state pf rapid decline.

## 2024-01-10 NOTE — FIRST PROVIDER EVALUATION
Medical screening examination initiated.  I have conducted a focused provider triage encounter, findings are as follows:    Brief history of present illness:  hx of pancreatic cancer recently diagnosed not on chemo  Here for constipation  + BMs but small    Hx of COPD on oxygen 3 L NC  On OxyContin though she has tried not to take it due to constipation, today the abdominal pain was severe and she took a dose    Vitals:    01/09/24 2011   BP: 126/85   Pulse: (!) 115   Resp: 18   Temp: 98 °F (36.7 °C)   TempSrc: Oral   SpO2: 98%   Weight: 68 kg (150 lb)       Pertinent physical exam:  fatigued, pale, on home oxygen    Brief workup plan:  cbc/cmp/lipase/CT a/p/UA    Preliminary workup initiated; this workup will be continued and followed by the physician or advanced practice provider that is assigned to the patient when roomed.

## 2024-01-10 NOTE — ED PROVIDER NOTES
Encounter Date: 1/9/2024       History     Chief Complaint   Patient presents with    Abdominal Pain     Recently diagnosed with pancreatic cancer. C/o constipation. Last BM x6 days ago. Hx of COPD. Wears 3L O2 at baseline     HPI    77 y/o F PMH back surgery, COPD, metastatic pancreatic cancer who presents to the ED for evaluation of abd pain.  Patient notes diagnosis of cancer occurred last week, very recent. She has not started therapy yet.  Today she presents with N/V and generalized abd pain for a few days. No BM for a few days as well. O2 NC is chronic, 2L.  Denies any f/c, CP, dyspnea, syncope, dysuria, or trauma.      Review of patient's allergies indicates:   Allergen Reactions    Adhesive Rash     Use PAPER Tape / TEGADERM    Levaquin [levofloxacin] Diarrhea and Nausea Only    Penicillins Rash     Past Medical History:   Diagnosis Date    Back pain     instrumented fusion L3-L5. Spinal cord stimulator in place. L5-S1 right paracentral disc osteophyte complex resulting in moderate right neural foraminal narrowing.    Breast cancer 05/2015    (status post lumpectomy and chemoradiation, currently on maintenance therapy with FEMARA). Right breast infiltrating ductal CA with DCIS, ER positive, VT/Her2 negative     COPD (chronic obstructive pulmonary disease)     Chronic bronchitis;  (on 3 L at home)     Hyperlipidemia     Hypertension     Osteoporosis 5/21/2019     Past Surgical History:   Procedure Laterality Date    BREAST BIOPSY Right     BREAST LUMPECTOMY Right 2015    IDC & DCIS    CERVICAL FUSION      HYSTERECTOMY      with BSO    LUMBAR FUSION      LYMPH NODE DISSECTION Right     right lumpectomy       Family History   Problem Relation Age of Onset    Breast cancer Maternal Grandmother 75    Ovarian cancer Neg Hx      Social History     Tobacco Use    Smoking status: Former     Current packs/day: 1.00     Average packs/day: 1 pack/day for 45.0 years (45.0 ttl pk-yrs)     Types: Cigarettes    Smokeless  tobacco: Current    Tobacco comments:     admits to attempting to quit multiple times; currently smoking up to 1 packs/day down from 2 packs/day at time of diagnosis   Substance Use Topics    Alcohol use: Yes     Comment: occasionaly    Drug use: No     Review of Systems   Constitutional:  Negative for fever.   Respiratory:  Negative for shortness of breath.    Cardiovascular:  Negative for chest pain.   Gastrointestinal:  Positive for abdominal pain and nausea.   Musculoskeletal:  Negative for back pain.   Skin:  Negative for rash.   Neurological:  Negative for weakness.       Physical Exam     Initial Vitals [01/09/24 2011]   BP Pulse Resp Temp SpO2   126/85 (!) 115 18 98 °F (36.7 °C) 98 %      MAP       --         Physical Exam    Nursing note and vitals reviewed.  Constitutional: She appears well-nourished.   HENT:   Head: Atraumatic.   Eyes: EOM are normal.   Neck: Neck supple.   Cardiovascular:  Normal rate and regular rhythm.           Pulmonary/Chest: Breath sounds normal. No respiratory distress.   Abdominal:   Generalized abd TTP   Musculoskeletal:         General: No edema. Normal range of motion.      Cervical back: Neck supple.     Neurological: She is alert and oriented to person, place, and time.   Skin: Skin is warm and dry.   Psychiatric: She has a normal mood and affect. Thought content normal.         ED Course   Procedures  Labs Reviewed   COMPREHENSIVE METABOLIC PANEL - Abnormal; Notable for the following components:       Result Value    Glucose 135 (*)     ALT 9 (*)     All other components within normal limits    Narrative:     Release to patient->Immediate   CBC W/ AUTO DIFFERENTIAL - Abnormal; Notable for the following components:    WBC 15.89 (*)     Immature Granulocytes 0.6 (*)     Gran # (ANC) 12.9 (*)     Immature Grans (Abs) 0.09 (*)     Mono # 1.4 (*)     Gran % 81.1 (*)     Lymph % 8.9 (*)     All other components within normal limits    Narrative:     Release to patient->Immediate    LIPASE    Narrative:     Release to patient->Immediate   TROPONIN I    Narrative:     Release to patient->Immediate   URINALYSIS, REFLEX TO URINE CULTURE          Imaging Results              CT Abdomen Pelvis With IV Contrast NO Oral Contrast (In process)                      Medications   morphine injection 4 mg (has no administration in time range)   iohexoL (OMNIPAQUE 350) injection 75 mL (75 mLs Intravenous Given 1/9/24 2133)   lactated ringers bolus 1,000 mL (1,000 mLs Intravenous New Bag 1/9/24 2213)     Medical Decision Making  Risk  Prescription drug management.                     EKG:  Rate 97  NSR  No STEMI  Some TN depressions                 77 y/o F here with abd pain.  VSS in ED, mild generalized abd TTP on exam.  EKG NSR, no STEMI but some abnormalities noted. Labs ordered from triage.  WBC 15, normal lytes, trop normal.   Patient declined any pain meds, IVF started.  Pending UA and CT at shift change, oncoming MD to follow and dispo.    Clinical Impression:  Final diagnoses:  [R00.0] Tachycardia  [R10.9] Abdominal pain, unspecified abdominal location (Primary)                 Markie Gibbs MD  01/09/24 0291

## 2024-01-10 NOTE — ED TRIAGE NOTES
Radha You, a 78 y.o. female presents to the ED w/ complaint of abdominal pain for appx 5 days. Pt states that she's been constipated for days and has taken laxatives with no relief. Pt also reports some n/v that started last night.  Pt was recently dx with pancreatic cancer.     Triage note:  Chief Complaint   Patient presents with    Abdominal Pain     Recently diagnosed with pancreatic cancer. C/o constipation. Last BM x6 days ago. Hx of COPD. Wears 3L O2 at baseline     Review of patient's allergies indicates:   Allergen Reactions    Adhesive Rash     Use PAPER Tape / TEGADERM    Levaquin [levofloxacin] Diarrhea and Nausea Only    Penicillins Rash     Past Medical History:   Diagnosis Date    Back pain     instrumented fusion L3-L5. Spinal cord stimulator in place. L5-S1 right paracentral disc osteophyte complex resulting in moderate right neural foraminal narrowing.    Breast cancer 05/2015    (status post lumpectomy and chemoradiation, currently on maintenance therapy with FEMARA). Right breast infiltrating ductal CA with DCIS, ER positive, NE/Her2 negative     COPD (chronic obstructive pulmonary disease)     Chronic bronchitis;  (on 3 L at home)     Hyperlipidemia     Hypertension     Osteoporosis 5/21/2019

## 2024-01-10 NOTE — PROVIDER PROGRESS NOTES - EMERGENCY DEPT.
Triage EKG interpretation performed at 8:23 PM    No STEMI. Abnormal EKG with ST an FL depressions, trop added    Lindsay Madrid

## 2024-01-10 NOTE — CONSULTS
[facility] Dept  General Surgery  Consult Note    Inpatient consult to General Surgery  Consult performed by: Ace Ruiz MD  Consult ordered by: Shira Chou MD        Subjective:     Chief Complaint/Reason for Admission: Abdominal Pain    History of Present Illness: Ms. You is a 77yo female with a history of COPD, HLD, HTN, h/o breast cancer, and recently diagnosed metastatic pancreatic cancer who presents today with abdominal pain, nausea, and emesis.    In terms of her pancreatic cancer history this was diagnosed recently.  Had CT imaging done on 1/3/24 which showed a 2.4cm pancreatic tail mass with peritoneal and omental stranding and innumerable soft tissue lesions and omental caking.  Was seen by medical oncology on 1/4/24 for her metastatic pancreatic cancer who offered her palliative chemo however the patient at that time was still undecided if she wished to pursue this vs. Best supportive care/hospice.  She states that she has some chronic pain which she takes oxycodone at home for however around 3 days ago she began to have worsening of her abdominal pain.  It is epigastric/diffuse.  With this pain she has also had nausea and emesis associated with it.  Has been unable to tolerate any significant PO intake during this time secondary to this.  Her last bowel movement was over 24hrs ago and she took multiple laxatives and states that it was a small bowel movement otherwise is not passing any gas or stool.  Workup in the ED at this time showed a leukocytosis to 15.9.  Renal and hepatic panels are unremarkable.  CT abdomen pelvis showed continued presence of the above stated findings from the prior scan with interval development of multiple small bowel strictures.  General surgery was consulted for evaluation of SBO.      No current facility-administered medications on file prior to encounter.     Current Outpatient Medications on File Prior to Encounter   Medication Sig     albuterol 90 mcg/actuation inhaler Inhale 2 puffs into the lungs every 6 (six) hours as needed for Wheezing.    albuterol-ipratropium (DUO-NEB) 2.5 mg-0.5 mg/3 mL nebulizer solution Take 3 mLs by nebulization every 6 (six) hours as needed.    azithromycin (Z-SABINE) 250 MG tablet Take 250 mg by mouth 3 (three) times a week.    azithromycin (ZITHROMAX) 500 MG tablet Take one tablet Monday, then Wednesday, then Friday per week (Patient not taking: Reported on 1/4/2024)    calcium carbonate (OS-JENNI) 600 mg calcium (1,500 mg) Tab Take 1 tablet by mouth once daily.    cyclobenzaprine (FLEXERIL) 10 MG tablet Take 10 mg by mouth 3 (three) times daily.    DALIRESP 500 mcg Tab Take 1 tablet by mouth once daily.    losartan (COZAAR) 25 MG tablet Take 0.5 tablets (12.5 mg total) by mouth once daily. (Patient taking differently: Take 25 mg by mouth once daily.)    METOPROLOL SUCCINATE ORAL Take by mouth.    montelukast (SINGULAIR) 10 mg tablet Take 10 mg by mouth every evening.    MYRBETRIQ 25 mg Tb24 ER tablet Take 25 mg by mouth once daily.    ondansetron (ZOFRAN) 4 MG tablet Take 4 mg by mouth every 8 (eight) hours as needed.    oxyCODONE (ROXICODONE) 10 mg Tab immediate release tablet Take 10 mg by mouth 4 (four) times daily.    oxyCODONE-acetaminophen (PERCOCET)  mg per tablet     oxyCODONE-acetaminophen (PERCOCET) 5-325 mg per tablet Take 1 tablet by mouth every 6 (six) hours as needed. (Patient not taking: Reported on 1/4/2024)    pantoprazole (PROTONIX) 40 MG tablet Take 40 mg by mouth once daily.    pravastatin (PRAVACHOL) 40 MG tablet Take 40 mg by mouth every evening.     predniSONE (DELTASONE) 10 MG tablet Take 1 tablet by mouth every other day.    topiramate (TOPAMAX) 50 MG tablet Take 50 mg by mouth every evening.     TRELEGY ELLIPTA 200-62.5-25 mcg inhaler Inhale 1 puff into the lungs once daily.    [DISCONTINUED] aspirin (ECOTRIN) 81 MG EC tablet Take 81 mg by mouth once daily.       Review of patient's  allergies indicates:   Allergen Reactions    Adhesive Rash     Use PAPER Tape / TEGADERM    Levaquin [levofloxacin] Diarrhea and Nausea Only    Penicillins Rash       Past Medical History:   Diagnosis Date    Back pain     instrumented fusion L3-L5. Spinal cord stimulator in place. L5-S1 right paracentral disc osteophyte complex resulting in moderate right neural foraminal narrowing.    Breast cancer 05/2015    (status post lumpectomy and chemoradiation, currently on maintenance therapy with FEMARA). Right breast infiltrating ductal CA with DCIS, ER positive, MS/Her2 negative     COPD (chronic obstructive pulmonary disease)     Chronic bronchitis;  (on 3 L at home)     Hyperlipidemia     Hypertension     Osteoporosis 5/21/2019     Past Surgical History:   Procedure Laterality Date    BREAST BIOPSY Right     BREAST LUMPECTOMY Right 2015    IDC & DCIS    CERVICAL FUSION      HYSTERECTOMY      with BSO    LUMBAR FUSION      LYMPH NODE DISSECTION Right     right lumpectomy       Family History       Problem Relation (Age of Onset)    Breast cancer Maternal Grandmother (75)          Tobacco Use    Smoking status: Former     Current packs/day: 1.00     Average packs/day: 1 pack/day for 45.0 years (45.0 ttl pk-yrs)     Types: Cigarettes    Smokeless tobacco: Current    Tobacco comments:     admits to attempting to quit multiple times; currently smoking up to 1 packs/day down from 2 packs/day at time of diagnosis   Substance and Sexual Activity    Alcohol use: Yes     Comment: occasionaly    Drug use: No    Sexual activity: Not Currently     Review of Systems   Constitutional:  Positive for appetite change. Negative for chills and fever.   HENT: Negative.     Respiratory:  Negative for cough, chest tightness and shortness of breath.    Cardiovascular:  Negative for chest pain and palpitations.   Gastrointestinal:  Positive for abdominal distention, abdominal pain, constipation, nausea and vomiting. Negative for diarrhea.    Genitourinary:  Negative for dysuria and hematuria.   Musculoskeletal: Negative.    Skin: Negative.    Neurological: Negative.    Psychiatric/Behavioral:  Negative for confusion.      Objective:     Vital Signs (Most Recent):  Temp: 97.6 °F (36.4 °C) (01/09/24 2310)  Pulse: 68 (01/10/24 0115)  Resp: 15 (01/10/24 0115)  BP: (!) 141/77 (01/10/24 0115)  SpO2: 100 % (01/10/24 0115) Vital Signs (24h Range):  Temp:  [97.6 °F (36.4 °C)-98 °F (36.7 °C)] 97.6 °F (36.4 °C)  Pulse:  [] 68  Resp:  [14-20] 15  SpO2:  [98 %-100 %] 100 %  BP: (126-141)/(77-85) 141/77     Weight: 68 kg (150 lb)  Body mass index is 23.49 kg/m².      Intake/Output Summary (Last 24 hours) at 1/10/2024 0215  Last data filed at 1/9/2024 2347  Gross per 24 hour   Intake 1000 ml   Output --   Net 1000 ml       Physical Exam  Constitutional:       General: She is not in acute distress.     Appearance: Normal appearance.   HENT:      Head: Normocephalic and atraumatic.   Eyes:      Extraocular Movements: Extraocular movements intact.   Cardiovascular:      Rate and Rhythm: Normal rate and regular rhythm.      Pulses: Normal pulses.   Pulmonary:      Effort: Pulmonary effort is normal. No respiratory distress.   Abdominal:      General: There is distension.      Tenderness: There is abdominal tenderness (mild epigastric). There is no guarding.   Musculoskeletal:         General: Normal range of motion.      Cervical back: Normal range of motion.   Skin:     General: Skin is warm and dry.   Neurological:      General: No focal deficit present.      Mental Status: She is alert and oriented to person, place, and time.   Psychiatric:         Mood and Affect: Mood normal.         Behavior: Behavior normal.       Significant Labs:  Recent Lab Results         01/09/24 2034        Albumin 3.8       ALP 81       ALT 9       Anion Gap 13       AST 18  Comment: *Result may be interfered by visible hemolysis       Baso # 0.06       Basophil % 0.4        BILIRUBIN TOTAL 0.8  Comment: For infants and newborns, interpretation of results should be based  on gestational age, weight and in agreement with clinical  observations.    Premature Infant recommended reference ranges:  Up to 24 hours.............<8.0 mg/dL  Up to 48 hours............<12.0 mg/dL  3-5 days..................<15.0 mg/dL  6-29 days.................<15.0 mg/dL         BUN 17       Calcium 9.9       Chloride 98       CO2 27       Creatinine 0.9       Differential Method Automated       eGFR >60.0       Eos # 0.1       Eosinophil % 0.4       Glucose 135       Gran # (ANC) 12.9       Gran % 81.1       Hematocrit 46.3       Hemoglobin 15.0       Immature Grans (Abs) 0.09  Comment: Mild elevation in immature granulocytes is non specific and   can be seen in a variety of conditions including stress response,   acute inflammation, trauma and pregnancy. Correlation with other   laboratory and clinical findings is essential.         Immature Granulocytes 0.6       Lipase 17       Lymph # 1.4       Lymph % 8.9       MCH 28.5       MCHC 32.4       MCV 88       Mono # 1.4       Mono % 8.6       MPV 10.4       nRBC 0       Platelet Count 431       Potassium 4.2       PROTEIN TOTAL 7.9       RBC 5.27       RDW 13.3       Sodium 138       Troponin I 0.008  Comment: The reference interval for Troponin I represents the 99th percentile   cutoff   for our facility and is consistent with 3rd generation assay   performance.         WBC 15.89               Significant Diagnostics:  I have reviewed all pertinent imaging results/findings within the past 24 hours.  Narrative & Impression  EXAMINATION:  CT ABDOMEN PELVIS WITH IV CONTRAST     CLINICAL HISTORY:  Abdominal pain, acute, nonlocalized;hx of pancreatic cancer, constipation;     TECHNIQUE:  Using 75 mL of Omnipaque 350 IV contrast , and multi-detector helical CT technique, axial CT images of the abdomen and pelvis were obtained. 2D post-processing coronal and sagittal  reconstructions was performed.     COMPARISON:  CT chest abdomen and pelvis with IV contrast 01/03/2024     CT abdomen pelvis without and with IV contrast 07/12/2023, obtained at [facility] and reviewed (solely for comparison purposes[place] [facility] [place]     FINDINGS:  Lung base: Centrilobular emphysema.     Visualized portion of heart: Interatrial septum lipomatous hypertrophy again noted.  Significant RCA calcification.  Normal size.  No significant pleural effusion.     Liver: Left lobe medial segment intermediate hypoattenuated focus measuring up to 1.5 cm, more conspicuous since July 2023, but favored to represent focal fatty infiltration.     [place] parasagittally oriented region of hyperenhancement in the anterior superior subcapsular left hepatic lobe appears more conspicuous than on 01/03/2024; this possibly represents a transient hepatic attenuation difference (series 2, image 30).  Its morphology is atypical for neoplasm.     Gallbladder: Stable appearance of distension and radiopaque stone (or, less likely, mural polyp) (series 2, image 52).  No additional CT evidence of cholecystitis.     Bile duct: No intra-or extrahepatic biliary ductal dilatation.     Spleen: Unremarkable.     Pancreas: Pancreatic tail hypodense mass again noted similar to recent prior.     Adrenal glands: Stable left adrenal gland nodule.     Genitourinary: The kidneys are normal in size and location. The ureters appear normal in course and caliber.  No evidence of nephrolithiasis or hydroureteronephrosis.  The urinary bladder is collapsed.     Reproductive organs: Hysterectomy.     GI tract: Small hiatal hernia.  Distal esophageal wall thickening, new since 01/03/2024, and most suspicious for esophagitis; neoplasm less likely.     Numerous distended to mildly dilated small bowel loops, distension measuring up to 3.4 cm interim.     There are cervical foci or short-segments of small bowel luminal narrowing with  associated mural hyperenhancement, suspicious for strictures, possibly on the basis of metastatic neoplasm.  Primary neoplasm or inflammatory, infectious, or vascular processes are not excluded.     The caliber of the colon is normal.     Moderate quantity of fecal residue throughout most of the colon.  The high attenuation of this fecal residue could be related to recently ingested radiopaque material, but inspissation of the fecal residue is not excluded.  Significant narrowing of rectum associated with an irregular cystic structure with simple fluid density and imperceptible wall measuring proximally 5.0 x 2.7 x 2.6 cm similar to recent prior scan and new from remote prior scan.     A structure thought to represent a distended appendix demonstrates some irregular enhancement.  Metastatic disease is suspected, appendicitis is not excluded.     Peritoneum: Trace intraperitoneal fluid, increased since 01/03/2024.  No pneumoperitoneum.     Retroperitoneum: No significant adenopathy.     Vasculature: Normal caliber of abdominal aorta with mild atherosclerosis.     Abdominal wall: Unremarkable.     Bones: Postsurgical change of lower lumbar spine posterior fusion.  L2 vertebral body compression deformity.  No acute abnormality.     Impression:     Known pancreatic tail mass, abutting and possibly invading the spleen.     New geographic region of decreased enhancement in the lower splenic pole, suspicious for splenic infarct.     Pre-existing scattered omental nodules, mesenteric nodules, and left upper quadrant omental caking, compatible with intraperitoneal metastatic disease.     Multiple small bowel strictures are suggested, possibly on the basis of metastatic disease.  Associated low-grade or partial small bowel obstruction, versus ileus.  No pneumoperitoneum.     Suspected chronic appendicitis, possibly on the basis of underlying appendiceal metastasis.  Faintly radiopaque appendicoliths not excluded.     Similar  to 01/03/2024, but new since 07/12/2023, there is a circumscribed fluid collection adjacent to the distal rectosigmoid colon.  This again causes some narrowing of the adjacent colon.  Although an abscess is not fully excluded, the paucity of surrounding stranding/inflammatory changes perhaps favors cystic peritoneal metastasis.     New distal esophageal wall thickening, suspicious for esophagitis.  Neoplasm less likely but not excluded.  Correlate clinically.  Consider outpatient EGD.     Additional pre-existing findings as detailed in the body of the report.    Assessment/Plan:     Ms. You is a 79yo female who presents with metastatic pancreatic cancer with omental caking and concern for a malignant bowel obstruction.    Discussed with the patient that surgical intervention is unlikely to provide any significant long term benefit if any given the extent of her cancer based on imaging.  Discussed that interventions we can perform are NG decompression with the hope that the obstruction passes or the possibility of a palliative G tube for venting purposes.  At this time the patient is not interested in any operative intervention and would rather have meaningful and comfortable time.  We discussed the role of NG decompression and she is also not interested in this at this time.  We discussed the risks of emesis with aspiration that could lead to pneumonia, respiratory failure, cardiac arrest or death and she understands that this is a possibility if that were to happen but would not like to have an NG at this point.      In terms of management at this time would recommend medicine/medical oncology evaluation for admission.  Patient presents with failure to thrive given her inability to tolerate oral intake secondary to her malignancy.  At this time patient does not wish to have any operations and there is not a reasonable operative fix given her diffuse abdominal spread of her cancer.  Would keep the patient NPO  for now and await to see if she develops return of bowel function.      Had a discussion about goals of care if she were to have a cardiac arrest or go into respiratory failure.  Patient at this time still wishes to remain full code.  Can consider a palliative consult for further goals of care discussion given her diagnosis.    Thank you for your consult. I will follow-up with patient. Please contact us if you have any additional questions.    Ace Ruiz MD  General Surgery  [facility]

## 2024-01-10 NOTE — PROGRESS NOTES
[facility] - Emergency Dept  [facility] Medicine  Progress Note    Patient Name: Radha You  MRN: [id number]  Patient Class: IP- Inpatient   Admission Date: 1/9/2024  Length of Stay: 0 days  Attending Physician: Jaron Staton MD  Primary Care Provider: Sapphire Ruffin FNP-C        Subjective:     Principal Problem:Partial small bowel obstruction        HPI:  Radha You is a 78-year-old woman with a past medical history of HTN, HLD, chronic hypoxic respiratory failure on 1-3 liters of oxygen 2/2 COPD, history of breast cancer s/p chemoradiation and lumpectomy who presents to the emergency department with abdominal pain.    Per report, the patient had a pancreatic tail mass that was incidentally discovered during a bronchoscopic procedure. She underwent EUS at [facility] which revealed a mass in the tail of the pancreas. This was biopsied and was consistent with pancreatic ductal adenocarcinoma. The patient has been following in the Oncology clinic and was offered palliative chemotherapy but declined.     She began having abdominal pain approximately three to four days ago. She also complains of severe constipation. She was unable to have a bowel movement for several days. She said that she purchased multiple over-the-counter laxatives, including suppositories, and was able to pass a small bowel movement earlier today but still felt constipation. Additionally, the patient began having increased pain in her abdomen that was associated with nausea and vomiting and was unable to tolerate any oral intake while at home. She denies other symptoms such as fevers, chills, chest pain or discomfort, cough, or shortness of breath.    While in the emergency department, she was mildly tachycardic at 115 at presentation but other vital signs were within normal limits. She had a mild leukocytosis, but electrolytes, renal and liver function were all within normal limits. A CT abdomen/pelvis with contrast was ordered which  revealed the known pancreatic tail mass, abutting and possibly invading the spleen with new geographic region of decreased enhancement in the lower splenic pole, suspicious for splenic infarct. There were multiple small bowel strictures are suggested, possibly on the basis of metastatic disease that is possibly associated with low-grade or partial small bowel obstruction versus ileus.      General Surgery was consulted given concern for obstruction. However, given the patient's metastatic disease, they felt that she would not be a good surgical candidate. The possibility of a G-tube was discussed but the patient deferred and opted for nasogastric decompression at this time. She was given a 1 liter bolus of lactated ringer's solution in addition to 4 mg of IV morphine and 4 mg of IV ondansetron. She was admitted to Hospital Medicine for further management.            Overview/Hospital Course:  No notes on file    Interval History: Feeling a little better this am, NGT in place. Is from [facility] and lives alone. Surgery following for possible G tube, Has two sons , one lives in [place] where she will probably go live with.    Review of Systems   Constitutional:  Positive for appetite change. Negative for fatigue and fever.   HENT:  Negative for rhinorrhea, sneezing, sore throat and tinnitus.    Eyes:  Negative for photophobia and visual disturbance.   Respiratory:  Negative for cough and chest tightness.    Cardiovascular:  Negative for chest pain and leg swelling.   Gastrointestinal:  Positive for abdominal pain, constipation, nausea and vomiting. Negative for diarrhea.   Endocrine: Negative for polyphagia and polyuria.   Genitourinary:  Negative for difficulty urinating, dysuria, hematuria and menstrual problem.   Musculoskeletal:  Negative for gait problem and joint swelling.   Skin:  Negative for rash and wound.   Neurological:  Negative for seizures, syncope and facial asymmetry.    Psychiatric/Behavioral:  Negative for agitation, behavioral problems and confusion.      Objective:     Vital Signs (Most Recent):  Temp: 98.5 °F (36.9 °C) (01/10/24 1016)  Pulse: 68 (01/10/24 1016)  Resp: 18 (01/10/24 0624)  BP: (!) 143/77 (01/10/24 1016)  SpO2: 100 % (01/10/24 1017) Vital Signs (24h Range):  Temp:  [97.6 °F (36.4 °C)-98.5 °F (36.9 °C)] 98.5 °F (36.9 °C)  Pulse:  [] 68  Resp:  [14-20] 18  SpO2:  [97 %-100 %] 100 %  BP: (120-147)/(67-85) 143/77     Weight: 68 kg (150 lb)  Body mass index is 23.49 kg/m².    Intake/Output Summary (Last 24 hours) at 1/10/2024 1056  Last data filed at 1/9/2024 2347  Gross per 24 hour   Intake 1000 ml   Output --   Net 1000 ml         Physical Exam  Vitals reviewed.   Constitutional:       General: She is not in acute distress.     Appearance: Normal appearance. She is not ill-appearing or toxic-appearing.      Comments: Pleasant woman in no acute distress. Cooperative with examination and conversant with interview.   HENT:      Head: Normocephalic and atraumatic.      Right Ear: External ear normal.      Left Ear: External ear normal.      Nose:      Comments: Nasogatric tube in the nares.     Mouth/Throat:      Comments: Oropharynx appearing dry with tongue fissuring.  Eyes:      General:         Right eye: No discharge.         Left eye: No discharge.      Extraocular Movements: Extraocular movements intact.   Cardiovascular:      Rate and Rhythm: Normal rate and regular rhythm.      Pulses: Normal pulses.      Heart sounds: No murmur heard.     No friction rub. No gallop.   Pulmonary:      Effort: Pulmonary effort is normal.      Breath sounds: Normal breath sounds.   Abdominal:      Tenderness: There is abdominal tenderness.      Comments: Slight abdominal fullness and tenderness to the right side of the abdomen but no rigidity or peritoneal signs.   Musculoskeletal:      Cervical back: Neck supple.      Right lower leg: No edema.      Left lower leg: No  edema.   Skin:     General: Skin is warm and dry.   Neurological:      General: No focal deficit present.      Mental Status: She is alert and oriented to person, place, and time. Mental status is at baseline.      Sensory: No sensory deficit.      Motor: No weakness.   Psychiatric:         Mood and Affect: Mood normal.         Behavior: Behavior normal.             Significant Labs: All pertinent labs within the past 24 hours have been reviewed.  BMP:   Recent Labs   Lab 01/10/24  0427   GLU 93   *   K 4.0      CO2 24   BUN 18   CREATININE 0.8   CALCIUM 8.7   MG 1.9       Significant Imaging: I have reviewed all pertinent imaging results/findings within the past 24 hours.    Assessment/Plan:      * Partial small bowel obstruction  Known pancreatic tail mass with multiple small bowel strictures that are likely secondary to low-grade or a partial small bowel obstruction. General Surgery following, patient declining surgical or operative intervention at this time and would prefer nasogastric decompression.  - General Surgery following, appreciate additional recommendations  - patient NPO at this time  - continue nasogastric decompression at this time  - begin lactated ringer's at 75 mL/hour  - begin scopolamine patch q3 days for anti-nausea and anti-secretory effects  - begin IV haloperidol 2 mg q6h PRN nausea/vomiting  - begin IV morphine 4 mg q4h PRN pain    Pancreatic adenocarcinoma  Patient with a history of pancreatic adenocarcinoma of the tail of the pancreas. Currently following with Oncology clinic.       Chronic respiratory failure with hypoxia  Patient with a history of chronic hypoxic respiratory failure that is on 1-3 liters of oxygen from her COPD. No signs of respiratory distress at this time, breathing is even and unlabored. Patient currently stable on supplemental oxygen via nasal cannula.    Centrilobular emphysema  Patient with a history of chronic obstructive pulmonary disease. On 1-3  liters of supplemental oxygen at baseline.  - continue supplemental oxygen, wean as tolerated  - azithromycin 250 mg three times weekly (MWF)  - albuterol-ipratropium nebulizers PRN    Hyperlipidemia  Continue home pravastatin 40 mg daily.      Essential hypertension  Blood pressure elevated which may be secondary to the patient's pain and discomfort.   - resume home metoprolol succinate 25 mg daily    Breast cancer  History of breast cancer. Patient previously had lumpectomy and chemoradiation.        VTE Risk Mitigation (From admission, onward)           Ordered     enoxaparin injection 40 mg  Daily         01/10/24 0335     IP VTE HIGH RISK PATIENT  Once         01/10/24 0335     Place sequential compression device  Until discontinued         01/10/24 0335                    Discharge Planning   RUFINO:      Code Status: Full Code   Is the patient medically ready for discharge?:     Reason for patient still in hospital (select all that apply): Patient unstable                     Jaron Staton MD  Department of Hospital Medicine   [facility] - Emergency Dept

## 2024-01-10 NOTE — H&P
[facility] - Emergency Dept  [facility] Medicine  History & Physical    Patient Name: Radha You  MRN: [id number]  Patient Class: IP- Inpatient  Admission Date: 1/9/2024  Attending Physician: Jaron Staton MD   Primary Care Provider: Sapphire Ruffin FNP-C         Patient information was obtained from patient, relative(s), past medical records, and ER records.     Subjective:     Principal Problem:Partial small bowel obstruction    Chief Complaint:   Chief Complaint   Patient presents with    Abdominal Pain     Recently diagnosed with pancreatic cancer. C/o constipation. Last BM x6 days ago. Hx of COPD. Wears 3L O2 at baseline        HPI: Radha You is a 78-year-old woman with a past medical history of HTN, HLD, chronic hypoxic respiratory failure on 1-3 liters of oxygen 2/2 COPD, history of breast cancer s/p chemoradiation and lumpectomy who presents to the emergency department with abdominal pain.    Per report, the patient had a pancreatic tail mass that was incidentally discovered during a bronchoscopic procedure. She underwent EUS at [facility] which revealed a mass in the tail of the pancreas. This was biopsied and was consistent with pancreatic ductal adenocarcinoma. The patient has been following in the Oncology clinic and was offered palliative chemotherapy but declined.     She began having abdominal pain approximately three to four days ago. She also complains of severe constipation. She was unable to have a bowel movement for several days. She said that she purchased multiple over-the-counter laxatives, including suppositories, and was able to pass a small bowel movement earlier today but still felt constipation. Additionally, the patient began having increased pain in her abdomen that was associated with nausea and vomiting and was unable to tolerate any oral intake while at home. She denies other symptoms such as fevers, chills, chest pain or discomfort, cough, or shortness of breath.    While  in the emergency department, she was mildly tachycardic at 115 at presentation but other vital signs were within normal limits. She had a mild leukocytosis, but electrolytes, renal and liver function were all within normal limits. A CT abdomen/pelvis with contrast was ordered which revealed the known pancreatic tail mass, abutting and possibly invading the spleen with new geographic region of decreased enhancement in the lower splenic pole, suspicious for splenic infarct. There were multiple small bowel strictures are suggested, possibly on the basis of metastatic disease that is possibly associated with low-grade or partial small bowel obstruction versus ileus.      General Surgery was consulted given concern for obstruction. However, given the patient's metastatic disease, they felt that she would not be a good surgical candidate. The possibility of a G-tube was discussed but the patient deferred and opted for nasogastric decompression at this time. She was given a 1 liter bolus of lactated ringer's solution in addition to 4 mg of IV morphine and 4 mg of IV ondansetron. She was admitted to Hospital Medicine for further management.            Past Medical History:   Diagnosis Date    Back pain     instrumented fusion L3-L5. Spinal cord stimulator in place. L5-S1 right paracentral disc osteophyte complex resulting in moderate right neural foraminal narrowing.    Breast cancer 05/2015    (status post lumpectomy and chemoradiation, currently on maintenance therapy with FEMARA). 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     COPD (chronic obstructive pulmonary disease)     Chronic bronchitis;  (on 3 L at home)     Hyperlipidemia     Hypertension     Osteoporosis 5/21/2019       Past Surgical History:   Procedure Laterality Date    BREAST BIOPSY Right     BREAST LUMPECTOMY Right 2015    IDC & DCIS    CERVICAL FUSION      HYSTERECTOMY      with BSO    LUMBAR FUSION      LYMPH NODE  DISSECTION Right     right lumpectomy         Review of patient's allergies indicates:   Allergen Reactions    Adhesive Rash     Use PAPER Tape / TEGADERM    Levaquin [levofloxacin] Diarrhea and Nausea Only    Penicillins Rash       No current facility-administered medications on file prior to encounter.     Current Outpatient Medications on File Prior to Encounter   Medication Sig    albuterol 90 mcg/actuation inhaler Inhale 2 puffs into the lungs every 6 (six) hours as needed for Wheezing.    albuterol-ipratropium (DUO-NEB) 2.5 mg-0.5 mg/3 mL nebulizer solution Take 3 mLs by nebulization every 6 (six) hours as needed.    azithromycin (Z-SABINE) 250 MG tablet Take 250 mg by mouth 3 (three) times a week.    azithromycin (ZITHROMAX) 500 MG tablet Take one tablet Monday, then Wednesday, then Friday per week (Patient not taking: Reported on 1/4/2024)    calcium carbonate (OS-JENNI) 600 mg calcium (1,500 mg) Tab Take 1 tablet by mouth once daily.    cyclobenzaprine (FLEXERIL) 10 MG tablet Take 10 mg by mouth 3 (three) times daily.    DALIRESP 500 mcg Tab Take 1 tablet by mouth once daily.    losartan (COZAAR) 25 MG tablet Take 0.5 tablets (12.5 mg total) by mouth once daily. (Patient taking differently: Take 25 mg by mouth once daily.)    METOPROLOL SUCCINATE ORAL Take by mouth.    montelukast (SINGULAIR) 10 mg tablet Take 10 mg by mouth every evening.    MYRBETRIQ 25 mg Tb24 ER tablet Take 25 mg by mouth once daily.    ondansetron (ZOFRAN) 4 MG tablet Take 4 mg by mouth every 8 (eight) hours as needed.    oxyCODONE (ROXICODONE) 10 mg Tab immediate release tablet Take 10 mg by mouth 4 (four) times daily.    oxyCODONE-acetaminophen (PERCOCET)  mg per tablet     oxyCODONE-acetaminophen (PERCOCET) 5-325 mg per tablet Take 1 tablet by mouth every 6 (six) hours as needed. (Patient not taking: Reported on 1/4/2024)    pantoprazole (PROTONIX) 40 MG tablet Take 40 mg by mouth once daily.    pravastatin (PRAVACHOL) 40 MG tablet  Take 40 mg by mouth every evening.     topiramate (TOPAMAX) 50 MG tablet Take 50 mg by mouth every evening.     TRELEGY ELLIPTA 200-62.5-25 mcg inhaler Inhale 1 puff into the lungs once daily.    [DISCONTINUED] aspirin (ECOTRIN) 81 MG EC tablet Take 81 mg by mouth once daily.    [DISCONTINUED] predniSONE (DELTASONE) 10 MG tablet Take 1 tablet by mouth every other day.     Family History       Problem Relation (Age of Onset)    Breast cancer Maternal Grandmother (75)          Tobacco Use    Smoking status: Former     Current packs/day: 1.00     Average packs/day: 1 pack/day for 45.0 years (45.0 ttl pk-yrs)     Types: Cigarettes    Smokeless tobacco: Current    Tobacco comments:     admits to attempting to quit multiple times; currently smoking up to 1 packs/day down from 2 packs/day at time of diagnosis   Substance and Sexual Activity    Alcohol use: Yes     Comment: occasionaly    Drug use: No    Sexual activity: Not Currently     Review of Systems   Constitutional:  Positive for appetite change. Negative for fatigue and fever.   HENT:  Negative for rhinorrhea, sneezing, sore throat and tinnitus.    Eyes:  Negative for photophobia and visual disturbance.   Respiratory:  Negative for cough and chest tightness.    Cardiovascular:  Negative for chest pain and leg swelling.   Gastrointestinal:  Positive for abdominal pain, constipation, nausea and vomiting. Negative for diarrhea.   Endocrine: Negative for polyphagia and polyuria.   Genitourinary:  Negative for difficulty urinating, dysuria, hematuria and menstrual problem.   Musculoskeletal:  Negative for gait problem and joint swelling.   Skin:  Negative for rash and wound.   Neurological:  Negative for seizures, syncope and facial asymmetry.   Psychiatric/Behavioral:  Negative for agitation, behavioral problems and confusion.      Objective:     Vital Signs (Most Recent):  Temp: 97.6 °F (36.4 °C) (01/09/24 2310)  Pulse: 67 (01/10/24 0230)  Resp: 16 (01/10/24  0230)  BP: 135/77 (01/10/24 0230)  SpO2: 97 % (01/10/24 0230) Vital Signs (24h Range):  Temp:  [97.6 °F (36.4 °C)-98 °F (36.7 °C)] 97.6 °F (36.4 °C)  Pulse:  [] 67  Resp:  [14-20] 16  SpO2:  [97 %-100 %] 97 %  BP: (126-141)/(77-85) 135/77     Weight: 68 kg (150 lb)  Body mass index is 23.49 kg/m².     Physical Exam  Vitals reviewed.   Constitutional:       General: She is not in acute distress.     Appearance: Normal appearance. She is not ill-appearing or toxic-appearing.      Comments: Pleasant woman in no acute distress. Cooperative with examination and conversant with interview.   HENT:      Head: Normocephalic and atraumatic.      Right Ear: External ear normal.      Left Ear: External ear normal.      Nose:      Comments: Nasogatric tube in the nares.     Mouth/Throat:      Comments: Oropharynx appearing dry with tongue fissuring.  Eyes:      General:         Right eye: No discharge.         Left eye: No discharge.      Extraocular Movements: Extraocular movements intact.   Cardiovascular:      Rate and Rhythm: Normal rate and regular rhythm.      Pulses: Normal pulses.      Heart sounds: No murmur heard.     No friction rub. No gallop.   Pulmonary:      Effort: Pulmonary effort is normal.      Breath sounds: Normal breath sounds.   Abdominal:      Tenderness: There is abdominal tenderness.      Comments: Slight abdominal fullness and tenderness to the right side of the abdomen but no rigidity or peritoneal signs.   Musculoskeletal:      Cervical back: Neck supple.      Right lower leg: No edema.      Left lower leg: No edema.   Skin:     General: Skin is warm and dry.   Neurological:      General: No focal deficit present.      Mental Status: She is alert and oriented to person, place, and time. Mental status is at baseline.      Sensory: No sensory deficit.      Motor: No weakness.   Psychiatric:         Mood and Affect: Mood normal.         Behavior: Behavior normal.                Significant Labs:  All pertinent labs within the past 24 hours have been reviewed.  CBC:   Recent Labs   Lab 01/09/24 2034   WBC 15.89*   HGB 15.0   HCT 46.3        CMP:   Recent Labs   Lab 01/09/24 2034      K 4.2   CL 98   CO2 27   *   BUN 17   CREATININE 0.9   CALCIUM 9.9   PROT 7.9   ALBUMIN 3.8   BILITOT 0.8   ALKPHOS 81   AST 18   ALT 9*   ANIONGAP 13       Significant Imaging: I have reviewed all pertinent imaging results/findings within the past 24 hours.  Assessment/Plan:     * Partial small bowel obstruction  Known pancreatic tail mass with multiple small bowel strictures that are likely secondary to low-grade or a partial small bowel obstruction. General Surgery following, patient declining surgical or operative intervention at this time and would prefer nasogastric decompression.  - General Surgery following, appreciate additional recommendations  - patient NPO at this time  - continue nasogastric decompression at this time  - begin lactated ringer's at 75 mL/hour  - begin scopolamine patch q3 days for anti-nausea and anti-secretory effects  - begin IV haloperidol 2 mg q6h PRN nausea/vomiting  - begin IV morphine 4 mg q4h PRN pain    Pancreatic adenocarcinoma  Patient with a history of pancreatic adenocarcinoma of the tail of the pancreas. Currently following with Oncology clinic.       Chronic respiratory failure with hypoxia  Patient with a history of chronic hypoxic respiratory failure that is on 1-3 liters of oxygen from her COPD. No signs of respiratory distress at this time, breathing is even and unlabored. Patient currently stable on supplemental oxygen via nasal cannula.    Centrilobular emphysema  Patient with a history of chronic obstructive pulmonary disease. On 1-3 liters of supplemental oxygen at baseline.  - continue supplemental oxygen, wean as tolerated  - azithromycin 250 mg three times weekly (MWF)  - albuterol-ipratropium nebulizers PRN    Hyperlipidemia  Continue home pravastatin  40 mg daily.      Essential hypertension  Blood pressure elevated which may be secondary to the patient's pain and discomfort.   - resume home metoprolol succinate 25 mg daily    Breast cancer  History of breast cancer. Patient previously had lumpectomy and chemoradiation.        VTE Risk Mitigation (From admission, onward)           Ordered     enoxaparin injection 40 mg  Daily         01/10/24 0335     IP VTE HIGH RISK PATIENT  Once         01/10/24 0335     Place sequential compression device  Until discontinued         01/10/24 0335                       Melchor Singh MD  Department of Hospital Medicine  [facility] - Emergency Dept

## 2024-01-10 NOTE — PROVIDER PROGRESS NOTES - EMERGENCY DEPT.
Imaging Results               CT Abdomen Pelvis With IV Contrast NO Oral Contrast (Final result)  Result time 01/10/24 00:21:55      Final result by Alfredo Bella MD (01/10/24 00:21:55)                   Impression:      Known pancreatic tail mass, abutting and possibly invading the spleen.    New geographic region of decreased enhancement in the lower splenic pole, suspicious for splenic infarct.    Pre-existing scattered omental nodules, mesenteric nodules, and left upper quadrant omental caking, compatible with intraperitoneal metastatic disease.    Multiple small bowel strictures are suggested, possibly on the basis of metastatic disease.  Associated low-grade or partial small bowel obstruction, versus ileus.  No pneumoperitoneum.    Suspected chronic appendicitis, possibly on the basis of underlying appendiceal metastasis.  Faintly radiopaque appendicoliths not excluded.    Similar to 01/03/2024, but new since 07/12/2023, there is a circumscribed fluid collection adjacent to the distal rectosigmoid colon.  This again causes some narrowing of the adjacent colon.  Although an abscess is not fully excluded, the paucity of surrounding stranding/inflammatory changes perhaps favors cystic peritoneal metastasis.    New distal esophageal wall thickening, suspicious for esophagitis.  Neoplasm less likely but not excluded.  Correlate clinically.  Consider outpatient EGD.    Additional pre-existing findings as detailed in the body of the report.    This report was flagged in Epic as abnormal.    Electronically signed by resident: Sindhu Beltran  Date:    01/09/2024  Time:    22:01    Electronically signed by: Alfredo Bella  Date:    01/10/2024  Time:    00:21               Narrative:    EXAMINATION:  CT ABDOMEN PELVIS WITH IV CONTRAST    CLINICAL HISTORY:  Abdominal pain, acute, nonlocalized;hx of pancreatic cancer, constipation;    TECHNIQUE:  Using 75 mL of Omnipaque 350 IV contrast , and multi-detector helical CT  technique, axial CT images of the abdomen and pelvis were obtained. 2D post-processing coronal and sagittal reconstructions was performed.    COMPARISON:  CT chest abdomen and pelvis with IV contrast 01/03/2024    CT abdomen pelvis without and with IV contrast 07/12/2023, obtained at [facility] and reviewed (solely for comparison purposes) in the [facility] PACS.    FINDINGS:  Lung base: Centrilobular emphysema.    Visualized portion of heart: Interatrial septum lipomatous hypertrophy again noted.  Significant RCA calcification.  Normal size.  No significant pleural effusion.    Liver: Left lobe medial segment intermediate hypoattenuated focus measuring up to 1.5 cm, more conspicuous since July 2023, but favored to represent focal fatty infiltration.    [place] parasagittally oriented region of hyperenhancement in the anterior superior subcapsular left hepatic lobe appears more conspicuous than on 01/03/2024; this possibly represents a transient hepatic attenuation difference (series 2, image 30).  Its morphology is atypical for neoplasm.    Gallbladder: Stable appearance of distension and radiopaque stone (or, less likely, mural polyp) (series 2, image 52).  No additional CT evidence of cholecystitis.    Bile duct: No intra-or extrahepatic biliary ductal dilatation.    Spleen: Unremarkable.    Pancreas: Pancreatic tail hypodense mass again noted similar to recent prior.    Adrenal glands: Stable left adrenal gland nodule.    Genitourinary: The kidneys are normal in size and location. The ureters appear normal in course and caliber.  No evidence of nephrolithiasis or hydroureteronephrosis.  The urinary bladder is collapsed.    Reproductive organs: Hysterectomy.    GI tract: Small hiatal hernia.  Distal esophageal wall thickening, new since 01/03/2024, and most suspicious for esophagitis; neoplasm less likely.    Numerous distended to mildly dilated small bowel loops, distension measuring up to 3.4 cm  interim.    There are cervical foci or short-segments of small bowel luminal narrowing with associated mural hyperenhancement, suspicious for strictures, possibly on the basis of metastatic neoplasm.  Primary neoplasm or inflammatory, infectious, or vascular processes are not excluded.    The caliber of the colon is normal.    Moderate quantity of fecal residue throughout most of the colon.  The high attenuation of this fecal residue could be related to recently ingested radiopaque material, but inspissation of the fecal residue is not excluded.  Significant narrowing of rectum associated with an irregular cystic structure with simple fluid density and imperceptible wall measuring proximally 5.0 x 2.7 x 2.6 cm similar to recent prior scan and new from remote prior scan.    A structure thought to represent a distended appendix demonstrates some irregular enhancement.  Metastatic disease is suspected, appendicitis is not excluded.    Peritoneum: Trace intraperitoneal fluid, increased since 01/03/2024.  No pneumoperitoneum.    Retroperitoneum: No significant adenopathy.    Vasculature: Normal caliber of abdominal aorta with mild atherosclerosis.    Abdominal wall: Unremarkable.    Bones: Postsurgical change of lower lumbar spine posterior fusion.  L2 vertebral body compression deformity.  No acute abnormality.                                     CT shows her known metastatic pancreatic cancer.  She was a new possible splenic infarct, though I do not believe this is the cause of her acute pain today.  She has small-bowel strictures, likely secondary to her metastatic disease, causing a partial SBO, which is consistent with her symptoms today.  CT imaging also shows likely chronic appendicitis, cystic peritoneal metastasis, and esophagitis.  General surgery was consulted, they recommend NG tube.  She was not a good candidate for surgery secondary to her metastatic disease.  Patient was admitted to Hospital Medicine.

## 2024-01-10 NOTE — ASSESSMENT & PLAN NOTE
Patient with a history of chronic obstructive pulmonary disease. On 1-3 liters of supplemental oxygen at baseline.  - continue supplemental oxygen, wean as tolerated  - azithromycin 250 mg three times weekly (MWF)  - albuterol-ipratropium nebulizers PRN

## 2024-01-11 LAB
ALBUMIN SERPL BCP-MCNC: 2.8 G/DL (ref 3.5–5.2)
ALP SERPL-CCNC: 61 U/L (ref 55–135)
ALT SERPL W/O P-5'-P-CCNC: 7 U/L (ref 10–44)
ANION GAP SERPL CALC-SCNC: 10 MMOL/L (ref 8–16)
AST SERPL-CCNC: 15 U/L (ref 10–40)
BILIRUB DIRECT SERPL-MCNC: 0.4 MG/DL (ref 0.1–0.3)
BILIRUB SERPL-MCNC: 1.1 MG/DL (ref 0.1–1)
BUN SERPL-MCNC: 20 MG/DL (ref 8–23)
CALCIUM SERPL-MCNC: 8.4 MG/DL (ref 8.7–10.5)
CHLORIDE SERPL-SCNC: 103 MMOL/L (ref 95–110)
CO2 SERPL-SCNC: 22 MMOL/L (ref 23–29)
CREAT SERPL-MCNC: 0.7 MG/DL (ref 0.5–1.4)
EST. GFR  (NO RACE VARIABLE): >60 ML/MIN/1.73 M^2
GLUCOSE SERPL-MCNC: 59 MG/DL (ref 70–110)
MAGNESIUM SERPL-MCNC: 2 MG/DL (ref 1.6–2.6)
PHOSPHATE SERPL-MCNC: 3.5 MG/DL (ref 2.7–4.5)
POTASSIUM SERPL-SCNC: 4.3 MMOL/L (ref 3.5–5.1)
PROT SERPL-MCNC: 5.6 G/DL (ref 6–8.4)
SODIUM SERPL-SCNC: 135 MMOL/L (ref 136–145)

## 2024-01-11 PROCEDURE — 80076 HEPATIC FUNCTION PANEL: CPT | Performed by: STUDENT IN AN ORGANIZED HEALTH CARE EDUCATION/TRAINING PROGRAM

## 2024-01-11 PROCEDURE — 20600001 HC STEP DOWN PRIVATE ROOM

## 2024-01-11 PROCEDURE — 63600175 PHARM REV CODE 636 W HCPCS: Performed by: NURSE PRACTITIONER

## 2024-01-11 PROCEDURE — 25000003 PHARM REV CODE 250: Performed by: STUDENT IN AN ORGANIZED HEALTH CARE EDUCATION/TRAINING PROGRAM

## 2024-01-11 PROCEDURE — 63600175 PHARM REV CODE 636 W HCPCS: Performed by: STUDENT IN AN ORGANIZED HEALTH CARE EDUCATION/TRAINING PROGRAM

## 2024-01-11 PROCEDURE — 83735 ASSAY OF MAGNESIUM: CPT | Performed by: STUDENT IN AN ORGANIZED HEALTH CARE EDUCATION/TRAINING PROGRAM

## 2024-01-11 PROCEDURE — 84100 ASSAY OF PHOSPHORUS: CPT | Performed by: STUDENT IN AN ORGANIZED HEALTH CARE EDUCATION/TRAINING PROGRAM

## 2024-01-11 PROCEDURE — 80048 BASIC METABOLIC PNL TOTAL CA: CPT | Performed by: STUDENT IN AN ORGANIZED HEALTH CARE EDUCATION/TRAINING PROGRAM

## 2024-01-11 PROCEDURE — 36415 COLL VENOUS BLD VENIPUNCTURE: CPT | Performed by: STUDENT IN AN ORGANIZED HEALTH CARE EDUCATION/TRAINING PROGRAM

## 2024-01-11 RX ORDER — MORPHINE SULFATE 4 MG/ML
4 INJECTION, SOLUTION INTRAMUSCULAR; INTRAVENOUS
Status: DISCONTINUED | OUTPATIENT
Start: 2024-01-11 | End: 2024-01-23 | Stop reason: HOSPADM

## 2024-01-11 RX ORDER — HYDROMORPHONE HYDROCHLORIDE 1 MG/ML
0.5 INJECTION, SOLUTION INTRAMUSCULAR; INTRAVENOUS; SUBCUTANEOUS ONCE
Status: COMPLETED | OUTPATIENT
Start: 2024-01-11 | End: 2024-01-11

## 2024-01-11 RX ORDER — KETOROLAC TROMETHAMINE 15 MG/ML
15 INJECTION, SOLUTION INTRAMUSCULAR; INTRAVENOUS ONCE
Status: COMPLETED | OUTPATIENT
Start: 2024-01-11 | End: 2024-01-11

## 2024-01-11 RX ADMIN — MORPHINE SULFATE 4 MG: 4 INJECTION INTRAVENOUS at 08:01

## 2024-01-11 RX ADMIN — SODIUM CHLORIDE, SODIUM LACTATE, POTASSIUM CHLORIDE, AND CALCIUM CHLORIDE: 600; 310; 30; 20 INJECTION, SOLUTION INTRAVENOUS at 01:01

## 2024-01-11 RX ADMIN — PRAVASTATIN SODIUM 40 MG: 40 TABLET ORAL at 09:01

## 2024-01-11 RX ADMIN — HYDROMORPHONE HYDROCHLORIDE 0.5 MG: 0.5 INJECTION, SOLUTION INTRAMUSCULAR; INTRAVENOUS; SUBCUTANEOUS at 12:01

## 2024-01-11 RX ADMIN — ENOXAPARIN SODIUM 40 MG: 40 INJECTION SUBCUTANEOUS at 04:01

## 2024-01-11 RX ADMIN — HALOPERIDOL LACTATE 2 MG: 5 INJECTION, SOLUTION INTRAMUSCULAR at 02:01

## 2024-01-11 RX ADMIN — PRAVASTATIN SODIUM 40 MG: 40 TABLET ORAL at 12:01

## 2024-01-11 RX ADMIN — METOPROLOL SUCCINATE 25 MG: 25 TABLET, EXTENDED RELEASE ORAL at 08:01

## 2024-01-11 RX ADMIN — HALOPERIDOL LACTATE 2 MG: 5 INJECTION, SOLUTION INTRAMUSCULAR at 08:01

## 2024-01-11 RX ADMIN — CYCLOBENZAPRINE HYDROCHLORIDE 10 MG: 5 TABLET, FILM COATED ORAL at 12:01

## 2024-01-11 RX ADMIN — TOPIRAMATE 50 MG: 25 TABLET, FILM COATED ORAL at 12:01

## 2024-01-11 RX ADMIN — CYCLOBENZAPRINE HYDROCHLORIDE 10 MG: 5 TABLET, FILM COATED ORAL at 08:01

## 2024-01-11 RX ADMIN — CYCLOBENZAPRINE HYDROCHLORIDE 10 MG: 5 TABLET, FILM COATED ORAL at 02:01

## 2024-01-11 RX ADMIN — MORPHINE SULFATE 4 MG: 4 INJECTION INTRAVENOUS at 02:01

## 2024-01-11 RX ADMIN — KETOROLAC TROMETHAMINE 15 MG: 15 INJECTION, SOLUTION INTRAMUSCULAR; INTRAVENOUS at 12:01

## 2024-01-11 RX ADMIN — TOPIRAMATE 50 MG: 25 TABLET, FILM COATED ORAL at 09:01

## 2024-01-11 RX ADMIN — CYCLOBENZAPRINE HYDROCHLORIDE 10 MG: 5 TABLET, FILM COATED ORAL at 09:01

## 2024-01-11 NOTE — NURSING
Nurses Note -- 4 Eyes      1/11/2024   5:38 AM      Skin assessed during: Admit      [x] No Altered Skin Integrity Present    [x]Prevention Measures Documented      [] Yes- Altered Skin Integrity Present or Discovered   [] LDA Added if Not in Epic (Describe Wound)   [] New Altered Skin Integrity was Present on Admit and Documented in LDA   [] Wound Image Taken    Wound Care Consulted? No    Attending Nurse:  Temi Gonzalez RN/Staff Member:  Ino Garcia

## 2024-01-11 NOTE — PLAN OF CARE
Problem: Adult Inpatient Plan of Care  Goal: Plan of Care Review  Outcome: Ongoing, Progressing  Goal: Patient-Specific Goal (Individualized)  Outcome: Ongoing, Progressing  Goal: Absence of Hospital-Acquired Illness or Injury  Outcome: Ongoing, Progressing  Goal: Optimal Comfort and Wellbeing  Outcome: Ongoing, Progressing  Goal: Readiness for Transition of Care  Outcome: Ongoing, Progressing       No acute occurs this shift. Family at bedside. Call light within reach. Will continue to monitor.

## 2024-01-11 NOTE — ASSESSMENT & PLAN NOTE
Patient with a history of chronic hypoxic respiratory failure that is on 1-3 liters of oxygen from her COPD.

## 2024-01-11 NOTE — HOSPITAL COURSE
She was kept NPO. General Surgery followed. She was put on lactated Ringer's solution at 75 mL/hr. She was given scopolamine patch. General Surgery signed off and recommended consulting Gastroenterology. Her urine was dark so fluids were increased to 100 mL/hr. After discussing the likelihood of recurrence of her acute problem due to the intestinal strictures, Gastroenterology was consulted on 1/12/2024 for venting gastrostomy. She was still reluctant so Palliative Care was consulted to discuss goals of care. Physical and Occupational Therapy were consulted when she reported that she had not gotten out of the hospital bed since being admitted. Famotidine was started for the esophagitis diagnosed on admission as well as to prevent esophageal ulcer due to having a nasogastric tube. Palliative Care had a long discussion with her and recommended a trial of dexamethasone to see if it would decrease intestinal swelling. Abdominal X-ray showed mid increase in bowel distension and recurrence of the nasogastric tube tip in the esophagus despite having been advanced 2 nights prior. The tube was advanced again. On 1/15/2024, abdominal X-ray showed resolution of bowel distension. She was started on senna-docusate twice daily and given bisacodyl suppository. She started having bowel movements. She tolerated clear liquid diet.     Agreeable to Gtube placement for venting, placed 1/19. Palliative following. Patient and family still undecided about hospice versus consideration for continued treatment with oncology, awaiting other family member to arrive for discussion.   Patient and family want to go home with palliative care f/u. Working on pain control prior to discharge, palliative recommend fentanyl patch. Venting GT as needed, clamp for meds and oral intake. Pain regimen adjusted.   Stable for discharge with palliative home f/u.

## 2024-01-11 NOTE — NURSING
Pt complained that pain medication was not effective. Pt continue to rate pain 8/10 to abdomen. I called the on call doctor / Np.   Danielle Aguilar NP, and informed of pt complaint. New orders given for pain medication.

## 2024-01-11 NOTE — ASSESSMENT & PLAN NOTE
General Surgery offered nasogastric decompression vs palliative gastrostomy decompression. NG for now. NPO. Scopolamine patch, prn antiemetics.

## 2024-01-11 NOTE — SUBJECTIVE & OBJECTIVE
Interval History: Discussed the recommendation from Surgery for venting gastrostomy with her and her sons. For now, will do NG decompression, then try feeding and see if symptoms return.    Review of Systems   Constitutional:  Negative for chills and fever.   Gastrointestinal:  Positive for nausea and vomiting.   Neurological:  Negative for seizures and syncope.     Objective:     Vital Signs (Most Recent):  Temp: 97.8 °F (36.6 °C) (01/11/24 1531)  Pulse: 75 (01/11/24 1531)  Resp: 19 (01/11/24 1531)  BP: 136/64 (01/11/24 1531)  SpO2: 100 % (01/11/24 1531) Vital Signs (24h Range):  Temp:  [97.8 °F (36.6 °C)-98.5 °F (36.9 °C)] 97.8 °F (36.6 °C)  Pulse:  [66-75] 75  Resp:  [16-20] 19  SpO2:  [92 %-100 %] 100 %  BP: (136-152)/(64-77) 136/64     Weight: 65 kg (143 lb 6.2 oz)  Body mass index is 22.46 kg/m².  No intake or output data in the 24 hours ending 01/11/24 1613      Physical Exam  Vitals and nursing note reviewed.   Constitutional:       General: She is not in acute distress.     Appearance: She is well-developed and normal weight. She is not diaphoretic.   HENT:      Nose:      Comments: Nasogastric tube  Pulmonary:      Effort: Pulmonary effort is normal. No respiratory distress.   Skin:     General: Skin is warm and dry.      Coloration: Skin is not jaundiced or pale.   Neurological:      Mental Status: She is alert and oriented to person, place, and time. Mental status is at baseline.      Motor: No seizure activity.   Psychiatric:         Attention and Perception: Attention normal.         Mood and Affect: Affect normal.         Behavior: Behavior is cooperative.         Thought Content: Thought content normal.             Significant Labs: All pertinent labs within the past 24 hours have been reviewed.    Significant Imaging: I have reviewed all pertinent imaging results/findings within the past 24 hours.  CT Abdomen Pelvis With IV Contrast NO Oral Contrast 1/10/24: FINDINGS:   Lung base: Centrilobular  emphysema.   Visualized portion of heart: Interatrial septum lipomatous hypertrophy again noted.  Significant RCA calcification.  Normal size.  No significant pleural effusion.   Liver: Left lobe medial segment intermediate hypoattenuated focus measuring up to 1.5 cm, more conspicuous since July 2023, but favored to represent focal fatty infiltration.   [place] parasagittally oriented region of hyperenhancement in the anterior superior subcapsular left hepatic lobe appears more conspicuous than on 01/03/2024; this possibly represents a transient hepatic attenuation difference (series 2, image 30).  Its morphology is atypical for neoplasm.   Gallbladder: Stable appearance of distension and radiopaque stone (or, less likely, mural polyp) (series 2, image 52).  No additional CT evidence of cholecystitis.   Bile duct: No intra-or extrahepatic biliary ductal dilatation.   Spleen: Unremarkable.   Pancreas: Pancreatic tail hypodense mass again noted similar to recent prior.   Adrenal glands: Stable left adrenal gland nodule.   Genitourinary: The kidneys are normal in size and location. The ureters appear normal in course and caliber.  No evidence of nephrolithiasis or hydroureteronephrosis.  The urinary bladder is collapsed.   Reproductive organs: Hysterectomy.   GI tract: Small hiatal hernia.  Distal esophageal wall thickening, new since 01/03/2024, and most suspicious for esophagitis; neoplasm less likely.   Numerous distended to mildly dilated small bowel loops, distension measuring up to 3.4 cm interim.   There are cervical foci or short-segments of small bowel luminal narrowing with associated mural hyperenhancement, suspicious for strictures, possibly on the basis of metastatic neoplasm.  Primary neoplasm or inflammatory, infectious, or vascular processes are not excluded.   The caliber of the colon is normal.   Moderate quantity of fecal residue throughout most of the colon.  The high attenuation of this fecal  residue could be related to recently ingested radiopaque material, but inspissation of the fecal residue is not excluded.  Significant narrowing of rectum associated with an irregular cystic structure with simple fluid density and imperceptible wall measuring proximally 5.0 x 2.7 x 2.6 cm similar to recent prior scan and new from remote prior scan.   A structure thought to represent a distended appendix demonstrates some irregular enhancement.  Metastatic disease is suspected, appendicitis is not excluded.   Peritoneum: Trace intraperitoneal fluid, increased since 01/03/2024.  No pneumoperitoneum.   Retroperitoneum: No significant adenopathy.   Vasculature: Normal caliber of abdominal aorta with mild atherosclerosis.   Abdominal wall: Unremarkable.   Bones: Postsurgical change of lower lumbar spine posterior fusion.  L2 vertebral body compression deformity.  No acute abnormality.   Impression:  Known pancreatic tail mass, abutting and possibly invading the spleen.   New geographic region of decreased enhancement in the lower splenic pole, suspicious for splenic infarct.   Pre-existing scattered omental nodules, mesenteric nodules, and left upper quadrant omental caking, compatible with intraperitoneal metastatic disease.   Multiple small bowel strictures are suggested, possibly on the basis of metastatic disease.  Associated low-grade or partial small bowel obstruction, versus ileus.  No pneumoperitoneum.   Suspected chronic appendicitis, possibly on the basis of underlying appendiceal metastasis.  Faintly radiopaque appendicoliths not excluded.   Similar to 01/03/2024, but new since 07/12/2023, there is a circumscribed fluid collection adjacent to the distal rectosigmoid colon.  This again causes some narrowing of the adjacent colon.  Although an abscess is not fully excluded, the paucity of surrounding stranding/inflammatory changes perhaps favors cystic peritoneal metastasis.   New distal esophageal wall  thickening, suspicious for esophagitis.  Neoplasm less likely but not excluded.  Correlate clinically.  Consider outpatient EGD.   Additional pre-existing findings as detailed in the body of the report.   This report was flagged in Epic as abnormal.   XR Gastric tube check, non-radiologist performed 1/10/24: FINDINGS:   Enteric tube tip in the proximal stomach.   Mildly distended small bowel loops in the included upper abdomen suggesting partial small bowel obstruction pattern.   Fixation hardware in the lumbar spine.  Electronic stimulator device with wires extending over the lower lumbar spine.   Impression:  Enteric tube tip in the proximal stomach.   Distended small bowel loops in the upper abdomen suggesting partial small bowel obstruction pattern.

## 2024-01-11 NOTE — PLAN OF CARE
Pt dx with small bowel obstruction. Pt has NG tube, with low continuous suction. Pt complained of increase pain, on call provider notified, new orders given. Pt resting well after receiving prn hydromorphone 0.5 mg and Ketorolac 15 mg. Fall and safety precautions maintained.

## 2024-01-12 PROBLEM — K21.00 REFLUX ESOPHAGITIS: Status: ACTIVE | Noted: 2024-01-10

## 2024-01-12 PROBLEM — K56.699 SMALL BOWEL STRICTURE: Status: ACTIVE | Noted: 2024-01-10

## 2024-01-12 LAB
ANION GAP SERPL CALC-SCNC: 12 MMOL/L (ref 8–16)
BUN SERPL-MCNC: 18 MG/DL (ref 8–23)
CALCIUM SERPL-MCNC: 8.5 MG/DL (ref 8.7–10.5)
CHLORIDE SERPL-SCNC: 103 MMOL/L (ref 95–110)
CO2 SERPL-SCNC: 22 MMOL/L (ref 23–29)
CREAT SERPL-MCNC: 0.6 MG/DL (ref 0.5–1.4)
ERYTHROCYTE [DISTWIDTH] IN BLOOD BY AUTOMATED COUNT: 13.3 % (ref 11.5–14.5)
EST. GFR  (NO RACE VARIABLE): >60 ML/MIN/1.73 M^2
GLUCOSE SERPL-MCNC: 45 MG/DL (ref 70–110)
HCT VFR BLD AUTO: 39.2 % (ref 37–48.5)
HGB BLD-MCNC: 12.7 G/DL (ref 12–16)
MAGNESIUM SERPL-MCNC: 1.9 MG/DL (ref 1.6–2.6)
MCH RBC QN AUTO: 29.1 PG (ref 27–31)
MCHC RBC AUTO-ENTMCNC: 32.4 G/DL (ref 32–36)
MCV RBC AUTO: 90 FL (ref 82–98)
PHOSPHATE SERPL-MCNC: 3.2 MG/DL (ref 2.7–4.5)
PLATELET # BLD AUTO: 170 K/UL (ref 150–450)
PMV BLD AUTO: 11.7 FL (ref 9.2–12.9)
POCT GLUCOSE: 134 MG/DL (ref 70–110)
POCT GLUCOSE: 75 MG/DL (ref 70–110)
POTASSIUM SERPL-SCNC: 4.3 MMOL/L (ref 3.5–5.1)
RBC # BLD AUTO: 4.36 M/UL (ref 4–5.4)
SODIUM SERPL-SCNC: 137 MMOL/L (ref 136–145)
WBC # BLD AUTO: 8.21 K/UL (ref 3.9–12.7)

## 2024-01-12 PROCEDURE — 63600175 PHARM REV CODE 636 W HCPCS: Performed by: HOSPITALIST

## 2024-01-12 PROCEDURE — 80048 BASIC METABOLIC PNL TOTAL CA: CPT | Performed by: STUDENT IN AN ORGANIZED HEALTH CARE EDUCATION/TRAINING PROGRAM

## 2024-01-12 PROCEDURE — C1751 CATH, INF, PER/CENT/MIDLINE: HCPCS

## 2024-01-12 PROCEDURE — 25000003 PHARM REV CODE 250: Performed by: HOSPITALIST

## 2024-01-12 PROCEDURE — 63600175 PHARM REV CODE 636 W HCPCS: Performed by: NURSE PRACTITIONER

## 2024-01-12 PROCEDURE — 63600175 PHARM REV CODE 636 W HCPCS: Performed by: STUDENT IN AN ORGANIZED HEALTH CARE EDUCATION/TRAINING PROGRAM

## 2024-01-12 PROCEDURE — 99223 1ST HOSP IP/OBS HIGH 75: CPT | Mod: GC,,, | Performed by: INTERNAL MEDICINE

## 2024-01-12 PROCEDURE — 97165 OT EVAL LOW COMPLEX 30 MIN: CPT

## 2024-01-12 PROCEDURE — A4217 STERILE WATER/SALINE, 500 ML: HCPCS | Performed by: HOSPITALIST

## 2024-01-12 PROCEDURE — 3E0336Z INTRODUCTION OF NUTRITIONAL SUBSTANCE INTO PERIPHERAL VEIN, PERCUTANEOUS APPROACH: ICD-10-PCS | Performed by: HOSPITALIST

## 2024-01-12 PROCEDURE — 85027 COMPLETE CBC AUTOMATED: CPT | Performed by: STUDENT IN AN ORGANIZED HEALTH CARE EDUCATION/TRAINING PROGRAM

## 2024-01-12 PROCEDURE — 83735 ASSAY OF MAGNESIUM: CPT | Performed by: STUDENT IN AN ORGANIZED HEALTH CARE EDUCATION/TRAINING PROGRAM

## 2024-01-12 PROCEDURE — 36410 VNPNXR 3YR/> PHY/QHP DX/THER: CPT

## 2024-01-12 PROCEDURE — 20600001 HC STEP DOWN PRIVATE ROOM

## 2024-01-12 PROCEDURE — 63700000 PHARM REV CODE 250 ALT 637 W/O HCPCS: Performed by: STUDENT IN AN ORGANIZED HEALTH CARE EDUCATION/TRAINING PROGRAM

## 2024-01-12 PROCEDURE — 97530 THERAPEUTIC ACTIVITIES: CPT

## 2024-01-12 PROCEDURE — 84100 ASSAY OF PHOSPHORUS: CPT | Performed by: STUDENT IN AN ORGANIZED HEALTH CARE EDUCATION/TRAINING PROGRAM

## 2024-01-12 PROCEDURE — 97161 PT EVAL LOW COMPLEX 20 MIN: CPT

## 2024-01-12 PROCEDURE — 76937 US GUIDE VASCULAR ACCESS: CPT

## 2024-01-12 PROCEDURE — S5010 5% DEXTROSE AND 0.45% SALINE: HCPCS | Performed by: HOSPITALIST

## 2024-01-12 PROCEDURE — 25000003 PHARM REV CODE 250: Performed by: STUDENT IN AN ORGANIZED HEALTH CARE EDUCATION/TRAINING PROGRAM

## 2024-01-12 PROCEDURE — 36415 COLL VENOUS BLD VENIPUNCTURE: CPT | Performed by: STUDENT IN AN ORGANIZED HEALTH CARE EDUCATION/TRAINING PROGRAM

## 2024-01-12 PROCEDURE — 97535 SELF CARE MNGMENT TRAINING: CPT

## 2024-01-12 RX ORDER — OXYCODONE HYDROCHLORIDE 10 MG/1
10 TABLET ORAL EVERY 6 HOURS PRN
Status: DISCONTINUED | OUTPATIENT
Start: 2024-01-12 | End: 2024-01-21

## 2024-01-12 RX ORDER — DEXTROSE MONOHYDRATE AND SODIUM CHLORIDE 5; .45 G/100ML; G/100ML
INJECTION, SOLUTION INTRAVENOUS CONTINUOUS
Status: DISCONTINUED | OUTPATIENT
Start: 2024-01-12 | End: 2024-01-19

## 2024-01-12 RX ORDER — FAMOTIDINE 10 MG/ML
20 INJECTION INTRAVENOUS 2 TIMES DAILY
Status: DISCONTINUED | OUTPATIENT
Start: 2024-01-12 | End: 2024-01-21

## 2024-01-12 RX ORDER — ONDANSETRON HYDROCHLORIDE 2 MG/ML
4 INJECTION, SOLUTION INTRAVENOUS EVERY 6 HOURS PRN
Status: DISCONTINUED | OUTPATIENT
Start: 2024-01-12 | End: 2024-01-23 | Stop reason: HOSPADM

## 2024-01-12 RX ADMIN — MORPHINE SULFATE 4 MG: 4 INJECTION INTRAVENOUS at 09:01

## 2024-01-12 RX ADMIN — DEXTROSE AND SODIUM CHLORIDE: 5; 450 INJECTION, SOLUTION INTRAVENOUS at 12:01

## 2024-01-12 RX ADMIN — TOPIRAMATE 50 MG: 25 TABLET, FILM COATED ORAL at 09:01

## 2024-01-12 RX ADMIN — SODIUM CHLORIDE, SODIUM LACTATE, POTASSIUM CHLORIDE, AND CALCIUM CHLORIDE: 600; 310; 30; 20 INJECTION, SOLUTION INTRAVENOUS at 07:01

## 2024-01-12 RX ADMIN — METOPROLOL SUCCINATE 25 MG: 25 TABLET, EXTENDED RELEASE ORAL at 10:01

## 2024-01-12 RX ADMIN — DEXTROSE AND SODIUM CHLORIDE: 5; 450 INJECTION, SOLUTION INTRAVENOUS at 11:01

## 2024-01-12 RX ADMIN — MORPHINE SULFATE 4 MG: 4 INJECTION INTRAVENOUS at 03:01

## 2024-01-12 RX ADMIN — MORPHINE SULFATE 4 MG: 4 INJECTION INTRAVENOUS at 04:01

## 2024-01-12 RX ADMIN — AZITHROMYCIN 250 MG: 250 TABLET, FILM COATED ORAL at 10:01

## 2024-01-12 RX ADMIN — ENOXAPARIN SODIUM 40 MG: 40 INJECTION SUBCUTANEOUS at 05:01

## 2024-01-12 RX ADMIN — FAMOTIDINE 20 MG: 10 INJECTION INTRAVENOUS at 09:01

## 2024-01-12 RX ADMIN — CYCLOBENZAPRINE HYDROCHLORIDE 10 MG: 5 TABLET, FILM COATED ORAL at 09:01

## 2024-01-12 RX ADMIN — MORPHINE SULFATE 4 MG: 4 INJECTION INTRAVENOUS at 01:01

## 2024-01-12 RX ADMIN — CYCLOBENZAPRINE HYDROCHLORIDE 10 MG: 5 TABLET, FILM COATED ORAL at 10:01

## 2024-01-12 RX ADMIN — FAMOTIDINE 20 MG: 10 INJECTION INTRAVENOUS at 03:01

## 2024-01-12 RX ADMIN — DEXTROSE MONOHYDRATE 250 ML: 100 INJECTION, SOLUTION INTRAVENOUS at 08:01

## 2024-01-12 RX ADMIN — CYCLOBENZAPRINE HYDROCHLORIDE 10 MG: 5 TABLET, FILM COATED ORAL at 03:01

## 2024-01-12 RX ADMIN — PRAVASTATIN SODIUM 40 MG: 40 TABLET ORAL at 09:01

## 2024-01-12 RX ADMIN — HALOPERIDOL LACTATE 2 MG: 5 INJECTION, SOLUTION INTRAMUSCULAR at 04:01

## 2024-01-12 RX ADMIN — OXYCODONE HYDROCHLORIDE 10 MG: 10 TABLET ORAL at 10:01

## 2024-01-12 NOTE — ASSESSMENT & PLAN NOTE
Being followed in Oncology clinic here. Was offered palliative chemotherapy. She wants a follow-up to discuss prognosis and current treatment options. Consult Oncology.

## 2024-01-12 NOTE — CONSULTS
[facility]  Gastroenterology  Consult Note    Patient Name: Radha You  MRN: [id number]  Admission Date: 1/9/2024  Hospital Length of Stay: 2 days  Code Status: Full Code   Attending Provider: Navid Kate MD   Consulting Provider: Cherrie Palacios DO  Primary Care Physician: Sapphire Ruffin FNP-C  Principal Problem:Partial small bowel obstruction    Inpatient consult to Gastroenterology  Consult performed by: Cherrie Palacios DO  Consult ordered by: Navid Kate MD        Subjective:     HPI: Radha You is a 78 y.o. female with HTN, HLD, COPD, Chronic Hypoxic Respiratory Failure (on 1 LNC of supplemental oxygen), history of ER+ [place]- HER-2- right breast carcinoma status post partial mastectomy and axillary lymph node dissection on 6/29/2015 and chemoradiation and pancreatic adenocarcinoma that [place] [facility] [place] with abdominal pain for 3 to 4 days, associated with severe constipation with no bowel movement for several days despite over-the-counter laxatives, including suppositories. She was able to pass a small bowel movement earlier on the day of presentation but still felt constipated. She also had increase in pain in abdominal pain associated with nausea and vomiting causing inability to tolerate oral intake. Imaging on 1/9 with partial small bowel obstruction 2/2 malignant strictures vs ileus.She reports significant improvement in her n/v following NGT placement. She was seen by general surgery and deemed not a surgical candidate. They recommended a palliative care consult and GI consult for a venting G tube. She was initially reluctant to pursue placement of one, but is now agreeable having had ongoing discussions with her primary Hospitalist. GI consulted on 1/12 for venting G tube placement.     Past Medical History:   Diagnosis Date    Back pain     instrumented fusion L3-L5. Spinal cord stimulator in place. L5-S1 right paracentral disc osteophyte complex resulting  in moderate right neural foraminal narrowing.    Breast cancer 05/2015    (status post lumpectomy and chemoradiation, currently on maintenance therapy with FEMARA). Right breast infiltrating ductal CA with DCIS, ER positive, CT/Her2 negative     COPD (chronic obstructive pulmonary disease)     Chronic bronchitis;  (on 3 L at home)     Hyperlipidemia     Hypertension     Osteoporosis 05/21/2019    Pancreatic adenocarcinoma 12/12/2023       Past Surgical History:   Procedure Laterality Date    BREAST BIOPSY Right     BREAST LUMPECTOMY Right 2015    IDC & DCIS    CERVICAL FUSION      HYSTERECTOMY      with BSO    LUMBAR FUSION      LYMPH NODE DISSECTION Right     right lumpectomy         Family History   Problem Relation Age of Onset    Breast cancer Maternal Grandmother 75    Ovarian cancer Neg Hx        Social History     Socioeconomic History    Marital status:    Tobacco Use    Smoking status: Former     Current packs/day: 1.00     Average packs/day: 1 pack/day for 45.0 years (45.0 ttl pk-yrs)     Types: Cigarettes    Smokeless tobacco: Current    Tobacco comments:     admits to attempting to quit multiple times; currently smoking up to 1 packs/day down from 2 packs/day at time of diagnosis   Substance and Sexual Activity    Alcohol use: Yes     Comment: occasionaly    Drug use: No    Sexual activity: Not Currently       No current facility-administered medications on file prior to encounter.     Current Outpatient Medications on File Prior to Encounter   Medication Sig Dispense Refill    albuterol 90 mcg/actuation inhaler Inhale 2 puffs into the lungs every 6 (six) hours as needed for Wheezing.      albuterol-ipratropium (DUO-NEB) 2.5 mg-0.5 mg/3 mL nebulizer solution Take 3 mLs by nebulization every 6 (six) hours as needed.      azithromycin (Z-SABINE) 250 MG tablet Take 250 mg by mouth 3 (three) times a week.      azithromycin (ZITHROMAX) 500 MG tablet Take one tablet Monday, then Wednesday, then Friday per  week (Patient not taking: Reported on 1/4/2024) 12 tablet 3    calcium carbonate (OS-JENNI) 600 mg calcium (1,500 mg) Tab Take 1 tablet by mouth once daily.      cyclobenzaprine (FLEXERIL) 10 MG tablet Take 10 mg by mouth 3 (three) times daily.      DALIRESP 500 mcg Tab Take 1 tablet by mouth once daily.      losartan (COZAAR) 25 MG tablet Take 0.5 tablets (12.5 mg total) by mouth once daily. (Patient taking differently: Take 25 mg by mouth once daily.) 15 tablet 3    METOPROLOL SUCCINATE ORAL Take by mouth.      montelukast (SINGULAIR) 10 mg tablet Take 10 mg by mouth every evening.      MYRBETRIQ 25 mg Tb24 ER tablet Take 25 mg by mouth once daily.      ondansetron (ZOFRAN) 4 MG tablet Take 4 mg by mouth every 8 (eight) hours as needed.      oxyCODONE (ROXICODONE) 10 mg Tab immediate release tablet Take 10 mg by mouth 4 (four) times daily.      oxyCODONE-acetaminophen (PERCOCET)  mg per tablet       oxyCODONE-acetaminophen (PERCOCET) 5-325 mg per tablet Take 1 tablet by mouth every 6 (six) hours as needed. (Patient not taking: Reported on 1/4/2024) 30 tablet 0    pantoprazole (PROTONIX) 40 MG tablet Take 40 mg by mouth once daily.      pravastatin (PRAVACHOL) 40 MG tablet Take 40 mg by mouth every evening.       topiramate (TOPAMAX) 50 MG tablet Take 50 mg by mouth every evening.       TRELEGY ELLIPTA 200-62.5-25 mcg inhaler Inhale 1 puff into the lungs once daily.      [DISCONTINUED] aspirin (ECOTRIN) 81 MG EC tablet Take 81 mg by mouth once daily.         Review of patient's allergies indicates:   Allergen Reactions    Adhesive Rash     Use PAPER Tape / TEGADERM    Levaquin [levofloxacin] Diarrhea and Nausea Only    Penicillins Rash       Review of Systems   Constitutional:  Positive for malaise/fatigue. Negative for chills and fever.   HENT:  Negative for congestion and sore throat.    Respiratory:  Negative for cough and sputum production.    Cardiovascular:  Negative for chest pain and palpitations.    Gastrointestinal:  Positive for abdominal pain, constipation and nausea. Negative for blood in stool, diarrhea, melena and vomiting.   Genitourinary:  Negative for dysuria and frequency.   Musculoskeletal:  Negative for back pain and myalgias.   Skin:  Negative for itching and rash.   Neurological:  Negative for dizziness and headaches.      Objective:     Vitals:    01/12/24 1605   BP: (!) 160/77   Pulse: 79   Resp: 20   Temp: 97.3 °F (36.3 °C)     Constitutional:  not in acute distress  HENT: Head: Normal, normocephalic, atraumatic.  Eyes: conjunctiva clear  Cardiovascular: regular rate and rhythm  Respiratory: normal chest expansion & respiratory effort   and no accessory muscle use  GI: soft, distended, and tenderness marked in the entire abdomen  Musculoskeletal: no muscular tenderness noted  Skin: normal color  Neurological: alert, oriented x3  Psychiatric: mood and affect are within normal limits    Significant Labs:  Recent Labs   Lab 01/09/24  2034 01/10/24  0427 01/12/24  0423   HGB 15.0 12.4 12.7       Lab Results   Component Value Date    WBC 8.21 01/12/2024    HGB 12.7 01/12/2024    HCT 39.2 01/12/2024    MCV 90 01/12/2024     01/12/2024       Lab Results   Component Value Date     01/12/2024    K 4.3 01/12/2024     01/12/2024    CO2 22 (L) 01/12/2024    BUN 18 01/12/2024    CREATININE 0.6 01/12/2024    CALCIUM 8.5 (L) 01/12/2024    ANIONGAP 12 01/12/2024    ESTGFRAFRICA >60.0 04/04/2022    EGFRNONAA >60.0 04/04/2022       Lab Results   Component Value Date    ALT 7 (L) 01/11/2024    AST 15 01/11/2024    ALKPHOS 61 01/11/2024    BILITOT 1.1 (H) 01/11/2024       Lab Results   Component Value Date    INR 0.9 03/27/2019    INR 0.9 03/02/2018    INR 0.9 08/17/2015       Significant Imaging:  Reviewed pertinent radiology findings.       Assessment/Plan:     Radha You is a 78 y.o. female with with HTN, HLD, COPD, Chronic Hypoxic Respiratory Failure (on 1 LNC of supplemental oxygen),  history of ER+ [place]- HER-2- right breast carcinoma status post partial mastectomy and axillary lymph node dissection on 6/29/2015 and chemoradiation and pancreatic adenocarcinoma that presented to [facility] [place] ED on 1/9 with abdominal pain for 3 to 4 days, associated with severe constipation with no bowel movement for several days despite over-the-counter laxatives, including suppositories. Imaging on 1/9 with partial small bowel obstruction 2/2 malignant strictures vs ileus. GI consulted on 1/12 for venting G tube placement.     Problem List:  Partial Small Bowel Obstruction 2/2 malignant strictures vs Ileus   Pancreatic Adenocarcinoma     Recommendations:  - Would recommend consult to palliative care to discuss goals of care; patient is agreeable   - Patient remains somewhat reluctant about a venting G-tube, we will follow along peripherally and will be able to offer one on Tuesday, 1/16 vs Wednesday, 1/17 pending scheduling availability and patient's desire to proceed with placement     Thank you for involving us in the care of Radha You. Please call with any additional questions, concerns or changes in the patient's clinical status. We will continue to follow.     Cherrie Palacios DO  Gastroenterology Fellow PGY- IV  [facility]

## 2024-01-12 NOTE — PT/OT/SLP EVAL
"Occupational Therapy   Co-Evaluation + Treatment with PT    Name: Radha You  MRN: [id number]  Admitting Diagnosis: Partial small bowel obstruction  Recent Surgery: * No surgery found *      Co-treat performed due to acuity and complexity of pt's medical status with 2 skilled disciplines needed to optimize pts occupational performance and  decreased activity tolerance. \  Recommendations:     Discharge Recommendations: Moderate Intensity Therapy  Discharge Equipment Recommendations:  none  Barriers to discharge:   increased level of assistance required    Assessment:     Radha You is a 78 y.o. female with a medical diagnosis of Partial small bowel obstruction.  Patient currently demonstrates a need for moderate intensity therapy on a daily basis post acute secondary to a decline in functional status due to illness. Pt tolerated treatment well without incident. Pt is progressing towards goals, however would benefit from continued skilled OT for improved coordination, strength, and activity tolerance neccessary for safe ADL completion  to maximize QOL and functional independence.  She presents with deficits. Performance deficits affecting function: weakness, impaired endurance, impaired self care skills, impaired functional mobility, gait instability, impaired balance, decreased coordination.      Rehab Prognosis: Good; patient would benefit from acute skilled OT services to address these deficits and reach maximum level of function.       Plan:     Patient to be seen 4 x/week to address the above listed problems via self-care/home management, therapeutic activities, therapeutic exercises  Plan of Care Expires: 02/12/24  Plan of Care Reviewed with: patient, son    Subjective     Chief Complaint: " my back hurts"  Patient/Family Comments/goals: improve mobility and return home.     Occupational Profile:  Living Environment: Pt lives alone in a mobile home with 2 GLADYS and BHR. Pt has both WIS and T/S, with Pt " "primarily using T/S.   Previous level of function: pt reports Mod (I) in ADLs and IADLs. Pt uses Rollator for community mobility and does not use an AD while in home for ambulation.   Roles and Routines: Caretaker to self, active community dweller.   Equipment Used at Home: grab bar, rollator, oxygen (shower bench, DME ownd not used : 3 in 1 commode, rolling walker)  Assistance upon Discharge: Limited support, son lives 6 hours away.     Pain/Comfort:  Pain Rating 1:  (unrated)  Location - Orientation 1: generalized  Location 1: back  Pain Addressed 1: Distraction, Reposition    Patients cultural, spiritual, Anabaptist conflicts given the current situation: no    Objective:     Communicated with: TIMOTHY Juarez prior to session.  Patient found HOB elevated with peripheral IV, PureWick, NG tube, pulse ox (continuous), telemetry, oxygen upon OT entry to room.    General Precautions: Standard, fall  Orthopedic Precautions: N/A  Braces: N/A  Respiratory Status: Nasal cannula, flow 3 L/min    Occupational Performance:    Bed Mobility:    Patient completed Rolling/Turning to Right with contact guard assistance  Patient completed Scooting/Bridging with contact guard assistance  Patient completed Supine to Sit with contact guard assistance  Patient completed Sit to Supine with contact guard assistance    Functional Mobility/Transfers:  Patient completed Sit <> Stand Transfer with contact guard assistance  with  no assistive device  x 5 trials for assistance with pericare  Functional Mobility: Pt deferring, Pt reporting fatigue, Pt also with urinary incontinence during each stand, with Pt requesting to return to bed.     Activities of Daily Living:  Upper Body Dressing: moderate assistance to patrick gown and simulate jacket  Lower Body Dressing: maximal assistance while seated EOB to patrick non slip socks, Pt attempted to complete task with figure 4 technique with Pt reporting " oh I can't do it"   Toileting: total assistance with " pericare this date, Pt with toni donned  and experiencing incontinence with mobility requiring A for pad changing and posterior pericare.     Cognitive/Visual Perceptual:  Cognitive/Psychosocial Skills:     -       Oriented to: Person, Place, Time, and Situation   -       Follows Commands/attention:Follows multistep  commands  -       Communication: clear/fluent  -       Safety awareness/insight to disability: intact   -       Mood/Affect/Coping skills/emotional control: Appropriate to situation    Physical Exam:  Dominant hand: -       R  Upper Extremity Range of Motion:  -       Right Upper Extremity: WFL  -       Left Upper Extremity: WFL  Upper Extremity Strength: -       Right Upper Extremity: WFL  -       Left Upper Extremity: WFL   Strength: -       Right Upper Extremity: WFL  -       Left Upper Extremity: WFL    AMPAC 6 Click ADL:  AMPAC Total Score: 15    Treatment & Education:  Role of OT and OT POC  Educated on increasing OOB activity with staff A and impact on increasing functional independence.  Educated on importance of progressive mobilization to increase strength and endurance.      Patient left right sidelying with all lines intact, call button in reach, and Xray Tech present    GOALS:   Multidisciplinary Problems       Occupational Therapy Goals          Problem: Occupational Therapy    Goal Priority Disciplines Outcome Interventions   Occupational Therapy Goal     OT, PT/OT Ongoing, Progressing    Description: Goals to be met by: 1/26/24     Patient will increase functional independence with ADLs by performing:    UE Dressing with Stand-by Assistance.  LE Dressing with Stand-by Assistance and Assistive Devices as needed.  Grooming while standing at sink with Contact Guard Assistance.  Toileting from toilet with Contact Guard Assistance for hygiene and clothing management.   Toilet transfer to bedside commode with Stand-by Assistance.                         History:     Past Medical  History:   Diagnosis Date    Back pain     instrumented fusion L3-L5. Spinal cord stimulator in place. L5-S1 right paracentral disc osteophyte complex resulting in moderate right neural foraminal narrowing.    Breast cancer 05/2015    (status post lumpectomy and chemoradiation, currently on maintenance therapy with FEMARA). Right breast infiltrating ductal CA with DCIS, ER positive, [place]/Her2 negative     COPD (chronic obstructive pulmonary disease)     Chronic bronchitis;  (on 3 L at home)     Hyperlipidemia     Hypertension     Osteoporosis 05/21/2019    Pancreatic adenocarcinoma 12/12/2023         Past Surgical History:   Procedure Laterality Date    BREAST BIOPSY Right     BREAST LUMPECTOMY Right 2015    IDC & DCIS    CERVICAL FUSION      HYSTERECTOMY      with BSO    LUMBAR FUSION      LYMPH NODE DISSECTION Right     right lumpectomy         Time Tracking:     OT Date of Treatment: 01/13/24  OT Start Time: 1517  OT Stop Time: 1541  OT Total Time (min): 24 min    Billable Minutes:Evaluation 12  Self Care/Home Management 12    1/12/2024

## 2024-01-12 NOTE — PLAN OF CARE
Problem: Adult Inpatient Plan of Care  Goal: Plan of Care Review  Outcome: Ongoing, Progressing  Goal: Patient-Specific Goal (Individualized)  Outcome: Ongoing, Progressing  Goal: Absence of Hospital-Acquired Illness or Injury  Outcome: Ongoing, Progressing  Goal: Optimal Comfort and Wellbeing  Outcome: Ongoing, Progressing  Goal: Readiness for Transition of Care  Outcome: Ongoing, Progressing     Patient alert and awake. No distress noted at this time. Family member at bedside. Call light within reach. Will continue to monitor.

## 2024-01-12 NOTE — PROGRESS NOTES
[facility] Medicine  Progress Note    Patient Name: Radha You  MRN: [id number]  Patient Class: IP- Inpatient   Admission Date: 1/9/2024  Length of Stay: 2 days  Attending Physician: Navid Kate MD  Primary Care Provider: Sapphire Ruffin FNP-C        Subjective:     Principal Problem:Partial small bowel obstruction        HPI:  Radha You is a 78 year old white woman with former cigarette smoking, hypertension, hyperlipidemia, chronic obstructive pulmonary disease, chronic hypoxic respiratory failure(on 1 liter/minute of supplemental oxygen), osteoporosis, history of estrogen receptor positive progesterone receptor negative, 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 She lives in [place], [place]. She is . Her hematologist-oncologist is Dr. Charles Us.               She was seen at [facility] Hematology[facility]Oncology clinic on 1/4/2024 for her pancreatic adenocarcinoma, which was found incidentally during a bronchoscopy and biopsied by endoscopic ultrasound at [facility]. She was offered palliative chemotherapy and declined it.               She presented to [facility] Emergency Department on 1/9/2024 with abdominal pain for 3 to 4 days, associated with severe constipation with no bowel movement for several days despite over-the-counter laxatives, including suppositories. She was able to pass a small bowel movement earlier on the day of presentation but still felt constipated. She also had increase in pain in abdominal pain associated with nausea and vomiting causing inability to tolerate oral intake.  In the emergency department, heart rate was 115 bpm. Labs showed mild  leukocytosis. CT of the abdomen and pelvis with contrast revealed the known pancreatic tail mass, abutting and possibly invading the spleen with new geographic region of decreased enhancement in the lower splenic pole, suspicious for splenic infarct. There were multiple small bowel strictures suggested, possibly on the basis of metastatic disease that is possibly associated with low-grade or partial small bowel obstruction versus ileus. General Surgery was consulted and brought up the possibility of a palliative gastrostomy for venting as a treatment option. She deferred and opted for nasogastric decompression. She was given a liter of lactated Ringer's solution, 4 mg of intravenous morphine, and 4 mg of intravenous ondansetron. She was admitted to Hospital Medicine Team S.    Overview/Hospital Course:  She was kept NPO. General Surgery followed. She was put on lactated Ringer's solution at 75 mL/hr. She was given scopolamine patch. General Surgery signed off and recommended consulting Gastroenterology. Her urine was dark so fluids were increased to 100 mL/hr.     Interval History: Discussed the likelihood of her partial SBO recurring due to small intestine strictures. She seems agreeable to getting a venting gastrostomy. She reports not having gotten out of bed since admission, not getting any nutrition since admission. She was diagnosed with esophagitis on admission and has had a NG tube but no antacid medication to treat and prevent NG tube-associated esophageal ulcer so will start it. Her IV infiltrated and caused left arm swelling. Rapid Response team placed a midline catheter on the right arm. She had hypoglycemia this morning so I changed the fluids from LR to D5 1/2 NS. Will order TPN due to lack of nutrition for several days.     Review of Systems   Constitutional:  Negative for chills and fever.   Gastrointestinal:  Negative for vomiting.   Neurological:  Negative for seizures and syncope.     Objective:      Vital Signs (Most Recent):  Temp: 97.5 °F (36.4 °C) (01/12/24 1120)  Pulse: 72 (01/12/24 1120)  Resp: 20 (01/12/24 1120)  BP: (!) 140/66 (01/12/24 1120)  SpO2: 100 % (01/12/24 1120) Vital Signs (24h Range):  Temp:  [96.6 °F (35.9 °C)-97.8 °F (36.6 °C)] 97.5 °F (36.4 °C)  Pulse:  [69-75] 72  Resp:  [14-20] 20  SpO2:  [99 %-100 %] 100 %  BP: (132-155)/(64-78) 140/66     Weight: 65 kg (143 lb 6.2 oz)  Body mass index is 22.46 kg/m².    Intake/Output Summary (Last 24 hours) at 1/12/2024 1504  Last data filed at 1/12/2024 1216  Gross per 24 hour   Intake 80 ml   Output 1050 ml   Net -970 ml         Physical Exam  Vitals and nursing note reviewed.   Constitutional:       General: She is not in acute distress.     Appearance: She is well-developed and normal weight. She is not diaphoretic.   HENT:      Nose:      Comments: Nasogastric tube  Pulmonary:      Effort: Pulmonary effort is normal. No respiratory distress.   Skin:     General: Skin is warm and dry.      Coloration: Skin is not jaundiced or pale.   Neurological:      Mental Status: She is alert and oriented to person, place, and time. Mental status is at baseline.      Motor: No seizure activity.   Psychiatric:         Attention and Perception: Attention normal.         Mood and Affect: Affect normal.         Behavior: Behavior is cooperative.         Thought Content: Thought content normal.             Significant Labs: All pertinent labs within the past 24 hours have been reviewed.    Significant Imaging: I have reviewed all pertinent imaging results/findings within the past 24 hours.    Assessment/Plan:      * Partial small bowel obstruction  Small bowel stricture   General Surgery offered nasogastric decompression vs palliative gastrostomy decompression. NG for now. NPO. Scopolamine patch, prn antiemetics. General Surgery signed off and recommended consulting Gastroenterology to place venting gastrostomy. Repeat abdominal X-ray. Change IV fluids to TPN to  provide nutrition. Consult PT and OT for debility.    Reflux esophagitis  Start famotidine.    Pancreatic adenocarcinoma  Being followed in Oncology clinic here. Was offered palliative chemotherapy. She wants a follow-up to discuss prognosis and current treatment options. Consult Oncology.      Chronic respiratory failure with hypoxia  Patient with a history of chronic hypoxic respiratory failure that is on 1-3 liters of oxygen from her COPD.    Centrilobular emphysema  Continue home azithromycin 250 mg three times weekly (MWF), albuterol-ipratropium nebulizers PRN.    Hyperlipidemia  Continue home pravastatin 40 mg daily.      Essential hypertension  Continue home metoprolol succinate 25 mg daily. Monitor blood pressures.    Carcinoma of right breast in female, estrogen receptor positive  History of breast cancer. Patient previously had lumpectomy and chemoradiation.        VTE Risk Mitigation (From admission, onward)           Ordered     enoxaparin injection 40 mg  Daily         01/10/24 0335     IP VTE HIGH RISK PATIENT  Once         01/10/24 0335     Place sequential compression device  Until discontinued         01/10/24 0335                    Discharge Planning   RUFINO: 1/16/2024     Code Status: Full Code   Is the patient medically ready for discharge?:     Reason for patient still in hospital (select all that apply): Patient unstable, Patient trending condition, and Treatment                     Navid Kate MD  Department of Hospital Medicine   [facility] - Telemetry Stepdown

## 2024-01-12 NOTE — SUBJECTIVE & OBJECTIVE
Interval History: Discussed the likelihood of her partial SBO recurring due to small intestine strictures. She seems agreeable to getting a venting gastrostomy. She reports not having gotten out of bed since admission, not getting any nutrition since admission. She was diagnosed with esophagitis on admission and has had a NG tube but no antacid medication to treat and prevent NG tube-associated esophageal ulcer so will start it. Her IV infiltrated and caused left arm swelling. Rapid Response team placed a midline catheter on the right arm. She had hypoglycemia this morning so I changed the fluids from LR to D5 1/2 NS. Will order TPN due to lack of nutrition for several days.     Review of Systems   Constitutional:  Negative for chills and fever.   Gastrointestinal:  Negative for vomiting.   Neurological:  Negative for seizures and syncope.     Objective:     Vital Signs (Most Recent):  Temp: 97.5 °F (36.4 °C) (01/12/24 1120)  Pulse: 72 (01/12/24 1120)  Resp: 20 (01/12/24 1120)  BP: (!) 140/66 (01/12/24 1120)  SpO2: 100 % (01/12/24 1120) Vital Signs (24h Range):  Temp:  [96.6 °F (35.9 °C)-97.8 °F (36.6 °C)] 97.5 °F (36.4 °C)  Pulse:  [69-75] 72  Resp:  [14-20] 20  SpO2:  [99 %-100 %] 100 %  BP: (132-155)/(64-78) 140/66     Weight: 65 kg (143 lb 6.2 oz)  Body mass index is 22.46 kg/m².    Intake/Output Summary (Last 24 hours) at 1/12/2024 1504  Last data filed at 1/12/2024 1216  Gross per 24 hour   Intake 80 ml   Output 1050 ml   Net -970 ml         Physical Exam  Vitals and nursing note reviewed.   Constitutional:       General: She is not in acute distress.     Appearance: She is well-developed and normal weight. She is not diaphoretic.   HENT:      Nose:      Comments: Nasogastric tube  Pulmonary:      Effort: Pulmonary effort is normal. No respiratory distress.   Skin:     General: Skin is warm and dry.      Coloration: Skin is not jaundiced or pale.   Neurological:      Mental Status: She is alert and oriented to  person, place, and time. Mental status is at baseline.      Motor: No seizure activity.   Psychiatric:         Attention and Perception: Attention normal.         Mood and Affect: Affect normal.         Behavior: Behavior is cooperative.         Thought Content: Thought content normal.             Significant Labs: All pertinent labs within the past 24 hours have been reviewed.    Significant Imaging: I have reviewed all pertinent imaging results/findings within the past 24 hours.

## 2024-01-12 NOTE — ASSESSMENT & PLAN NOTE
Small bowel stricture   General Surgery offered nasogastric decompression vs palliative gastrostomy decompression. NG for now. NPO. Scopolamine patch, prn antiemetics. General Surgery signed off and recommended consulting Gastroenterology to place venting gastrostomy. Repeat abdominal X-ray. Change IV fluids to TPN to provide nutrition. Consult PT and OT for debility.

## 2024-01-12 NOTE — CARE UPDATE
"RAPID RESPONSE NURSE PROACTIVE ROUNDING NOTE       Time of Visit: 09:45 AM    Admit Date: 2024  LOS: 2  Code Status: Full Code   Date of Visit: 2024  : 1945  Age: 78 y.o.  Sex: female  Race: White  Bed: 9909/5606 A:   MRN: [id number]  Was the patient discharged from an ICU this admission? No   Was the patient discharged from a PACU within last 24 hours? No   Did the patient receive conscious sedation/general anesthesia in last 24 hours? No  Was the patient in the ED within the past 24 hours? No  Was the patient on NIPPV within the past 24 hours? No   Attending Physician: Navid Kate MD  Primary Service: [facility] MED S   Time spent at the bedside: 30 - 45 min    SITUATION    Notified by  Bedside RN during unit rounds .  Reason for alert: hypoglycemia, need for increased PIV access  Called to evaluate the patient for  hypoglycemia    BACKGROUND     Why is the patient in the hospital?: Partial small bowel obstruction    Patient has a past medical history of Back pain, Breast cancer, COPD (chronic obstructive pulmonary disease), Hyperlipidemia, Hypertension, Osteoporosis, and Pancreatic adenocarcinoma.    Last Vitals:  Temp: 97.5 °F (36.4 °C) (1120)  Pulse: 72 (1120)  Resp: 20 (1120)  BP: 140/66 (1120)  SpO2: 100 % (1120)    24 Hours Vitals Range:  Temp:  [96.6 °F (35.9 °C)-97.8 °F (36.6 °C)]   Pulse:  [69-75]   Resp:  [14-20]   BP: (132-155)/(64-78)   SpO2:  [99 %-100 %]     Labs:  Recent Labs     01/09/24  2034 01/10/24  0427 01/12/24  0423   WBC 15.89* 12.46 8.21   HGB 15.0 12.4 12.7   HCT 46.3 39.0 39.2    309 170       Recent Labs     01/10/24  0427 01/11/24  0524 24   * 135* 137   K 4.0 4.3 4.3    103 103   CO2 24 22* 22*   BUN 18 20 18   CREATININE 0.8 0.7 0.6   GLU 93 59* 45*   PHOS 3.3 3.5 3.2   MG 1.9 2.0 1.9        No results for input(s): "PH", "PCO2", "PO2", "HCO3", "POCSATURATED", "BE" in the last 72 hours. "     ASSESSMENT    Physical Exam  Vitals and nursing note reviewed.   Eyes:      Pupils: Pupils are equal, round, and reactive to light.   Cardiovascular:      Rate and Rhythm: Normal rate and regular rhythm.   Pulmonary:      Effort: Pulmonary effort is normal.   Abdominal:      Tenderness: There is abdominal tenderness. There is guarding.      Comments: NGT - dark brown output.    Skin:     General: Skin is warm and dry.      Capillary Refill: Capillary refill takes less than 2 seconds.   Neurological:      General: No focal deficit present.      Mental Status: She is alert and oriented to person, place, and time.       INTERVENTIONS    The patient was seen for Medical problem. Staff concerns included critical lab abnormality. The following interventions were performed: continuous pulse ox monitoring continued, continuous cardiac monitoring continued, and additional PIV access established.    RECOMMENDATIONS    - Maintain PIV access  - POCT BG at least q 4 hrs and PRN  - NGT to LIWS  - Treat nausea with PRNs per MD orders   - Consider TPN if unable to restart diet or tube feedings    PROVIDER ESCALATION    Yes/No  Yes    Orders received and case discussed with Dr. Kate . Spoke with MD about need for additional PIV access. Left arm red, swollen. Right arm limb alert d/t lumpectomy in 2015. MD states okay to place midline PIV to right arm.    Disposition: Remain in room 8085.    FOLLOW-UP    Rounding completed with charge TIMOTHY Sargent and bedside TIMOTHY Britt . updated on plan of care. Instructed to call the Rapid Response Nurse, Keely Vasquez RN at [phone] for additional questions or concerns.

## 2024-01-12 NOTE — PT/OT/SLP EVAL
"Physical Therapy Co-Evaluation    Patient Name:  Radha You   MRN:  [id number]    Recommendations:     Discharge Recommendations: Moderate Intensity Therapy   Discharge Equipment Recommendations: none   Barriers to discharge: Inaccessible home and Decreased caregiver support    Assessment:     Radha You is a 78 y.o. female admitted with a medical diagnosis of Partial small bowel obstruction.  She presents with the following impairments/functional limitations: weakness, impaired endurance, impaired functional mobility, gait instability, impaired balance, decreased lower extremity function, impaired cardiopulmonary response to activity.    Cooperative and in good spirits. Pt performed 5 reps STS at EOB with 1 person for safety, <10s static stand. Politely declined amb at this time. Pt will benefit from skilled PT services while in house in order to address the aforementioned deficits.      Rehab Prognosis: Good; patient would benefit from acute skilled PT services to address these deficits and reach maximum level of function.    Recent Surgery: * No surgery found *      Plan:     During this hospitalization, patient to be seen 4 x/week to address the identified rehab impairments via therapeutic activities, gait training, neuromuscular re-education, therapeutic exercises and progress toward the following goals:    Plan of Care Expires:  02/12/24    Subjective     "I've had 2 back surgeries"    Pain/Comfort:  Pain Rating 1:  (not rated)  Location 1: abdomen  Pain Addressed 1: Distraction, Reposition    Patients cultural, spiritual, Yazidi conflicts given the current situation: no    Living Environment:  Per pt, pt resides alone in trailer home with 2 GLADYS B HR. Pt has both tub/shower combo with TTB (primary) and walk-in shower. Pt's son resides 6 hours away.   Prior to admission, patients level of function was mod I, rollator for long distances.  Equipment used at home: grab bar, bath bench, rollator, walker, " rolling.  DME owned (not currently used): rolling walker.  Upon discharge, patient will have assistance from unknown.    Objective:     Communicated with RN prior to session.  Patient found HOB elevated with NG tube, PureWick, peripheral IV, pulse ox (continuous), telemetry  upon PT entry to room.    General Precautions: Standard, fall  Orthopedic Precautions:N/A   Braces: N/A  Respiratory Status: Nasal cannula, flow 3 L/min    Exams:  RLE ROM: WFL  RLE Strength: grossly 3/5  LLE ROM: WFL  LLE Strength: grossly 3/5    Functional Mobility:  Bed Mobility:     Supine to Sit: contact guard assistance  Sit to Supine: contact guard assistance  Transfers:     Sit to Stand:  contact guard assistance with no AD  Gait: politely declined at this time  Balance:   Good sitting balance  Fair-good static standing balance CGA with UE on rail      AM-PAC 6 CLICK MOBILITY  Total Score:18       Treatment & Education:  Discussed sitting up EOB and/or UIC with RW and RN/staff outside of therapy services  Tolerated 5 reps STS for toileting, saranya-care, and linen changes    Educated pt on PT role/POC  Educated pt on importance of OOB activity and daily ambulation   Pt educated on proper body mechanics, safety techniques, and energy conservation with PT facilitation and cueing throughout session   Pt verbalized understanding      Patient left right sidelying with all lines intact, call button in reach, RN notified, and OT and son present.    GOALS:   Multidisciplinary Problems       Physical Therapy Goals          Problem: Physical Therapy    Goal Priority Disciplines Outcome Goal Variances Interventions   Physical Therapy Goal     PT, PT/OT Ongoing, Progressing     Description: Goals to be met by: 2024     Patient will increase functional independence with mobility by performin. Supine to sit with Modified [place]  2. Sit to supine with Modified [place]  3. Sit to stand transfer with Supervision  4. Bed to chair  transfer with Supervision using LRAD  5. Gait  x 100 feet with Supervision using LRAD.   6. Ascend/descend 2 stair with bilateral Handrails Contact Guard Assistance using LRAD.                          History:     Past Medical History:   Diagnosis Date    Back pain     instrumented fusion L3-L5. Spinal cord stimulator in place. L5-S1 right paracentral disc osteophyte complex resulting in moderate right neural foraminal narrowing.    Breast cancer 05/2015    (status post lumpectomy and chemoradiation, currently on maintenance therapy with FEMARA). Right breast infiltrating ductal CA with DCIS, ER positive, CT/Her2 negative     COPD (chronic obstructive pulmonary disease)     Chronic bronchitis;  (on 3 L at home)     Hyperlipidemia     Hypertension     Osteoporosis 05/21/2019    Pancreatic adenocarcinoma 12/12/2023       Past Surgical History:   Procedure Laterality Date    BREAST BIOPSY Right     BREAST LUMPECTOMY Right 2015    IDC & DCIS    CERVICAL FUSION      HYSTERECTOMY      with BSO    LUMBAR FUSION      LYMPH NODE DISSECTION Right     right lumpectomy         Time Tracking:     PT Received On: 01/12/24  PT Start Time: 1517     PT Stop Time: 1541  PT Total Time (min): 24 min     Billable Minutes: Evaluation 12 and Therapeutic Activity 12    Co-treatment performed due to patient's multiple deficits requiring two skilled therapists to appropriately and safely assess patient's strength and endurance while facilitating functional tasks in addition to accommodating for patient's activity tolerance.       01/12/2024

## 2024-01-12 NOTE — PLAN OF CARE
Problem: Adult Inpatient Plan of Care  Goal: Plan of Care Review  Outcome: Ongoing, Progressing  Goal: Patient-Specific Goal (Individualized)  Outcome: Ongoing, Progressing  Goal: Absence of Hospital-Acquired Illness or Injury  Outcome: Ongoing, Progressing  Goal: Optimal Comfort and Wellbeing  Outcome: Ongoing, Progressing  Goal: Readiness for Transition of Care  Outcome: Ongoing, Progressing     Problem: Infection  Goal: Absence of Infection Signs and Symptoms  Outcome: Ongoing, Progressing     Problem: Skin Injury Risk Increased  Goal: Skin Health and Integrity  Outcome: Ongoing, Progressing     Patient rested intermittently during shift had frequent complaints of pain from abdomen and discomfort from back. Repositioned patient frequently, NPO w/ NG tube in at low suction w/ no interventions during shift to NG tube. Gave PRN pain medication w/ mild relief.

## 2024-01-12 NOTE — PLAN OF CARE
PT Evaluation completed and PT POC established.    Problem: Physical Therapy  Goal: Physical Therapy Goal  Description: Goals to be met by: 2024     Patient will increase functional independence with mobility by performin. Supine to sit with Modified [place]  2. Sit to supine with Modified [place]  3. Sit to stand transfer with Supervision  4. Bed to chair transfer with Supervision using LRAD  5. Gait  x 100 feet with Supervision using LRAD.   6. Ascend/descend 2 stair with bilateral Handrails Contact Guard Assistance using LRAD.     Outcome: Ongoing, Progressing

## 2024-01-12 NOTE — CARE UPDATE
RAPID RESPONSE NURSE FOLLOW-UP NOTE       Followed up with patient for proactive rounding.  No acute issues at this time. Reviewed plan of care with Bedside RNLorenza . Most recent BG=75.  Team will continue to follow.  Please call Rapid Response RN, Keely Vasquez RN with any questions or concerns at [phone].

## 2024-01-12 NOTE — PLAN OF CARE
Evaluation completed. Goals established.    Problem: Occupational Therapy  Goal: Occupational Therapy Goal  Description: Goals to be met by: 1/26/24     Patient will increase functional independence with ADLs by performing:    UE Dressing with Stand-by Assistance.  LE Dressing with Stand-by Assistance and Assistive Devices as needed.  Grooming while standing at sink with Contact Guard Assistance.  Toileting from toilet with Contact Guard Assistance for hygiene and clothing management.   Toilet transfer to bedside commode with Stand-by Assistance.    Outcome: Ongoing, Progressing   Cristi Valdez OTR/L

## 2024-01-12 NOTE — PROCEDURES
RAPID RESPONSE VASCULAR ACCESS NOTE       Single lumen 20G, 10CM midline placed in the right basilic vein. Needle advanced into the vessel under real time ultrasound guidance.    Max dwell date: 29 days   Lot number: [id number]

## 2024-01-13 PROBLEM — R52 PAIN: Status: ACTIVE | Noted: 2024-01-13

## 2024-01-13 LAB
INR PPP: 1 (ref 0.8–1.2)
PREALB SERPL-MCNC: 8 MG/DL (ref 20–43)
PROTHROMBIN TIME: 10.9 SEC (ref 9–12.5)
TRIGL SERPL-MCNC: 109 MG/DL (ref 30–150)

## 2024-01-13 PROCEDURE — 85610 PROTHROMBIN TIME: CPT | Performed by: HOSPITALIST

## 2024-01-13 PROCEDURE — 63600175 PHARM REV CODE 636 W HCPCS: Performed by: STUDENT IN AN ORGANIZED HEALTH CARE EDUCATION/TRAINING PROGRAM

## 2024-01-13 PROCEDURE — 20600001 HC STEP DOWN PRIVATE ROOM

## 2024-01-13 PROCEDURE — A4217 STERILE WATER/SALINE, 500 ML: HCPCS | Performed by: HOSPITALIST

## 2024-01-13 PROCEDURE — 84134 ASSAY OF PREALBUMIN: CPT | Performed by: HOSPITALIST

## 2024-01-13 PROCEDURE — 99499 UNLISTED E&M SERVICE: CPT | Mod: ,,, | Performed by: INTERNAL MEDICINE

## 2024-01-13 PROCEDURE — 84478 ASSAY OF TRIGLYCERIDES: CPT | Performed by: HOSPITALIST

## 2024-01-13 PROCEDURE — 63600175 PHARM REV CODE 636 W HCPCS: Performed by: NURSE PRACTITIONER

## 2024-01-13 PROCEDURE — 25000003 PHARM REV CODE 250: Performed by: HOSPITALIST

## 2024-01-13 PROCEDURE — 99497 ADVNCD CARE PLAN 30 MIN: CPT | Mod: 25,,, | Performed by: INTERNAL MEDICINE

## 2024-01-13 PROCEDURE — 63600175 PHARM REV CODE 636 W HCPCS: Mod: JZ,JG | Performed by: HOSPITALIST

## 2024-01-13 PROCEDURE — 99223 1ST HOSP IP/OBS HIGH 75: CPT | Mod: ,,, | Performed by: INTERNAL MEDICINE

## 2024-01-13 PROCEDURE — 36415 COLL VENOUS BLD VENIPUNCTURE: CPT | Performed by: HOSPITALIST

## 2024-01-13 PROCEDURE — S5010 5% DEXTROSE AND 0.45% SALINE: HCPCS | Performed by: HOSPITALIST

## 2024-01-13 PROCEDURE — 25000003 PHARM REV CODE 250: Performed by: STUDENT IN AN ORGANIZED HEALTH CARE EDUCATION/TRAINING PROGRAM

## 2024-01-13 PROCEDURE — 99498 ADVNCD CARE PLAN ADDL 30 MIN: CPT | Mod: ,,, | Performed by: INTERNAL MEDICINE

## 2024-01-13 PROCEDURE — 63600175 PHARM REV CODE 636 W HCPCS: Performed by: INTERNAL MEDICINE

## 2024-01-13 RX ORDER — DEXAMETHASONE SODIUM PHOSPHATE 100 MG/10ML
10 INJECTION INTRAMUSCULAR; INTRAVENOUS EVERY 24 HOURS
Status: COMPLETED | OUTPATIENT
Start: 2024-01-13 | End: 2024-01-15

## 2024-01-13 RX ADMIN — MORPHINE SULFATE 4 MG: 4 INJECTION INTRAVENOUS at 01:01

## 2024-01-13 RX ADMIN — MORPHINE SULFATE 4 MG: 4 INJECTION INTRAVENOUS at 02:01

## 2024-01-13 RX ADMIN — CYCLOBENZAPRINE HYDROCHLORIDE 10 MG: 5 TABLET, FILM COATED ORAL at 08:01

## 2024-01-13 RX ADMIN — CYCLOBENZAPRINE HYDROCHLORIDE 10 MG: 5 TABLET, FILM COATED ORAL at 02:01

## 2024-01-13 RX ADMIN — METOPROLOL SUCCINATE 25 MG: 25 TABLET, EXTENDED RELEASE ORAL at 08:01

## 2024-01-13 RX ADMIN — MORPHINE SULFATE 4 MG: 4 INJECTION INTRAVENOUS at 05:01

## 2024-01-13 RX ADMIN — ENOXAPARIN SODIUM 40 MG: 40 INJECTION SUBCUTANEOUS at 04:01

## 2024-01-13 RX ADMIN — SCOPALAMINE 1 PATCH: 1 PATCH, EXTENDED RELEASE TRANSDERMAL at 05:01

## 2024-01-13 RX ADMIN — CYCLOBENZAPRINE HYDROCHLORIDE 10 MG: 5 TABLET, FILM COATED ORAL at 10:01

## 2024-01-13 RX ADMIN — DEXTROSE AND SODIUM CHLORIDE: 5; 450 INJECTION, SOLUTION INTRAVENOUS at 09:01

## 2024-01-13 RX ADMIN — OXYCODONE HYDROCHLORIDE 10 MG: 10 TABLET ORAL at 10:01

## 2024-01-13 RX ADMIN — DEXAMETHASONE SODIUM PHOSPHATE 10 MG: 10 INJECTION INTRAMUSCULAR; INTRAVENOUS at 02:01

## 2024-01-13 RX ADMIN — MORPHINE SULFATE 4 MG: 4 INJECTION INTRAVENOUS at 08:01

## 2024-01-13 RX ADMIN — MAGNESIUM SULFATE HEPTAHYDRATE: 500 INJECTION, SOLUTION INTRAMUSCULAR; INTRAVENOUS at 10:01

## 2024-01-13 RX ADMIN — FAMOTIDINE 20 MG: 10 INJECTION INTRAVENOUS at 08:01

## 2024-01-13 RX ADMIN — TOPIRAMATE 50 MG: 25 TABLET, FILM COATED ORAL at 10:01

## 2024-01-13 RX ADMIN — FAMOTIDINE 20 MG: 10 INJECTION INTRAVENOUS at 10:01

## 2024-01-13 RX ADMIN — OXYCODONE HYDROCHLORIDE 10 MG: 10 TABLET ORAL at 12:01

## 2024-01-13 RX ADMIN — PRAVASTATIN SODIUM 40 MG: 40 TABLET ORAL at 10:01

## 2024-01-13 NOTE — PROGRESS NOTES
[facility] - Telemetry Stepdown  Adult Nutrition  Progress Note    SUMMARY       Recommendations    Diet advancement per MD.  If TPN still warranted, rec'd 300g D, 85g AA + IV Lipids to provide 1860 kcals & 85 g of protein (GIR: 3.2).  RD following.    Goals: Meet % EEN, EPN by RD f/u date  Nutrition Goal Status: new  Communication of RD Recs: reviewed with RN    Assessment and Plan    Moderate malnutrition    Malnutrition Type:  Context: acute illness or injury  Level: moderate    Related to (etiology):   Inability to consume sufficient energy     Signs and Symptoms (as evidenced by):   Weight loss, inadequate energy intake, NFPE    Malnutrition Characteristic Summary:  Weight Loss (Malnutrition): 5% in 1 month  Energy Intake (Malnutrition): less than 75% for greater than 7 days  Subcutaneous Fat (Malnutrition): moderate depletion  Muscle Mass (Malnutrition): moderate depletion    Interventions/Recommendations (treatment strategy):  Collaboration of nutrition care w/ other providers  PN    Nutrition Diagnosis Status:   New    Malnutrition Assessment    Malnutrition Context: acute illness or injury  Malnutrition Level: moderate    Weight Loss (Malnutrition): 5% in 1 month  Energy Intake (Malnutrition): less than 75% for greater than 7 days  Subcutaneous Fat (Malnutrition): moderate depletion  Muscle Mass (Malnutrition): moderate depletion     Reason for Assessment    Reason For Assessment: new TPN  Diagnosis: other (see comments) (SBO)  Relevant Medical History: HTN, HLD, Ca  Interdisciplinary Rounds: did not attend    General Information Comments: NPO w/ NGT present. TPN ordered, however not infusing 2/2 no central line. Noted palliative care following. Pt w/ decreased appetite PTA & UBW of 150# - meets criteria for moderate malnutrition. NFPE complete today; please see PES statement for details.  Nutrition Discharge Planning: Pending clinical course    Nutrition/Diet History    Spiritual, Cultural Beliefs,  "Shinto Practices, Values that Affect Care: no  Factors Affecting Nutritional Intake: NPO    Anthropometrics    Temp: 98 °F (36.7 °C)  Height Method: Stated  Height: 5' 7" (170.2 cm)  Height (inches): 67 in  Weight Method: Stated  Weight: 65 kg (143 lb 4.8 oz)  Weight (lb): 143.3 lb  Ideal Body Weight (IBW), Female: 135 lb  % Ideal Body Weight, Female (lb): 106.15 %  BMI (Calculated): 22.4  BMI Grade: 18.5-24.9 - normal  Usual Body Weight (UBW), k.2 kg  % Usual Body Weight: 95.51  % Weight Change From Usual Weight: -4.69 %    Lab/Procedures/Meds    Pertinent Labs Reviewed: reviewed  Pertinent Labs Comments: JAIDA 109  Pertinent Medications Reviewed: reviewed  Pertinent Medications Comments: D5    Estimated/Assessed Needs    Weight Used For Calorie Calculations: 65 kg (143 lb 4.8 oz)    Energy Calorie Requirements (kcal): 1950 kcal/d  Energy Need Method: Kcal/kg (30 kcal/kg)    Protein Requirements: 85 g/d (1.3 g/kg)  Weight Used For Protein Calculations: 65 kg (143 lb 4.8 oz)    Estimated Fluid Requirement Method: other (see comments) (Per MD)  RDA Method (mL): 1950    Nutrition Prescription Ordered    Current Diet Order: NPO    Evaluation of Received Nutrient/Fluid Intake    I/O: -1.7L since admit    Comments: LBM:     Nutrition Risk    Level of Risk/Frequency of Follow-up:  (1x/week)     Monitor and Evaluation    Food and Nutrient Intake: parenteral nutrition intake, food and beverage intake, energy intake  Food and Nutrient Adminstration: diet order, enteral and parenteral nutrition administration  Physical Activity and Function: nutrition-related ADLs and IADLs  Anthropometric Measurements: weight, weight change  Biochemical Data, Medical Tests and Procedures: glucose/endocrine profile, lipid profile, inflammatory profile, gastrointestinal profile, electrolyte and renal panel  Nutrition-Focused Physical Findings: overall appearance     Nutrition Follow-Up    RD Follow-up?: Yes      "

## 2024-01-13 NOTE — PLAN OF CARE
Consult received. Patient was seen in oncology clinic on 1/4. Discussed diagnosis at bedside with patient and family today. Per 1/4 clinic note, treatment being offered by GI oncologist Dr. Us will consist of gemcitabine and abraxane. Family ahs been given handouts on the proposed medications. They have good understanding of the poor prognosis associated with metastatic pancreatic cancer. Patient would like to get over her current hospital issues before deciding on pursuing chemotherapy. She feels additional information is overwhelming at this time. We also briefly discussed that TPN has not been shown to improve prognosis in metastatic pancreatic cancer. Patient and family were thankful for bedside discussion. They have been instructed to call clinic with further questions or if further follow up is desired.    *Oncology will sign off.

## 2024-01-13 NOTE — CONSULTS
[facility] - Telemetry Stepdown  Palliative Medicine  Consult Note    Patient Name: Radha You  MRN: [id number]  Admission Date: 1/9/2024  Hospital Length of Stay: 3 days  Code Status: Full Code   Attending Provider: Navid Kate MD  Consulting Provider: Glo Ibarra MD  Primary Care Physician: Sapphire Ruffin FNP-C  Principal Problem:Partial small bowel obstruction    Patient information was obtained from patient, relative(s), past medical records, and primary team.      Consults  Assessment/Plan:     Oncology  Pancreatic adenocarcinoma  Recent diagnosis of pancreatic cancer with metastatic disease.  Now presenting with small-bowel obstruction due to tumor versus stricture versus ileus.  Patient is still contemplating chemo and radiation although in conversations with her sons she knows that is not realistic.  She is sad over and over that she is not a candidate for curative treatment.  She has not a surgical candidate.  We will certainly need to weigh benefit versus burden of chemotherapy with this patient who has limited functional ability at this time.  She also lives alone in [facility] far away from her children and/or healthcare.    GI  * Partial small bowel obstruction  Partial small bowel obstruction could be multifactorial including ileus, stricture from previous surgery or tumor.  Currently with NG tube in place.  Dr. Kate has started peripheral nutrition.  Patient is contemplating venting gastrostomy.  In the meantime would consider the use of Decadron 10 mg IVP daily for 3 days to see if there is an inflammatory component to this stricture to enable the ileus to resolve.  Patient and family are aware of this and are eager to start.  Discussed side effects of Decadron including elevated blood sugars and confusion.  They will be with her 24 a day.      Palliative Care  Palliative care encounter  Impression:    Symptoms:  Abdominal pain, nausea, vomiting.     Medicolegal:  Patient has decision  making capacity.  Son Hiram is surrogate decision maker and is HCPOA.     Psychosocial:  support system consists of friends and family.  Her best friend for over 60 years is coming to visit her tomorrow.  She plans to have a checklist of things that her friend needs to take care of.  This gives her great comfort.     Spiritual:  Denominational.  Has not been involved with the Sabianism family since moving to [facility].    Prognostication:  Poor    Understanding of disease and Illness Trajectory: Patient and Family  has  adequate understanding of her illness, they can benefit from continued education on what to expect in the future.      Goals of care:  Comfort and quality of life as well as independence.  Advance Care Planning    Date: 01/13/2024    Today a voluntary meeting took place: bedside    Patient Participation: Patient is able to participate     Attendees (Name and  Relationship to patient): Health care power of : Hiram son and other son Anibal and Hiram's daughter Shabnam.    Staff attendees (Name and  Role): Glo Ibarra MD    ACP Conversation (General): Understanding of current condition long conversation with patient and her 2 sons and granddaughter.  Patient is aware that her disease is not curable.  She is still thinking that she could see an oncologist in least will and receive chemotherapy or radiation.  She is not a surgical candidate and understands that.  I did ask her how much she was willing to go through for extra time.  She was unable to answer that.  She was more focused on making sure her house was okay and the  came etc. and so forth.  We did talk at length about improving her ileus/small-bowel obstruction.  Malignant obstruction can sometimes be relieved with higher dose Decadron and I offered that as an option to the family for 3 days.  We have that timeframe because they venting gastrostomy can not be placed prior to Tuesday at the earliest.  Her primary team has started  her on peripheral nutrition.  I did discuss separately with the sons that this has not really been shown to be that effective and certainly would not alter the cancer and lack of treatment options.     Code Status: did not engage in discussions today    ACP Documents: Reviewed current existing digital forms    Goals of care: The patient and family endorses that what is most important right now is to focus on remaining as independent as possible and symptom/pain control    Accordingly, we have decided that the best plan to meet the patient's goals includes continuing with treatment      Recommendations/  Follow-up tasks: The patient and health care agent were provided the following recommendations trial of Decadron 10 mg IV daily for 3 days.  Continue with peripheral nutrition.  Monitor condition and treat abdominal and back pain.       Length of ACP   conversation in minutes: >76 minutes          Code status:  Full for now    Advance directives:  Documents are in place.  Her living will says that she would want artificial hydration and nutrition.  She is getting that currently.  We will need to clarify this with the patient and sons particularly as we move closer to hospice treatment.      Summary and recommendations:  Continue pain medication for both her lower back pain and the abdominal pain  Trial of Decadron 10 mg IV Q 24 hours x3 days to see if that will help her small-bowel obstruction.  Consider additional Oncology recommendations so patient and family can move forward with decision-making.  I will return to visit with them tomorrow morning.  Have encouraged the son who lives in [facility] to go home because the weather is about to get really bad.      > 76min time was spent on advance care planning, goals of care discussion, emotional support, formulating and communicating prognosis and goals of care, exploring burden/benefit of various approaches of treatment.            Other  Pain  PainIs  multifactorial related to her previous spinal surgery.  She does have a spinal stimulator in place.  She does use a lidocaine patch.  Normally at home she takes hydrocodone as needed and sleeps on the couch.    The abdominal pain seems to be controlled with the use of IV morphine.  She is taken approximately 60 mg of oral morphine equivalents in the last 24 hours.  Would continue with IV morphine or oxycodone 10 mg every 3 hours as needed for pain and see how she does.  We will adjust dose and medication as needed.        Thank you for your consult. I will follow-up with patient. Please contact us if you have any additional questions.    Subjective:     HPI:   Ms. You is a 77yo female with a history of COPD, HLD, HTN, h/o breast cancer, and recently diagnosed metastatic pancreatic cancer who presented 1.10.2024  with abdominal pain, nausea, and emesis.  Been evaluated by Surgical Oncology and not felt to be a surgical candidate.  She is still considering chemotherapy and/or radiation therapy to prolong her life.  At this time however she has an ileus/small-bowel obstruction and has nasogastric decompression.  There is a consideration for placing a venting gastrostomy next week.    Palliative Medicine has been asked to assist with goals of care and treatment options and planning.      Hospital Course:  No notes on file    Interval History: Lying in bed. Initially came to visit patient and she had just been medicated with morphine.  I returned at 12:30 p.m. after preliminary conversation with her son.  She is now lying in bed.  Said that her pain was under better control.  Wanted to try some oral oxycodone.  She is a little bit confused about current prognosis and treatment plans.  She is also fretting over her home and needing to do things to tidy up her life.    Past Medical History:   Diagnosis Date    Back pain     instrumented fusion L3-L5. Spinal cord stimulator in place. L5-S1 right paracentral disc  osteophyte complex resulting in moderate right neural foraminal narrowing.    Breast cancer 05/2015    (status post lumpectomy and chemoradiation, currently on maintenance therapy with FEMARA). Right breast infiltrating ductal CA with DCIS, ER positive, VT/Her2 negative     COPD (chronic obstructive pulmonary disease)     Chronic bronchitis;  (on 3 L at home)     Hyperlipidemia     Hypertension     Osteoporosis 05/21/2019    Pancreatic adenocarcinoma 12/12/2023       Past Surgical History:   Procedure Laterality Date    BREAST BIOPSY Right     BREAST LUMPECTOMY Right 2015    IDC & DCIS    CERVICAL FUSION      HYSTERECTOMY      with BSO    LUMBAR FUSION      LYMPH NODE DISSECTION Right     right lumpectomy         Review of patient's allergies indicates:   Allergen Reactions    Adhesive Rash     Use PAPER Tape / TEGADERM    Levaquin [levofloxacin] Diarrhea and Nausea Only    Penicillins Rash       Medications:  Continuous Infusions:   dextrose 5 % and 0.45 % NaCl 100 mL/hr at 01/13/24 0901     Scheduled Meds:   azithromycin  250 mg Oral Once per day on Mon Wed Fri    cyclobenzaprine  10 mg Oral TID    enoxparin  40 mg Subcutaneous Daily    famotidine (PF)  20 mg Intravenous BID    metoprolol succinate  25 mg Oral Daily    pravastatin  40 mg Oral QHS    scopolamine  1 patch Transdermal Q3 Days    topiramate  50 mg Oral QHS     PRN Meds:acetaminophen, albuterol-ipratropium, dextrose 10%, dextrose 10%, glucagon (human recombinant), glucose, glucose, haloperidol lactate, melatonin, morphine, naloxone, ondansetron, oxyCODONE, simethicone, sodium chloride 0.9%    Family History       Problem Relation (Age of Onset)    Breast cancer Maternal Grandmother (75)          Tobacco Use    Smoking status: Former     Current packs/day: 1.00     Average packs/day: 1 pack/day for 45.0 years (45.0 ttl pk-yrs)     Types: Cigarettes    Smokeless tobacco: Current    Tobacco comments:     admits to attempting to quit multiple times;  currently smoking up to 1 packs/day down from 2 packs/day at time of diagnosis   Substance and Sexual Activity    Alcohol use: Yes     Comment: occasionaly    Drug use: No    Sexual activity: Not Currently       Review of Systems   Constitutional:  Positive for activity change and fatigue.   Gastrointestinal:  Positive for abdominal pain and nausea.   Musculoskeletal:  Positive for back pain.   Psychiatric/Behavioral:  Positive for decreased concentration.      Objective:     Vital Signs (Most Recent):  Temp: 98 °F (36.7 °C) (01/13/24 1222)  Pulse: 80 (01/13/24 1222)  Resp: 18 (01/13/24 1212)  BP: (!) 166/81 (01/13/24 1222)  SpO2: 100 % (01/13/24 1222) Vital Signs (24h Range):  Temp:  [97.3 °F (36.3 °C)-98 °F (36.7 °C)] 98 °F (36.7 °C)  Pulse:  [68-91] 80  Resp:  [17-21] 18  SpO2:  [83 %-100 %] 100 %  BP: (147-166)/(72-85) 166/81     Weight: 65 kg (143 lb 4.8 oz)  Body mass index is 22.44 kg/m².       Physical Exam  Constitutional:       Comments: NG tube in place   Cardiovascular:      Rate and Rhythm: Tachycardia present.   Pulmonary:      Effort: Pulmonary effort is normal.   Abdominal:      Comments: Minimal bowel sounds   Neurological:      Mental Status: She is alert. Mental status is at baseline.   Psychiatric:      Comments: Disconnected thoughts.            Review of Symptoms      Symptom Assessment (ESAS 0-10 Scale)  Pain:  2  Dyspnea:  0  Anxiety:  2  Nausea:  0  Depression:  0  Anorexia:  0  Fatigue:  2  Insomnia:  0  Restlessness:  0  Agitation:  0     CAM / Delirium:  Negative  Constipation:  Negative      Bowel Management Plan (BMP):  Yes      Pain Assessment:  OME in 24 hours:  Sixty  Location(s): back    Back       Location: bilateral        Quality: Aching        Quantity: 2/10 in intensity        Chronicity: Onset 6 year(s) ago, controlled        Aggravating Factors: Movement        Alleviating Factors: Recumbency       Associated Symptoms: None    Performance Status:  40    Living Arrangements:   "Lives alone and Lives >50 miles from facility    Psychosocial/Cultural:   See Palliative Psychosocial Note: No  ,  was an NOPD .  Two sons 1 lives in [facility] the other lives in [facility].  She has a little shop.  She does crafting.  She moved to [facility] after her 's death.  She lives far away from any City or her children.  She has many friends.  She has not have a Islam family because she lives so far away from town  **Primary  to Follow**  Palliative Care  Consult: No    Spiritual:  F - Lilly and Belief:  Amish  I - Importance:  Yes  C - Community:  Yes  A - Address in Care:  Yes        Advance Care Planning  Advance Directives:   Living Will: Yes        Copy on chart: Yes    LaPOST: No    Do Not Resuscitate Status: No    Medical Power of : Yes    Agent's Name:  Hiram Ramos   Agent's Contact Number:  [phone]    Decision Making:  Patient answered questions and Family answered questions  Goals of Care: The patient and family endorses that what is most important right now is to focus on remaining as independent as possible and symptom/pain control    Accordingly, we have decided that the best plan to meet the patient's goals includes continuing with treatment.         Significant Labs: All pertinent labs within the past 24 hours have been reviewed.  CBC:   Recent Labs   Lab 01/12/24  0423   WBC 8.21   HGB 12.7   HCT 39.2   MCV 90        BMP:  No results for input(s): "GLU", "NA", "K", "CL", "CO2", "BUN", "CREATININE", "CALCIUM", "MG" in the last 24 hours.  LFT:  Lab Results   Component Value Date    AST 15 01/11/2024    ALKPHOS 61 01/11/2024    BILITOT 1.1 (H) 01/11/2024     Albumin:   Albumin   Date Value Ref Range Status   01/11/2024 2.8 (L) 3.5 - 5.2 g/dL Final     Protein:   Total Protein   Date Value Ref Range Status   01/11/2024 5.6 (L) 6.0 - 8.4 g/dL Final     Lactic acid:   Lab Results   Component Value " Date    LACTATE 0.7 03/05/2020    LACTATE 1.0 03/27/2019       Significant Imaging: I have reviewed all pertinent imaging results/findings within the past 24 hours.      I spent a total of 180 minutes on the day of the visit. This includes face to face time in discussion of goals of care, symptom assessment, coordination of care and emotional support.  This also includes non-face to face time preparing to see the patient (eg, review of tests/imaging), obtaining and/or reviewing separately obtained history, documenting clinical information in the electronic or other health record, independently interpreting results and communicating results to the patient/family/caregiver, or care coordinator.    Glo Ibarra MD  Palliative Medicine  [facility]

## 2024-01-13 NOTE — ASSESSMENT & PLAN NOTE
PainIs multifactorial related to her previous spinal surgery.  She does have a spinal stimulator in place.  She does use a lidocaine patch.  Normally at home she takes hydrocodone as needed and sleeps on the couch.    The abdominal pain seems to be controlled with the use of IV morphine.  She is taken approximately 60 mg of oral morphine equivalents in the last 24 hours.  Would continue with IV morphine or oxycodone 10 mg every 3 hours as needed for pain and see how she does.  We will adjust dose and medication as needed.

## 2024-01-13 NOTE — ASSESSMENT & PLAN NOTE
Malnutrition Type:  Context: acute illness or injury  Level: moderate    Related to (etiology):   Inability to consume sufficient energy     Signs and Symptoms (as evidenced by):   Weight loss, inadequate energy intake, NFPE    Malnutrition Characteristic Summary:  Weight Loss (Malnutrition): 5% in 1 month  Energy Intake (Malnutrition): less than 75% for greater than 7 days  Subcutaneous Fat (Malnutrition): moderate depletion  Muscle Mass (Malnutrition): moderate depletion    Interventions/Recommendations (treatment strategy):  Collaboration of nutrition care w/ other providers  PN    Nutrition Diagnosis Status:   New

## 2024-01-13 NOTE — SUBJECTIVE & OBJECTIVE
Interval History: Lying in bed. Initially came to visit patient and she had just been medicated with morphine.  I returned at 12:30 p.m. after preliminary conversation with her son.  She is now lying in bed.  Said that her pain was under better control.  Wanted to try some oral oxycodone.  She is a little bit confused about current prognosis and treatment plans.  She is also fretting over her home and needing to do things to tidy up her life.    Past Medical History:   Diagnosis Date    Back pain     instrumented fusion L3-L5. Spinal cord stimulator in place. L5-S1 right paracentral disc osteophyte complex resulting in moderate right neural foraminal narrowing.    Breast cancer 05/2015    (status post lumpectomy and chemoradiation, currently on maintenance therapy with FEMARA). 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     COPD (chronic obstructive pulmonary disease)     Chronic bronchitis;  (on 3 L at home)     Hyperlipidemia     Hypertension     Osteoporosis 05/21/2019    Pancreatic adenocarcinoma 12/12/2023       Past Surgical History:   Procedure Laterality Date    BREAST BIOPSY Right     BREAST LUMPECTOMY Right 2015    IDC & DCIS    CERVICAL FUSION      HYSTERECTOMY      with BSO    LUMBAR FUSION      LYMPH NODE DISSECTION Right     right lumpectomy         Review of patient's allergies indicates:   Allergen Reactions    Adhesive Rash     Use PAPER Tape / TEGADERM    Levaquin [levofloxacin] Diarrhea and Nausea Only    Penicillins Rash       Medications:  Continuous Infusions:   dextrose 5 % and 0.45 % NaCl 100 mL/hr at 01/13/24 0901     Scheduled Meds:   azithromycin  250 mg Oral Once per day on Mon Wed Fri    cyclobenzaprine  10 mg Oral TID    enoxparin  40 mg Subcutaneous Daily    famotidine (PF)  20 mg Intravenous BID    metoprolol succinate  25 mg Oral Daily    pravastatin  40 mg Oral QHS    scopolamine  1 patch Transdermal Q3 Days    topiramate  50 mg Oral QHS     PRN  Meds:acetaminophen, albuterol-ipratropium, dextrose 10%, dextrose 10%, glucagon (human recombinant), glucose, glucose, haloperidol lactate, melatonin, morphine, naloxone, ondansetron, oxyCODONE, simethicone, sodium chloride 0.9%    Family History       Problem Relation (Age of Onset)    Breast cancer Maternal Grandmother (75)          Tobacco Use    Smoking status: Former     Current packs/day: 1.00     Average packs/day: 1 pack/day for 45.0 years (45.0 ttl pk-yrs)     Types: Cigarettes    Smokeless tobacco: Current    Tobacco comments:     admits to attempting to quit multiple times; currently smoking up to 1 packs/day down from 2 packs/day at time of diagnosis   Substance and Sexual Activity    Alcohol use: Yes     Comment: occasionaly    Drug use: No    Sexual activity: Not Currently       Review of Systems   Constitutional:  Positive for activity change and fatigue.   Gastrointestinal:  Positive for abdominal pain and nausea.   Musculoskeletal:  Positive for back pain.   Psychiatric/Behavioral:  Positive for decreased concentration.      Objective:     Vital Signs (Most Recent):  Temp: 98 °F (36.7 °C) (01/13/24 1222)  Pulse: 80 (01/13/24 1222)  Resp: 18 (01/13/24 1212)  BP: (!) 166/81 (01/13/24 1222)  SpO2: 100 % (01/13/24 1222) Vital Signs (24h Range):  Temp:  [97.3 °F (36.3 °C)-98 °F (36.7 °C)] 98 °F (36.7 °C)  Pulse:  [68-91] 80  Resp:  [17-21] 18  SpO2:  [83 %-100 %] 100 %  BP: (147-166)/(72-85) 166/81     Weight: 65 kg (143 lb 4.8 oz)  Body mass index is 22.44 kg/m².       Physical Exam  Constitutional:       Comments: NG tube in place   Cardiovascular:      Rate and Rhythm: Tachycardia present.   Pulmonary:      Effort: Pulmonary effort is normal.   Abdominal:      Comments: Minimal bowel sounds   Neurological:      Mental Status: She is alert. Mental status is at baseline.   Psychiatric:      Comments: Disconnected thoughts.            Review of Symptoms      Symptom Assessment (ESAS 0-10 Scale)  Pain:   2  Dyspnea:  0  Anxiety:  2  Nausea:  0  Depression:  0  Anorexia:  0  Fatigue:  2  Insomnia:  0  Restlessness:  0  Agitation:  0     CAM / Delirium:  Negative  Constipation:  Negative      Bowel Management Plan (BMP):  Yes      Pain Assessment:  OME in 24 hours:  Sixty  Location(s): back    Back       Location: bilateral        Quality: Aching        Quantity: 2/10 in intensity        Chronicity: Onset 6 year(s) ago, controlled        Aggravating Factors: Movement        Alleviating Factors: Recumbency       Associated Symptoms: None    Performance Status:  40    Living Arrangements:  Lives alone and Lives >50 miles from facility    Psychosocial/Cultural:   See Palliative Psychosocial Note: No  ,  was an NOPD .  Two sons 1 lives in [facility] the other lives in [facility].  She has a [employer] shop.  She does crafting.  She moved to [facility] after her 's death.  She lives far away from any City or her children.  She has many friends.  She has not have a Mandaen family because she lives so far away from town  **Primary  to Follow**  Palliative Care  Consult: No    Spiritual:  F - Lilly and Belief:  Hindu  I - Importance:  Yes  C - Community:  Yes  A - Address in Care:  Yes        Advance Care Planning   Advance Directives:   Living Will: Yes        Copy on chart: Yes    LaPOST: No    Do Not Resuscitate Status: No    Medical Power of : Yes    Agent's Name:  Hiram Ramos   Agent's Contact Number:  [phone]    Decision Making:  Patient answered questions and Family answered questions  Goals of Care: The patient and family endorses that what is most important right now is to focus on remaining as independent as possible and symptom/pain control    Accordingly, we have decided that the best plan to meet the patient's goals includes continuing with treatment.         Significant Labs: All pertinent labs within the past 24 hours have been  "reviewed.  CBC:   Recent Labs   Lab 01/12/24  0423   WBC 8.21   HGB 12.7   HCT 39.2   MCV 90        BMP:  No results for input(s): "GLU", "NA", "K", "CL", "CO2", "BUN", "CREATININE", "CALCIUM", "MG" in the last 24 hours.  LFT:  Lab Results   Component Value Date    AST 15 01/11/2024    ALKPHOS 61 01/11/2024    BILITOT 1.1 (H) 01/11/2024     Albumin:   Albumin   Date Value Ref Range Status   01/11/2024 2.8 (L) 3.5 - 5.2 g/dL Final     Protein:   Total Protein   Date Value Ref Range Status   01/11/2024 5.6 (L) 6.0 - 8.4 g/dL Final     Lactic acid:   Lab Results   Component Value Date    LACTATE 0.7 03/05/2020    LACTATE 1.0 03/27/2019       Significant Imaging: I have reviewed all pertinent imaging results/findings within the past 24 hours.    "

## 2024-01-13 NOTE — ASSESSMENT & PLAN NOTE
Being followed in Oncology clinic here. Was offered palliative chemotherapy. She wants a follow-up to discuss prognosis and current treatment options. Consulted Oncology.

## 2024-01-13 NOTE — SUBJECTIVE & OBJECTIVE
Interval History: Feels a bit better. Still has NG tube for decompression. It was advanced overnight.    Review of Systems   Constitutional:  Negative for chills and fever.   Gastrointestinal:  Negative for vomiting.   Neurological:  Negative for seizures and syncope.     Objective:     Vital Signs (Most Recent):  Temp: 98.3 °F (36.8 °C) (01/13/24 1542)  Pulse: 80 (01/13/24 1542)  Resp: 16 (01/13/24 1452)  BP: 139/68 (01/13/24 1542)  SpO2: 100 % (01/13/24 1542) Vital Signs (24h Range):  Temp:  [97.6 °F (36.4 °C)-98.3 °F (36.8 °C)] 98.3 °F (36.8 °C)  Pulse:  [68-91] 80  Resp:  [16-21] 16  SpO2:  [83 %-100 %] 100 %  BP: (139-166)/(68-85) 139/68     Weight: 65 kg (143 lb 4.8 oz)  Body mass index is 22.44 kg/m².    Intake/Output Summary (Last 24 hours) at 1/13/2024 1649  Last data filed at 1/13/2024 1515  Gross per 24 hour   Intake --   Output 2775 ml   Net -2775 ml           Physical Exam  Vitals and nursing note reviewed.   Constitutional:       General: She is not in acute distress.     Appearance: She is well-developed and normal weight. She is not diaphoretic.   HENT:      Nose:      Comments: Nasogastric tube  Pulmonary:      Effort: Pulmonary effort is normal. No respiratory distress.   Skin:     General: Skin is warm and dry.      Coloration: Skin is not jaundiced or pale.   Neurological:      Mental Status: She is alert and oriented to person, place, and time. Mental status is at baseline.      Motor: No seizure activity.   Psychiatric:         Attention and Perception: Attention normal.         Mood and Affect: Affect normal.         Behavior: Behavior is cooperative.         Thought Content: Thought content normal.             Significant Labs: All pertinent labs within the past 24 hours have been reviewed.    Significant Imaging: I have reviewed all pertinent imaging results/findings within the past 24 hours.  X-Ray Abdomen AP 1 View 1/12/24: FINDINGS:   Two overlapping images acquired.   Reconfirmed enteric  tube, again the tip is seen in the upper stomach beneath the left hemidiaphragm but the side port hole projects in the distal esophagus some 3 cm or greater from the expected esophageal hiatus.  Might consider advancement of this tube further into the GI tract 5 cm or so.  There are several air-filled prominent to dilated small bowel loops involving the right mid and lower quadrants.  Maximum intraluminal measurements of these or 3.5 cm.  As previously discussed, findings could relate to some degree of ongoing partial small bowel obstruction.  No dilated colon segments.  No free air.  Contrast in the bladder.  No definite urinary tract calculi.  Reconfirmed findings of fusion hardware lower lumbar spine and lumbosacral region as well as electronic stimulator device with wires extending over the central lower lumbar spine.  EKG leads superimposed chest and abdomen.

## 2024-01-13 NOTE — ASSESSMENT & PLAN NOTE
Impression:    Symptoms:  Abdominal pain, nausea, vomiting.     Medicolegal:  Patient has decision making capacity.  Son Hiram is surrogate decision maker and is HCPOA.     Psychosocial:  support system consists of friends and family.  Her best friend for over 60 years is coming to visit her tomorrow.  She plans to have a checklist of things that her friend needs to take care of.  This gives her great comfort.     Spiritual:  Evangelical.  Has not been involved with the Rastafarian family since moving to [facility].    Prognostication:  Poor    Understanding of disease and Illness Trajectory: Patient and Family  has  adequate understanding of her illness, they can benefit from continued education on what to expect in the future.      Goals of care:  Comfort and quality of life as well as independence.  Advance Care Planning     Date: 01/13/2024    Today a voluntary meeting took place: bedside    Patient Participation: Patient is able to participate     Attendees (Name and  Relationship to patient): Health care power of : Hiram son and other son Anibal and Hiram's daughter Shabnam.    Staff attendees (Name and  Role): Glo Ibarra MD    ACP Conversation (General): Understanding of current condition long conversation with patient and her 2 sons and granddaughter.  Patient is aware that her disease is not curable.  She is still thinking that she could see an oncologist in least will and receive chemotherapy or radiation.  She is not a surgical candidate and understands that.  I did ask her how much she was willing to go through for extra time.  She was unable to answer that.  She was more focused on making sure her house was okay and the  came etc. and so forth.  We did talk at length about improving her ileus/small-bowel obstruction.  Malignant obstruction can sometimes be relieved with higher dose Decadron and I offered that as an option to the family for 3 days.  We have that timeframe because they  venting gastrostomy can not be placed prior to Tuesday at the earliest.  Her primary team has started her on peripheral nutrition.  I did discuss separately with the sons that this has not really been shown to be that effective and certainly would not alter the cancer and lack of treatment options.     Code Status: did not engage in discussions today    ACP Documents: Reviewed current existing digital forms    Goals of care: The patient and family endorses that what is most important right now is to focus on remaining as independent as possible and symptom/pain control    Accordingly, we have decided that the best plan to meet the patient's goals includes continuing with treatment      Recommendations/  Follow-up tasks: The patient and health care agent were provided the following recommendations trial of Decadron 10 mg IV daily for 3 days.  Continue with peripheral nutrition.  Monitor condition and treat abdominal and back pain.       Length of ACP   conversation in minutes: >76 minutes          Code status:  Full for now    Advance directives:  Documents are in place.  Her living will says that she would want artificial hydration and nutrition.  She is getting that currently.  We will need to clarify this with the patient and sons particularly as we move closer to hospice treatment.      Summary and recommendations:  Continue pain medication for both her lower back pain and the abdominal pain  Trial of Decadron 10 mg IV Q 24 hours x3 days to see if that will help her small-bowel obstruction.  Consider additional Oncology recommendations so patient and family can move forward with decision-making.  I will return to visit with them tomorrow morning.  Have encouraged the son who lives in [facility] to go home because the weather is about to get really bad.      > 76min time was spent on advance care planning, goals of care discussion, emotional support, formulating and communicating prognosis and goals of care,  exploring burden/benefit of various approaches of treatment.

## 2024-01-13 NOTE — PLAN OF CARE
Recommendations     Diet advancement per MD.  If TPN still warranted, rec'd 300g D, 85g AA + IV Lipids to provide 1860 kcals & 85 g of protein (GIR: 3.2).  RD following.     Goals: Meet % EEN, EPN by RD f/u date  Nutrition Goal Status: new  Communication of RD Recs: reviewed with RN

## 2024-01-13 NOTE — HPI
Ms. You is a 77yo female with a history of COPD, HLD, HTN, h/o breast cancer, and recently diagnosed metastatic pancreatic cancer who presented 1.10.2024  with abdominal pain, nausea, and emesis.  Been evaluated by Surgical Oncology and not felt to be a surgical candidate.  She is still considering chemotherapy and/or radiation therapy to prolong her life.  At this time however she has an ileus/small-bowel obstruction and has nasogastric decompression.  There is a consideration for placing a venting gastrostomy next week.    Palliative Medicine has been asked to assist with goals of care and treatment options and planning.

## 2024-01-13 NOTE — PROGRESS NOTES
[facility] Medicine  Progress Note    Patient Name: Radha You  MRN: [id number]  Patient Class: IP- Inpatient   Admission Date: 1/9/2024  Length of Stay: 3 days  Attending Physician: Navid Kate MD  Primary Care Provider: Sapphire Ruffin FNP-C        Subjective:     Principal Problem:Partial small bowel obstruction        HPI:  Radha You is a 78 year old white woman with former cigarette smoking, hypertension, hyperlipidemia, chronic obstructive pulmonary disease, chronic hypoxic respiratory failure(on 1 liter/minute of supplemental oxygen), osteoporosis, history of estrogen receptor positive progesterone receptor negative, 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 She lives in [place], [place]. She is . Her hematologist-oncologist is Dr. Charles Us.               She was seen at [facility] Hematology[facility]Oncology clinic on 1/4/2024 for her pancreatic adenocarcinoma, which was found incidentally during a bronchoscopy and biopsied by endoscopic ultrasound at [facility]. She was offered palliative chemotherapy and declined it.               She presented to [facility] Emergency Department on 1/9/2024 with abdominal pain for 3 to 4 days, associated with severe constipation with no bowel movement for several days despite over-the-counter laxatives, including suppositories. She was able to pass a small bowel movement earlier on the day of presentation but still felt constipated. She also had increase in pain in abdominal pain associated with nausea and vomiting causing inability to tolerate oral intake.  In the emergency department, heart rate was 115 bpm. Labs showed mild  leukocytosis. CT of the abdomen and pelvis with contrast revealed the known pancreatic tail mass, abutting and possibly invading the spleen with new geographic region of decreased enhancement in the lower splenic pole, suspicious for splenic infarct. There were multiple small bowel strictures suggested, possibly on the basis of metastatic disease that is possibly associated with low-grade or partial small bowel obstruction versus ileus. General Surgery was consulted and brought up the possibility of a palliative gastrostomy for venting as a treatment option. She deferred and opted for nasogastric decompression. She was given a liter of lactated Ringer's solution, 4 mg of intravenous morphine, and 4 mg of intravenous ondansetron. She was admitted to Hospital Medicine Team S.    Overview/Hospital Course:  She was kept NPO. General Surgery followed. She was put on lactated Ringer's solution at 75 mL/hr. She was given scopolamine patch. General Surgery signed off and recommended consulting Gastroenterology. Her urine was dark so fluids were increased to 100 mL/hr. After discussing the likelihood of recurrence of her acute problem due to the intestinal strictures, Gastroenterology was consulted on 1/12/2024 for venting gastrostomy. She was still reluctant so Palliative Care was consulted to discuss goals of care. Physical and Occupational Therapy were consulted when she reported that she had not gotten out of the hospital bed since being admitted. Famotidine was started for the esophagitis diagnosed on admission as well as to prevent esophageal ulcer due to having a nasogastric tube. Palliative Care had a long discussion with her and recommended a trial of dexamethasone to see if it would decrease intestinal swelling.     Interval History: Feels a bit better. Still has NG tube for decompression. It was advanced overnight.    Review of Systems   Constitutional:  Negative for chills and fever.   Gastrointestinal:  Negative  for vomiting.   Neurological:  Negative for seizures and syncope.     Objective:     Vital Signs (Most Recent):  Temp: 98.3 °F (36.8 °C) (01/13/24 1542)  Pulse: 80 (01/13/24 1542)  Resp: 16 (01/13/24 1452)  BP: 139/68 (01/13/24 1542)  SpO2: 100 % (01/13/24 1542) Vital Signs (24h Range):  Temp:  [97.6 °F (36.4 °C)-98.3 °F (36.8 °C)] 98.3 °F (36.8 °C)  Pulse:  [68-91] 80  Resp:  [16-21] 16  SpO2:  [83 %-100 %] 100 %  BP: (139-166)/(68-85) 139/68     Weight: 65 kg (143 lb 4.8 oz)  Body mass index is 22.44 kg/m².    Intake/Output Summary (Last 24 hours) at 1/13/2024 1649  Last data filed at 1/13/2024 1515  Gross per 24 hour   Intake --   Output 2775 ml   Net -2775 ml           Physical Exam  Vitals and nursing note reviewed.   Constitutional:       General: She is not in acute distress.     Appearance: She is well-developed and normal weight. She is not diaphoretic.   HENT:      Nose:      Comments: Nasogastric tube  Pulmonary:      Effort: Pulmonary effort is normal. No respiratory distress.   Skin:     General: Skin is warm and dry.      Coloration: Skin is not jaundiced or pale.   Neurological:      Mental Status: She is alert and oriented to person, place, and time. Mental status is at baseline.      Motor: No seizure activity.   Psychiatric:         Attention and Perception: Attention normal.         Mood and Affect: Affect normal.         Behavior: Behavior is cooperative.         Thought Content: Thought content normal.             Significant Labs: All pertinent labs within the past 24 hours have been reviewed.    Significant Imaging: I have reviewed all pertinent imaging results/findings within the past 24 hours.  X-Ray Abdomen AP 1 View 1/12/24: FINDINGS:   Two overlapping images acquired.   Reconfirmed enteric tube, again the tip is seen in the upper stomach beneath the left hemidiaphragm but the side port hole projects in the distal esophagus some 3 cm or greater from the expected esophageal hiatus.  Might  consider advancement of this tube further into the GI tract 5 cm or so.  There are several air-filled prominent to dilated small bowel loops involving the right mid and lower quadrants.  Maximum intraluminal measurements of these or 3.5 cm.  As previously discussed, findings could relate to some degree of ongoing partial small bowel obstruction.  No dilated colon segments.  No free air.  Contrast in the bladder.  No definite urinary tract calculi.  Reconfirmed findings of fusion hardware lower lumbar spine and lumbosacral region as well as electronic stimulator device with wires extending over the central lower lumbar spine.  EKG leads superimposed chest and abdomen.     Assessment/Plan:      * Partial small bowel obstruction  Small bowel stricture   General Surgery offered nasogastric decompression vs palliative gastrostomy decompression. NG for now. NPO. Scopolamine patch, prn antiemetics. Gastroenterology consulted to place venting gastrostomy. She has not firmly agreed to have it done yet. Change IV fluids to peripheral TPN to provide nutrition. Consulted PT and OT for debility. Appreciate Palliative Care. Started dexamethasone trial.    Reflux esophagitis  Started famotidine.    Pancreatic adenocarcinoma  Being followed in Oncology clinic here. Was offered palliative chemotherapy. She wants a follow-up to discuss prognosis and current treatment options. Consulted Oncology.      Chronic respiratory failure with hypoxia  Patient with a history of chronic hypoxic respiratory failure that is on 1-3 liters of oxygen from her COPD.    Centrilobular emphysema  Continue home azithromycin 250 mg three times weekly (MWF), albuterol-ipratropium nebulizers PRN.    Hyperlipidemia  Continue home pravastatin 40 mg daily.      Essential hypertension  Continue home metoprolol succinate 25 mg daily. Monitor blood pressures.    Carcinoma of right breast in female, estrogen receptor positive  History of breast cancer. Patient  previously had lumpectomy and chemoradiation.        VTE Risk Mitigation (From admission, onward)           Ordered     enoxaparin injection 40 mg  Daily         01/10/24 0335     IP VTE HIGH RISK PATIENT  Once         01/10/24 0335     Place sequential compression device  Until discontinued         01/10/24 0335                    Discharge Planning   RUFINO: 1/16/2024     Code Status: Full Code   Is the patient medically ready for discharge?: No    Reason for patient still in hospital (select all that apply): Treatment                     Navid Kate MD  Department of Hospital Medicine   [facility] - Telemetry Stepdown

## 2024-01-13 NOTE — NURSING
Notified Mely LAURA regarding patient's TPN. Called pharmacy regarding TPN administration and explained patient does not have central line informed. Pharmacy stated not to give TPN w/o central line and patient does not have central line. PA aware of information. PA explained they will relay message to day team regarding TPN.

## 2024-01-13 NOTE — ASSESSMENT & PLAN NOTE
Small bowel stricture   General Surgery offered nasogastric decompression vs palliative gastrostomy decompression. NG for now. NPO. Scopolamine patch, prn antiemetics. Gastroenterology consulted to place venting gastrostomy. She has not firmly agreed to have it done yet. Change IV fluids to peripheral TPN to provide nutrition. Consulted PT and OT for debility. Appreciate Palliative Care. Started dexamethasone trial.

## 2024-01-13 NOTE — PLAN OF CARE
Problem: Adult Inpatient Plan of Care  Goal: Plan of Care Review  Outcome: Ongoing, Progressing  Goal: Patient-Specific Goal (Individualized)  Outcome: Ongoing, Progressing  Goal: Absence of Hospital-Acquired Illness or Injury  Outcome: Ongoing, Progressing  Goal: Optimal Comfort and Wellbeing  Outcome: Ongoing, Progressing  Goal: Readiness for Transition of Care  Outcome: Ongoing, Progressing     Problem: Infection  Goal: Absence of Infection Signs and Symptoms  Outcome: Ongoing, Progressing     Problem: Skin Injury Risk Increased  Goal: Skin Health and Integrity  Outcome: Ongoing, Progressing     Problem: Coping Ineffective  Goal: Effective Coping  Outcome: Ongoing, Progressing    Patient awake w/ family at bedside. Answered all questions regarding plan of care for patient. Abdominal pain during shift relieved w/ PRN pain medication.

## 2024-01-13 NOTE — ASSESSMENT & PLAN NOTE
Recent diagnosis of pancreatic cancer with metastatic disease.  Now presenting with small-bowel obstruction due to tumor versus stricture versus ileus.  Patient is still contemplating chemo and radiation although in conversations with her sons she knows that is not realistic.  She is sad over and over that she is not a candidate for curative treatment.  She has not a surgical candidate.  We will certainly need to weigh benefit versus burden of chemotherapy with this patient who has limited functional ability at this time.  She also lives alone in [facility] far away from her children and/or healthcare.

## 2024-01-13 NOTE — CONSULTS
Thank you for this consult.  Chart reviewed.  Will see patient today.  Glo Ibarra MD  Palliative Medicine

## 2024-01-13 NOTE — ASSESSMENT & PLAN NOTE
Partial small bowel obstruction could be multifactorial including ileus, stricture from previous surgery or tumor.  Currently with NG tube in place.  Dr. Kate has started peripheral nutrition.  Patient is contemplating venting gastrostomy.  In the meantime would consider the use of Decadron 10 mg IVP daily for 3 days to see if there is an inflammatory component to this stricture to enable the ileus to resolve.  Patient and family are aware of this and are eager to start.  Discussed side effects of Decadron including elevated blood sugars and confusion.  They will be with her 24 a day.

## 2024-01-14 LAB
DNA RANGE(S) EXAMINED NAR: NORMAL
GENE DIS ANL INTERP-IMP: POSITIVE
GENE DIS ASSESSED: NORMAL
MSI CA SPEC-IMP: NOT DETECTED
POCT GLUCOSE: 108 MG/DL (ref 70–110)
POCT GLUCOSE: 110 MG/DL (ref 70–110)
POCT GLUCOSE: 130 MG/DL (ref 70–110)
POCT GLUCOSE: 135 MG/DL (ref 70–110)
POCT GLUCOSE: 83 MG/DL (ref 70–110)
REASON FOR STUDY: NORMAL
TEMPUS LCA: NORMAL
TEMPUS PORTAL: NORMAL
TEMPUS TRIAL1: NORMAL
TEMPUS TRIAL2: NORMAL
TEMPUS TRIAL3: NORMAL
TEMPUS TRIALCOUNT: 3

## 2024-01-14 PROCEDURE — 63600175 PHARM REV CODE 636 W HCPCS: Mod: JZ,JG | Performed by: HOSPITALIST

## 2024-01-14 PROCEDURE — 25000003 PHARM REV CODE 250: Performed by: STUDENT IN AN ORGANIZED HEALTH CARE EDUCATION/TRAINING PROGRAM

## 2024-01-14 PROCEDURE — 63600175 PHARM REV CODE 636 W HCPCS: Performed by: INTERNAL MEDICINE

## 2024-01-14 PROCEDURE — A4217 STERILE WATER/SALINE, 500 ML: HCPCS | Performed by: HOSPITALIST

## 2024-01-14 PROCEDURE — 99497 ADVNCD CARE PLAN 30 MIN: CPT | Mod: ,,, | Performed by: INTERNAL MEDICINE

## 2024-01-14 PROCEDURE — 20600001 HC STEP DOWN PRIVATE ROOM

## 2024-01-14 PROCEDURE — 99233 SBSQ HOSP IP/OBS HIGH 50: CPT | Mod: ,,, | Performed by: INTERNAL MEDICINE

## 2024-01-14 PROCEDURE — 25000003 PHARM REV CODE 250: Performed by: HOSPITALIST

## 2024-01-14 PROCEDURE — 63600175 PHARM REV CODE 636 W HCPCS: Performed by: NURSE PRACTITIONER

## 2024-01-14 PROCEDURE — 63600175 PHARM REV CODE 636 W HCPCS: Performed by: STUDENT IN AN ORGANIZED HEALTH CARE EDUCATION/TRAINING PROGRAM

## 2024-01-14 RX ADMIN — MORPHINE SULFATE 4 MG: 4 INJECTION INTRAVENOUS at 08:01

## 2024-01-14 RX ADMIN — MAGNESIUM SULFATE HEPTAHYDRATE: 500 INJECTION, SOLUTION INTRAMUSCULAR; INTRAVENOUS at 10:01

## 2024-01-14 RX ADMIN — DEXAMETHASONE SODIUM PHOSPHATE 10 MG: 10 INJECTION INTRAMUSCULAR; INTRAVENOUS at 08:01

## 2024-01-14 RX ADMIN — FAMOTIDINE 20 MG: 10 INJECTION INTRAVENOUS at 09:01

## 2024-01-14 RX ADMIN — CYCLOBENZAPRINE HYDROCHLORIDE 10 MG: 5 TABLET, FILM COATED ORAL at 08:01

## 2024-01-14 RX ADMIN — PRAVASTATIN SODIUM 40 MG: 40 TABLET ORAL at 09:01

## 2024-01-14 RX ADMIN — OXYCODONE HYDROCHLORIDE 10 MG: 10 TABLET ORAL at 09:01

## 2024-01-14 RX ADMIN — OXYCODONE HYDROCHLORIDE 10 MG: 10 TABLET ORAL at 03:01

## 2024-01-14 RX ADMIN — ENOXAPARIN SODIUM 40 MG: 40 INJECTION SUBCUTANEOUS at 04:01

## 2024-01-14 RX ADMIN — FAMOTIDINE 20 MG: 10 INJECTION INTRAVENOUS at 08:01

## 2024-01-14 RX ADMIN — CYCLOBENZAPRINE HYDROCHLORIDE 10 MG: 5 TABLET, FILM COATED ORAL at 02:01

## 2024-01-14 RX ADMIN — TOPIRAMATE 50 MG: 25 TABLET, FILM COATED ORAL at 09:01

## 2024-01-14 RX ADMIN — METOPROLOL SUCCINATE 25 MG: 25 TABLET, EXTENDED RELEASE ORAL at 08:01

## 2024-01-14 RX ADMIN — CYCLOBENZAPRINE HYDROCHLORIDE 10 MG: 5 TABLET, FILM COATED ORAL at 09:01

## 2024-01-14 NOTE — ASSESSMENT & PLAN NOTE
01.14.2024  Symptoms:  Abdominal pain, nausea, vomiting.     Medicolegal:  Patient has decision making capacity.  Son Hiram is surrogate decision maker and is HCPOA in his present at the bedside.     Psychosocial:  support system consists of friends and family.  Her best friend for over 60 years i came to visit her and she is distraught because she thinks that she is trying to take over her life and get her finances.     Spiritual:  Nondenominational.  Has not been involved with the Religious family since moving to [facility].    Prognostication:  Poor    Understanding of disease and Illness Trajectory: Patient and Family  has  adequate understanding of her illness, they can benefit from continued education on what to expect in the future.      Goals of care:  Comfort and quality of life as well as independence.  Advance Care Planning     Date: 01/13/2024    Today a voluntary meeting took place: bedside    Patient Participation: Patient is unable to participate     Attendees (Name and  Relationship to patient): Health care power of : Hiram edwards   Staff attendees (Name and  Role): Glo Ibarra MD    ACP Conversation (General): Patient and family are aware that her disease is not curable.  We did talk again about improving her ileus/small-bowel obstruction.  Malignant obstruction can sometimes be relieved with higher dose Decadron and I offered that as an option to the family for 2/3 days.  We have that timeframe because they venting gastrostomy can not be placed prior to Tuesday at the earliest.  Her primary team has started her on peripheral nutrition.  I did discuss again that this has not really been shown to be that effective and certainly would not alter the cancer and lack of treatment options.     Code Status:  Discussed with son that if she dies in her sleep we should let her.  I would put in a do not resuscitate order.  He said he would talk with his brother about this.  ACP Documents: Reviewed current  existing digital forms    Goals of care: The patient and family endorses that what is most important right now is to focus on remaining as independent as possible and symptom/pain control    Accordingly, we have decided that the best plan to meet the patient's goals includes continuing with treatment      Recommendations/  Follow-up tasks: The patient and health care agent were provided the following recommendations trial of Decadron 10 mg IV daily for on day2 of 3 days.  Continue with peripheral nutrition.  Have decreased the IV fluids to see if that will help her IV pump from beeping.  Monitor condition and treat abdominal and back pain.      Length of ACP   conversation in minutes: >16 min          Code status:  Full for now    Advance directives:  Documents are in place.  Her living will says that she would want artificial hydration and nutrition.  She is getting that currently.  We will need to clarify this with the patient and sons particularly as we move closer to hospice treatment.      Summary and recommendations:  Continue pain medication for both her lower back pain and the abdominal pain  Trial of Decadron 10 mg IV Q 24 hours x3 days to see if that will help her small-bowel obstruction.  Consider additional Oncology recommendations so patient and family can move forward with decision-making.  I will return to visit with them tomorrow morning.  Have encouraged the son who lives in [facility] to go home because the weather is about to get really bad.    01.13.2024  Impression:    Symptoms:  Abdominal pain, nausea, vomiting.     Medicolegal:  Patient has decision making capacity.  Son Hiram is surrogate decision maker and is HCPOA.     Psychosocial:  support system consists of friends and family.  Her best friend for over 60 years is coming to visit her tomorrow.  She plans to have a checklist of things that her friend needs to take care of.  This gives her great comfort.     Spiritual:  Episcopal.   Has not been involved with the [employer] family since moving to [facility].    Prognostication:  Poor    Understanding of disease and Illness Trajectory: Patient and Family  has  adequate understanding of her illness, they can benefit from continued education on what to expect in the future.      Goals of care:  Comfort and quality of life as well as independence.  Advance Care Planning     Date: 01/13/2024    Today a voluntary meeting took place: bedside    Patient Participation: Patient is able to participate     Attendees (Name and  Relationship to patient): Health care power of : Hiram son and other son Anibal and Hiram's daughter Shabnam.    Staff attendees (Name and  Role): Glo Ibarra MD    ACP Conversation (General): Understanding of current condition long conversation with patient and her 2 sons and granddaughter.  Patient is aware that her disease is not curable.  She is still thinking that she could see an oncologist in least will and receive chemotherapy or radiation.  She is not a surgical candidate and understands that.  I did ask her how much she was willing to go through for extra time.  She was unable to answer that.  She was more focused on making sure her house was okay and the  came etc. and so forth.  We did talk at length about improving her ileus/small-bowel obstruction.  Malignant obstruction can sometimes be relieved with higher dose Decadron and I offered that as an option to the family for 3 days.  We have that timeframe because they venting gastrostomy can not be placed prior to Tuesday at the earliest.  Her primary team has started her on peripheral nutrition.  I did discuss separately with the sons that this has not really been shown to be that effective and certainly would not alter the cancer and lack of treatment options.     Code Status: did not engage in discussions today    ACP Documents: Reviewed current existing digital forms    Goals of care: The patient and  family endorses that what is most important right now is to focus on remaining as independent as possible and symptom/pain control    Accordingly, we have decided that the best plan to meet the patient's goals includes continuing with treatment      Recommendations/  Follow-up tasks: The patient and health care agent were provided the following recommendations trial of Decadron 10 mg IV daily for 3 days.  Continue with peripheral nutrition.  Monitor condition and treat abdominal and back pain.       Length of ACP   conversation in minutes: >76 minutes          Code status:  Full for now    Advance directives:  Documents are in place.  Her living will says that she would want artificial hydration and nutrition.  She is getting that currently.  We will need to clarify this with the patient and sons particularly as we move closer to hospice treatment.      Summary and recommendations:  Continue pain medication for both her lower back pain and the abdominal pain  Trial of Decadron 10 mg IV Q 24 hours x3 days to see if that will help her small-bowel obstruction.  Consider additional Oncology recommendations so patient and family can move forward with decision-making.  I will return to visit with them tomorrow morning.  Have encouraged the son who lives in [facility] to go home because the weather is about to get really bad.      > 76min time was spent on advance care planning, goals of care discussion, emotional support, formulating and communicating prognosis and goals of care, exploring burden/benefit of various approaches of treatment.

## 2024-01-14 NOTE — PLAN OF CARE
Problem: Adult Inpatient Plan of Care  Goal: Plan of Care Review  Outcome: Ongoing, Progressing  Goal: Patient-Specific Goal (Individualized)  Outcome: Ongoing, Progressing  Goal: Absence of Hospital-Acquired Illness or Injury  Outcome: Ongoing, Progressing  Goal: Optimal Comfort and Wellbeing  Outcome: Ongoing, Progressing  Goal: Readiness for Transition of Care  Outcome: Ongoing, Progressing     Problem: Infection  Goal: Absence of Infection Signs and Symptoms  Outcome: Ongoing, Progressing     Problem: Skin Injury Risk Increased  Goal: Skin Health and Integrity  Outcome: Ongoing, Progressing     Problem: Coping Ineffective  Goal: Effective Coping  Outcome: Ongoing, Progressing     Problem: Fall Injury Risk  Goal: Absence of Fall and Fall-Related Injury  Outcome: Ongoing, Progressing  Pt is currently in bed with call light within reach personal belongings at bedside and bed in lowest position. Family is also at bedside and is attentive to pt. Will continue to monitor for any changes in care.

## 2024-01-14 NOTE — NURSING
Notified Mely LAURA regarding fluids for patient. Patient is currently on D5 1/2 NS at 100ml/hr. Asked if we needed to adjust/change since patient started TPN. PA wants to keep patient on current fluids at same rate until dayshift team comes in to adjust orders.

## 2024-01-14 NOTE — ASSESSMENT & PLAN NOTE
01/14/2024 patient's functional status is such that she has not a candidate for chemo or radiation.  Consideration of venting gastrostomy is still possible.    01/143/2024  Recent diagnosis of pancreatic cancer with metastatic disease.  Now presenting with small-bowel obstruction due to tumor versus stricture versus ileus.  Patient is still contemplating chemo and radiation although in conversations with her sons she knows that is not realistic.  She is sad over and over that she is not a candidate for curative treatment.  She has not a surgical candidate.  We will certainly need to weigh benefit versus burden of chemotherapy with this patient who has limited functional ability at this time.  She also lives alone in [facility] far away from her children and/or healthcare.

## 2024-01-14 NOTE — PLAN OF CARE
Abdominal X-ray showed mid increase in bowel distension and recurrence of nasogastric tube tip in the esophagus despite having been advanced 2 nights prior.    X-Ray Abdomen Portable 1/14/24: FINDINGS:   The bowel gas pattern is non-specific.Mild small bowel distension identified up to 4 cm, slightly increased from prior examination of 01/12/2024.  Partial small bowel obstruction remains a consideration.   Side-hole of NG tube is at the level of the distal esophagus and can be further advanced as previously noted.   No airspace consolidation at the lung bases.No acute bony abnormality.No abnormal soft tissue masses.No abnormal calcific densities.Advanced degenerative changes left hip.Postoperative changes lower lumbar spine with fusion hardware.  Neurostimulator device with wires over the central lumbar spine.   Impression:  As above, with mild increase in small bowel distension, potentially partial small bowel obstruction.   Nasogastric tube can be further advanced as per above.     Assessment/Plan: No progress.  Advance NG tube again. Continue current treatment.

## 2024-01-14 NOTE — SUBJECTIVE & OBJECTIVE
Interval History:  Patient is confused more today and somewhat distraught that things are going on that she has no control over.  She is angry that she feels that people are trying to take her money.  Had a long talk with her son outside of the room as well about how to best care for patient.  He is willing to take mom to his house and take care of her for the duration.  He does not think that she will be amenable to hospice but he has significant experience in taking care of patients who are dying.  I told him that hospice would offer a lot of support.  She is distraught over the IV in her right arm that keeps beeping because she keeps flexing her elbow (she was told never to have an IV in the right arm to prevent lymphedema-which has changed but she is unwilling to hear that) pain is better controlled however she is somewhat delirious trying to get out of bed picking at things.  She is concerned over her pure wick catheter system as well.    Medications:  Continuous Infusions:   dextrose 5 % and 0.45 % NaCl 20 mL/hr at 01/14/24 1100    Standard Custom Day One ADULT TPN for patient with NO electrolyte abnormality or NO renal dysfunction (PERIPHERAL) 42 mL/hr at 01/13/24 2227     Scheduled Meds:   azithromycin  250 mg Oral Once per day on Mon Wed Fri    cyclobenzaprine  10 mg Oral TID    dexAMETHasone  10 mg Intravenous Daily    enoxparin  40 mg Subcutaneous Daily    famotidine (PF)  20 mg Intravenous BID    metoprolol succinate  25 mg Oral Daily    pravastatin  40 mg Oral QHS    scopolamine  1 patch Transdermal Q3 Days    topiramate  50 mg Oral QHS     PRN Meds:acetaminophen, albuterol-ipratropium, dextrose 10%, dextrose 10%, glucagon (human recombinant), glucose, glucose, haloperidol lactate, melatonin, morphine, naloxone, ondansetron, oxyCODONE, simethicone, sodium chloride 0.9%    Objective:     Vital Signs (Most Recent):  Temp: 97.6 °F (36.4 °C) (01/14/24 1008)  Pulse: 76 (01/14/24 1141)  Resp: 15 (01/14/24  1141)  BP: (!) 188/87 (01/14/24 1141)  SpO2: 100 % (01/14/24 1141) Vital Signs (24h Range):  Temp:  [97.5 °F (36.4 °C)-98.3 °F (36.8 °C)] 97.6 °F (36.4 °C)  Pulse:  [76-92] 76  Resp:  [15-26] 15  SpO2:  [80 %-100 %] 100 %  BP: (139-188)/(68-95) 188/87     Weight: 65 kg (143 lb 4.8 oz)  Body mass index is 22.44 kg/m².       Physical Exam  Vitals reviewed.   Constitutional:       Appearance: She is ill-appearing.   Cardiovascular:      Rate and Rhythm: Normal rate.   Pulmonary:      Effort: Pulmonary effort is normal.   Abdominal:      Comments: No bowel sounds heard.  Abdomen is a little distended but still soft.  Tender in the left upper quadrant   Musculoskeletal:         General: Normal range of motion.   Skin:     General: Skin is warm.   Neurological:      Mental Status: She is alert. She is disoriented.   Psychiatric:      Comments: Confused and tangential.            Review of Symptoms      Symptom Assessment (ESAS 0-10 Scale)  Pain:  0  Dyspnea:  0  Anxiety:  0  Nausea:  0  Depression:  0  Anorexia:  0  Fatigue:  0  Insomnia:  0  Restlessness:  3  Agitation:  3     CAM / Delirium:  Positive  Constipation:  Negative  Diarrhea:  Negative      Bowel Management Plan (BMP):  Yes      Pain Assessment:  OME in 24 hours:  50  Location(s): abdomen    Abdomen       Location: left        Quality: Cramping        Chronicity: Onset 3 week(s) ago, controlled since Pain and nausea better with NG drainage.        Aggravating Factors: None        Alleviating Factors: Opiates (NG drainage)        Associated Symptoms: None    Performance Status:  50    Living Arrangements:  Lives alone and Lives >50 miles from facility    Psychosocial/Cultural:   See Palliative Psychosocial Note: No   2 sons, lives in [facility].  Son in [place] is willing to take her into his home.  He is willing to care for her.  He has not sure that hospice will be accepted by his mother.  **Primary  to Follow**  Palliative Care  " Consult: No    Spiritual:  F - Lilly and Belief:  Caodaism  I - Importance:  Yes  C - Community:  Yes  A - Address in Care:  Yes        Advance Care Planning   Advance Directives:   Living Will: Yes        Copy on chart: Yes    LaPOST: No    Do Not Resuscitate Status: No    Medical Power of : Yes    Agent's Name:  Hiram Ramos   Agent's Contact Number:  [phone]    Decision Making:  Patient answered questions and Family answered questions  Goals of Care: What is most important right now is to focus on remaining as independent as possible, symptom/pain control. Accordingly, we have decided that the best plan to meet the patient's goals includes continuing with treatment.  Family is thinking transitioning to comfort focus treatment would be their plan.  They are hopeful that the Decadron works.  I have recommended that we still consider venting gastrostomy.         Significant Labs: All pertinent labs within the past 24 hours have been reviewed.  CBC:   Recent Labs   Lab 01/12/24  0423   WBC 8.21   HGB 12.7   HCT 39.2   MCV 90        BMP:  No results for input(s): "GLU", "NA", "K", "CL", "CO2", "BUN", "CREATININE", "CALCIUM", "MG" in the last 24 hours.  LFT:  Lab Results   Component Value Date    AST 15 01/11/2024    ALKPHOS 61 01/11/2024    BILITOT 1.1 (H) 01/11/2024     Albumin:   Albumin   Date Value Ref Range Status   01/11/2024 2.8 (L) 3.5 - 5.2 g/dL Final     Protein:   Total Protein   Date Value Ref Range Status   01/11/2024 5.6 (L) 6.0 - 8.4 g/dL Final     Lactic acid:   Lab Results   Component Value Date    LACTATE 0.7 03/05/2020    LACTATE 1.0 03/27/2019       Significant Imaging: I have reviewed all pertinent imaging results/findings within the past 24 hours.  "

## 2024-01-14 NOTE — ASSESSMENT & PLAN NOTE
01.14.2024  Has not had a significant improvement with the addition of dexamethasone.  Would continue for 3 days and monitor.  KUB looks worse today.  Consider venting gastrostomy.  01.13.2024  Partial small bowel obstruction could be multifactorial including ileus, stricture from previous surgery or tumor.  Currently with NG tube in place.  Dr. Kate has started peripheral nutrition.  Patient is contemplating venting gastrostomy.  In the meantime would consider the use of Decadron 10 mg IVP daily for 3 days to see if there is an inflammatory component to this stricture to enable the ileus to resolve.  Patient and family are aware of this and are eager to start.  Discussed side effects of Decadron including elevated blood sugars and confusion.  They will be with her 24 a day.

## 2024-01-14 NOTE — PROGRESS NOTES
[facility]  [facility] Note    Patient Name: Radha You  MRN: [id number]  Admission Date: 1/9/2024  Hospital Length of Stay: 4 days  Code Status: Full Code   Attending Provider: Navid Kate MD  Consulting Provider: Glo Ibarra MD  Primary Care Physician: Sapphire Ruffin FNP-C  Principal Problem:Partial small bowel obstruction    Patient information was obtained from patient, relative(s), caregiver / friend, and primary team.      Assessment/Plan:     Oncology  Pancreatic adenocarcinoma  01/14/2024 patient's functional status is such that she has not a candidate for chemo or radiation.  Consideration of venting gastrostomy is still possible.    01/143/2024  Recent diagnosis of pancreatic cancer with metastatic disease.  Now presenting with small-bowel obstruction due to tumor versus stricture versus ileus.  Patient is still contemplating chemo and radiation although in conversations with her sons she knows that is not realistic.  She is sad over and over that she is not a candidate for curative treatment.  She has not a surgical candidate.  We will certainly need to weigh benefit versus burden of chemotherapy with this patient who has limited functional ability at this time.  She also lives alone in [facility] far away from her children and/or healthcare.    GI  * Partial small bowel obstruction  01.14.2024  Has not had a significant improvement with the addition of dexamethasone.  Would continue for 3 days and monitor.  KUB looks worse today.  Consider venting gastrostomy.  01.13.2024  Partial small bowel obstruction could be multifactorial including ileus, stricture from previous surgery or tumor.  Currently with NG tube in place.  Dr. Kate has started peripheral nutrition.  Patient is contemplating venting gastrostomy.  In the meantime would consider the use of Decadron 10 mg IVP daily for 3 days to see if there is an inflammatory component to this stricture to  enable the ileus to resolve.  Patient and family are aware of this and are eager to start.  Discussed side effects of Decadron including elevated blood sugars and confusion.  They will be with her 24 a day.      Palliative Care  Palliative care encounter    01.14.2024  Symptoms:  Abdominal pain, nausea, vomiting.     Medicolegal:  Patient has decision making capacity.  Son Hiram is surrogate decision maker and is HCPOA in his present at the bedside.     Psychosocial:  support system consists of friends and family.  Her best friend for over 60 years i came to visit her and she is distraught because she thinks that she is trying to take over her life and get her finances.     Spiritual:  Mosque.  Has not been involved with the Pentec[employer]al family since moving to [facility].    Prognostication:  Poor    Understanding of disease and Illness Trajectory: Patient and Family  has  adequate understanding of her illness, they can benefit from continued education on what to expect in the future.      Goals of care:  Comfort and quality of life as well as independence.  Advance Care Planning     Date: 01/13/2024    Today a voluntary meeting took place: bedside    Patient Participation: Patient is unable to participate     Attendees (Name and  Relationship to patient): Health care power of : Hiram edwards   Staff attendees (Name and  Role): Glo Ibarra MD    ACP Conversation (General): Patient and family are aware that her disease is not curable.  We did talk again about improving her ileus/small-bowel obstruction.  Malignant obstruction can sometimes be relieved with higher dose Decadron and I offered that as an option to the family for 2/3 days.  We have that timeframe because they venting gastrostomy can not be placed prior to Tuesday at the earliest.  Her primary team has started her on peripheral nutrition.  I did discuss again that this has not really been shown to be that effective and certainly would not alter the  cancer and lack of treatment options.     Code Status:  Discussed with son that if she dies in her sleep we should let her.  I would put in a do not resuscitate order.  He said he would talk with his brother about this.  ACP Documents: Reviewed current existing digital forms    Goals of care: The patient and family endorses that what is most important right now is to focus on remaining as independent as possible and symptom/pain control    Accordingly, we have decided that the best plan to meet the patient's goals includes continuing with treatment      Recommendations/  Follow-up tasks: The patient and health care agent were provided the following recommendations trial of Decadron 10 mg IV daily for on day2 of 3 days.  Continue with peripheral nutrition.  Have decreased the IV fluids to see if that will help her IV pump from beeping.  Monitor condition and treat abdominal and back pain.      Length of ACP   conversation in minutes: >16 min          Code status:  Full for now    Advance directives:  Documents are in place.  Her living will says that she would want artificial hydration and nutrition.  She is getting that currently.  We will need to clarify this with the patient and sons particularly as we move closer to hospice treatment.      Summary and recommendations:  Continue pain medication for both her lower back pain and the abdominal pain  Trial of Decadron 10 mg IV Q 24 hours x3 days to see if that will help her small-bowel obstruction.  Consider additional Oncology recommendations so patient and family can move forward with decision-making.  I will return to visit with them tomorrow morning.  Have encouraged the son who lives in [facility] to go home because the weather is about to get really bad.    01.13.2024  Impression:    Symptoms:  Abdominal pain, nausea, vomiting.     Medicolegal:  Patient has decision making capacity.  Son Hiram is surrogate decision maker and is HCPOA.     Psychosocial:   support system consists of friends and family.  Her best friend for over 60 years is coming to visit her tomorrow.  She plans to have a checklist of things that her friend needs to take care of.  This gives her great comfort.     Spiritual:  Sikhism.  Has not been involved with the Orthodoxy family since moving to [facility].    Prognostication:  Poor    Understanding of disease and Illness Trajectory: Patient and Family  has  adequate understanding of her illness, they can benefit from continued education on what to expect in the future.      Goals of care:  Comfort and quality of life as well as independence.  Advance Care Planning    Date: 01/13/2024    Today a voluntary meeting took place: bedside    Patient Participation: Patient is able to participate     Attendees (Name and  Relationship to patient): Health care power of : Hiram son and other son Anibal and Hiram's daughter Shabnam.    Staff attendees (Name and  Role): Glo Ibarra MD    ACP Conversation (General): Understanding of current condition long conversation with patient and her 2 sons and granddaughter.  Patient is aware that her disease is not curable.  She is still thinking that she could see an oncologist in least will and receive chemotherapy or radiation.  She is not a surgical candidate and understands that.  I did ask her how much she was willing to go through for extra time.  She was unable to answer that.  She was more focused on making sure her house was okay and the  came etc. and so forth.  We did talk at length about improving her ileus/small-bowel obstruction.  Malignant obstruction can sometimes be relieved with higher dose Decadron and I offered that as an option to the family for 3 days.  We have that timeframe because they venting gastrostomy can not be placed prior to Tuesday at the earliest.  Her primary team has started her on peripheral nutrition.  I did discuss separately with the sons that this has not  really been shown to be that effective and certainly would not alter the cancer and lack of treatment options.     Code Status: did not engage in discussions today    ACP Documents: Reviewed current existing digital forms    Goals of care: The patient and family endorses that what is most important right now is to focus on remaining as independent as possible and symptom/pain control    Accordingly, we have decided that the best plan to meet the patient's goals includes continuing with treatment      Recommendations/  Follow-up tasks: The patient and health care agent were provided the following recommendations trial of Decadron 10 mg IV daily for 3 days.  Continue with peripheral nutrition.  Monitor condition and treat abdominal and back pain.       Length of ACP   conversation in minutes: >76 minutes          Code status:  Full for now    Advance directives:  Documents are in place.  Her living will says that she would want artificial hydration and nutrition.  She is getting that currently.  We will need to clarify this with the patient and sons particularly as we move closer to hospice treatment.      Summary and recommendations:  Continue pain medication for both her lower back pain and the abdominal pain  Trial of Decadron 10 mg IV Q 24 hours x3 days to see if that will help her small-bowel obstruction.  Consider additional Oncology recommendations so patient and family can move forward with decision-making.  I will return to visit with them tomorrow morning.  Have encouraged the son who lives in [facility] to go home because the weather is about to get really bad.      > 76min time was spent on advance care planning, goals of care discussion, emotional support, formulating and communicating prognosis and goals of care, exploring burden/benefit of various approaches of treatment.                I will follow-up with patient. Please contact us if you have any additional questions.    Subjective:     Chief  Complaint:   Chief Complaint   Patient presents with    Abdominal Pain     Recently diagnosed with pancreatic cancer. C/o constipation. Last BM x6 days ago. Hx of COPD. Wears 3L O2 at baseline       HPI:   Ms. You is a 77yo female with a history of COPD, HLD, HTN, h/o breast cancer, and recently diagnosed metastatic pancreatic cancer who presented 1.10.2024  with abdominal pain, nausea, and emesis.  Been evaluated by Surgical Oncology and not felt to be a surgical candidate.  She is still considering chemotherapy and/or radiation therapy to prolong her life.  At this time however she has an ileus/small-bowel obstruction and has nasogastric decompression.  There is a consideration for placing a venting gastrostomy next week.    Palliative Medicine has been asked to assist with goals of care and treatment options and planning.      Hospital Course:  No notes on file    Interval History:  Patient is confused more today and somewhat distraught that things are going on that she has no control over.  She is angry that she feels that people are trying to take her money.  Had a long talk with her son outside of the room as well about how to best care for patient.  He is willing to take mom to his house and take care of her for the duration.  He does not think that she will be amenable to hospice but he has significant experience in taking care of patients who are dying.  I told him that hospice would offer a lot of support.  She is distraught over the IV in her right arm that keeps beeping because she keeps flexing her elbow (she was told never to have an IV in the right arm to prevent lymphedema-which has changed but she is unwilling to hear that) pain is better controlled however she is somewhat delirious trying to get out of bed picking at things.  She is concerned over her pure wick catheter system as well.    Medications:  Continuous Infusions:   dextrose 5 % and 0.45 % NaCl 20 mL/hr at 01/14/24 1100    Standard  [employer] ADULT TPN for patient with NO electrolyte abnormality or NO renal dysfunction (PERIPHERAL) 42 mL/hr at 01/13/24 2227     Scheduled Meds:   azithromycin  250 mg Oral Once per day on Mon Wed Fri    cyclobenzaprine  10 mg Oral TID    dexAMETHasone  10 mg Intravenous Daily    enoxparin  40 mg Subcutaneous Daily    famotidine (PF)  20 mg Intravenous BID    metoprolol succinate  25 mg Oral Daily    pravastatin  40 mg Oral QHS    scopolamine  1 patch Transdermal Q3 Days    topiramate  50 mg Oral QHS     PRN Meds:acetaminophen, albuterol-ipratropium, dextrose 10%, dextrose 10%, glucagon (human recombinant), glucose, glucose, haloperidol lactate, melatonin, morphine, naloxone, ondansetron, oxyCODONE, simethicone, sodium chloride 0.9%    Objective:     Vital Signs (Most Recent):  Temp: 97.6 °F (36.4 °C) (01/14/24 1008)  Pulse: 76 (01/14/24 1141)  Resp: 15 (01/14/24 1141)  BP: (!) 188/87 (01/14/24 1141)  SpO2: 100 % (01/14/24 1141) Vital Signs (24h Range):  Temp:  [97.5 °F (36.4 °C)-98.3 °F (36.8 °C)] 97.6 °F (36.4 °C)  Pulse:  [76-92] 76  Resp:  [15-26] 15  SpO2:  [80 %-100 %] 100 %  BP: (139-188)/(68-95) 188/87     Weight: 65 kg (143 lb 4.8 oz)  Body mass index is 22.44 kg/m².       Physical Exam  Vitals reviewed.   Constitutional:       Appearance: She is ill-appearing.   Cardiovascular:      Rate and Rhythm: Normal rate.   Pulmonary:      Effort: Pulmonary effort is normal.   Abdominal:      Comments: No bowel sounds heard.  Abdomen is a little distended but still soft.  Tender in the left upper quadrant   Musculoskeletal:         General: Normal range of motion.   Skin:     General: Skin is warm.   Neurological:      Mental Status: She is alert. She is disoriented.   Psychiatric:      Comments: Confused and tangential.            Review of Symptoms      Symptom Assessment (ESAS 0-10 Scale)  Pain:  0  Dyspnea:  0  Anxiety:  0  Nausea:  0  Depression:  0  Anorexia:  0  Fatigue:  0  Insomnia:  0  Restlessness:   3  Agitation:  3     CAM / Delirium:  Positive  Constipation:  Negative  Diarrhea:  Negative      Bowel Management Plan (BMP):  Yes      Pain Assessment:  OME in 24 hours:  50  Location(s): abdomen    Abdomen       Location: left        Quality: Cramping        Chronicity: Onset 3 week(s) ago, controlled since Pain and nausea better with NG drainage.        Aggravating Factors: None        Alleviating Factors: Opiates (NG drainage)        Associated Symptoms: None    Performance Status:  50    Living Arrangements:  Lives alone and Lives >50 miles from facility    Psychosocial/Cultural:   See Palliative Psychosocial Note: No   2 sons, lives in [facility].  Son in [place] is willing to take her into his home.  He is willing to care for her.  He has not sure that hospice will be accepted by his mother.  **Primary  to Follow**  Palliative Care  Consult: No    Spiritual:  F - Lilly and Belief:  Mosque  I - Importance:  Yes  C - Community:  Yes  A - Address in Care:  Yes        Advance Care Planning  Advance Directives:   Living Will: Yes        Copy on chart: Yes    LaPOST: No    Do Not Resuscitate Status: No    Medical Power of : Yes    Agent's Name:  Hiram Ramos   Agent's Contact Number:  [phone]    Decision Making:  Patient answered questions and Family answered questions  Goals of Care: What is most important right now is to focus on remaining as independent as possible, symptom/pain control. Accordingly, we have decided that the best plan to meet the patient's goals includes continuing with treatment.  Family is thinking transitioning to comfort focus treatment would be their plan.  They are hopeful that the Decadron works.  I have recommended that we still consider venting gastrostomy.         Significant Labs: All pertinent labs within the past 24 hours have been reviewed.  CBC:   Recent Labs   Lab 01/12/24  0423   WBC 8.21   HGB 12.7   HCT 39.2   MCV 90  "       BMP:  No results for input(s): "GLU", "NA", "K", "CL", "CO2", "BUN", "CREATININE", "CALCIUM", "MG" in the last 24 hours.  LFT:  Lab Results   Component Value Date    AST 15 01/11/2024    ALKPHOS 61 01/11/2024    BILITOT 1.1 (H) 01/11/2024     Albumin:   Albumin   Date Value Ref Range Status   01/11/2024 2.8 (L) 3.5 - 5.2 g/dL Final     Protein:   Total Protein   Date Value Ref Range Status   01/11/2024 5.6 (L) 6.0 - 8.4 g/dL Final     Lactic acid:   Lab Results   Component Value Date    LACTATE 0.7 03/05/2020    LACTATE 1.0 03/27/2019       Significant Imaging: I have reviewed all pertinent imaging results/findings within the past 24 hours.    Glo Ibarra MD  Palliative Medicine  [facility] - Telemetry Stepdown                "

## 2024-01-15 LAB
ALBUMIN SERPL BCP-MCNC: 3.2 G/DL (ref 3.5–5.2)
ALP SERPL-CCNC: 82 U/L (ref 55–135)
ALT SERPL W/O P-5'-P-CCNC: 26 U/L (ref 10–44)
ANION GAP SERPL CALC-SCNC: 9 MMOL/L (ref 8–16)
AST SERPL-CCNC: 42 U/L (ref 10–40)
BILIRUB SERPL-MCNC: 0.7 MG/DL (ref 0.1–1)
BUN SERPL-MCNC: 12 MG/DL (ref 8–23)
CALCIUM SERPL-MCNC: 9.7 MG/DL (ref 8.7–10.5)
CHLORIDE SERPL-SCNC: 102 MMOL/L (ref 95–110)
CO2 SERPL-SCNC: 30 MMOL/L (ref 23–29)
CREAT SERPL-MCNC: 0.7 MG/DL (ref 0.5–1.4)
ERYTHROCYTE [DISTWIDTH] IN BLOOD BY AUTOMATED COUNT: 13.1 % (ref 11.5–14.5)
EST. GFR  (NO RACE VARIABLE): >60 ML/MIN/1.73 M^2
GLUCOSE SERPL-MCNC: 107 MG/DL (ref 70–110)
HCT VFR BLD AUTO: 40.3 % (ref 37–48.5)
HGB BLD-MCNC: 12.7 G/DL (ref 12–16)
MCH RBC QN AUTO: 28.3 PG (ref 27–31)
MCHC RBC AUTO-ENTMCNC: 31.5 G/DL (ref 32–36)
MCV RBC AUTO: 90 FL (ref 82–98)
PLATELET # BLD AUTO: 275 K/UL (ref 150–450)
PLATELET BLD QL SMEAR: NORMAL
PMV BLD AUTO: 10.6 FL (ref 9.2–12.9)
POCT GLUCOSE: 101 MG/DL (ref 70–110)
POCT GLUCOSE: 102 MG/DL (ref 70–110)
POCT GLUCOSE: 115 MG/DL (ref 70–110)
POCT GLUCOSE: 72 MG/DL (ref 70–110)
POTASSIUM SERPL-SCNC: 3.4 MMOL/L (ref 3.5–5.1)
PROT SERPL-MCNC: 6.7 G/DL (ref 6–8.4)
RBC # BLD AUTO: 4.49 M/UL (ref 4–5.4)
SODIUM SERPL-SCNC: 141 MMOL/L (ref 136–145)
WBC # BLD AUTO: 12.15 K/UL (ref 3.9–12.7)

## 2024-01-15 PROCEDURE — 63600175 PHARM REV CODE 636 W HCPCS: Performed by: HOSPITALIST

## 2024-01-15 PROCEDURE — 80053 COMPREHEN METABOLIC PANEL: CPT | Performed by: HOSPITALIST

## 2024-01-15 PROCEDURE — 63600175 PHARM REV CODE 636 W HCPCS: Performed by: NURSE PRACTITIONER

## 2024-01-15 PROCEDURE — 25000003 PHARM REV CODE 250: Performed by: STUDENT IN AN ORGANIZED HEALTH CARE EDUCATION/TRAINING PROGRAM

## 2024-01-15 PROCEDURE — 20600001 HC STEP DOWN PRIVATE ROOM

## 2024-01-15 PROCEDURE — 25000003 PHARM REV CODE 250: Performed by: HOSPITALIST

## 2024-01-15 PROCEDURE — 63600175 PHARM REV CODE 636 W HCPCS: Performed by: STUDENT IN AN ORGANIZED HEALTH CARE EDUCATION/TRAINING PROGRAM

## 2024-01-15 PROCEDURE — A4217 STERILE WATER/SALINE, 500 ML: HCPCS | Performed by: HOSPITALIST

## 2024-01-15 PROCEDURE — 99233 SBSQ HOSP IP/OBS HIGH 50: CPT | Mod: ,,, | Performed by: INTERNAL MEDICINE

## 2024-01-15 PROCEDURE — 63600175 PHARM REV CODE 636 W HCPCS: Performed by: INTERNAL MEDICINE

## 2024-01-15 PROCEDURE — 36415 COLL VENOUS BLD VENIPUNCTURE: CPT | Performed by: HOSPITALIST

## 2024-01-15 PROCEDURE — 85027 COMPLETE CBC AUTOMATED: CPT | Performed by: HOSPITALIST

## 2024-01-15 PROCEDURE — 63700000 PHARM REV CODE 250 ALT 637 W/O HCPCS: Performed by: STUDENT IN AN ORGANIZED HEALTH CARE EDUCATION/TRAINING PROGRAM

## 2024-01-15 RX ORDER — AMOXICILLIN 250 MG
1 CAPSULE ORAL 2 TIMES DAILY
Status: DISCONTINUED | OUTPATIENT
Start: 2024-01-15 | End: 2024-01-23 | Stop reason: HOSPADM

## 2024-01-15 RX ORDER — BISACODYL 10 MG/1
10 SUPPOSITORY RECTAL ONCE
Status: COMPLETED | OUTPATIENT
Start: 2024-01-15 | End: 2024-01-15

## 2024-01-15 RX ADMIN — TOPIRAMATE 50 MG: 25 TABLET, FILM COATED ORAL at 08:01

## 2024-01-15 RX ADMIN — SENNOSIDES AND DOCUSATE SODIUM 1 TABLET: 8.6; 5 TABLET ORAL at 08:01

## 2024-01-15 RX ADMIN — MORPHINE SULFATE 4 MG: 4 INJECTION INTRAVENOUS at 09:01

## 2024-01-15 RX ADMIN — AZITHROMYCIN 250 MG: 250 TABLET, FILM COATED ORAL at 08:01

## 2024-01-15 RX ADMIN — CYCLOBENZAPRINE HYDROCHLORIDE 10 MG: 5 TABLET, FILM COATED ORAL at 08:01

## 2024-01-15 RX ADMIN — PRAVASTATIN SODIUM 40 MG: 40 TABLET ORAL at 08:01

## 2024-01-15 RX ADMIN — BISACODYL 10 MG: 10 SUPPOSITORY RECTAL at 03:01

## 2024-01-15 RX ADMIN — MORPHINE SULFATE 4 MG: 4 INJECTION INTRAVENOUS at 05:01

## 2024-01-15 RX ADMIN — FAMOTIDINE 20 MG: 10 INJECTION INTRAVENOUS at 08:01

## 2024-01-15 RX ADMIN — MORPHINE SULFATE 4 MG: 4 INJECTION INTRAVENOUS at 01:01

## 2024-01-15 RX ADMIN — METOPROLOL SUCCINATE 25 MG: 25 TABLET, EXTENDED RELEASE ORAL at 08:01

## 2024-01-15 RX ADMIN — CYCLOBENZAPRINE HYDROCHLORIDE 10 MG: 5 TABLET, FILM COATED ORAL at 03:01

## 2024-01-15 RX ADMIN — ENOXAPARIN SODIUM 40 MG: 40 INJECTION SUBCUTANEOUS at 04:01

## 2024-01-15 RX ADMIN — MAGNESIUM SULFATE HEPTAHYDRATE: 500 INJECTION, SOLUTION INTRAMUSCULAR; INTRAVENOUS at 10:01

## 2024-01-15 RX ADMIN — DEXAMETHASONE SODIUM PHOSPHATE 10 MG: 10 INJECTION INTRAMUSCULAR; INTRAVENOUS at 08:01

## 2024-01-15 NOTE — ASSESSMENT & PLAN NOTE
Patient with Hypoxic Respiratory failure which is Chronic.  she is on home oxygen at 1 LPM. Supplemental oxygen was provided and noted-      .   Signs/symptoms of respiratory failure include- increased work of breathing. Contributing diagnoses includes - COPD Labs and images were reviewed. Patient Has recent ABG, which has been reviewed. Will treat underlying causes and adjust management of respiratory failure as follows- she needs to continue her home medicines which include Trelegy and albuterol rescue.

## 2024-01-15 NOTE — CONSULTS
Patient does not wish to have PICC line at this time, Dr Kate notified. Will continue her peripheral TPN for now.

## 2024-01-15 NOTE — PROGRESS NOTES
[facility] - Telemetry Stepdown  Palliative Medicine  Progress Note    Patient Name: Radha You  MRN: [id number]  Admission Date: 1/9/2024  Hospital Length of Stay: 5 days  Code Status: Full Code   Attending Provider: Navid Kate MD  Consulting Provider: Glo Ibarra MD  Primary Care Physician: Sapphire Ruffin FNP-C  Principal Problem:Partial small bowel obstruction    Patient information was obtained from patient, past medical records, and primary team.      Assessment/Plan:     Pulmonary  Chronic respiratory failure with hypoxia  Patient with Hypoxic Respiratory failure which is Chronic.  she is on home oxygen at 1 LPM. Supplemental oxygen was provided and noted-      .   Signs/symptoms of respiratory failure include- increased work of breathing. Contributing diagnoses includes - COPD Labs and images were reviewed. Patient Has recent ABG, which has been reviewed. Will treat underlying causes and adjust management of respiratory failure as follows- she needs to continue her home medicines which include Trelegy and albuterol rescue.    Oncology  Pancreatic adenocarcinoma  01/15/2024  Patient says she feels better.  No real change in her symptoms with the Decadron.  Patient thinks she would like to proceed with venting gastrostomy.  She would not like to make any further decisions today.  We will defer some to her son's although she is very adamant that she is in charge.  01/14/2024 patient's functional status is such that she has not a candidate for chemo or radiation.  Consideration of venting gastrostomy is still possible.    01/143/2024  Recent diagnosis of pancreatic cancer with metastatic disease.  Now presenting with small-bowel obstruction due to tumor versus stricture versus ileus.  Patient is still contemplating chemo and radiation although in conversations with her sons she knows that is not realistic.  She is sad over and over that she is not a candidate for curative treatment.  She has not a  surgical candidate.  We will certainly need to weigh benefit versus burden of chemotherapy with this patient who has limited functional ability at this time.  She also lives alone in [facility] far away from her children and/or healthcare.    GI  * Partial small bowel obstruction  01.15.2024  Symptomatically not much different.  She will get her 3rd dose of dexamethasone today.  KUB apparently is unchanged.  Still anticipating venting gastrostomy in the future.  She has not a candidate for surgery or chemotherapy.  Would recommend home with hospice.  01.14.2024  Has not had a significant improvement with the addition of dexamethasone.  Would continue for 3 days and monitor.  KUB looks worse today.  Consider venting gastrostomy.  01.13.2024  Partial small bowel obstruction could be multifactorial including ileus, stricture from previous surgery or tumor.  Currently with NG tube in place.  Dr. Kate has started peripheral nutrition.  Patient is contemplating venting gastrostomy.  In the meantime would consider the use of Decadron 10 mg IVP daily for 3 days to see if there is an inflammatory component to this stricture to enable the ileus to resolve.  Patient and family are aware of this and are eager to start.  Discussed side effects of Decadron including elevated blood sugars and confusion.  They will be with her 24 a day.      Palliative Care  Palliative care encounter  01.15.2024  No change in plan of care.  No family in room.  Decision regarding venting gastrostomy need to be made.  Decision also on when IR would be available if they are agreeable to doing it.  Need to revisit code status when all family is available and discharge plans with hospice.  01.14.2024  Symptoms:  Abdominal pain, nausea, vomiting.     Medicolegal:  Patient has decision making capacity.  Son Hiram is surrogate decision maker and is HCPOA in his present at the bedside.     Psychosocial:  support system consists of friends and family.   Her best friend for over 60 years i came to visit her and she is distraught because she thinks that she is trying to take over her life and get her finances.     Spiritual:  Worship.  Has not been involved with the Worship family since moving to [facility].    Prognostication:  Poor    Understanding of disease and Illness Trajectory: Patient and Family  has  adequate understanding of her illness, they can benefit from continued education on what to expect in the future.      Goals of care:  Comfort and quality of life as well as independence.  Advance Care Planning     Date: 01/13/2024    Today a voluntary meeting took place: bedside    Patient Participation: Patient is unable to participate     Attendees (Name and  Relationship to patient): Health care power of : Hiram grace   Staff attendees (Name and  Role): Glo Ibarra MD    ACP Conversation (General): Patient and family are aware that her disease is not curable.  We did talk again about improving her ileus/small-bowel obstruction.  Malignant obstruction can sometimes be relieved with higher dose Decadron and I offered that as an option to the family for 2/3 days.  We have that timeframe because they venting gastrostomy can not be placed prior to Tuesday at the earliest.  Her primary team has started her on peripheral nutrition.  I did discuss again that this has not really been shown to be that effective and certainly would not alter the cancer and lack of treatment options.     Code Status:  Discussed with son that if she dies in her sleep we should let her.  I would put in a do not resuscitate order.  He said he would talk with his brother about this.  ACP Documents: Reviewed current existing digital forms    Goals of care: The patient and family endorses that what is most important right now is to focus on remaining as independent as possible and symptom/pain control    Accordingly, we have decided that the best plan to meet the patient's goals  includes continuing with treatment      Recommendations/  Follow-up tasks: The patient and health care agent were provided the following recommendations trial of Decadron 10 mg IV daily for on day2 of 3 days.  Continue with peripheral nutrition.  Have decreased the IV fluids to see if that will help her IV pump from beeping.  Monitor condition and treat abdominal and back pain.      Length of ACP   conversation in minutes: >16 min          Code status:  Full for now    Advance directives:  Documents are in place.  Her living will says that she would want artificial hydration and nutrition.  She is getting that currently.  We will need to clarify this with the patient and sons particularly as we move closer to hospice treatment.      Summary and recommendations:  Continue pain medication for both her lower back pain and the abdominal pain  Trial of Decadron 10 mg IV Q 24 hours x3 days to see if that will help her small-bowel obstruction.  Consider additional Oncology recommendations so patient and family can move forward with decision-making.  I will return to visit with them tomorrow morning.  Have encouraged the son who lives in [facility] to go home because the weather is about to get really bad.    01.13.2024  Impression:    Symptoms:  Abdominal pain, nausea, vomiting.     Medicolegal:  Patient has decision making capacity.  Son Hiram is surrogate decision maker and is HCPOA.     Psychosocial:  support system consists of friends and family.  Her best friend for over 60 years is coming to visit her tomorrow.  She plans to have a checklist of things that her friend needs to take care of.  This gives her great comfort.     Spiritual:  Episcopalian.  Has not been involved with the Yarsani family since moving to [facility].    Prognostication:  Poor    Understanding of disease and Illness Trajectory: Patient and Family  has  adequate understanding of her illness, they can benefit from continued education on what to  expect in the future.      Goals of care:  Comfort and quality of life as well as independence.  Advance Care Planning    Date: 01/13/2024    Today a voluntary meeting took place: bedside    Patient Participation: Patient is able to participate     Attendees (Name and  Relationship to patient): Health care power of : Hiram son and other son Anibal and Hiram's daughter Shabnam.    Staff attendees (Name and  Role): Glo Ibarra MD    ACP Conversation (General): Understanding of current condition long conversation with patient and her 2 sons and granddaughter.  Patient is aware that her disease is not curable.  She is still thinking that she could see an oncologist in least will and receive chemotherapy or radiation.  She is not a surgical candidate and understands that.  I did ask her how much she was willing to go through for extra time.  She was unable to answer that.  She was more focused on making sure her house was okay and the  came etc. and so forth.  We did talk at length about improving her ileus/small-bowel obstruction.  Malignant obstruction can sometimes be relieved with higher dose Decadron and I offered that as an option to the family for 3 days.  We have that timeframe because they venting gastrostomy can not be placed prior to Tuesday at the earliest.  Her primary team has started her on peripheral nutrition.  I did discuss separately with the sons that this has not really been shown to be that effective and certainly would not alter the cancer and lack of treatment options.     Code Status: did not engage in discussions today    ACP Documents: Reviewed current existing digital forms    Goals of care: The patient and family endorses that what is most important right now is to focus on remaining as independent as possible and symptom/pain control    Accordingly, we have decided that the best plan to meet the patient's goals includes continuing with treatment       Recommendations/  Follow-up tasks: The patient and health care agent were provided the following recommendations trial of Decadron 10 mg IV daily for 3 days.  Continue with peripheral nutrition.  Monitor condition and treat abdominal and back pain.       Length of ACP   conversation in minutes: >76 minutes          Code status:  Full for now    Advance directives:  Documents are in place.  Her living will says that she would want artificial hydration and nutrition.  She is getting that currently.  We will need to clarify this with the patient and sons particularly as we move closer to hospice treatment.      Summary and recommendations:  Continue pain medication for both her lower back pain and the abdominal pain  Trial of Decadron 10 mg IV Q 24 hours x3 days to see if that will help her small-bowel obstruction.  Consider additional Oncology recommendations so patient and family can move forward with decision-making.  I will return to visit with them tomorrow morning.  Have encouraged the son who lives in [facility] to go home because the weather is about to get really bad.      > 76min time was spent on advance care planning, goals of care discussion, emotional support, formulating and communicating prognosis and goals of care, exploring burden/benefit of various approaches of treatment.                I will follow-up with patient. Please contact us if you have any additional questions.    Subjective:     Chief Complaint:   Chief Complaint   Patient presents with    Abdominal Pain     Recently diagnosed with pancreatic cancer. C/o constipation. Last BM x6 days ago. Hx of COPD. Wears 3L O2 at baseline       HPI:   Ms. You is a 77yo female with a history of COPD, HLD, HTN, h/o breast cancer, and recently diagnosed metastatic pancreatic cancer who presented 1.10.2024  with abdominal pain, nausea, and emesis.  Been evaluated by Surgical Oncology and not felt to be a surgical candidate.  She is still  considering chemotherapy and/or radiation therapy to prolong her life.  At this time however she has an ileus/small-bowel obstruction and has nasogastric decompression.  There is a consideration for placing a venting gastrostomy next week.    Palliative Medicine has been asked to assist with goals of care and treatment options and planning.      Hospital Course:  No notes on file    Interval History:  Complains of dry mouth and itchy scratchy throat.  She is still somewhat distraught over her episode of delirium and anger at her family yesterday.  She says she is better.  She is asked family members not to come today because of the weather.  She is hopeful that the dexamethasone will work.  She does want her 2 sons to get along.    Medications:  Continuous Infusions:   dextrose 5 % and 0.45 % NaCl 20 mL/hr at 01/14/24 1100    Standard Custom Day One ADULT TPN for patient with NO electrolyte abnormality or NO renal dysfunction (PERIPHERAL) 42 mL/hr at 01/14/24 2211     Scheduled Meds:   azithromycin  250 mg Oral Once per day on Mon Wed Fri    cyclobenzaprine  10 mg Oral TID    enoxparin  40 mg Subcutaneous Daily    famotidine (PF)  20 mg Intravenous BID    metoprolol succinate  25 mg Oral Daily    pravastatin  40 mg Oral QHS    scopolamine  1 patch Transdermal Q3 Days    topiramate  50 mg Oral QHS     PRN Meds:acetaminophen, albuterol-ipratropium, dextrose 10%, dextrose 10%, glucagon (human recombinant), glucose, glucose, haloperidol lactate, melatonin, morphine, naloxone, ondansetron, oxyCODONE, simethicone, sodium chloride 0.9%    Objective:     Vital Signs (Most Recent):  Temp: 98.7 °F (37.1 °C) (01/15/24 1153)  Pulse: 80 (01/15/24 1153)  Resp: 17 (01/15/24 1153)  BP: (!) 156/73 (01/15/24 1153)  SpO2: 100 % (01/15/24 1153) Vital Signs (24h Range):  Temp:  [97.5 °F (36.4 °C)-98.7 °F (37.1 °C)] 98.7 °F (37.1 °C)  Pulse:  [72-94] 80  Resp:  [16-23] 17  SpO2:  [98 %-100 %] 100 %  BP: (143-189)/(73-94) 156/73      Weight: 65 kg (143 lb 4.8 oz)  Body mass index is 22.44 kg/m².       Physical Exam  Vitals reviewed.   Cardiovascular:      Rate and Rhythm: Normal rate.   Pulmonary:      Effort: Pulmonary effort is normal.   Abdominal:      Tenderness: There is abdominal tenderness.      Comments: No bowel sounds.   Neurological:      Mental Status: She is alert.            Review of Symptoms      Symptom Assessment (ESAS 0-10 Scale)  Pain:  0  Dyspnea:  0  Anxiety:  0  Nausea:  0  Depression:  0  Anorexia:  2  Fatigue:  0  Insomnia:  0  Restlessness:  0  Agitation:  0     CAM / Delirium:  Negative  Constipation:  Negative  Diarrhea:  Negative      Bowel Management Plan (BMP):  Yes      Pain Assessment:  OME in 24 hours:  60  Location(s): abdomen    Abdomen       Location: generalized        Quality: Colicky        Quantity: 2/10 in intensity        Chronicity: Onset 3 week(s) ago, gradually improving since Has improved with NG tube drainage.  Bowel rest.  Low-dose opioids.        Aggravating Factors: Sitting up        Alleviating Factors: Opiates and belching        Associated Symptoms: None    Performance Status:  50    Living Arrangements:  Lives alone and Lives >50 miles from facility    Psychosocial/Cultural:   See Palliative Psychosocial Note: No   2 sons, lives in [facility].  Son in [place] is willing to take her into his home.  He is willing to care for her.  He has not sure that hospice will be accepted by his mother.  **Primary  to Follow**  Palliative Care  Consult: No    Spiritual:  F - Lilly and Belief:  Anabaptist  I - Importance:  Yes  C - Community:  No has not been involved in the Orthodoxy since moving from [place] to [facility].  A - Address in Care:  Yes        Advance Care Planning  Advance Directives:   Living Will: Yes        Copy on chart: Yes    LaPOST: No    Do Not Resuscitate Status: No    Medical Power of : Yes    Agent's Name:  Hiram You    Agent's Contact Number:  [phone]    Decision Making:  Patient answered questions  Goals of Care: What is most important right now is to focus on remaining as independent as possible, symptom/pain control. Accordingly, we have decided that the best plan to meet the patient's goals includes continuing with treatment.         Significant Labs: All pertinent labs within the past 24 hours have been reviewed.  CBC:   Recent Labs   Lab 01/15/24  1031   WBC 12.15   HGB 12.7   HCT 40.3   MCV 90        BMP:  Recent Labs   Lab 01/15/24  1031         K 3.4*      CO2 30*   BUN 12   CREATININE 0.7   CALCIUM 9.7     LFT:  Lab Results   Component Value Date    AST 42 (H) 01/15/2024    ALKPHOS 82 01/15/2024    BILITOT 0.7 01/15/2024     Albumin:   Albumin   Date Value Ref Range Status   01/15/2024 3.2 (L) 3.5 - 5.2 g/dL Final     Protein:   Total Protein   Date Value Ref Range Status   01/15/2024 6.7 6.0 - 8.4 g/dL Final     Lactic acid:   Lab Results   Component Value Date    LACTATE 0.7 03/05/2020    LACTATE 1.0 03/27/2019       Significant Imaging: I have reviewed all pertinent imaging results/findings within the past 24 hours.    Glo Ibarra MD  Palliative Medicine  [facility] - [facility]

## 2024-01-15 NOTE — SUBJECTIVE & OBJECTIVE
Interval History: Felt sad yesterday when she had multiple visitors because she repeated the same story about becoming ill over and over. She did not want to change the midline catheter out to a PICC because of the pain she had with the midline placement. Abdominal pain has significantly improved since admission.    Review of Systems   Constitutional:  Negative for chills and fever.   Gastrointestinal:  Positive for constipation. Negative for vomiting.   Neurological:  Negative for seizures and syncope.     Objective:     Vital Signs (Most Recent):  Temp: 98.7 °F (37.1 °C) (01/15/24 1153)  Pulse: 80 (01/15/24 1153)  Resp: 17 (01/15/24 1153)  BP: (!) 156/73 (01/15/24 1153)  SpO2: 100 % (01/15/24 1153) Vital Signs (24h Range):  Temp:  [97.5 °F (36.4 °C)-98.7 °F (37.1 °C)] 98.7 °F (37.1 °C)  Pulse:  [72-94] 80  Resp:  [16-23] 17  SpO2:  [98 %-100 %] 100 %  BP: (143-189)/(73-94) 156/73     Weight: 65 kg (143 lb 4.8 oz)  Body mass index is 22.44 kg/m².    Intake/Output Summary (Last 24 hours) at 1/15/2024 1358  Last data filed at 1/15/2024 0951  Gross per 24 hour   Intake 75 ml   Output 2250 ml   Net -2175 ml           Physical Exam  Vitals and nursing note reviewed.   Constitutional:       General: She is not in acute distress.     Appearance: She is well-developed and normal weight. She is not diaphoretic.   HENT:      Nose:      Comments: Nasogastric tube  Pulmonary:      Effort: Pulmonary effort is normal. No respiratory distress.   Skin:     General: Skin is warm and dry.      Coloration: Skin is not jaundiced or pale.   Neurological:      Mental Status: She is alert and oriented to person, place, and time. Mental status is at baseline.      Motor: No seizure activity.   Psychiatric:         Attention and Perception: Attention normal.         Mood and Affect: Affect normal.         Behavior: Behavior is cooperative.         Thought Content: Thought content normal.             Significant Labs: All pertinent labs  within the past 24 hours have been reviewed.    Significant Imaging: I have reviewed all pertinent imaging results/findings within the past 24 hours.  X-Ray Abdomen AP Portable 1/15/24: FINDINGS:   There is a nasogastric tube with its tip within the stomach.  There are no dilated loops of bowel.  There is lumbar fixation hardware.

## 2024-01-15 NOTE — ASSESSMENT & PLAN NOTE
01/15/2024  Patient says she feels better.  No real change in her symptoms with the Decadron.  Patient thinks she would like to proceed with venting gastrostomy.  She would not like to make any further decisions today.  We will defer some to her son's although she is very adamant that she is in charge.  01/14/2024 patient's functional status is such that she has not a candidate for chemo or radiation.  Consideration of venting gastrostomy is still possible.    01/143/2024  Recent diagnosis of pancreatic cancer with metastatic disease.  Now presenting with small-bowel obstruction due to tumor versus stricture versus ileus.  Patient is still contemplating chemo and radiation although in conversations with her sons she knows that is not realistic.  She is sad over and over that she is not a candidate for curative treatment.  She has not a surgical candidate.  We will certainly need to weigh benefit versus burden of chemotherapy with this patient who has limited functional ability at this time.  She also lives alone in [facility] far away from her children and/or healthcare.

## 2024-01-15 NOTE — PROGRESS NOTES
[facility] Medicine  Progress Note    Patient Name: Radha You  MRN: [id number]  Patient Class: IP- Inpatient   Admission Date: 1/9/2024  Length of Stay: 5 days  Attending Physician: Navid Kate MD  Primary Care Provider: Sapphire Ruffin FNP-C        Subjective:     Principal Problem:Partial small bowel obstruction        HPI:  Radha You is a 78 year old white woman with former cigarette smoking, hypertension, hyperlipidemia, chronic obstructive pulmonary disease, chronic hypoxic respiratory failure(on 1 liter/minute of supplemental oxygen), osteoporosis, history of estrogen receptor positive progesterone receptor negative, 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 She lives in [place], [place]. She is . Her hematologist-oncologist is Dr. Charles Us.               She was seen at [facility] Hematology-Oncology clinic on 1/4/2024 for her pancreatic adenocarcinoma, which was found incidentally during a bronchoscopy and biopsied by endoscopic ultrasound at [facility]. She was offered palliative chemotherapy and declined it.               She presented to [facility] Emergency Department on 1/9/2024 with abdominal pain for 3 to 4 days, associated with severe constipation with no bowel movement for several days despite over-the-counter laxatives, including suppositories. She was able to pass a small bowel movement earlier on the day of presentation but still felt constipated. She also had increase in pain in abdominal pain associated with nausea and vomiting causing inability to tolerate oral intake.  In the emergency department, heart rate was 115 bpm. Labs showed mild  leukocytosis. CT of the abdomen and pelvis with contrast revealed the known pancreatic tail mass, abutting and possibly invading the spleen with new geographic region of decreased enhancement in the lower splenic pole, suspicious for splenic infarct. There were multiple small bowel strictures suggested, possibly on the basis of metastatic disease that is possibly associated with low-grade or partial small bowel obstruction versus ileus. General Surgery was consulted and brought up the possibility of a palliative gastrostomy for venting as a treatment option. She deferred and opted for nasogastric decompression. She was given a liter of lactated Ringer's solution, 4 mg of intravenous morphine, and 4 mg of intravenous ondansetron. She was admitted to Hospital Medicine Team S.    Overview/Hospital Course:  She was kept NPO. General Surgery followed. She was put on lactated Ringer's solution at 75 mL/hr. She was given scopolamine patch. General Surgery signed off and recommended consulting Gastroenterology. Her urine was dark so fluids were increased to 100 mL/hr. After discussing the likelihood of recurrence of her acute problem due to the intestinal strictures, Gastroenterology was consulted on 1/12/2024 for venting gastrostomy. She was still reluctant so Palliative Care was consulted to discuss goals of care. Physical and Occupational Therapy were consulted when she reported that she had not gotten out of the hospital bed since being admitted. Famotidine was started for the esophagitis diagnosed on admission as well as to prevent esophageal ulcer due to having a nasogastric tube. Palliative Care had a long discussion with her and recommended a trial of dexamethasone to see if it would decrease intestinal swelling. Abdominal X-ray showed mid increase in bowel distension and recurrence of the nasogastric tube tip in the esophagus despite having been advanced 2 nights prior. The tube was advanced again. On 1/15/2024,  abdominal X-ray showed resolution of bowel distension.     Interval History: Felt sad yesterday when she had multiple visitors because she repeated the same story about becoming ill over and over. She did not want to change the midline catheter out to a PICC because of the pain she had with the midline placement. Abdominal pain has significantly improved since admission.    Review of Systems   Constitutional:  Negative for chills and fever.   Gastrointestinal:  Positive for constipation. Negative for vomiting.   Neurological:  Negative for seizures and syncope.     Objective:     Vital Signs (Most Recent):  Temp: 98.7 °F (37.1 °C) (01/15/24 1153)  Pulse: 80 (01/15/24 1153)  Resp: 17 (01/15/24 1153)  BP: (!) 156/73 (01/15/24 1153)  SpO2: 100 % (01/15/24 1153) Vital Signs (24h Range):  Temp:  [97.5 °F (36.4 °C)-98.7 °F (37.1 °C)] 98.7 °F (37.1 °C)  Pulse:  [72-94] 80  Resp:  [16-23] 17  SpO2:  [98 %-100 %] 100 %  BP: (143-189)/(73-94) 156/73     Weight: 65 kg (143 lb 4.8 oz)  Body mass index is 22.44 kg/m².    Intake/Output Summary (Last 24 hours) at 1/15/2024 1358  Last data filed at 1/15/2024 0951  Gross per 24 hour   Intake 75 ml   Output 2250 ml   Net -2175 ml           Physical Exam  Vitals and nursing note reviewed.   Constitutional:       General: She is not in acute distress.     Appearance: She is well-developed and normal weight. She is not diaphoretic.   HENT:      Nose:      Comments: Nasogastric tube  Pulmonary:      Effort: Pulmonary effort is normal. No respiratory distress.   Skin:     General: Skin is warm and dry.      Coloration: Skin is not jaundiced or pale.   Neurological:      Mental Status: She is alert and oriented to person, place, and time. Mental status is at baseline.      Motor: No seizure activity.   Psychiatric:         Attention and Perception: Attention normal.         Mood and Affect: Affect normal.         Behavior: Behavior is cooperative.         Thought Content: Thought content  normal.             Significant Labs: All pertinent labs within the past 24 hours have been reviewed.    Significant Imaging: I have reviewed all pertinent imaging results/findings within the past 24 hours.  X-Ray Abdomen AP Portable 1/15/24: FINDINGS:   There is a nasogastric tube with its tip within the stomach.  There are no dilated loops of bowel.  There is lumbar fixation hardware.     Assessment/Plan:      * Partial small bowel obstruction  Small bowel stricture   NG tube placed. NPO. Scopolamine patch, prn antiemetics. Gastroenterology consulted to place venting gastrostomy. Giving peripheral TPN. Consulted PT and OT for debility. Appreciate Palliative Care. Giving dexamethasone trial. Give bisacodyl suppository. Start senna-docusate. Will soon clamp NG tube and try clear liquids.    Reflux esophagitis  Started famotidine.    Pancreatic adenocarcinoma  Being followed in Oncology clinic here. Was offered palliative chemotherapy. She wants a follow-up to discuss prognosis and current treatment options. Consulted Oncology.      Chronic respiratory failure with hypoxia  Patient with a history of chronic hypoxic respiratory failure that is on 1-3 liters of oxygen from her COPD.    Centrilobular emphysema  Continue home azithromycin 250 mg three times weekly (MWF), albuterol-ipratropium nebulizers PRN.    Hyperlipidemia  Continue home pravastatin 40 mg daily.      Essential hypertension  Continue home metoprolol succinate 25 mg daily. Monitor blood pressures.    Carcinoma of right breast in female, estrogen receptor positive  History of breast cancer. Patient previously had lumpectomy and chemoradiation.        VTE Risk Mitigation (From admission, onward)           Ordered     enoxaparin injection 40 mg  Daily         01/10/24 0335     IP VTE HIGH RISK PATIENT  Once         01/10/24 0335     Place sequential compression device  Until discontinued         01/10/24 0335                    Discharge Planning   RUFINO:  1/17/2024     Code Status: Full Code   Is the patient medically ready for discharge?: No    Reason for patient still in hospital (select all that apply): Patient trending condition and Treatment                     Navid Kate MD  Department of Hospital Medicine   [facility] - Telemetry Stepdown

## 2024-01-15 NOTE — ASSESSMENT & PLAN NOTE
Small bowel stricture   NG tube placed. NPO. Scopolamine patch, prn antiemetics. Gastroenterology consulted to place venting gastrostomy. Giving peripheral TPN. Consulted PT and OT for debility. Appreciate Palliative Care. Giving dexamethasone trial. Give bisacodyl suppository. Start senna-docusate. Will soon clamp NG tube and try clear liquids.

## 2024-01-15 NOTE — PLAN OF CARE
Problem: Adult Inpatient Plan of Care  Goal: Plan of Care Review  Outcome: Ongoing, Progressing  Goal: Patient-Specific Goal (Individualized)  Outcome: Ongoing, Progressing  Goal: Absence of Hospital-Acquired Illness or Injury  Outcome: Ongoing, Progressing  Goal: Optimal Comfort and Wellbeing  Outcome: Ongoing, Progressing  Goal: Readiness for Transition of Care  Outcome: Ongoing, Progressing     Problem: Infection  Goal: Absence of Infection Signs and Symptoms  Outcome: Ongoing, Progressing     Problem: Skin Injury Risk Increased  Goal: Skin Health and Integrity  Outcome: Ongoing, Progressing     Patient rested during shift w/ intermittent pain relieved with PRN pain medication.

## 2024-01-15 NOTE — ASSESSMENT & PLAN NOTE
01.15.2024  No change in plan of care.  No family in room.  Decision regarding venting gastrostomy need to be made.  Decision also on when IR would be available if they are agreeable to doing it.  Need to revisit code status when all family is available and discharge plans with hospice.  01.14.2024  Symptoms:  Abdominal pain, nausea, vomiting.     Medicolegal:  Patient has decision making capacity.  Son Hiram is surrogate decision maker and is HCPOA in his present at the bedside.     Psychosocial:  support system consists of friends and family.  Her best friend for over 60 years i came to visit her and she is distraught because she thinks that she is trying to take over her life and get her finances.     Spiritual:  Anabaptism.  Has not been involved with the Alev[employer] family since moving to [facility].    Prognostication:  Poor    Understanding of disease and Illness Trajectory: Patient and Family  has  adequate understanding of her illness, they can benefit from continued education on what to expect in the future.      Goals of care:  Comfort and quality of life as well as independence.  Advance Care Planning     Date: 01/13/2024    Today a voluntary meeting took place: bedside    Patient Participation: Patient is unable to participate     Attendees (Name and  Relationship to patient): Health care power of : Hiram edwards   Staff attendees (Name and  Role): Glo Ibarra MD    ACP Conversation (General): Patient and family are aware that her disease is not curable.  We did talk again about improving her ileus/small-bowel obstruction.  Malignant obstruction can sometimes be relieved with higher dose Decadron and I offered that as an option to the family for 2/3 days.  We have that timeframe because they venting gastrostomy can not be placed prior to Tuesday at the earliest.  Her primary team has started her on peripheral nutrition.  I did discuss again that this has not really been shown to be that effective and  certainly would not alter the cancer and lack of treatment options.     Code Status:  Discussed with son that if she dies in her sleep we should let her.  I would put in a do not resuscitate order.  He said he would talk with his brother about this.  ACP Documents: Reviewed current existing digital forms    Goals of care: The patient and family endorses that what is most important right now is to focus on remaining as independent as possible and symptom/pain control    Accordingly, we have decided that the best plan to meet the patient's goals includes continuing with treatment      Recommendations/  Follow-up tasks: The patient and health care agent were provided the following recommendations trial of Decadron 10 mg IV daily for on day2 of 3 days.  Continue with peripheral nutrition.  Have decreased the IV fluids to see if that will help her IV pump from beeping.  Monitor condition and treat abdominal and back pain.      Length of ACP   conversation in minutes: >16 min          Code status:  Full for now    Advance directives:  Documents are in place.  Her living will says that she would want artificial hydration and nutrition.  She is getting that currently.  We will need to clarify this with the patient and sons particularly as we move closer to hospice treatment.      Summary and recommendations:  Continue pain medication for both her lower back pain and the abdominal pain  Trial of Decadron 10 mg IV Q 24 hours x3 days to see if that will help her small-bowel obstruction.  Consider additional Oncology recommendations so patient and family can move forward with decision-making.  I will return to visit with them tomorrow morning.  Have encouraged the son who lives in [facility] to go home because the weather is about to get really bad.    01.13.2024  Impression:    Symptoms:  Abdominal pain, nausea, vomiting.     Medicolegal:  Patient has decision making capacity.  Son Hiram is surrogate decision maker and  is HCPOA.     Psychosocial:  support system consists of friends and family.  Her best friend for over 60 years is coming to visit her tomorrow.  She plans to have a checklist of things that her friend needs to take care of.  This gives her great comfort.     Spiritual:  Gnosticism.  Has not been involved with the [employer] family since moving to [facility].    Prognostication:  Poor    Understanding of disease and Illness Trajectory: Patient and Family  has  adequate understanding of her illness, they can benefit from continued education on what to expect in the future.      Goals of care:  Comfort and quality of life as well as independence.  Advance Care Planning     Date: 01/13/2024    Today a voluntary meeting took place: bedside    Patient Participation: Patient is able to participate     Attendees (Name and  Relationship to patient): Health care power of : Hiram son and other son Anibal and Hiram's daughter Shabnam.    Staff attendees (Name and  Role): Glo Ibarra MD    ACP Conversation (General): Understanding of current condition long conversation with patient and her 2 sons and granddaughter.  Patient is aware that her disease is not curable.  She is still thinking that she could see an oncologist in least will and receive chemotherapy or radiation.  She is not a surgical candidate and understands that.  I did ask her how much she was willing to go through for extra time.  She was unable to answer that.  She was more focused on making sure her house was okay and the  came etc. and so forth.  We did talk at length about improving her ileus/small-bowel obstruction.  Malignant obstruction can sometimes be relieved with higher dose Decadron and I offered that as an option to the family for 3 days.  We have that timeframe because they venting gastrostomy can not be placed prior to Tuesday at the earliest.  Her primary team has started her on peripheral nutrition.  I did discuss separately with  the sons that this has not really been shown to be that effective and certainly would not alter the cancer and lack of treatment options.     Code Status: did not engage in discussions today    ACP Documents: Reviewed current existing digital forms    Goals of care: The patient and family endorses that what is most important right now is to focus on remaining as independent as possible and symptom/pain control    Accordingly, we have decided that the best plan to meet the patient's goals includes continuing with treatment      Recommendations/  Follow-up tasks: The patient and health care agent were provided the following recommendations trial of Decadron 10 mg IV daily for 3 days.  Continue with peripheral nutrition.  Monitor condition and treat abdominal and back pain.       Length of ACP   conversation in minutes: >76 minutes          Code status:  Full for now    Advance directives:  Documents are in place.  Her living will says that she would want artificial hydration and nutrition.  She is getting that currently.  We will need to clarify this with the patient and sons particularly as we move closer to hospice treatment.      Summary and recommendations:  Continue pain medication for both her lower back pain and the abdominal pain  Trial of Decadron 10 mg IV Q 24 hours x3 days to see if that will help her small-bowel obstruction.  Consider additional Oncology recommendations so patient and family can move forward with decision-making.  I will return to visit with them tomorrow morning.  Have encouraged the son who lives in [facility] to go home because the weather is about to get really bad.      > 76min time was spent on advance care planning, goals of care discussion, emotional support, formulating and communicating prognosis and goals of care, exploring burden/benefit of various approaches of treatment.

## 2024-01-15 NOTE — SUBJECTIVE & OBJECTIVE
Interval History:  Complains of dry mouth and itchy scratchy throat.  She is still somewhat distraught over her episode of delirium and anger at her family yesterday.  She says she is better.  She is asked family members not to come today because of the weather.  She is hopeful that the dexamethasone will work.  She does want her 2 sons to get along.    Medications:  Continuous Infusions:   dextrose 5 % and 0.45 % NaCl 20 mL/hr at 01/14/24 1100    Standard Custom Day One ADULT TPN for patient with NO electrolyte abnormality or NO renal dysfunction (PERIPHERAL) 42 mL/hr at 01/14/24 2211     Scheduled Meds:   azithromycin  250 mg Oral Once per day on Mon Wed Fri    cyclobenzaprine  10 mg Oral TID    enoxparin  40 mg Subcutaneous Daily    famotidine (PF)  20 mg Intravenous BID    metoprolol succinate  25 mg Oral Daily    pravastatin  40 mg Oral QHS    scopolamine  1 patch Transdermal Q3 Days    topiramate  50 mg Oral QHS     PRN Meds:acetaminophen, albuterol-ipratropium, dextrose 10%, dextrose 10%, glucagon (human recombinant), glucose, glucose, haloperidol lactate, melatonin, morphine, naloxone, ondansetron, oxyCODONE, simethicone, sodium chloride 0.9%    Objective:     Vital Signs (Most Recent):  Temp: 98.7 °F (37.1 °C) (01/15/24 1153)  Pulse: 80 (01/15/24 1153)  Resp: 17 (01/15/24 1153)  BP: (!) 156/73 (01/15/24 1153)  SpO2: 100 % (01/15/24 1153) Vital Signs (24h Range):  Temp:  [97.5 °F (36.4 °C)-98.7 °F (37.1 °C)] 98.7 °F (37.1 °C)  Pulse:  [72-94] 80  Resp:  [16-23] 17  SpO2:  [98 %-100 %] 100 %  BP: (143-189)/(73-94) 156/73     Weight: 65 kg (143 lb 4.8 oz)  Body mass index is 22.44 kg/m².       Physical Exam  Vitals reviewed.   Cardiovascular:      Rate and Rhythm: Normal rate.   Pulmonary:      Effort: Pulmonary effort is normal.   Abdominal:      Tenderness: There is abdominal tenderness.      Comments: No bowel sounds.   Neurological:      Mental Status: She is alert.            Review of  Symptoms      Symptom Assessment (ESAS 0-10 Scale)  Pain:  0  Dyspnea:  0  Anxiety:  0  Nausea:  0  Depression:  0  Anorexia:  2  Fatigue:  0  Insomnia:  0  Restlessness:  0  Agitation:  0     CAM / Delirium:  Negative  Constipation:  Negative  Diarrhea:  Negative      Bowel Management Plan (BMP):  Yes      Pain Assessment:  OME in 24 hours:  60  Location(s): abdomen    Abdomen       Location: generalized        Quality: Colicky        Quantity: 2/10 in intensity        Chronicity: Onset 3 week(s) ago, gradually improving since Has improved with NG tube drainage.  Bowel rest.  Low-dose opioids.        Aggravating Factors: Sitting up        Alleviating Factors: Opiates and belching        Associated Symptoms: None    Performance Status:  50    Living Arrangements:  Lives alone and Lives >50 miles from facility    Psychosocial/Cultural:   See Palliative Psychosocial Note: No   2 sons, lives in [facility].  Son in [place] is willing to take her into his home.  He is willing to care for her.  He has not sure that hospice will be accepted by his mother.  **Primary  to Follow**  Palliative Care  Consult: No    Spiritual:  F - Lilly and Belief:  Gnosticist  I - Importance:  Yes  C - Community:  No has not been involved in the Pentec[employer] since moving from [place] to [facility].  A - Address in Care:  Yes        Advance Care Planning   Advance Directives:   Living Will: Yes        Copy on chart: Yes    LaPOST: No    Do Not Resuscitate Status: No    Medical Power of : Yes    Agent's Name:  Hiram You   Agent's Contact Number:  [phone]    Decision Making:  Patient answered questions  Goals of Care: What is most important right now is to focus on remaining as independent as possible, symptom/pain control. Accordingly, we have decided that the best plan to meet the patient's goals includes continuing with treatment.         Significant Labs: All pertinent labs within the  past 24 hours have been reviewed.  CBC:   Recent Labs   Lab 01/15/24  1031   WBC 12.15   HGB 12.7   HCT 40.3   MCV 90        BMP:  Recent Labs   Lab 01/15/24  1031         K 3.4*      CO2 30*   BUN 12   CREATININE 0.7   CALCIUM 9.7     LFT:  Lab Results   Component Value Date    AST 42 (H) 01/15/2024    ALKPHOS 82 01/15/2024    BILITOT 0.7 01/15/2024     Albumin:   Albumin   Date Value Ref Range Status   01/15/2024 3.2 (L) 3.5 - 5.2 g/dL Final     Protein:   Total Protein   Date Value Ref Range Status   01/15/2024 6.7 6.0 - 8.4 g/dL Final     Lactic acid:   Lab Results   Component Value Date    LACTATE 0.7 03/05/2020    LACTATE 1.0 03/27/2019       Significant Imaging: I have reviewed all pertinent imaging results/findings within the past 24 hours.

## 2024-01-15 NOTE — ASSESSMENT & PLAN NOTE
01.15.2024  Symptomatically not much different.  She will get her 3rd dose of dexamethasone today.  KUB apparently is unchanged.  Still anticipating venting gastrostomy in the future.  She has not a candidate for surgery or chemotherapy.  Would recommend home with hospice.  01.14.2024  Has not had a significant improvement with the addition of dexamethasone.  Would continue for 3 days and monitor.  KUB looks worse today.  Consider venting gastrostomy.  01.13.2024  Partial small bowel obstruction could be multifactorial including ileus, stricture from previous surgery or tumor.  Currently with NG tube in place.  Dr. Kate has started peripheral nutrition.  Patient is contemplating venting gastrostomy.  In the meantime would consider the use of Decadron 10 mg IVP daily for 3 days to see if there is an inflammatory component to this stricture to enable the ileus to resolve.  Patient and family are aware of this and are eager to start.  Discussed side effects of Decadron including elevated blood sugars and confusion.  They will be with her 24 a day.

## 2024-01-16 LAB
ALBUMIN SERPL BCP-MCNC: 3.2 G/DL (ref 3.5–5.2)
ALP SERPL-CCNC: 81 U/L (ref 55–135)
ALT SERPL W/O P-5'-P-CCNC: 27 U/L (ref 10–44)
ANION GAP SERPL CALC-SCNC: 8 MMOL/L (ref 8–16)
AST SERPL-CCNC: 33 U/L (ref 10–40)
BILIRUB SERPL-MCNC: 0.7 MG/DL (ref 0.1–1)
BUN SERPL-MCNC: 17 MG/DL (ref 8–23)
CALCIUM SERPL-MCNC: 9.3 MG/DL (ref 8.7–10.5)
CHLORIDE SERPL-SCNC: 104 MMOL/L (ref 95–110)
CO2 SERPL-SCNC: 27 MMOL/L (ref 23–29)
CREAT SERPL-MCNC: 0.7 MG/DL (ref 0.5–1.4)
ERYTHROCYTE [DISTWIDTH] IN BLOOD BY AUTOMATED COUNT: 13.2 % (ref 11.5–14.5)
EST. GFR  (NO RACE VARIABLE): >60 ML/MIN/1.73 M^2
GLUCOSE SERPL-MCNC: 92 MG/DL (ref 70–110)
HCT VFR BLD AUTO: 41 % (ref 37–48.5)
HGB BLD-MCNC: 13.3 G/DL (ref 12–16)
MAGNESIUM SERPL-MCNC: 2.4 MG/DL (ref 1.6–2.6)
MCH RBC QN AUTO: 28.6 PG (ref 27–31)
MCHC RBC AUTO-ENTMCNC: 32.4 G/DL (ref 32–36)
MCV RBC AUTO: 88 FL (ref 82–98)
PHOSPHATE SERPL-MCNC: 2.9 MG/DL (ref 2.7–4.5)
PLATELET # BLD AUTO: 386 K/UL (ref 150–450)
PMV BLD AUTO: 10.5 FL (ref 9.2–12.9)
POCT GLUCOSE: 101 MG/DL (ref 70–110)
POCT GLUCOSE: 84 MG/DL (ref 70–110)
POCT GLUCOSE: 93 MG/DL (ref 70–110)
POCT GLUCOSE: 94 MG/DL (ref 70–110)
POTASSIUM SERPL-SCNC: 3.3 MMOL/L (ref 3.5–5.1)
PROT SERPL-MCNC: 6.4 G/DL (ref 6–8.4)
RBC # BLD AUTO: 4.65 M/UL (ref 4–5.4)
SODIUM SERPL-SCNC: 139 MMOL/L (ref 136–145)
WBC # BLD AUTO: 14.62 K/UL (ref 3.9–12.7)

## 2024-01-16 PROCEDURE — 84100 ASSAY OF PHOSPHORUS: CPT | Performed by: HOSPITALIST

## 2024-01-16 PROCEDURE — 94761 N-INVAS EAR/PLS OXIMETRY MLT: CPT

## 2024-01-16 PROCEDURE — 20600001 HC STEP DOWN PRIVATE ROOM

## 2024-01-16 PROCEDURE — 83735 ASSAY OF MAGNESIUM: CPT | Performed by: HOSPITALIST

## 2024-01-16 PROCEDURE — 25000003 PHARM REV CODE 250: Performed by: STUDENT IN AN ORGANIZED HEALTH CARE EDUCATION/TRAINING PROGRAM

## 2024-01-16 PROCEDURE — 85027 COMPLETE CBC AUTOMATED: CPT | Performed by: HOSPITALIST

## 2024-01-16 PROCEDURE — 80053 COMPREHEN METABOLIC PANEL: CPT | Performed by: HOSPITALIST

## 2024-01-16 PROCEDURE — 25000003 PHARM REV CODE 250: Performed by: HOSPITALIST

## 2024-01-16 PROCEDURE — 36415 COLL VENOUS BLD VENIPUNCTURE: CPT | Performed by: HOSPITALIST

## 2024-01-16 PROCEDURE — 63600175 PHARM REV CODE 636 W HCPCS: Performed by: NURSE PRACTITIONER

## 2024-01-16 PROCEDURE — 99233 SBSQ HOSP IP/OBS HIGH 50: CPT | Mod: ,,, | Performed by: STUDENT IN AN ORGANIZED HEALTH CARE EDUCATION/TRAINING PROGRAM

## 2024-01-16 PROCEDURE — 63600175 PHARM REV CODE 636 W HCPCS: Performed by: STUDENT IN AN ORGANIZED HEALTH CARE EDUCATION/TRAINING PROGRAM

## 2024-01-16 RX ADMIN — SCOPALAMINE 1 PATCH: 1 PATCH, EXTENDED RELEASE TRANSDERMAL at 05:01

## 2024-01-16 RX ADMIN — FAMOTIDINE 20 MG: 10 INJECTION INTRAVENOUS at 09:01

## 2024-01-16 RX ADMIN — FAMOTIDINE 20 MG: 10 INJECTION INTRAVENOUS at 08:01

## 2024-01-16 RX ADMIN — CYCLOBENZAPRINE HYDROCHLORIDE 10 MG: 5 TABLET, FILM COATED ORAL at 02:01

## 2024-01-16 RX ADMIN — MORPHINE SULFATE 4 MG: 4 INJECTION INTRAVENOUS at 09:01

## 2024-01-16 RX ADMIN — PRAVASTATIN SODIUM 40 MG: 40 TABLET ORAL at 09:01

## 2024-01-16 RX ADMIN — METOPROLOL SUCCINATE 25 MG: 25 TABLET, EXTENDED RELEASE ORAL at 08:01

## 2024-01-16 RX ADMIN — TOPIRAMATE 50 MG: 25 TABLET, FILM COATED ORAL at 09:01

## 2024-01-16 RX ADMIN — SENNOSIDES AND DOCUSATE SODIUM 1 TABLET: 8.6; 5 TABLET ORAL at 08:01

## 2024-01-16 RX ADMIN — MORPHINE SULFATE 4 MG: 4 INJECTION INTRAVENOUS at 05:01

## 2024-01-16 RX ADMIN — ENOXAPARIN SODIUM 40 MG: 40 INJECTION SUBCUTANEOUS at 04:01

## 2024-01-16 RX ADMIN — MORPHINE SULFATE 4 MG: 4 INJECTION INTRAVENOUS at 12:01

## 2024-01-16 RX ADMIN — SENNOSIDES AND DOCUSATE SODIUM 1 TABLET: 8.6; 5 TABLET ORAL at 09:01

## 2024-01-16 RX ADMIN — CYCLOBENZAPRINE HYDROCHLORIDE 10 MG: 5 TABLET, FILM COATED ORAL at 09:01

## 2024-01-16 RX ADMIN — CYCLOBENZAPRINE HYDROCHLORIDE 10 MG: 5 TABLET, FILM COATED ORAL at 08:01

## 2024-01-16 NOTE — PROGRESS NOTES
[facility] Medicine  Progress Note    Patient Name: Radha You  MRN: [id number]  Patient Class: IP- Inpatient   Admission Date: 1/9/2024  Length of Stay: 6 days  Attending Physician: Navid Kate MD  Primary Care Provider: Sapphire Ruffin FNP-C        Subjective:     Principal Problem:Partial small bowel obstruction        HPI:  Radha You is a 78 year old white woman with former cigarette smoking, hypertension, hyperlipidemia, chronic obstructive pulmonary disease, chronic hypoxic respiratory failure(on 1 liter/minute of supplemental oxygen), osteoporosis, history of estrogen receptor positive progesterone receptor negative, 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 She lives in [place], [place]. She is . Her hematologist-oncologist is Dr. Charles Us.               She was seen at [facility] clinic on 1/4/2024 for her pancreatic adenocarcinoma, which was found incidentally during a bronchoscopy and biopsied by endoscopic ultrasound at [facility]. She was offered palliative chemotherapy and declined it.               She presented to [facility] Emergency Department on 1/9/2024 with abdominal pain for 3 to 4 days, associated with severe constipation with no bowel movement for several days despite over-the-counter laxatives, including suppositories. She was able to pass a small bowel movement earlier on the day of presentation but still felt constipated. She also had increase in pain in abdominal pain associated with nausea and vomiting causing inability to tolerate oral intake.  In the emergency department, heart rate was 115 bpm. Labs showed mild  leukocytosis. CT of the abdomen and pelvis with contrast revealed the known pancreatic tail mass, abutting and possibly invading the spleen with new geographic region of decreased enhancement in the lower splenic pole, suspicious for splenic infarct. There were multiple small bowel strictures suggested, possibly on the basis of metastatic disease that is possibly associated with low-grade or partial small bowel obstruction versus ileus. General Surgery was consulted and brought up the possibility of a palliative gastrostomy for venting as a treatment option. She deferred and opted for nasogastric decompression. She was given a liter of lactated Ringer's solution, 4 mg of intravenous morphine, and 4 mg of intravenous ondansetron. She was admitted to Hospital Medicine Team S.    Overview/Hospital Course:  She was kept NPO. General Surgery followed. She was put on lactated Ringer's solution at 75 mL/hr. She was given scopolamine patch. General Surgery signed off and recommended consulting Gastroenterology. Her urine was dark so fluids were increased to 100 mL/hr. After discussing the likelihood of recurrence of her acute problem due to the intestinal strictures, Gastroenterology was consulted on 1/12/2024 for venting gastrostomy. She was still reluctant so Palliative Care was consulted to discuss goals of care. Physical and Occupational Therapy were consulted when she reported that she had not gotten out of the hospital bed since being admitted. Famotidine was started for the esophagitis diagnosed on admission as well as to prevent esophageal ulcer due to having a nasogastric tube. Palliative Care had a long discussion with her and recommended a trial of dexamethasone to see if it would decrease intestinal swelling. Abdominal X-ray showed mid increase in bowel distension and recurrence of the nasogastric tube tip in the esophagus despite having been advanced 2 nights prior. The tube was advanced again. On 1/15/2024,  abdominal X-ray showed resolution of bowel distension. She was started on senna-docusate twice daily and given bisacodyl suppository. She started having bowel movements.     Interval History: Had a bowel movement yesterday after suppository and again this morning.    Review of Systems   Constitutional:  Negative for chills and fever.   Gastrointestinal:  Negative for vomiting.   Neurological:  Negative for seizures and syncope.     Objective:     Vital Signs (Most Recent):  Temp: 98.3 °F (36.8 °C) (01/16/24 1120)  Pulse: 68 (01/16/24 1127)  Resp: 20 (01/16/24 1223)  BP: (!) 156/80 (01/16/24 1120)  SpO2: 100 % (01/16/24 1127) Vital Signs (24h Range):  Temp:  [98.1 °F (36.7 °C)-98.9 °F (37.2 °C)] 98.3 °F (36.8 °C)  Pulse:  [68-93] 68  Resp:  [17-20] 20  SpO2:  [83 %-100 %] 100 %  BP: (144-166)/(78-86) 156/80     Weight: 65 kg (143 lb 4.8 oz)  Body mass index is 22.44 kg/m².    Intake/Output Summary (Last 24 hours) at 1/16/2024 1412  Last data filed at 1/16/2024 1228  Gross per 24 hour   Intake 60 ml   Output 1200 ml   Net -1140 ml           Physical Exam  Vitals and nursing note reviewed.   Constitutional:       General: She is not in acute distress.     Appearance: She is well-developed and normal weight. She is not diaphoretic.   HENT:      Nose:      Comments: Nasogastric tube  Pulmonary:      Effort: Pulmonary effort is normal. No respiratory distress.   Skin:     General: Skin is warm and dry.      Coloration: Skin is not jaundiced or pale.   Neurological:      Mental Status: She is alert and oriented to person, place, and time. Mental status is at baseline.      Motor: No seizure activity.   Psychiatric:         Attention and Perception: Attention normal.         Mood and Affect: Affect normal.         Behavior: Behavior is cooperative.         Thought Content: Thought content normal.             Significant Labs: All pertinent labs within the past 24 hours have been reviewed.    Significant Imaging: I have  reviewed all pertinent imaging results/findings within the past 24 hours.    Assessment/Plan:      * Partial small bowel obstruction  Small bowel stricture   NG tube placed. Scopolamine patch, prn antiemetics. Gastroenterology consulted to place venting gastrostomy. PT and OT for debility. Appreciate Palliative Care, who gave dexamethasone trial. Started senna-docusate. Clamp NG tube and try clear liquids. Will have to contact Gastroenterology in the next few days to place gastrostomy.    Reflux esophagitis  Started famotidine.    Pancreatic adenocarcinoma  Being followed in Oncology clinic here. Was offered palliative chemotherapy. She wants a follow-up to discuss prognosis and current treatment options. Consulted Oncology.      Chronic respiratory failure with hypoxia  Patient with a history of chronic hypoxic respiratory failure that is on 1-3 liters of oxygen from her COPD.    Centrilobular emphysema  Continue home azithromycin 250 mg three times weekly (MWF), albuterol-ipratropium nebulizers PRN.    Hyperlipidemia  Continue home pravastatin 40 mg daily.      Essential hypertension  Continue home metoprolol succinate 25 mg daily. Monitor blood pressures.    Carcinoma of right breast in female, estrogen receptor positive  History of breast cancer. Patient previously had lumpectomy and chemoradiation.        VTE Risk Mitigation (From admission, onward)           Ordered     enoxaparin injection 40 mg  Daily         01/10/24 0335     IP VTE HIGH RISK PATIENT  Once         01/10/24 0335     Place sequential compression device  Until discontinued         01/10/24 0335                    Discharge Planning   RUFINO: 1/19/2024     Code Status: Full Code   Is the patient medically ready for discharge?: No    Reason for patient still in hospital (select all that apply): Treatment  Discharge Plan A: Home Health                  Navid Kate MD  Department of Hospital Medicine   [facility] - Telemetry Stepdown

## 2024-01-16 NOTE — PROGRESS NOTES
[facility] - Telemetry Stepdown  Palliative Medicine  Progress Note    Patient Name: Radha You  MRN: [id number]  Admission Date: 1/9/2024  Hospital Length of Stay: 6 days  Code Status: Full Code   Attending Provider: Navid Kate MD  Consulting Provider: Melissa Toscano MD  Primary Care Physician: Sapphire Ruffin FNP-C  Principal Problem:Partial small bowel obstruction    Patient information was obtained from patient, relative(s), and primary team.      Assessment/Plan:     GI  * Partial small bowel obstruction  01.15.2024  Symptomatically not much different.  She will get her 3rd dose of dexamethasone today.  KUB apparently is unchanged.  Still anticipating venting gastrostomy in the future.  She has not a candidate for surgery or chemotherapy.  Would recommend home with hospice.  01.14.2024  Has not had a significant improvement with the addition of dexamethasone.  Would continue for 3 days and monitor.  KUB looks worse today.  Consider venting gastrostomy.  01.13.2024  Partial small bowel obstruction could be multifactorial including ileus, stricture from previous surgery or tumor.  Currently with NG tube in place.  Dr. Kate has started peripheral nutrition.  Patient is contemplating venting gastrostomy.  In the meantime would consider the use of Decadron 10 mg IVP daily for 3 days to see if there is an inflammatory component to this stricture to enable the ileus to resolve.  Patient and family are aware of this and are eager to start.  Discussed side effects of Decadron including elevated blood sugars and confusion.  They will be with her 24 a day.      Palliative Care  Palliative care encounter  01.15.2024  No change in plan of care.  No family in room.  Decision regarding venting gastrostomy need to be made.  Decision also on when IR would be available if they are agreeable to doing it.  Need to revisit code status when all family is available and discharge plans with hospice.  01.14.2024  Symptoms:   Abdominal pain, nausea, vomiting.     Medicolegal:  Patient has decision making capacity.  Son Hiram is surrogate decision maker and is HCPOA in his present at the bedside.     Psychosocial:  support system consists of friends and family.  Her best friend for over 60 years i came to visit her and she is distraught because she thinks that she is trying to take over her life and get her finances.     Spiritual:  Tenriism.  Has not been involved with the [employer] family since moving to [facility].    Prognostication:  Poor    Understanding of disease and Illness Trajectory: Patient and Family  has  adequate understanding of her illness, they can benefit from continued education on what to expect in the future.      Goals of care:  Comfort and quality of life as well as independence.  Advance Care Planning     Date: 01/13/2024    Today a voluntary meeting took place: bedside    Patient Participation: Patient is unable to participate     Attendees (Name and  Relationship to patient): Health care power of : Hiram edwards   Staff attendees (Name and  Role): Glo Ibarra MD    ACP Conversation (General): Patient and family are aware that her disease is not curable.  We did talk again about improving her ileus/small-bowel obstruction.  Malignant obstruction can sometimes be relieved with higher dose Decadron and I offered that as an option to the family for 2/3 days.  We have that timeframe because they venting gastrostomy can not be placed prior to Tuesday at the earliest.  Her primary team has started her on peripheral nutrition.  I did discuss again that this has not really been shown to be that effective and certainly would not alter the cancer and lack of treatment options.     Code Status:  Discussed with son that if she dies in her sleep we should let her.  I would put in a do not resuscitate order.  He said he would talk with his brother about this.  ACP Documents: Reviewed current existing digital  forms    Goals of care: The patient and family endorses that what is most important right now is to focus on remaining as independent as possible and symptom/pain control    Accordingly, we have decided that the best plan to meet the patient's goals includes continuing with treatment      Recommendations/  Follow-up tasks: The patient and health care agent were provided the following recommendations trial of Decadron 10 mg IV daily for on day2 of 3 days.  Continue with peripheral nutrition.  Have decreased the IV fluids to see if that will help her IV pump from beeping.  Monitor condition and treat abdominal and back pain.      Length of ACP   conversation in minutes: >16 min          Code status:  Full for now    Advance directives:  Documents are in place.  Her living will says that she would want artificial hydration and nutrition.  She is getting that currently.  We will need to clarify this with the patient and sons particularly as we move closer to hospice treatment.      Summary and recommendations:  Continue pain medication for both her lower back pain and the abdominal pain  Trial of Decadron 10 mg IV Q 24 hours x3 days to see if that will help her small-bowel obstruction.  Consider additional Oncology recommendations so patient and family can move forward with decision-making.  I will return to visit with them tomorrow morning.  Have encouraged the son who lives in [facility] to go home because the weather is about to get really bad.    01.13.2024  Impression:    Symptoms:  Abdominal pain, nausea, vomiting.     Medicolegal:  Patient has decision making capacity.  Son Hiram is surrogate decision maker and is HCPOA.     Psychosocial:  support system consists of friends and family.  Her best friend for over 60 years is coming to visit her tomorrow.  She plans to have a checklist of things that her friend needs to take care of.  This gives her great comfort.     Spiritual:  Roman Catholic.  Has not been  involved with the [employer]ish family since moving to [facility].    Prognostication:  Poor    Understanding of disease and Illness Trajectory: Patient and Family  has  adequate understanding of her illness, they can benefit from continued education on what to expect in the future.      Goals of care:  Comfort and quality of life as well as independence.  Advance Care Planning    Date: 01/13/2024    Today a voluntary meeting took place: bedside    Patient Participation: Patient is able to participate     Attendees (Name and  Relationship to patient): Health care power of : Hiram son and other son Anibal and Hiram's daughter Shabnam.    Staff attendees (Name and  Role): Glo Ibarra MD    ACP Conversation (General): Understanding of current condition long conversation with patient and her 2 sons and granddaughter.  Patient is aware that her disease is not curable.  She is still thinking that she could see an oncologist in least will and receive chemotherapy or radiation.  She is not a surgical candidate and understands that.  I did ask her how much she was willing to go through for extra time.  She was unable to answer that.  She was more focused on making sure her house was okay and the  came etc. and so forth.  We did talk at length about improving her ileus/small-bowel obstruction.  Malignant obstruction can sometimes be relieved with higher dose Decadron and I offered that as an option to the family for 3 days.  We have that timeframe because they venting gastrostomy can not be placed prior to Tuesday at the earliest.  Her primary team has started her on peripheral nutrition.  I did discuss separately with the sons that this has not really been shown to be that effective and certainly would not alter the cancer and lack of treatment options.     Code Status: did not engage in discussions today    ACP Documents: Reviewed current existing digital forms    Goals of care: The patient and family  endorses that what is most important right now is to focus on remaining as independent as possible and symptom/pain control    Accordingly, we have decided that the best plan to meet the patient's goals includes continuing with treatment      Recommendations/  Follow-up tasks: The patient and health care agent were provided the following recommendations trial of Decadron 10 mg IV daily for 3 days.  Continue with peripheral nutrition.  Monitor condition and treat abdominal and back pain.       Length of ACP   conversation in minutes: >76 minutes          Code status:  Full for now    Advance directives:  Documents are in place.  Her living will says that she would want artificial hydration and nutrition.  She is getting that currently.  We will need to clarify this with the patient and sons particularly as we move closer to hospice treatment.      Summary and recommendations:  Continue pain medication for both her lower back pain and the abdominal pain  Trial of Decadron 10 mg IV Q 24 hours x3 days to see if that will help her small-bowel obstruction.  Consider additional Oncology recommendations so patient and family can move forward with decision-making.  I will return to visit with them tomorrow morning.  Have encouraged the son who lives in [facility] to go home because the weather is about to get really bad.      > 76min time was spent on advance care planning, goals of care discussion, emotional support, formulating and communicating prognosis and goals of care, exploring burden/benefit of various approaches of treatment.            Other  Pain  PainIs multifactorial related to her previous spinal surgery.  She does have a spinal stimulator in place.  She does use a lidocaine patch.  Normally at home she takes Percocet as needed and sleeps on the couch. GLORIA GARCIA reviewed, prescribed Percocet 10 q6h PRN by provider in [place], [place].  - Controlled with morphine 4 mg IV  - Continue morphine 4 mg IV q3h PRN  severe pain, unable to tolerate PO  - Continue oxycodone 10 mg PO q4h PRN severe pain if able to tolerate PO  - NG tube still in place  - Prior 24 hour MME: 24 MME (two doses of morphine 4 mg IV)  - Encouraged Ms. You to take oxycodone dose when she is ready to ensure that dose is helpful prior to discharge        I will follow-up with patient. Please contact us if you have any additional questions.    Subjective:     Chief Complaint:   Chief Complaint   Patient presents with    Abdominal Pain     Recently diagnosed with pancreatic cancer. C/o constipation. Last BM x6 days ago. Hx of COPD. Wears 3L O2 at baseline       HPI:   Ms. You is a 79yo female with a history of COPD, HLD, HTN, h/o breast cancer, and recently diagnosed metastatic pancreatic cancer who presented 1.10.2024  with abdominal pain, nausea, and emesis.  Been evaluated by Surgical Oncology and not felt to be a surgical candidate.  She is still considering chemotherapy and/or radiation therapy to prolong her life.  At this time however she has an ileus/small-bowel obstruction and has nasogastric decompression.  There is a consideration for placing a venting gastrostomy next week.    Palliative Medicine has been asked to assist with goals of care and treatment options and planning.      Hospital Course:  No notes on file    Interval History:  Met Ms. You and her son at bedside. Says that she's been able to keep her     Medications:  Continuous Infusions:   dextrose 5 % and 0.45 % NaCl 20 mL/hr at 01/14/24 1100    TPN ADULT PERIPHERAL CUSTOM 42 mL/hr at 01/15/24 6614     Scheduled Meds:   azithromycin  250 mg Oral Once per day on Mon Wed Fri    cyclobenzaprine  10 mg Oral TID    enoxparin  40 mg Subcutaneous Daily    famotidine (PF)  20 mg Intravenous BID    metoprolol succinate  25 mg Oral Daily    pravastatin  40 mg Oral QHS    scopolamine  1 patch Transdermal Q3 Days    senna-docusate 8.6-50 mg  1 tablet Oral BID    topiramate  50 mg  Oral QHS     PRN Meds:acetaminophen, albuterol-ipratropium, dextrose 10%, dextrose 10%, glucagon (human recombinant), glucose, glucose, haloperidol lactate, melatonin, morphine, naloxone, ondansetron, oxyCODONE, simethicone, sodium chloride 0.9%    Objective:     Vital Signs (Most Recent):  Temp: 98.9 °F (37.2 °C) (01/16/24 0730)  Pulse: 76 (01/16/24 0814)  Resp: 18 (01/16/24 0730)  BP: (!) 166/84 (01/16/24 0814)  SpO2: 100 % (01/16/24 0730) Vital Signs (24h Range):  Temp:  [98.1 °F (36.7 °C)-98.9 °F (37.2 °C)] 98.9 °F (37.2 °C)  Pulse:  [74-93] 76  Resp:  [17-19] 18  SpO2:  [83 %-100 %] 100 %  BP: (144-166)/(73-86) 166/84     Weight: 65 kg (143 lb 4.8 oz)  Body mass index is 22.44 kg/m².       Physical Exam  Vitals reviewed.   HENT:      Head: Normocephalic and atraumatic.      Nose:      Comments: NG tube in place     Mouth/Throat:      Mouth: Mucous membranes are dry.   Eyes:      Extraocular Movements: Extraocular movements intact.      Conjunctiva/sclera: Conjunctivae normal.   Cardiovascular:      Rate and Rhythm: Normal rate.   Pulmonary:      Effort: Pulmonary effort is normal.   Abdominal:      Tenderness: There is abdominal tenderness.      Comments: No bowel sounds.   Skin:     General: Skin is warm and dry.   Neurological:      Mental Status: She is alert and oriented to person, place, and time. Mental status is at baseline.            Review of Symptoms      Symptom Assessment (ESAS 0-10 Scale)  Pain:  0  Dyspnea:  0  Anxiety:  0  Nausea:  0  Depression:  0  Anorexia:  0  Fatigue:  0  Insomnia:  0  Restlessness:  0  Agitation:  0     CAM / Delirium:  Negative  Constipation:  Negative  Diarrhea:  Negative      Bowel Management Plan (BMP):  Yes      Pain Assessment:  OME in 24 hours:  60  Location(s): abdomen    Abdomen       Location: generalized        Quality: Colicky        Quantity: 2/10 in intensity        Chronicity: Onset 3 week(s) ago, gradually improving since Has improved with NG tube drainage.   Bowel rest.  Low-dose opioids.        Aggravating Factors: Sitting up        Alleviating Factors: Opiates and belching        Associated Symptoms: None    Performance Status:  50    Living Arrangements:  Lives alone and Lives >50 miles from facility    Psychosocial/Cultural:   See Palliative Psychosocial Note: No   2 sons, lives in [facility].  Son in [place] is willing to take her into his home.  He is willing to care for her.  He has not sure that hospice will be accepted by his mother.  **Primary  to Follow**  Palliative Care  Consult: No    Spiritual:  F - Lilly and Belief:  Church  I - Importance:  Yes  C - Community:  No has not been involved in the [employer]ism since moving from [place] to [facility].  A - Address in Care:  Yes        Advance Care Planning  Advance Directives:   Living Will: Yes        Copy on chart: Yes    LaPOST: No    Do Not Resuscitate Status: No    Medical Power of : Yes    Agent's Name:  Hiram You   Agent's Contact Number:  [phone]    Decision Making:  Patient answered questions  Goals of Care: What is most important right now is to focus on remaining as independent as possible, symptom/pain control. Accordingly, we have decided that the best plan to meet the patient's goals includes continuing with treatment.         Significant Labs: All pertinent labs within the past 24 hours have been reviewed.  CBC:   Recent Labs   Lab 01/16/24 0357   WBC 14.62*   HGB 13.3   HCT 41.0   MCV 88          BMP:  Recent Labs   Lab 01/16/24 0357   GLU 92      K 3.3*      CO2 27   BUN 17   CREATININE 0.7   CALCIUM 9.3   MG 2.4       LFT:  Lab Results   Component Value Date    AST 33 01/16/2024    ALKPHOS 81 01/16/2024    BILITOT 0.7 01/16/2024     Albumin:   Albumin   Date Value Ref Range Status   01/16/2024 3.2 (L) 3.5 - 5.2 g/dL Final     Protein:   Total Protein   Date Value Ref Range Status   01/16/2024 6.4 6.0 - 8.4 g/dL  Final     Lactic acid:   Lab Results   Component Value Date    LACTATE 0.7 03/05/2020    LACTATE 1.0 03/27/2019       Significant Imaging: I have reviewed all pertinent imaging results/findings within the past 24 hours.    I spent a total of 50 minutes on the day of the visit. This includes face to face time in discussion of goals of care, symptom assessment, coordination of care and emotional support. This also includes non-face to face time preparing to see the patient (eg, review of tests/imaging), obtaining and/or reviewing separately obtained history, documenting clinical information in the electronic or other health record, independently interpreting results and communicating results to the patient/family/caregiver, or care coordinator.    Melissa Toscano MD  Palliative Medicine  [facility] - Telemetry Stepdown

## 2024-01-16 NOTE — PT/OT/SLP PROGRESS
Occupational Therapy      Patient Name:  Radha You   MRN:  [id number]    Patient not seen today secondary to patient adamantly declined OT services on this date. Patient verbalized she is not in the mood and to come back later during time of attempt of therapy. Patient also declined bed level activity. Will follow-up for therapy as scheduled.    1/16/2024

## 2024-01-16 NOTE — ASSESSMENT & PLAN NOTE
01.15.2024  No change in plan of care.  No family in room.  Decision regarding venting gastrostomy need to be made.  Decision also on when IR would be available if they are agreeable to doing it.  Need to revisit code status when all family is available and discharge plans with hospice.  01.14.2024  Symptoms:  Abdominal pain, nausea, vomiting.     Medicolegal:  Patient has decision making capacity.  Son Hiram is surrogate decision maker and is HCPOA in his present at the bedside.     Psychosocial:  support system consists of friends and family.  Her best friend for over 60 years i came to visit her and she is distraught because she thinks that she is trying to take over her life and get her finances.     Spiritual:  Faith.  Has not been involved with the [employer] family since moving to [facility].    Prognostication:  Poor    Understanding of disease and Illness Trajectory: Patient and Family  has  adequate understanding of her illness, they can benefit from continued education on what to expect in the future.      Goals of care:  Comfort and quality of life as well as independence.  Advance Care Planning     Date: 01/13/2024    Today a voluntary meeting took place: bedside    Patient Participation: Patient is unable to participate     Attendees (Name and  Relationship to patient): Health care power of : Hiram edwards   Staff attendees (Name and  Role): Glo Ibarra MD    ACP Conversation (General): Patient and family are aware that her disease is not curable.  We did talk again about improving her ileus/small-bowel obstruction.  Malignant obstruction can sometimes be relieved with higher dose Decadron and I offered that as an option to the family for 2/3 days.  We have that timeframe because they venting gastrostomy can not be placed prior to Tuesday at the earliest.  Her primary team has started her on peripheral nutrition.  I did discuss again that this has not really been shown to be that effective and  certainly would not alter the cancer and lack of treatment options.     Code Status:  Discussed with son that if she dies in her sleep we should let her.  I would put in a do not resuscitate order.  He said he would talk with his brother about this.  ACP Documents: Reviewed current existing digital forms    Goals of care: The patient and family endorses that what is most important right now is to focus on remaining as independent as possible and symptom/pain control    Accordingly, we have decided that the best plan to meet the patient's goals includes continuing with treatment      Recommendations/  Follow-up tasks: The patient and health care agent were provided the following recommendations trial of Decadron 10 mg IV daily for on day2 of 3 days.  Continue with peripheral nutrition.  Have decreased the IV fluids to see if that will help her IV pump from beeping.  Monitor condition and treat abdominal and back pain.      Length of ACP   conversation in minutes: >16 min          Code status:  Full for now    Advance directives:  Documents are in place.  Her living will says that she would want artificial hydration and nutrition.  She is getting that currently.  We will need to clarify this with the patient and sons particularly as we move closer to hospice treatment.      Summary and recommendations:  Continue pain medication for both her lower back pain and the abdominal pain  Trial of Decadron 10 mg IV Q 24 hours x3 days to see if that will help her small-bowel obstruction.  Consider additional Oncology recommendations so patient and family can move forward with decision-making.  I will return to visit with them tomorrow morning.  Have encouraged the son who lives in [facility] to go home because the weather is about to get really bad.    01.13.2024  Impression:    Symptoms:  Abdominal pain, nausea, vomiting.     Medicolegal:  Patient has decision making capacity.  Son Hiram is surrogate decision maker and  is HCPOA.     Psychosocial:  support system consists of friends and family.  Her best friend for over 60 years is coming to visit her tomorrow.  She plans to have a checklist of things that her friend needs to take care of.  This gives her great comfort.     Spiritual:  Mandaen.  Has not been involved with the Gnosticism family since moving to [facility].    Prognostication:  Poor    Understanding of disease and Illness Trajectory: Patient and Family  has  adequate understanding of her illness, they can benefit from continued education on what to expect in the future.      Goals of care:  Comfort and quality of life as well as independence.  Advance Care Planning     Date: 01/13/2024    Today a voluntary meeting took place: bedside    Patient Participation: Patient is able to participate     Attendees (Name and  Relationship to patient): Health care power of : Hiram son and other son Anibal and Hiram's daughter Shabnam.    Staff attendees (Name and  Role): Glo Ibarra MD    ACP Conversation (General): Understanding of current condition long conversation with patient and her 2 sons and granddaughter.  Patient is aware that her disease is not curable.  She is still thinking that she could see an oncologist in least will and receive chemotherapy or radiation.  She is not a surgical candidate and understands that.  I did ask her how much she was willing to go through for extra time.  She was unable to answer that.  She was more focused on making sure her house was okay and the  came etc. and so forth.  We did talk at length about improving her ileus/small-bowel obstruction.  Malignant obstruction can sometimes be relieved with higher dose Decadron and I offered that as an option to the family for 3 days.  We have that timeframe because they venting gastrostomy can not be placed prior to Tuesday at the earliest.  Her primary team has started her on peripheral nutrition.  I did discuss separately with  the sons that this has not really been shown to be that effective and certainly would not alter the cancer and lack of treatment options.     Code Status: did not engage in discussions today    ACP Documents: Reviewed current existing digital forms    Goals of care: The patient and family endorses that what is most important right now is to focus on remaining as independent as possible and symptom/pain control    Accordingly, we have decided that the best plan to meet the patient's goals includes continuing with treatment      Recommendations/  Follow-up tasks: The patient and health care agent were provided the following recommendations trial of Decadron 10 mg IV daily for 3 days.  Continue with peripheral nutrition.  Monitor condition and treat abdominal and back pain.       Length of ACP   conversation in minutes: >76 minutes          Code status:  Full for now    Advance directives:  Documents are in place.  Her living will says that she would want artificial hydration and nutrition.  She is getting that currently.  We will need to clarify this with the patient and sons particularly as we move closer to hospice treatment.      Summary and recommendations:  Continue pain medication for both her lower back pain and the abdominal pain  Trial of Decadron 10 mg IV Q 24 hours x3 days to see if that will help her small-bowel obstruction.  Consider additional Oncology recommendations so patient and family can move forward with decision-making.  I will return to visit with them tomorrow morning.  Have encouraged the son who lives in [facility] to go home because the weather is about to get really bad.      > 76min time was spent on advance care planning, goals of care discussion, emotional support, formulating and communicating prognosis and goals of care, exploring burden/benefit of various approaches of treatment.

## 2024-01-16 NOTE — PLAN OF CARE
Problem: Adult Inpatient Plan of Care  Goal: Plan of Care Review  Outcome: Ongoing, Progressing  Goal: Patient-Specific Goal (Individualized)  Outcome: Ongoing, Progressing  Goal: Absence of Hospital-Acquired Illness or Injury  Outcome: Ongoing, Progressing  Goal: Optimal Comfort and Wellbeing  Outcome: Ongoing, Progressing  Goal: Readiness for Transition of Care  Outcome: Ongoing, Progressing     Problem: Infection  Goal: Absence of Infection Signs and Symptoms  Outcome: Ongoing, Progressing     Problem: Skin Injury Risk Increased  Goal: Skin Health and Integrity  Outcome: Ongoing, Progressing     Problem: Fall Injury Risk  Goal: Absence of Fall and Fall-Related Injury  Outcome: Ongoing, Progressing     Problem: Pain Acute  Goal: Acceptable Pain Control and Functional Ability  Outcome: Ongoing, Progressing

## 2024-01-16 NOTE — ASSESSMENT & PLAN NOTE
PainIs multifactorial related to her previous spinal surgery.  She does have a spinal stimulator in place.  She does use a lidocaine patch.  Normally at home she takes Percocet as needed and sleeps on the couch. GLORIA GARCIA reviewed, prescribed Percocet 10 q6h PRN by provider in [place], [place].  - Controlled with morphine 4 mg IV  - Continue morphine 4 mg IV q3h PRN severe pain, unable to tolerate PO  - Continue oxycodone 10 mg PO q4h PRN severe pain if able to tolerate PO  - NG tube still in place  - Prior 24 hour MME: 24 MME (two doses of morphine 4 mg IV)  - Encouraged Ms. You to take oxycodone dose when she is ready to ensure that dose is helpful prior to discharge

## 2024-01-16 NOTE — SUBJECTIVE & OBJECTIVE
Interval History:  Met Ms. You and her son at bedside. Says that she's been able to keep her     Medications:  Continuous Infusions:   dextrose 5 % and 0.45 % NaCl 20 mL/hr at 01/14/24 1100    TPN ADULT PERIPHERAL CUSTOM 42 mL/hr at 01/15/24 2245     Scheduled Meds:   azithromycin  250 mg Oral Once per day on Mon Wed Fri    cyclobenzaprine  10 mg Oral TID    enoxparin  40 mg Subcutaneous Daily    famotidine (PF)  20 mg Intravenous BID    metoprolol succinate  25 mg Oral Daily    pravastatin  40 mg Oral QHS    scopolamine  1 patch Transdermal Q3 Days    senna-docusate 8.6-50 mg  1 tablet Oral BID    topiramate  50 mg Oral QHS     PRN Meds:acetaminophen, albuterol-ipratropium, dextrose 10%, dextrose 10%, glucagon (human recombinant), glucose, glucose, haloperidol lactate, melatonin, morphine, naloxone, ondansetron, oxyCODONE, simethicone, sodium chloride 0.9%    Objective:     Vital Signs (Most Recent):  Temp: 98.9 °F (37.2 °C) (01/16/24 0730)  Pulse: 76 (01/16/24 0814)  Resp: 18 (01/16/24 0730)  BP: (!) 166/84 (01/16/24 0814)  SpO2: 100 % (01/16/24 0730) Vital Signs (24h Range):  Temp:  [98.1 °F (36.7 °C)-98.9 °F (37.2 °C)] 98.9 °F (37.2 °C)  Pulse:  [74-93] 76  Resp:  [17-19] 18  SpO2:  [83 %-100 %] 100 %  BP: (144-166)/(73-86) 166/84     Weight: 65 kg (143 lb 4.8 oz)  Body mass index is 22.44 kg/m².       Physical Exam  Vitals reviewed.   HENT:      Head: Normocephalic and atraumatic.      Nose:      Comments: NG tube in place     Mouth/Throat:      Mouth: Mucous membranes are dry.   Eyes:      Extraocular Movements: Extraocular movements intact.      Conjunctiva/sclera: Conjunctivae normal.   Cardiovascular:      Rate and Rhythm: Normal rate.   Pulmonary:      Effort: Pulmonary effort is normal.   Abdominal:      Tenderness: There is abdominal tenderness.      Comments: No bowel sounds.   Skin:     General: Skin is warm and dry.   Neurological:      Mental Status: She is alert and oriented to person, place,  and time. Mental status is at baseline.            Review of Symptoms      Symptom Assessment (ESAS 0-10 Scale)  Pain:  0  Dyspnea:  0  Anxiety:  0  Nausea:  0  Depression:  0  Anorexia:  0  Fatigue:  0  Insomnia:  0  Restlessness:  0  Agitation:  0     CAM / Delirium:  Negative  Constipation:  Negative  Diarrhea:  Negative      Bowel Management Plan (BMP):  Yes      Pain Assessment:  OME in 24 hours:  60  Location(s): abdomen    Abdomen       Location: generalized        Quality: Colicky        Quantity: 2/10 in intensity        Chronicity: Onset 3 week(s) ago, gradually improving since Has improved with NG tube drainage.  Bowel rest.  Low-dose opioids.        Aggravating Factors: Sitting up        Alleviating Factors: Opiates and belching        Associated Symptoms: None    Performance Status:  50    Living Arrangements:  Lives alone and Lives >50 miles from facility    Psychosocial/Cultural:   See Palliative Psychosocial Note: No   2 sons, lives in [facility].  Son in [place] is willing to take her into his home.  He is willing to care for her.  He has not sure that hospice will be accepted by his mother.  **Primary  to Follow**  Palliative Care  Consult: No    Spiritual:  F - Lilly and Belief:  Rastafari  I - Importance:  Yes  C - Community:  No has not been involved in the [employer] since moving from [place] to [facility].  A - Address in Care:  Yes        Advance Care Planning   Advance Directives:   Living Will: Yes        Copy on chart: Yes    [place]POST: No    Do Not Resuscitate Status: No    Medical Power of : Yes    Agent's Name:  Hiram You   Agent's Contact Number:  [phone]    Decision Making:  Patient answered questions  Goals of Care: What is most important right now is to focus on remaining as independent as possible, symptom/pain control. Accordingly, we have decided that the best plan to meet the patient's goals includes continuing with  treatment.         Significant Labs: All pertinent labs within the past 24 hours have been reviewed.  CBC:   Recent Labs   Lab 01/16/24 0357   WBC 14.62*   HGB 13.3   HCT 41.0   MCV 88          BMP:  Recent Labs   Lab 01/16/24 0357   GLU 92      K 3.3*      CO2 27   BUN 17   CREATININE 0.7   CALCIUM 9.3   MG 2.4       LFT:  Lab Results   Component Value Date    AST 33 01/16/2024    ALKPHOS 81 01/16/2024    BILITOT 0.7 01/16/2024     Albumin:   Albumin   Date Value Ref Range Status   01/16/2024 3.2 (L) 3.5 - 5.2 g/dL Final     Protein:   Total Protein   Date Value Ref Range Status   01/16/2024 6.4 6.0 - 8.4 g/dL Final     Lactic acid:   Lab Results   Component Value Date    LACTATE 0.7 03/05/2020    LACTATE 1.0 03/27/2019       Significant Imaging: I have reviewed all pertinent imaging results/findings within the past 24 hours.

## 2024-01-16 NOTE — PLAN OF CARE
Problem: Adult Inpatient Plan of Care  Goal: Plan of Care Review  Outcome: Ongoing, Progressing  Goal: Patient-Specific Goal (Individualized)  Outcome: Ongoing, Progressing  Goal: Absence of Hospital-Acquired Illness or Injury  Outcome: Ongoing, Progressing  Goal: Optimal Comfort and Wellbeing  Outcome: Ongoing, Progressing  Goal: Readiness for Transition of Care  Outcome: Ongoing, Progressing     Problem: Infection  Goal: Absence of Infection Signs and Symptoms  Outcome: Ongoing, Progressing     Problem: Skin Injury Risk Increased  Goal: Skin Health and Integrity  Outcome: Ongoing, Progressing     Problem: Coping Ineffective  Goal: Effective Coping  Outcome: Ongoing, Progressing     Patient rested during majority of shift. Ambulated to bedside commode w/ two small liquid bowel movements. Patient had brief episodes of confusion attempting to get out of bed. Redirected and orientated patient. No pain reported during shift. Educated patient on medication administration and monitoring blood sugar.

## 2024-01-16 NOTE — PLAN OF CARE
[facility] - Telemetry Stepdown  Initial Discharge Assessment       Primary Care Provider: Sapphire Ruffin FNP-C    Admission Diagnosis: SBO (small bowel obstruction) [K56.609]  Tachycardia [R00.0]  Encounter for nasogastric (NG) tube placement [Z46.59]  Chest pain [R07.9]  Abdominal pain, unspecified abdominal location [R10.9]    Admission Date: 1/9/2024  Expected Discharge Date: 1/17/2024    Transition of Care Barriers: None    Payor: MEDICARE / Plan: MEDICARE PART A & B / Product Type: Government /     Extended Emergency Contact Information  Primary Emergency Contact: Hiram You   [facility]  Home Phone: [phone]  Mobile Phone: [phone]  Relation: Son  Secondary Emergency Contact: YouAnibal  Mobile Phone: [phone]  Relation: Son  Preferred language: English   needed? No    Discharge Plan A: Home Health  Discharge Plan B: Hospice/home      [id number] - [place], [place] - [address]   [id number]   [place] [address]  Phone: [phone] Fax: [phone]    [facility] - [place] [address] [address]  Phone: [phone] Fax: [phone]    [id number] - [place], [place] - [address] [facility] AT [address]  [place] [address]  Phone: [phone] Fax: [phone]      Initial Assessment (most recent)       Adult Discharge Assessment - 01/16/24 1228          Discharge Assessment    Assessment Type Discharge Planning Assessment     Confirmed/corrected address, phone number and insurance Yes     Confirmed Demographics Correct on Facesheet     Source of Information patient     Reason For Admission SBO     People in Home alone     Facility Arrived From: home     Do you expect to return to your current living situation? Yes     Do you have help at home or someone to help you manage your care at home? Yes     Who are your  caregiver(s) and their phone number(s)? son Hiram You [phone]     Prior to hospitilization cognitive status: Alert/Oriented     Current cognitive status: Alert/Oriented     Walking or Climbing Stairs Difficulty yes     Walking or Climbing Stairs ambulation difficulty, requires equipment     Dressing/Bathing Difficulty yes     Dressing/Bathing bathing difficulty, requires equipment     Home Layout Able to live on 1st floor     Equipment Currently Used at Home bedside commode;bath bench;cane, straight;oxygen;walker, rolling;other (see comments)   transport wheelchair    Do you take prescription medications? Yes     Do you have prescription coverage? Yes     Do you have any problems affording any of your prescribed medications? No     Is the patient taking medications as prescribed? yes     Who is going to help you get home at discharge? son     How do you get to doctors appointments? family or friend will provide;car, drives self     Are you on dialysis? No     Do you take coumadin? No     Discharge Plan A Home Health     Discharge Plan B Hospice/home     DME Needed Upon Discharge  other (see comments)   TBD    Transition of Care Barriers None        Physical Activity    On average, how many days per week do you engage in moderate to strenuous exercise (like a brisk walk)? 0 days        Financial Resource Strain    How hard is it for you to pay for the very basics like food, housing, medical care, and heating? Not very hard        Housing Stability    In the last 12 months, was there a time when you were not able to pay the mortgage or rent on time? No     In the last 12 months, was there a time when you did not have a steady place to sleep or slept in a shelter (including now)? No        Transportation Needs    In the past 12 months, has lack of transportation kept you from medical appointments or from getting medications? No     In the past 12 months, has lack of transportation kept you from meetings,  work, or from getting things needed for daily living? No        Food Insecurity    Within the past 12 months, you worried that your food would run out before you got the money to buy more. Never true     Within the past 12 months, the food you bought just didn't last and you didn't have money to get more. Never true        Stress    Do you feel stress - tense, restless, nervous, or anxious, or unable to sleep at night because your mind is troubled all the time - these days? To some extent   per chart review       Social Connections    In a typical week, how many times do you talk on the phone with family, friends, or neighbors? More than three times a week     How often do you get together with friends or relatives? More than three times a week        Alcohol Use    Q1: How often do you have a drink containing alcohol? Monthly or less   occassionally per chart review                  Pt lives alone in a 1 story home with 0 steps to enter-son lives next door.  Pt has multiple items of DME at home, see above.  Pt denies HD/BT, does use home O2 through [facility].  Pt's family will provide transportation home at time of d/c.  Will continue to follow for d/c needs.    Discharge Plan A and Plan B have been determined by review of patient's clinical status, future medical and therapeutic needs, and coverage/benefits for post-acute care in coordination with multidisciplinary team members.     Armin Marquez RN CM  Case Management  [id number]

## 2024-01-16 NOTE — ASSESSMENT & PLAN NOTE
Small bowel stricture   NG tube placed. Scopolamine patch, prn antiemetics. Gastroenterology consulted to place venting gastrostomy. PT and OT for debility. Appreciate Palliative Care, who gave dexamethasone trial. Started senna-docusate. Clamp NG tube and try clear liquids. Will have to contact Gastroenterology in the next few days to place gastrostomy.

## 2024-01-16 NOTE — SUBJECTIVE & OBJECTIVE
Interval History: Had a bowel movement yesterday after suppository and again this morning.    Review of Systems   Constitutional:  Negative for chills and fever.   Gastrointestinal:  Negative for vomiting.   Neurological:  Negative for seizures and syncope.     Objective:     Vital Signs (Most Recent):  Temp: 98.3 °F (36.8 °C) (01/16/24 1120)  Pulse: 68 (01/16/24 1127)  Resp: 20 (01/16/24 1223)  BP: (!) 156/80 (01/16/24 1120)  SpO2: 100 % (01/16/24 1127) Vital Signs (24h Range):  Temp:  [98.1 °F (36.7 °C)-98.9 °F (37.2 °C)] 98.3 °F (36.8 °C)  Pulse:  [68-93] 68  Resp:  [17-20] 20  SpO2:  [83 %-100 %] 100 %  BP: (144-166)/(78-86) 156/80     Weight: 65 kg (143 lb 4.8 oz)  Body mass index is 22.44 kg/m².    Intake/Output Summary (Last 24 hours) at 1/16/2024 1412  Last data filed at 1/16/2024 1228  Gross per 24 hour   Intake 60 ml   Output 1200 ml   Net -1140 ml           Physical Exam  Vitals and nursing note reviewed.   Constitutional:       General: She is not in acute distress.     Appearance: She is well-developed and normal weight. She is not diaphoretic.   HENT:      Nose:      Comments: Nasogastric tube  Pulmonary:      Effort: Pulmonary effort is normal. No respiratory distress.   Skin:     General: Skin is warm and dry.      Coloration: Skin is not jaundiced or pale.   Neurological:      Mental Status: She is alert and oriented to person, place, and time. Mental status is at baseline.      Motor: No seizure activity.   Psychiatric:         Attention and Perception: Attention normal.         Mood and Affect: Affect normal.         Behavior: Behavior is cooperative.         Thought Content: Thought content normal.             Significant Labs: All pertinent labs within the past 24 hours have been reviewed.    Significant Imaging: I have reviewed all pertinent imaging results/findings within the past 24 hours.

## 2024-01-16 NOTE — PHYSICIAN QUERY
PT Name: Radha You  MR #: [id number]    DOCUMENTATION CLARIFICATION     CDS: David BECKWITH,RN        Contact information:[email or web address]    This form is a permanent document in the medical record.     Query Date: January 16, 2024    By submitting this query, we are merely seeking further clarification of documentation.  Please utilize your independent clinical judgment when addressing the question(s) below.    The medical record contains the following:   Indicators  Supporting Clinical Findings Location in Medical Record   x Registered Dietician Diagnosis Moderate malnutrition  Malnutrition Type:  Context: acute illness or injury  Level: moderate    1/13  Nutrition PN   x Energy Intake Energy Intake Malnutrition: less than 75% for greater than 7 days  1/13  Nutrition PN   x Weight Loss Weight Loss (Malnutrition): 5% in 1 month  1/13  Nutrition PN   x Fat Loss Subcutaneous Fat (Malnutrition): moderate depletion  1/13  Nutrition PN   x Muscle Loss Muscle Mass (Malnutrition): moderate depletion  1/13  Nutrition PN    Edema/Fluid Accumulation      Reduced  Strength (by dynamometer)     x Weight, BMI, Usual Body Weight  WT: 143.3 lb  BMI: 22.4 1/13  Nutrition PN    Delayed Wound Healing     x Acute or Chronic Illness Partial small bowel obstruction, small bowel stricture, reflux esophagitis, pancreatic adenocarcinoma, chronic respiratory failure with hypoxia, centrilobular emphysema, hyperlipidemia, essential hypertension, carcinoma of right breast in female, estrogen receptor positive       Positive for abdominal distention, abdominal pain, constipation, nausea and vomiting.    Patient presents with failure to thrive given her inability to tolerate oral intake secondary to her malignancy.         1/13  HM PN             1/10 General Surgery  Consults    Social or Environmental Circumstances     x Treatment Diet advancement per MD.  If TPN still warranted, rec'd 300g D, 85g AA + IV Lipids to provide  1860 kcals & 85 g of protein (GIR: 3.2).  RD following.      1/13  Nutrition PN   x Other NPO w/ NGT present. TPN ordered, however not infusing 2/2 no central line.   1/13  Nutrition PN     Academy of Nutrition and Dietetics (Academy) and the American Society for Parenteral and Enteral Nutrition (A.S.P.E.N.) Clinical Characteristics to support Malnutrition      Criteria for mild malnutrition is defined as 1 characteristic outlined above within the established moderate or severe parameters.  A minimum of 2 out of the 6 characteristics noted above are recommended for a diagnosis of moderate or severe malnutrition.  Chronic illness/injury is a disease/condition lasting 3 months or longer.    The noted clinical guidelines are only system guidelines and do not replace the providers clinical judgment.    Provider, please confirm  the degree of malnutrition  associated with above clinical findings.      [x  ] Moderate Malnutrition - a minimum of 2 of the 6 moderate malnutrition characteristics noted above    [  ] Other Nutritional Diagnosis (please specify): _______     Please document in your progress notes daily for the duration of treatment until resolved and  include in your discharge summary.      Reference:    VALENCIA Mckenzie, PhD, RD, Vinh ABURTO P., PhD, RN, VÍCTOR Holguin MD, PhD, Kev BOWEN A., MS, RD, [facility], ADRIANA Tilley, MS, RD, The Academy Malnutrition Work Group, The A.S.P.E.N. Board of Directors. (2012). Consensus Statement: Academy of Nutrition and Dietetics and American Society for Parenteral and Enteral Nutrition: Characteristics Recommended for the Identification and Documentation of Adult Malnutrition (Undernutrition). Journal of Parenteral and Enteral Nutrition, 36(3), 275-283. doi:10.1177/3141605772285630     Form No. [id number]

## 2024-01-17 LAB
ALBUMIN SERPL BCP-MCNC: 2.9 G/DL (ref 3.5–5.2)
ALP SERPL-CCNC: 83 U/L (ref 55–135)
ALT SERPL W/O P-5'-P-CCNC: 34 U/L (ref 10–44)
ANION GAP SERPL CALC-SCNC: 8 MMOL/L (ref 8–16)
AST SERPL-CCNC: 35 U/L (ref 10–40)
BILIRUB SERPL-MCNC: 1 MG/DL (ref 0.1–1)
BUN SERPL-MCNC: 20 MG/DL (ref 8–23)
CALCIUM SERPL-MCNC: 8.9 MG/DL (ref 8.7–10.5)
CHLORIDE SERPL-SCNC: 101 MMOL/L (ref 95–110)
CO2 SERPL-SCNC: 29 MMOL/L (ref 23–29)
CREAT SERPL-MCNC: 0.7 MG/DL (ref 0.5–1.4)
ERYTHROCYTE [DISTWIDTH] IN BLOOD BY AUTOMATED COUNT: 13.3 % (ref 11.5–14.5)
EST. GFR  (NO RACE VARIABLE): >60 ML/MIN/1.73 M^2
GLUCOSE SERPL-MCNC: 83 MG/DL (ref 70–110)
HCT VFR BLD AUTO: 39.7 % (ref 37–48.5)
HGB BLD-MCNC: 12.5 G/DL (ref 12–16)
MAGNESIUM SERPL-MCNC: 2.3 MG/DL (ref 1.6–2.6)
MCH RBC QN AUTO: 28.1 PG (ref 27–31)
MCHC RBC AUTO-ENTMCNC: 31.5 G/DL (ref 32–36)
MCV RBC AUTO: 89 FL (ref 82–98)
PHOSPHATE SERPL-MCNC: 3.1 MG/DL (ref 2.7–4.5)
PLATELET # BLD AUTO: 296 K/UL (ref 150–450)
PMV BLD AUTO: 10.6 FL (ref 9.2–12.9)
POCT GLUCOSE: 77 MG/DL (ref 70–110)
POCT GLUCOSE: 77 MG/DL (ref 70–110)
POCT GLUCOSE: 88 MG/DL (ref 70–110)
POCT GLUCOSE: 99 MG/DL (ref 70–110)
POTASSIUM SERPL-SCNC: 3.6 MMOL/L (ref 3.5–5.1)
PROT SERPL-MCNC: 6 G/DL (ref 6–8.4)
RBC # BLD AUTO: 4.45 M/UL (ref 4–5.4)
SODIUM SERPL-SCNC: 138 MMOL/L (ref 136–145)
WBC # BLD AUTO: 12.18 K/UL (ref 3.9–12.7)

## 2024-01-17 PROCEDURE — 25000003 PHARM REV CODE 250: Performed by: HOSPITALIST

## 2024-01-17 PROCEDURE — 84100 ASSAY OF PHOSPHORUS: CPT | Performed by: HOSPITALIST

## 2024-01-17 PROCEDURE — 99900035 HC TECH TIME PER 15 MIN (STAT)

## 2024-01-17 PROCEDURE — 83735 ASSAY OF MAGNESIUM: CPT | Performed by: HOSPITALIST

## 2024-01-17 PROCEDURE — 94640 AIRWAY INHALATION TREATMENT: CPT

## 2024-01-17 PROCEDURE — 80053 COMPREHEN METABOLIC PANEL: CPT | Performed by: HOSPITALIST

## 2024-01-17 PROCEDURE — 20600001 HC STEP DOWN PRIVATE ROOM

## 2024-01-17 PROCEDURE — 25000242 PHARM REV CODE 250 ALT 637 W/ HCPCS: Performed by: STUDENT IN AN ORGANIZED HEALTH CARE EDUCATION/TRAINING PROGRAM

## 2024-01-17 PROCEDURE — 63700000 PHARM REV CODE 250 ALT 637 W/O HCPCS: Performed by: STUDENT IN AN ORGANIZED HEALTH CARE EDUCATION/TRAINING PROGRAM

## 2024-01-17 PROCEDURE — 25000003 PHARM REV CODE 250: Performed by: STUDENT IN AN ORGANIZED HEALTH CARE EDUCATION/TRAINING PROGRAM

## 2024-01-17 PROCEDURE — 63600175 PHARM REV CODE 636 W HCPCS: Performed by: NURSE PRACTITIONER

## 2024-01-17 PROCEDURE — 99233 SBSQ HOSP IP/OBS HIGH 50: CPT | Mod: ,,, | Performed by: STUDENT IN AN ORGANIZED HEALTH CARE EDUCATION/TRAINING PROGRAM

## 2024-01-17 PROCEDURE — 94761 N-INVAS EAR/PLS OXIMETRY MLT: CPT

## 2024-01-17 PROCEDURE — 63600175 PHARM REV CODE 636 W HCPCS: Performed by: STUDENT IN AN ORGANIZED HEALTH CARE EDUCATION/TRAINING PROGRAM

## 2024-01-17 PROCEDURE — 27000221 HC OXYGEN, UP TO 24 HOURS

## 2024-01-17 PROCEDURE — 85027 COMPLETE CBC AUTOMATED: CPT | Performed by: HOSPITALIST

## 2024-01-17 RX ORDER — FLUTICASONE FUROATE AND VILANTEROL 200; 25 UG/1; UG/1
1 POWDER RESPIRATORY (INHALATION) DAILY
Status: DISCONTINUED | OUTPATIENT
Start: 2024-01-17 | End: 2024-01-23 | Stop reason: HOSPADM

## 2024-01-17 RX ADMIN — SENNOSIDES AND DOCUSATE SODIUM 1 TABLET: 8.6; 5 TABLET ORAL at 09:01

## 2024-01-17 RX ADMIN — FAMOTIDINE 20 MG: 10 INJECTION INTRAVENOUS at 09:01

## 2024-01-17 RX ADMIN — PRAVASTATIN SODIUM 40 MG: 40 TABLET ORAL at 09:01

## 2024-01-17 RX ADMIN — CYCLOBENZAPRINE HYDROCHLORIDE 10 MG: 5 TABLET, FILM COATED ORAL at 09:01

## 2024-01-17 RX ADMIN — MORPHINE SULFATE 4 MG: 4 INJECTION INTRAVENOUS at 03:01

## 2024-01-17 RX ADMIN — IPRATROPIUM BROMIDE AND ALBUTEROL SULFATE 3 ML: .5; 3 SOLUTION RESPIRATORY (INHALATION) at 10:01

## 2024-01-17 RX ADMIN — MORPHINE SULFATE 4 MG: 4 INJECTION INTRAVENOUS at 11:01

## 2024-01-17 RX ADMIN — AZITHROMYCIN 250 MG: 250 TABLET, FILM COATED ORAL at 09:01

## 2024-01-17 RX ADMIN — OXYCODONE HYDROCHLORIDE 10 MG: 10 TABLET ORAL at 09:01

## 2024-01-17 RX ADMIN — METOPROLOL SUCCINATE 25 MG: 25 TABLET, EXTENDED RELEASE ORAL at 09:01

## 2024-01-17 RX ADMIN — MORPHINE SULFATE 4 MG: 4 INJECTION INTRAVENOUS at 07:01

## 2024-01-17 RX ADMIN — IPRATROPIUM BROMIDE AND ALBUTEROL SULFATE 3 ML: .5; 3 SOLUTION RESPIRATORY (INHALATION) at 05:01

## 2024-01-17 RX ADMIN — TOPIRAMATE 50 MG: 25 TABLET, FILM COATED ORAL at 09:01

## 2024-01-17 RX ADMIN — ENOXAPARIN SODIUM 40 MG: 40 INJECTION SUBCUTANEOUS at 05:01

## 2024-01-17 RX ADMIN — MORPHINE SULFATE 4 MG: 4 INJECTION INTRAVENOUS at 09:01

## 2024-01-17 RX ADMIN — CYCLOBENZAPRINE HYDROCHLORIDE 10 MG: 5 TABLET, FILM COATED ORAL at 03:01

## 2024-01-17 NOTE — PROGRESS NOTES
[facility] - Telemetry Stepdown  Adult Nutrition  Progress Note    SUMMARY       Recommendations    Continue Full liquid diet; advance to Regular as tolerated.  If pt agreeable, add Boost Plus ONS TID.  RD to monitor & follow-up.    Goals: Meet % EEN, EPN by RD f/u date  Nutrition Goal Status: progressing towards goal  Communication of RD Recs: reviewed with physician    Assessment and Plan    Moderate malnutrition    Malnutrition Type:  Context: acute illness or injury  Level: moderate    Related to (etiology):   Inability to consume sufficient energy     Signs and Symptoms (as evidenced by):   Weight loss, inadequate energy intake, NFPE    Malnutrition Characteristic Summary:  Weight Loss (Malnutrition): 5% in 1 month  Energy Intake (Malnutrition): less than 75% for greater than 7 days  Subcutaneous Fat (Malnutrition): moderate depletion  Muscle Mass (Malnutrition): moderate depletion    Interventions/Recommendations (treatment strategy):  Collaboration of nutrition care w/ other providers  ADAT    Nutrition Diagnosis Status:   Continues    Malnutrition Assessment    Malnutrition Context: acute illness or injury  Malnutrition Level: moderate    Weight Loss (Malnutrition): 5% in 1 month  Energy Intake (Malnutrition): less than 75% for greater than 7 days  Subcutaneous Fat (Malnutrition): moderate depletion  Muscle Mass (Malnutrition): moderate depletion     Reason for Assessment    Reason For Assessment: RD follow-up  Diagnosis: other (see comments) (SBO)  Relevant Medical History: HTN, HLD, Ca  Interdisciplinary Rounds: did not attend    General Information Comments: Diet advanced to full liquids this AM, tolerated clear liquids yesterday. NGT present (clamped). Moderate malnutrition diagnosis continues - please see PES statement for details.  Nutrition Discharge Planning: Pending clinical course    Nutrition/Diet History    Spiritual, Cultural Beliefs, Hoahaoism Practices, Values that Affect Care: no  Factors  "Affecting Nutritional Intake: altered gastrointestinal function    Anthropometrics    Temp: 98 °F (36.7 °C)  Height Method: Stated  Height: 5' 7" (170.2 cm)  Height (inches): 67 in  Weight Method: Bed Scale  Weight: 65.2 kg (143 lb 11.8 oz)  Weight (lb): 143.74 lb  Ideal Body Weight (IBW), Female: 135 lb  % Ideal Body Weight, Female (lb): 106.47 %  BMI (Calculated): 22.5  BMI Grade: 18.5-24.9 - normal  Usual Body Weight (UBW), k.2 kg  % Usual Body Weight: 95.51  % Weight Change From Usual Weight: -4.69 %    Lab/Procedures/Meds    Pertinent Labs Reviewed: reviewed  Pertinent Labs Comments: -  Pertinent Medications Reviewed: reviewed  Pertinent Medications Comments: D5    Estimated/Assessed Needs    Weight Used For Calorie Calculations: 65.2 kg (143 lb 11.8 oz)    Energy Calorie Requirements (kcal):  kcal/d  Energy Need Method: Kcal/kg (30 kcal/kg)    Protein Requirements: 85 g/d (1.3 g/kg)  Weight Used For Protein Calculations: 65.2 kg (143 lb 11.8 oz)    Estimated Fluid Requirement Method: other (see comments) (Per MD)  RDA Method (mL):     Nutrition Prescription Ordered    Current Diet Order: Full liquids    Evaluation of Received Nutrient/Fluid Intake    I/O: -7.8L since admit    Comments: LBM:     Tolerance: tolerating    Nutrition Risk    Level of Risk/Frequency of Follow-up:  (1x/week)     Monitor and Evaluation    Food and Nutrient Intake: food and beverage intake, energy intake, parenteral nutrition intake  Food and Nutrient Adminstration: enteral and parenteral nutrition administration, diet order  Physical Activity and Function: nutrition-related ADLs and IADLs  Anthropometric Measurements: weight, weight change  Biochemical Data, Medical Tests and Procedures: glucose/endocrine profile, lipid profile, inflammatory profile, gastrointestinal profile, electrolyte and renal panel  Nutrition-Focused Physical Findings: overall appearance     Nutrition Follow-Up    RD Follow-up?: Yes      "

## 2024-01-17 NOTE — ASSESSMENT & PLAN NOTE
PainIs multifactorial related to her previous spinal surgery.  She does have a spinal stimulator in place.  She does use a lidocaine patch.  Normally at home she takes Percocet as needed and sleeps on the couch. GLORIA GARCIA reviewed, prescribed Percocet 10 q6h PRN by provider in [place], [place].  - Continue morphine 4 mg IV q3h PRN severe pain, unable to tolerate PO  - Continue oxycodone 10 mg PO q4h PRN severe pain if able to tolerate PO  - NG tube still in place, clamped, anticipating likely venting PEG placement  - Prior 24 hour MME: 24 MME (two doses of morphine 4 mg IV)  - Encouraged Ms. You to take oxycodone dose when she is ready to ensure that dose is helpful prior to discharge

## 2024-01-17 NOTE — PT/OT/SLP PROGRESS
Occupational Therapy      Patient Name:  Radha You   MRN:  [id number]    Patient not seen today secondary to pt. Declined in am reporting she wants PM treatment. Son also reported he would speak to pt. About participating in therapy to improve. .    1/17/2024

## 2024-01-17 NOTE — PT/OT/SLP PROGRESS
Occupational Therapy      Patient Name:  Radha You   MRN:  [id number]    Patient not seen today secondary to back Pain. Refused repositioning, EOB and bed level activities. 2nd attempt today. Will follow-up as appropriate.    1/17/2024

## 2024-01-17 NOTE — PROGRESS NOTES
[facility] Medicine  Progress Note    Patient Name: Radha You  MRN: [id number]  Patient Class: IP- Inpatient   Admission Date: 1/9/2024  Length of Stay: 7 days  Attending Physician: Navid Kate MD  Primary Care Provider: Sapphire Ruffin FNP-C        Subjective:     Principal Problem:Partial small bowel obstruction        HPI:  Radha You is a 78 year old white woman with former cigarette smoking, hypertension, hyperlipidemia, chronic obstructive pulmonary disease, chronic hypoxic respiratory failure(on 1 liter/minute of supplemental oxygen), osteoporosis, history of estrogen receptor positive progesterone receptor negative, 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 She lives in [place], [place]. She is . Her hematologist-oncologist is Dr. Charles Us.               She was seen at [facility] Hematology-Oncology clinic on 1/4/2024 for her pancreatic adenocarcinoma, which was found incidentally during a bronchoscopy and biopsied by endoscopic ultrasound at [facility]. She was offered palliative chemotherapy and declined it.               She presented to [facility] Emergency Department on 1/9/2024 with abdominal pain for 3 to 4 days, associated with severe constipation with no bowel movement for several days despite over-the-counter laxatives, including suppositories. She was able to pass a small bowel movement earlier on the day of presentation but still felt constipated. She also had increase in pain in abdominal pain associated with nausea and vomiting causing inability to tolerate oral intake.  In the emergency department, heart rate was 115 bpm. Labs showed mild  leukocytosis. CT of the abdomen and pelvis with contrast revealed the known pancreatic tail mass, abutting and possibly invading the spleen with new geographic region of decreased enhancement in the lower splenic pole, suspicious for splenic infarct. There were multiple small bowel strictures suggested, possibly on the basis of metastatic disease that is possibly associated with low-grade or partial small bowel obstruction versus ileus. General Surgery was consulted and brought up the possibility of a palliative gastrostomy for venting as a treatment option. She deferred and opted for nasogastric decompression. She was given a liter of lactated Ringer's solution, 4 mg of intravenous morphine, and 4 mg of intravenous ondansetron. She was admitted to Hospital Medicine Team S.    Overview/Hospital Course:  She was kept NPO. General Surgery followed. She was put on lactated Ringer's solution at 75 mL/hr. She was given scopolamine patch. General Surgery signed off and recommended consulting Gastroenterology. Her urine was dark so fluids were increased to 100 mL/hr. After discussing the likelihood of recurrence of her acute problem due to the intestinal strictures, Gastroenterology was consulted on 1/12/2024 for venting gastrostomy. She was still reluctant so Palliative Care was consulted to discuss goals of care. Physical and Occupational Therapy were consulted when she reported that she had not gotten out of the hospital bed since being admitted. Famotidine was started for the esophagitis diagnosed on admission as well as to prevent esophageal ulcer due to having a nasogastric tube. Palliative Care had a long discussion with her and recommended a trial of dexamethasone to see if it would decrease intestinal swelling. Abdominal X-ray showed mid increase in bowel distension and recurrence of the nasogastric tube tip in the esophagus despite having been advanced 2 nights prior. The tube was advanced again. On 1/15/2024,  abdominal X-ray showed resolution of bowel distension. She was started on senna-docusate twice daily and given bisacodyl suppository. She started having bowel movements. She tolerated clear liquid diet.     Interval History: Had nausea prior to taking clear liquids. Did not vomit it. Chronic back pain. She said she will talk to her son first before confirming that she will get venting gastrostomy.    Review of Systems   Constitutional:  Negative for chills and fever.   Gastrointestinal:  Positive for nausea. Negative for vomiting.   Musculoskeletal:  Positive for back pain.   Neurological:  Negative for seizures and syncope.     Objective:     Vital Signs (Most Recent):  Temp: 98.3 °F (36.8 °C) (01/17/24 1153)  Pulse: 80 (01/17/24 1153)  Resp: 18 (01/17/24 1153)  BP: 121/64 (01/17/24 1153)  SpO2: (!) 93 % (01/17/24 1153) Vital Signs (24h Range):  Temp:  [97.9 °F (36.6 °C)-98.9 °F (37.2 °C)] 98.3 °F (36.8 °C)  Pulse:  [65-84] 80  Resp:  [16-20] 18  SpO2:  [87 %-100 %] 93 %  BP: (113-142)/(64-79) 121/64     Weight: 65.2 kg (143 lb 11.8 oz)  Body mass index is 22.51 kg/m².    Intake/Output Summary (Last 24 hours) at 1/17/2024 1416  Last data filed at 1/17/2024 0800  Gross per 24 hour   Intake 240 ml   Output 775 ml   Net -535 ml           Physical Exam  Vitals and nursing note reviewed.   Constitutional:       General: She is not in acute distress.     Appearance: She is well-developed and normal weight. She is not diaphoretic.   HENT:      Nose:      Comments: Nasogastric tube  Pulmonary:      Effort: Pulmonary effort is normal. No respiratory distress.   Skin:     General: Skin is warm and dry.      Coloration: Skin is not jaundiced or pale.   Neurological:      Mental Status: She is alert and oriented to person, place, and time. Mental status is at baseline.      Motor: No seizure activity.   Psychiatric:         Attention and Perception: Attention normal.         Mood and Affect: Affect normal.         Behavior:  Behavior is cooperative.         Thought Content: Thought content normal.             Significant Labs: All pertinent labs within the past 24 hours have been reviewed.    Significant Imaging: I have reviewed all pertinent imaging results/findings within the past 24 hours.    Assessment/Plan:      * Partial small bowel obstruction  Small bowel stricture   NG tube placed. Scopolamine patch, prn antiemetics. Gastroenterology consulted to place venting gastrostomy. PT and OT for debility. Appreciate Palliative Care, who gave dexamethasone trial. Started senna-docusate. Advance to full liquids. Will have to contact Gastroenterology once she agrees to get the venting gastrostomy.    Reflux esophagitis  Started famotidine.    Pancreatic adenocarcinoma  Being followed in Oncology clinic here. Was offered palliative chemotherapy. She wants a follow-up to discuss prognosis and current treatment options. Consulted Oncology.      Chronic respiratory failure with hypoxia  Patient with a history of chronic hypoxic respiratory failure that is on 1-3 liters of oxygen from her COPD.    Centrilobular emphysema  Continue home azithromycin 250 mg three times weekly (MWF), albuterol-ipratropium nebulizers PRN.    Hyperlipidemia  Continue home pravastatin 40 mg daily.      Essential hypertension  Continue home metoprolol succinate 25 mg daily. Monitor blood pressures.    Carcinoma of right breast in female, estrogen receptor positive  History of breast cancer. Patient previously had lumpectomy and chemoradiation.        VTE Risk Mitigation (From admission, onward)           Ordered     enoxaparin injection 40 mg  Daily         01/10/24 0335     IP VTE HIGH RISK PATIENT  Once         01/10/24 0335     Place sequential compression device  Until discontinued         01/10/24 0335                    Discharge Planning   RUFINO: 1/19/2024     Code Status: Full Code   Is the patient medically ready for discharge?: No    Reason for patient still  in hospital (select all that apply): Other (specify) gastrostomy placement  Discharge Plan A: Home Health                  Navid Kate MD  Department of Hospital Medicine   [facility] - Telemetry Stepdown

## 2024-01-17 NOTE — ASSESSMENT & PLAN NOTE
Small bowel stricture   NG tube placed. Scopolamine patch, prn antiemetics. Gastroenterology consulted to place venting gastrostomy. PT and OT for debility. Appreciate Palliative Care, who gave dexamethasone trial. Started senna-docusate. Advance to full liquids. Will have to contact Gastroenterology once she agrees to get the venting gastrostomy.

## 2024-01-17 NOTE — SUBJECTIVE & OBJECTIVE
Interval History:  Met Ms. You at bedside, pleasant and engaging. Did note that she tends to sleep in late and stay up late. NG tube clamped and denies nausea. Notes that pain is not really in abdomen, but in back where she had back surgery in the past. Hadn't made an official declaration about venting PEG but leaning towards it.    Medications:  Continuous Infusions:   dextrose 5 % and 0.45 % NaCl 20 mL/hr at 01/14/24 1100     Scheduled Meds:   azithromycin  250 mg Oral Once per day on Mon Wed Fri    cyclobenzaprine  10 mg Oral TID    enoxparin  40 mg Subcutaneous Daily    famotidine (PF)  20 mg Intravenous BID    fluticasone furoate-vilanteroL  1 puff Inhalation Daily    metoprolol succinate  25 mg Oral Daily    pravastatin  40 mg Oral QHS    scopolamine  1 patch Transdermal Q3 Days    senna-docusate 8.6-50 mg  1 tablet Oral BID    topiramate  50 mg Oral QHS     PRN Meds:acetaminophen, albuterol-ipratropium, dextrose 10%, dextrose 10%, glucagon (human recombinant), glucose, glucose, haloperidol lactate, melatonin, morphine, naloxone, ondansetron, oxyCODONE, simethicone, sodium chloride 0.9%    Objective:     Vital Signs (Most Recent):  Temp: 98 °F (36.7 °C) (01/17/24 0750)  Pulse: 80 (01/17/24 1016)  Resp: 16 (01/17/24 1016)  BP: 113/79 (01/17/24 0750)  SpO2: 100 % (01/17/24 1016) Vital Signs (24h Range):  Temp:  [97.9 °F (36.6 °C)-98.9 °F (37.2 °C)] 98 °F (36.7 °C)  Pulse:  [65-84] 80  Resp:  [16-20] 16  SpO2:  [99 %-100 %] 100 %  BP: (113-156)/(70-80) 113/79     Weight: 65.2 kg (143 lb 11.8 oz)  Body mass index is 22.51 kg/m².       Physical Exam  Vitals reviewed.   HENT:      Head: Normocephalic and atraumatic.      Nose:      Comments: NG tube in place     Mouth/Throat:      Mouth: Mucous membranes are dry.   Eyes:      Extraocular Movements: Extraocular movements intact.      Conjunctiva/sclera: Conjunctivae normal.   Cardiovascular:      Rate and Rhythm: Normal rate.   Pulmonary:      Effort: Pulmonary  effort is normal.   Abdominal:      Tenderness: There is abdominal tenderness.      Comments: No bowel sounds.   Skin:     General: Skin is warm and dry.   Neurological:      Mental Status: She is alert and oriented to person, place, and time. Mental status is at baseline.            Review of Symptoms      Symptom Assessment (ESAS 0-10 Scale)  Pain:  0  Dyspnea:  0  Anxiety:  0  Nausea:  0  Depression:  0  Anorexia:  0  Fatigue:  0  Insomnia:  0  Restlessness:  0  Agitation:  0     CAM / Delirium:  Negative  Constipation:  Negative  Diarrhea:  Negative      Bowel Management Plan (BMP):  Yes      Pain Assessment:  OME in 24 hours:  60  Location(s): abdomen    Abdomen       Location: generalized        Quality: Colicky        Quantity: 2/10 in intensity        Chronicity: Onset 3 week(s) ago, gradually improving since Has improved with NG tube drainage.  Bowel rest.  Low-dose opioids.        Aggravating Factors: Sitting up        Alleviating Factors: Opiates and belching        Associated Symptoms: None    Performance Status:  50    Living Arrangements:  Lives alone and Lives >50 miles from facility    Psychosocial/Cultural:   See Palliative Psychosocial Note: No   2 sons, lives in [facility].  Son in [place] is willing to take her into his home.  He is willing to care for her.  He has not sure that hospice will be accepted by his mother.  **Primary  to Follow**  Palliative Care  Consult: No    Spiritual:  F - Lilly and Belief:  Hinduism  I - Importance:  Yes  C - Community:  No has not been involved in the [employer] since moving from [place] to [facility].  A - Address in Care:  Yes        Advance Care Planning   Advance Directives:   Living Will: Yes        Copy on chart: Yes    [place]: No    Do Not Resuscitate Status: No    Medical Power of : Yes    Agent's Name:  Hiram You   Agent's Contact Number:  [phone]    Decision Making:  Patient answered  questions  Goals of Care: What is most important right now is to focus on remaining as independent as possible, symptom/pain control. Accordingly, we have decided that the best plan to meet the patient's goals includes continuing with treatment.         Significant Labs: All pertinent labs within the past 24 hours have been reviewed.  CBC:   Recent Labs   Lab 01/17/24 0442   WBC 12.18   HGB 12.5   HCT 39.7   MCV 89          BMP:  Recent Labs   Lab 01/17/24 0442   GLU 83      K 3.6      CO2 29   BUN 20   CREATININE 0.7   CALCIUM 8.9   MG 2.3       LFT:  Lab Results   Component Value Date    AST 35 01/17/2024    ALKPHOS 83 01/17/2024    BILITOT 1.0 01/17/2024     Albumin:   Albumin   Date Value Ref Range Status   01/17/2024 2.9 (L) 3.5 - 5.2 g/dL Final     Protein:   Total Protein   Date Value Ref Range Status   01/17/2024 6.0 6.0 - 8.4 g/dL Final     Lactic acid:   Lab Results   Component Value Date    LACTATE 0.7 03/05/2020    LACTATE 1.0 03/27/2019       Significant Imaging: I have reviewed all pertinent imaging results/findings within the past 24 hours.

## 2024-01-17 NOTE — PLAN OF CARE
PT alert and oriented x 4. Able to voice all wants and needs. All needs met.  Cont to have NG tube in place that is clamped at this time. She has remained free of falls and injuries. Safety eduction and plan of care reviewed with PT and she voiced understanding. Bed is low and locked with call light with in easy reach.  Bed alarm set with sounds audible.

## 2024-01-17 NOTE — SUBJECTIVE & OBJECTIVE
Interval History: Had nausea prior to taking clear liquids. Did not vomit it. Chronic back pain. She said she will talk to her son first before confirming that she will get venting gastrostomy.    Review of Systems   Constitutional:  Negative for chills and fever.   Gastrointestinal:  Positive for nausea. Negative for vomiting.   Musculoskeletal:  Positive for back pain.   Neurological:  Negative for seizures and syncope.     Objective:     Vital Signs (Most Recent):  Temp: 98.3 °F (36.8 °C) (01/17/24 1153)  Pulse: 80 (01/17/24 1153)  Resp: 18 (01/17/24 1153)  BP: 121/64 (01/17/24 1153)  SpO2: (!) 93 % (01/17/24 1153) Vital Signs (24h Range):  Temp:  [97.9 °F (36.6 °C)-98.9 °F (37.2 °C)] 98.3 °F (36.8 °C)  Pulse:  [65-84] 80  Resp:  [16-20] 18  SpO2:  [87 %-100 %] 93 %  BP: (113-142)/(64-79) 121/64     Weight: 65.2 kg (143 lb 11.8 oz)  Body mass index is 22.51 kg/m².    Intake/Output Summary (Last 24 hours) at 1/17/2024 1416  Last data filed at 1/17/2024 0800  Gross per 24 hour   Intake 240 ml   Output 775 ml   Net -535 ml           Physical Exam  Vitals and nursing note reviewed.   Constitutional:       General: She is not in acute distress.     Appearance: She is well-developed and normal weight. She is not diaphoretic.   HENT:      Nose:      Comments: Nasogastric tube  Pulmonary:      Effort: Pulmonary effort is normal. No respiratory distress.   Skin:     General: Skin is warm and dry.      Coloration: Skin is not jaundiced or pale.   Neurological:      Mental Status: She is alert and oriented to person, place, and time. Mental status is at baseline.      Motor: No seizure activity.   Psychiatric:         Attention and Perception: Attention normal.         Mood and Affect: Affect normal.         Behavior: Behavior is cooperative.         Thought Content: Thought content normal.             Significant Labs: All pertinent labs within the past 24 hours have been reviewed.    Significant Imaging: I have reviewed  all pertinent imaging results/findings within the past 24 hours.

## 2024-01-17 NOTE — ASSESSMENT & PLAN NOTE
01.17.2024  Ms. You shared that she believes she made her mind up regarding venting gastrostomy, which she is agreeable. She was also told that if not needed, then they don't have to proceed, which she would be accepting of as well. Also not sure if they will follow with oncology. I asked if she'd keep me in the loop about this decision as it will help me know who to communicate in terms of appropriate next steps and she said she would. Asked me to leave my card, which I did at her bedside table.  01.15.2024  No change in plan of care.  No family in room.  Decision regarding venting gastrostomy need to be made.  Decision also on when IR would be available if they are agreeable to doing it.  Need to revisit code status when all family is available and discharge plans with hospice.  01.14.2024  Symptoms:  Abdominal pain, nausea, vomiting.     Medicolegal:  Patient has decision making capacity.  Son Hiram is surrogate decision maker and is HCPOA in his present at the bedside.     Psychosocial:  support system consists of friends and family.  Her best friend for over 60 years i came to visit her and she is distraught because she thinks that she is trying to take over her life and get her finances.     Spiritual:  Zoroastrianism.  Has not been involved with the Restorationist family since moving to [facility].    Prognostication:  Poor    Understanding of disease and Illness Trajectory: Patient and Family  has  adequate understanding of her illness, they can benefit from continued education on what to expect in the future.      Goals of care:  Comfort and quality of life as well as independence.  Advance Care Planning     Date: 01/13/2024    Today a voluntary meeting took place: bedside    Patient Participation: Patient is unable to participate     Attendees (Name and  Relationship to patient): Health care power of : Hiram edwards   Staff attendees (Name and  Role): Glo Ibarra MD    ACP Conversation (General): Patient  and family are aware that her disease is not curable.  We did talk again about improving her ileus/small-bowel obstruction.  Malignant obstruction can sometimes be relieved with higher dose Decadron and I offered that as an option to the family for 2/3 days.  We have that timeframe because they venting gastrostomy can not be placed prior to Tuesday at the earliest.  Her primary team has started her on peripheral nutrition.  I did discuss again that this has not really been shown to be that effective and certainly would not alter the cancer and lack of treatment options.     Code Status:  Discussed with son that if she dies in her sleep we should let her.  I would put in a do not resuscitate order.  He said he would talk with his brother about this.  ACP Documents: Reviewed current existing digital forms    Goals of care: The patient and family endorses that what is most important right now is to focus on remaining as independent as possible and symptom/pain control    Accordingly, we have decided that the best plan to meet the patient's goals includes continuing with treatment      Recommendations/  Follow-up tasks: The patient and health care agent were provided the following recommendations trial of Decadron 10 mg IV daily for on day2 of 3 days.  Continue with peripheral nutrition.  Have decreased the IV fluids to see if that will help her IV pump from beeping.  Monitor condition and treat abdominal and back pain.      Length of ACP   conversation in minutes: >16 min          Code status:  Full for now    Advance directives:  Documents are in place.  Her living will says that she would want artificial hydration and nutrition.  She is getting that currently.  We will need to clarify this with the patient and sons particularly as we move closer to hospice treatment.      Summary and recommendations:  Continue pain medication for both her lower back pain and the abdominal pain  Trial of Decadron 10 mg IV Q 24 hours  x3 days to see if that will help her small-bowel obstruction.  Consider additional Oncology recommendations so patient and family can move forward with decision-making.  I will return to visit with them tomorrow morning.  Have encouraged the son who lives in [facility] to go home because the weather is about to get really bad.    01.13.2024  Impression:    Symptoms:  Abdominal pain, nausea, vomiting.     Medicolegal:  Patient has decision making capacity.  Son Hiram is surrogate decision maker and is HCPOA.     Psychosocial:  support system consists of friends and family.  Her best friend for over 60 years is coming to visit her tomorrow.  She plans to have a checklist of things that her friend needs to take care of.  This gives her great comfort.     Spiritual:  Alevism.  Has not been involved with the Orthodox family since moving to [facility].    Prognostication:  Poor    Understanding of disease and Illness Trajectory: Patient and Family  has  adequate understanding of her illness, they can benefit from continued education on what to expect in the future.      Goals of care:  Comfort and quality of life as well as independence.  Advance Care Planning     Date: 01/13/2024    Today a voluntary meeting took place: bedside    Patient Participation: Patient is able to participate     Attendees (Name and  Relationship to patient): Health care power of : Hiram son and other son Anibal and Hiram's daughter Shabnam.    Staff attendees (Name and  Role): Glo Ibarra MD    ACP Conversation (General): Understanding of current condition long conversation with patient and her 2 sons and granddaughter.  Patient is aware that her disease is not curable.  She is still thinking that she could see an oncologist in least will and receive chemotherapy or radiation.  She is not a surgical candidate and understands that.  I did ask her how much she was willing to go through for extra time.  She was unable to answer  that.  She was more focused on making sure her house was okay and the  came etc. and so forth.  We did talk at length about improving her ileus/small-bowel obstruction.  Malignant obstruction can sometimes be relieved with higher dose Decadron and I offered that as an option to the family for 3 days.  We have that timeframe because they venting gastrostomy can not be placed prior to Tuesday at the earliest.  Her primary team has started her on peripheral nutrition.  I did discuss separately with the sons that this has not really been shown to be that effective and certainly would not alter the cancer and lack of treatment options.     Code Status: did not engage in discussions today    ACP Documents: Reviewed current existing digital forms    Goals of care: The patient and family endorses that what is most important right now is to focus on remaining as independent as possible and symptom/pain control    Accordingly, we have decided that the best plan to meet the patient's goals includes continuing with treatment      Recommendations/  Follow-up tasks: The patient and health care agent were provided the following recommendations trial of Decadron 10 mg IV daily for 3 days.  Continue with peripheral nutrition.  Monitor condition and treat abdominal and back pain.       Length of ACP   conversation in minutes: >76 minutes          Code status:  Full for now    Advance directives:  Documents are in place.  Her living will says that she would want artificial hydration and nutrition.  She is getting that currently.  We will need to clarify this with the patient and sons particularly as we move closer to hospice treatment.      Summary and recommendations:  Continue pain medication for both her lower back pain and the abdominal pain  Trial of Decadron 10 mg IV Q 24 hours x3 days to see if that will help her small-bowel obstruction.  Consider additional Oncology recommendations so patient and family can move  forward with decision-making.  I will return to visit with them tomorrow morning.  Have encouraged the son who lives in [facility] to go home because the weather is about to get really bad.      > 76min time was spent on advance care planning, goals of care discussion, emotional support, formulating and communicating prognosis and goals of care, exploring burden/benefit of various approaches of treatment.

## 2024-01-17 NOTE — PROGRESS NOTES
[facility] - Telemetry Stepdown  Palliative Medicine  Progress Note    Patient Name: Radha You  MRN: [id number]  Admission Date: 1/9/2024  Hospital Length of Stay: 7 days  Code Status: Full Code   Attending Provider: Navid Kate MD  Consulting Provider: Melissa Toscano MD  Primary Care Physician: Sapphire Ruffin FNP-C  Principal Problem:Partial small bowel obstruction    Patient information was obtained from patient and primary team.      Assessment/Plan:     Palliative Care  Palliative care encounter  01.17.2024  Ms. You shared that she believes she made her mind up regarding venting gastrostomy, which she is agreeable. She was also told that if not needed, then they don't have to proceed, which she would be accepting of as well. Also not sure if they will follow with oncology. I asked if she'd keep me in the loop about this decision as it will help me know who to communicate in terms of appropriate next steps and she said she would. Asked me to leave my card, which I did at her bedside table.  01.15.2024  No change in plan of care.  No family in room.  Decision regarding venting gastrostomy need to be made.  Decision also on when IR would be available if they are agreeable to doing it.  Need to revisit code status when all family is available and discharge plans with hospice.  01.14.2024  Symptoms:  Abdominal pain, nausea, vomiting.     Medicolegal:  Patient has decision making capacity.  Son Hiram is surrogate decision maker and is HCPOA in his present at the bedside.     Psychosocial:  support system consists of friends and family.  Her best friend for over 60 years i came to visit her and she is distraught because she thinks that she is trying to take over her life and get her finances.     Spiritual:  Jain.  Has not been involved with the Zoroastrianism family since moving to [facility].    Prognostication:  Poor    Understanding of disease and Illness Trajectory: Patient and Family  has  adequate  understanding of her illness, they can benefit from continued education on what to expect in the future.      Goals of care:  Comfort and quality of life as well as independence.  Advance Care Planning     Date: 01/13/2024    Today a voluntary meeting took place: bedside    Patient Participation: Patient is unable to participate     Attendees (Name and  Relationship to patient): Health care power of : Hiram edwards   Staff attendees (Name and  Role): Glo Ibarra MD    ACP Conversation (General): Patient and family are aware that her disease is not curable.  We did talk again about improving her ileus/small-bowel obstruction.  Malignant obstruction can sometimes be relieved with higher dose Decadron and I offered that as an option to the family for 2/3 days.  We have that timeframe because they venting gastrostomy can not be placed prior to Tuesday at the earliest.  Her primary team has started her on peripheral nutrition.  I did discuss again that this has not really been shown to be that effective and certainly would not alter the cancer and lack of treatment options.     Code Status:  Discussed with son that if she dies in her sleep we should let her.  I would put in a do not resuscitate order.  He said he would talk with his brother about this.  ACP Documents: Reviewed current existing digital forms    Goals of care: The patient and family endorses that what is most important right now is to focus on remaining as independent as possible and symptom/pain control    Accordingly, we have decided that the best plan to meet the patient's goals includes continuing with treatment      Recommendations/  Follow-up tasks: The patient and health care agent were provided the following recommendations trial of Decadron 10 mg IV daily for on day2 of 3 days.  Continue with peripheral nutrition.  Have decreased the IV fluids to see if that will help her IV pump from beeping.  Monitor condition and treat abdominal and back  pain.      Length of ACP   conversation in minutes: >16 min          Code status:  Full for now    Advance directives:  Documents are in place.  Her living will says that she would want artificial hydration and nutrition.  She is getting that currently.  We will need to clarify this with the patient and sons particularly as we move closer to hospice treatment.      Summary and recommendations:  Continue pain medication for both her lower back pain and the abdominal pain  Trial of Decadron 10 mg IV Q 24 hours x3 days to see if that will help her small-bowel obstruction.  Consider additional Oncology recommendations so patient and family can move forward with decision-making.  I will return to visit with them tomorrow morning.  Have encouraged the son who lives in [facility] to go home because the weather is about to get really bad.    01.13.2024  Impression:    Symptoms:  Abdominal pain, nausea, vomiting.     Medicolegal:  Patient has decision making capacity.  Son Hiram is surrogate decision maker and is HCPOA.     Psychosocial:  support system consists of friends and family.  Her best friend for over 60 years is coming to visit her tomorrow.  She plans to have a checklist of things that her friend needs to take care of.  This gives her great comfort.     Spiritual:  Anabaptism.  Has not been involved with the Mormon family since moving to [facility].    Prognostication:  Poor    Understanding of disease and Illness Trajectory: Patient and Family  has  adequate understanding of her illness, they can benefit from continued education on what to expect in the future.      Goals of care:  Comfort and quality of life as well as independence.  Advance Care Planning    Date: 01/13/2024    Today a voluntary meeting took place: bedside    Patient Participation: Patient is able to participate     Attendees (Name and  Relationship to patient): Health care power of : Hiram edwards and other son Anibal and Hiram's  daughter Shabnam.    Staff attendees (Name and  Role): Glo Ibarra MD    ACP Conversation (General): Understanding of current condition long conversation with patient and her 2 sons and granddaughter.  Patient is aware that her disease is not curable.  She is still thinking that she could see an oncologist in least will and receive chemotherapy or radiation.  She is not a surgical candidate and understands that.  I did ask her how much she was willing to go through for extra time.  She was unable to answer that.  She was more focused on making sure her house was okay and the  came etc. and so forth.  We did talk at length about improving her ileus/small-bowel obstruction.  Malignant obstruction can sometimes be relieved with higher dose Decadron and I offered that as an option to the family for 3 days.  We have that timeframe because they venting gastrostomy can not be placed prior to Tuesday at the earliest.  Her primary team has started her on peripheral nutrition.  I did discuss separately with the sons that this has not really been shown to be that effective and certainly would not alter the cancer and lack of treatment options.     Code Status: did not engage in discussions today    ACP Documents: Reviewed current existing digital forms    Goals of care: The patient and family endorses that what is most important right now is to focus on remaining as independent as possible and symptom/pain control    Accordingly, we have decided that the best plan to meet the patient's goals includes continuing with treatment      Recommendations/  Follow-up tasks: The patient and health care agent were provided the following recommendations trial of Decadron 10 mg IV daily for 3 days.  Continue with peripheral nutrition.  Monitor condition and treat abdominal and back pain.       Length of ACP   conversation in minutes: >76 minutes          Code status:  Full for now    Advance directives:  Documents are in place.   Her living will says that she would want artificial hydration and nutrition.  She is getting that currently.  We will need to clarify this with the patient and sons particularly as we move closer to hospice treatment.      Summary and recommendations:  Continue pain medication for both her lower back pain and the abdominal pain  Trial of Decadron 10 mg IV Q 24 hours x3 days to see if that will help her small-bowel obstruction.  Consider additional Oncology recommendations so patient and family can move forward with decision-making.  I will return to visit with them tomorrow morning.  Have encouraged the son who lives in [facility] to go home because the weather is about to get really bad.      > 76min time was spent on advance care planning, goals of care discussion, emotional support, formulating and communicating prognosis and goals of care, exploring burden/benefit of various approaches of treatment.            Other  Pain  PainIs multifactorial related to her previous spinal surgery.  She does have a spinal stimulator in place.  She does use a lidocaine patch.  Normally at home she takes Percocet as needed and sleeps on the couch. [place]  reviewed, prescribed Percocet 10 q6h PRN by provider in [place], [place].  - Continue morphine 4 mg IV q3h PRN severe pain, unable to tolerate PO  - Continue oxycodone 10 mg PO q4h PRN severe pain if able to tolerate PO  - NG tube still in place, clamped, anticipating likely venting PEG placement  - Prior 24 hour MME: 24 MME (two doses of morphine 4 mg IV)  - Encouraged Ms. You to take oxycodone dose when she is ready to ensure that dose is helpful prior to discharge        I will follow-up with patient. Please contact us if you have any additional questions.    Subjective:     Chief Complaint:   Chief Complaint   Patient presents with    Abdominal Pain     Recently diagnosed with pancreatic cancer. C/o constipation. Last BM x6 days ago. Hx of COPD. Wears 3L O2 at baseline        HPI:   Ms. You is a 77yo female with a history of COPD, HLD, HTN, h/o breast cancer, and recently diagnosed metastatic pancreatic cancer who presented 1.10.2024  with abdominal pain, nausea, and emesis.  Been evaluated by Surgical Oncology and not felt to be a surgical candidate.  She is still considering chemotherapy and/or radiation therapy to prolong her life.  At this time however she has an ileus/small-bowel obstruction and has nasogastric decompression.  There is a consideration for placing a venting gastrostomy next week.    Palliative Medicine has been asked to assist with goals of care and treatment options and planning.      Hospital Course:  No notes on file    Interval History:  Met Ms. You at bedside, pleasant and engaging. Did note that she tends to sleep in late and stay up late. NG tube clamped and denies nausea. Notes that pain is not really in abdomen, but in back where she had back surgery in the past. Hadn't made an official declaration about venting PEG but leaning towards it.    Medications:  Continuous Infusions:   dextrose 5 % and 0.45 % NaCl 20 mL/hr at 01/14/24 1100     Scheduled Meds:   azithromycin  250 mg Oral Once per day on Mon Wed Fri    cyclobenzaprine  10 mg Oral TID    enoxparin  40 mg Subcutaneous Daily    famotidine (PF)  20 mg Intravenous BID    fluticasone furoate-vilanteroL  1 puff Inhalation Daily    metoprolol succinate  25 mg Oral Daily    pravastatin  40 mg Oral QHS    scopolamine  1 patch Transdermal Q3 Days    senna-docusate 8.6-50 mg  1 tablet Oral BID    topiramate  50 mg Oral QHS     PRN Meds:acetaminophen, albuterol-ipratropium, dextrose 10%, dextrose 10%, glucagon (human recombinant), glucose, glucose, haloperidol lactate, melatonin, morphine, naloxone, ondansetron, oxyCODONE, simethicone, sodium chloride 0.9%    Objective:     Vital Signs (Most Recent):  Temp: 98 °F (36.7 °C) (01/17/24 0750)  Pulse: 80 (01/17/24 1016)  Resp: 16 (01/17/24 1016)  BP:  113/79 (01/17/24 0750)  SpO2: 100 % (01/17/24 1016) Vital Signs (24h Range):  Temp:  [97.9 °F (36.6 °C)-98.9 °F (37.2 °C)] 98 °F (36.7 °C)  Pulse:  [65-84] 80  Resp:  [16-20] 16  SpO2:  [99 %-100 %] 100 %  BP: (113-156)/(70-80) 113/79     Weight: 65.2 kg (143 lb 11.8 oz)  Body mass index is 22.51 kg/m².       Physical Exam  Vitals reviewed.   HENT:      Head: Normocephalic and atraumatic.      Nose:      Comments: NG tube in place     Mouth/Throat:      Mouth: Mucous membranes are dry.   Eyes:      Extraocular Movements: Extraocular movements intact.      Conjunctiva/sclera: Conjunctivae normal.   Cardiovascular:      Rate and Rhythm: Normal rate.   Pulmonary:      Effort: Pulmonary effort is normal.   Abdominal:      Tenderness: There is abdominal tenderness.      Comments: No bowel sounds.   Skin:     General: Skin is warm and dry.   Neurological:      Mental Status: She is alert and oriented to person, place, and time. Mental status is at baseline.            Review of Symptoms      Symptom Assessment (ESAS 0-10 Scale)  Pain:  0  Dyspnea:  0  Anxiety:  0  Nausea:  0  Depression:  0  Anorexia:  0  Fatigue:  0  Insomnia:  0  Restlessness:  0  Agitation:  0     CAM / Delirium:  Negative  Constipation:  Negative  Diarrhea:  Negative      Bowel Management Plan (BMP):  Yes      Pain Assessment:  OME in 24 hours:  60  Location(s): abdomen    Abdomen       Location: generalized        Quality: Colicky        Quantity: 2/10 in intensity        Chronicity: Onset 3 week(s) ago, gradually improving since Has improved with NG tube drainage.  Bowel rest.  Low-dose opioids.        Aggravating Factors: Sitting up        Alleviating Factors: Opiates and belching        Associated Symptoms: None    Performance Status:  50    Living Arrangements:  Lives alone and Lives >50 miles from facility    Psychosocial/Cultural:   See Palliative Psychosocial Note: No   2 sons, lives in [facility].  Son in [place] is willing to  take her into his home.  He is willing to care for her.  He has not sure that hospice will be accepted by his mother.  **Primary  to Follow**  Palliative Care  Consult: No    Spiritual:  F - Lilly and Belief:  Jain  I - Importance:  Yes  C - Community:  No has not been involved in the Holiness since moving from [place] to [facility].  A - Address in Care:  Yes        Advance Care Planning  Advance Directives:   Living Will: Yes        Copy on chart: Yes    [place]POST: No    Do Not Resuscitate Status: No    Medical Power of : Yes    Agent's Name:  Hiram You   Agent's Contact Number:  [phone]    Decision Making:  Patient answered questions  Goals of Care: What is most important right now is to focus on remaining as independent as possible, symptom/pain control. Accordingly, we have decided that the best plan to meet the patient's goals includes continuing with treatment.         Significant Labs: All pertinent labs within the past 24 hours have been reviewed.  CBC:   Recent Labs   Lab 01/17/24 0442   WBC 12.18   HGB 12.5   HCT 39.7   MCV 89          BMP:  Recent Labs   Lab 01/17/24 0442   GLU 83      K 3.6      CO2 29   BUN 20   CREATININE 0.7   CALCIUM 8.9   MG 2.3       LFT:  Lab Results   Component Value Date    AST 35 01/17/2024    ALKPHOS 83 01/17/2024    BILITOT 1.0 01/17/2024     Albumin:   Albumin   Date Value Ref Range Status   01/17/2024 2.9 (L) 3.5 - 5.2 g/dL Final     Protein:   Total Protein   Date Value Ref Range Status   01/17/2024 6.0 6.0 - 8.4 g/dL Final     Lactic acid:   Lab Results   Component Value Date    LACTATE 0.7 03/05/2020    LACTATE 1.0 03/27/2019       Significant Imaging: I have reviewed all pertinent imaging results/findings within the past 24 hours.    I spent a total of 50 minutes on the day of the visit. This includes face to face time in discussion of goals of care, symptom assessment, coordination of care and  emotional support. This also includes non-face to face time preparing to see the patient (eg, review of tests/imaging), obtaining and/or reviewing separately obtained history, documenting clinical information in the electronic or other health record, independently interpreting results and communicating results to the patient/family/caregiver, or care coordinator.      Melissa Toscano MD  Palliative Medicine  [facility] - Telemetry Stepdown

## 2024-01-18 ENCOUNTER — ANESTHESIA EVENT (OUTPATIENT)
Dept: ENDOSCOPY | Facility: HOSPITAL | Age: 79
DRG: 436 | End: 2024-01-18
Payer: MEDICARE

## 2024-01-18 LAB
MAGNESIUM SERPL-MCNC: 2.2 MG/DL (ref 1.6–2.6)
PHOSPHATE SERPL-MCNC: 3.5 MG/DL (ref 2.7–4.5)
POCT GLUCOSE: 71 MG/DL (ref 70–110)
POCT GLUCOSE: 80 MG/DL (ref 70–110)
POCT GLUCOSE: 86 MG/DL (ref 70–110)

## 2024-01-18 PROCEDURE — 63600175 PHARM REV CODE 636 W HCPCS: Performed by: NURSE PRACTITIONER

## 2024-01-18 PROCEDURE — 25000003 PHARM REV CODE 250: Performed by: STUDENT IN AN ORGANIZED HEALTH CARE EDUCATION/TRAINING PROGRAM

## 2024-01-18 PROCEDURE — 25000003 PHARM REV CODE 250: Performed by: HOSPITALIST

## 2024-01-18 PROCEDURE — 20600001 HC STEP DOWN PRIVATE ROOM

## 2024-01-18 PROCEDURE — 83735 ASSAY OF MAGNESIUM: CPT | Performed by: HOSPITALIST

## 2024-01-18 PROCEDURE — 63600175 PHARM REV CODE 636 W HCPCS: Performed by: STUDENT IN AN ORGANIZED HEALTH CARE EDUCATION/TRAINING PROGRAM

## 2024-01-18 PROCEDURE — 84100 ASSAY OF PHOSPHORUS: CPT | Performed by: HOSPITALIST

## 2024-01-18 PROCEDURE — 36415 COLL VENOUS BLD VENIPUNCTURE: CPT | Performed by: HOSPITALIST

## 2024-01-18 PROCEDURE — 25000242 PHARM REV CODE 250 ALT 637 W/ HCPCS: Performed by: HOSPITALIST

## 2024-01-18 PROCEDURE — 99232 SBSQ HOSP IP/OBS MODERATE 35: CPT | Mod: ,,,

## 2024-01-18 RX ADMIN — SENNOSIDES AND DOCUSATE SODIUM 1 TABLET: 8.6; 5 TABLET ORAL at 09:01

## 2024-01-18 RX ADMIN — METOPROLOL SUCCINATE 25 MG: 25 TABLET, EXTENDED RELEASE ORAL at 09:01

## 2024-01-18 RX ADMIN — MORPHINE SULFATE 4 MG: 4 INJECTION INTRAVENOUS at 07:01

## 2024-01-18 RX ADMIN — CYCLOBENZAPRINE HYDROCHLORIDE 10 MG: 5 TABLET, FILM COATED ORAL at 08:01

## 2024-01-18 RX ADMIN — FAMOTIDINE 20 MG: 10 INJECTION INTRAVENOUS at 08:01

## 2024-01-18 RX ADMIN — SENNOSIDES AND DOCUSATE SODIUM 1 TABLET: 8.6; 5 TABLET ORAL at 08:01

## 2024-01-18 RX ADMIN — MORPHINE SULFATE 4 MG: 4 INJECTION INTRAVENOUS at 08:01

## 2024-01-18 RX ADMIN — FLUTICASONE FUROATE AND VILANTEROL TRIFENATATE 1 PUFF: 200; 25 POWDER RESPIRATORY (INHALATION) at 09:01

## 2024-01-18 RX ADMIN — CYCLOBENZAPRINE HYDROCHLORIDE 10 MG: 5 TABLET, FILM COATED ORAL at 11:01

## 2024-01-18 RX ADMIN — OXYCODONE HYDROCHLORIDE 10 MG: 10 TABLET ORAL at 08:01

## 2024-01-18 RX ADMIN — MORPHINE SULFATE 4 MG: 4 INJECTION INTRAVENOUS at 04:01

## 2024-01-18 RX ADMIN — PRAVASTATIN SODIUM 40 MG: 40 TABLET ORAL at 08:01

## 2024-01-18 RX ADMIN — ENOXAPARIN SODIUM 40 MG: 40 INJECTION SUBCUTANEOUS at 06:01

## 2024-01-18 RX ADMIN — TOPIRAMATE 50 MG: 25 TABLET, FILM COATED ORAL at 08:01

## 2024-01-18 NOTE — ASSESSMENT & PLAN NOTE
Patient with pancreatic adenocarcinoma with reoccurring small bowel obstruction. Discussed with patient and son regarding g-tube placement for venting purposes. They wish to proceed.     - Will proceed with G-tube placement tomorrow  - NPO after midnight

## 2024-01-18 NOTE — PT/OT/SLP PROGRESS
Occupational Therapy      Patient Name:  Radha You   MRN:  [id number]    Patient not seen today secondary to pt. Adamantly refusing on this date. Pt. Reporting she needed to rest for her sx tomorrow. Therapist explained importance of OOB/therapy to maintain strength, prevent DVT's, maintain skin integrity and prevent PNA. Pt. Continued to adamantly refuse. MD (Brendon Elias) messaged on this date regarding pt. Lack of participation.   1/18/2024

## 2024-01-18 NOTE — PLAN OF CARE
Problem: Adult Inpatient Plan of Care  Goal: Plan of Care Review  Outcome: Ongoing, Progressing  Goal: Patient-Specific Goal (Individualized)  Outcome: Ongoing, Progressing  Goal: Absence of Hospital-Acquired Illness or Injury  Outcome: Ongoing, Progressing  Goal: Optimal Comfort and Wellbeing  Outcome: Ongoing, Progressing  Goal: Readiness for Transition of Care  Outcome: Ongoing, Progressing     Problem: Skin Injury Risk Increased  Goal: Skin Health and Integrity  Outcome: Ongoing, Progressing  Intervention: Optimize Skin Protection  Flowsheets (Taken 1/17/2024 1805)  Pressure Reduction Techniques:   frequent weight shift encouraged   weight shift assistance provided  Pressure Reduction Devices: positioning supports utilized  Skin Protection:   adhesive use limited   incontinence pads utilized   tubing/devices free from skin contact  Head of Bed (HOB) Positioning: HOB at 30 degrees     Problem: Coping Ineffective  Goal: Effective Coping  Outcome: Ongoing, Progressing  Intervention: Support and Enhance Coping Strategies  Flowsheets (Taken 1/17/2024 1805)  Family/Support System Care: self-care encouraged  Environmental Support:   calm environment promoted   rest periods encouraged     Problem: Fall Injury Risk  Goal: Absence of Fall and Fall-Related Injury  Outcome: Ongoing, Progressing     Problem: Pain Acute  Goal: Acceptable Pain Control and Functional Ability  Outcome: Ongoing, Progressing  Intervention: Develop Pain Management Plan  Flowsheets (Taken 1/17/2024 1805)  Pain Management Interventions:   pain management plan reviewed with patient/caregiver   pillow support provided   quiet environment facilitated  Intervention: Prevent or Manage Pain  Flowsheets (Taken 1/17/2024 1805)  Sensory Stimulation Regulation: quiet environment promoted  Medication Review/Management: medications reviewed     POC reviewed. Address questions and concerns. AAOX4. VVS. Pain manage with Morphine q3. Periodical tearful @X's about  her situation. Encourage to verbalize feeling to MD. Michael. Remain on Oxygen @2L. Sat>92%. No s/s of distress. Bed in lowest position. Bed alarm. Call light in reach.     Statement Selected

## 2024-01-18 NOTE — ANESTHESIA PREPROCEDURE EVALUATION
[facility]  Anesthesia Pre-Operative Evaluation         Patient Name/: Radha oYu, 1945  MRN: [id number]    SUBJECTIVE:     Pre-operative evaluation for Procedure(s) (LRB):  INSERTION, PEG TUBE (N/A)     2024    Radha You is a 78 y.o. female w/ a significant PMHx of SBO, HTN, emphysema, ALFARO on 2L NC, breast cancer, and GERD.     CT of the abdomen and pelvis with contrast revealed the known pancreatic tail mass, abutting and possibly invading the spleen with new geographic region of decreased enhancement in the lower splenic pole, suspicious for splenic infarct. There were multiple small bowel strictures suggested, possibly on the basis of metastatic disease that is possibly associated with low-grade or partial small bowel obstruction versus ileus. Started nasogastric decompression. Plan for gtube placement for venting purposes      Patient now presents for the above procedure(s).    Patient denies any other known cardiopulmonary disease.     Level of Care: Floor/HM    NPO status: Instructed to be NPO at midnight prior to procedure    Access: NG, midline    Hemodynamics: HDS with no pressor requirements    Previous Airway:  Intubation:     Induction:  Intravenous    Intubated:  Postinduction    Mask Ventilation:  Easy mask    Attempts:  1    Attempted By:  Student    Method of Intubation:  Direct and video laryngoscopy    Blade:  Johnson 3    Laryngeal View Grade: Grade I - full view of cords      Difficult Airway Encountered?: No      Complications:  None    Airway Device:  Oral endotracheal tube    Airway Device Size:  8.5    Style/Cuff Inflation:  Cuffed    Inflation Amount (mL):  7    Tube secured:  20    Secured at:  The lips    Placement Verified By:  Capnometry    Complicating Factors:  None    Findings Post-Intubation:  BS equal bilateral    ________________________________________  2015  CONCLUSIONS     1 - Normal left ventricular systolic function (EF 60-65%).      2 - Quantitatively measured LV function is 59%.     3 - Left ventricular diastolic dysfunction.     4 - Normal right ventricular systolic function .     5 - Trivial mitral regurgitation.     6 - Trivial pericardial effusion.     7 - Low central venous pressure.     ________________________________________    LDA:        Midline Catheter Insertion/Assessment  - Single Lumen 01/12/24 1045 Right basilic vein (medial side of arm) 20g x 10cm (Active)   $ Midline Charges (Upon insertion) Bedside Insertion Performed Pt > 3 Years Old;Midline Catheter (Supply);Ultrasound Guide for Vascular Access 01/12/24 1045   Site Assessment Clean;Dry;Intact 01/17/24 1913   IV Device Securement catheter securement device 01/17/24 1913   Line Status Flushed 01/17/24 1913   Dressing Type CHG impregnated dressing/sponge 01/17/24 1913   Dressing Status Clean;Dry;Intact 01/17/24 1913   Dressing Intervention Integrity maintained 01/17/24 1913   Dressing Change Due 01/19/24 01/17/24 1913   Site Change Due 02/10/24 01/17/24 1913   Reason Not Rotated Not due 01/17/24 0750   Number of days: 6            NG/OG Tube 01/10/24 0255 [place] sump Right nostril (Active)   Placement Check placement verified by x-ray 01/12/24 0720   Tolerance no signs/symptoms of discomfort 01/17/24 1913   Securement secured to nostril center 01/17/24 1913   Clamp Status/Tolerance clamped;abdominal discomfort 01/17/24 1913   Suction Setting/Drainage Method suction at;low;intermittent setting 01/16/24 0800   Insertion Site Appearance no redness, warmth, tenderness, skin breakdown, drainage 01/17/24 0750   Drainage Tan 01/16/24 0800   Flush/Irrigation flushed w/;sterile normal saline;no resistance met 01/15/24 0900   Feeding Action feeding held 01/13/24 0800   Tube Output(mL)(Include Discarded Residual) 400 mL 01/16/24 1228   Number of days: 8       Female External Urinary Catheter w/ Suction 01/09/24 2358 (Active)   Skin no redness;no breakdown 01/17/24 1913   Tolerance no  signs/symptoms of discomfort 01/17/24 1913   Suction Continuous suction at 40 mmHg 01/17/24 1913   Date of last wick change 01/16/24 01/17/24 1913   Time of last wick change 2000 01/17/24 0750   Output (mL) 200 mL 01/18/24 0956   Number of days: 8       Drips:    dextrose 5 % and 0.45 % NaCl 20 mL/hr at 01/14/24 1100       Patient Active Problem List   Diagnosis    Malignant neoplasm of other specified sites of female breast    Carcinoma of right breast in female, estrogen receptor positive    Secondary and unspecified malignant neoplasm of lymph nodes    Encounter for antineoplastic chemotherapy    ALFARO (dyspnea on exertion)    Breast cancer metastasized to axillary lymph node    Depression    Carcinoma of right breast metastatic to axillary lymph node    Osteoporosis    Acute on chronic respiratory failure with hypoxia and hypercapnia    Palliative care encounter    Chronic low back pain    Other specified anemias    Essential hypertension    Hyperlipidemia    Moderate malnutrition    Centrilobular emphysema    Chronic respiratory failure with hypoxia    Partial small bowel obstruction    Pancreatic adenocarcinoma    Small bowel stricture    Reflux esophagitis    Pain       Review of patient's allergies indicates:   Allergen Reactions    Adhesive Rash     Use PAPER Tape / TEGADERM    Levaquin [levofloxacin] Diarrhea and Nausea Only    Penicillins Rash       Current Inpatient Medications:    azithromycin  250 mg Oral Once per day on Mon Wed Fri    cyclobenzaprine  10 mg Oral TID    enoxparin  40 mg Subcutaneous Daily    famotidine (PF)  20 mg Intravenous BID    fluticasone furoate-vilanteroL  1 puff Inhalation Daily    metoprolol succinate  25 mg Oral Daily    pravastatin  40 mg Oral QHS    scopolamine  1 patch Transdermal Q3 Days    senna-docusate 8.6-50 mg  1 tablet Oral BID    topiramate  50 mg Oral QHS       No current facility-administered medications on file prior to encounter.     Current Outpatient  Medications on File Prior to Encounter   Medication Sig Dispense Refill    albuterol 90 mcg/actuation inhaler Inhale 2 puffs into the lungs every 6 (six) hours as needed for Wheezing.      albuterol-ipratropium (DUO-NEB) 2.5 mg-0.5 mg/3 mL nebulizer solution Take 3 mLs by nebulization every 6 (six) hours as needed.      azithromycin (Z-SABINE) 250 MG tablet Take 250 mg by mouth 3 (three) times a week.      azithromycin (ZITHROMAX) 500 MG tablet Take one tablet Monday, then Wednesday, then Friday per week (Patient not taking: Reported on 1/4/2024) 12 tablet 3    calcium carbonate (OS-JENNI) 600 mg calcium (1,500 mg) Tab Take 1 tablet by mouth once daily.      cyclobenzaprine (FLEXERIL) 10 MG tablet Take 10 mg by mouth 3 (three) times daily.      DALIRESP 500 mcg Tab Take 1 tablet by mouth once daily.      losartan (COZAAR) 25 MG tablet Take 0.5 tablets (12.5 mg total) by mouth once daily. (Patient taking differently: Take 25 mg by mouth once daily.) 15 tablet 3    METOPROLOL SUCCINATE ORAL Take by mouth.      montelukast (SINGULAIR) 10 mg tablet Take 10 mg by mouth every evening.      MYRBETRIQ 25 mg Tb24 ER tablet Take 25 mg by mouth once daily.      ondansetron (ZOFRAN) 4 MG tablet Take 4 mg by mouth every 8 (eight) hours as needed.      oxyCODONE (ROXICODONE) 10 mg Tab immediate release tablet Take 10 mg by mouth 4 (four) times daily.      oxyCODONE-acetaminophen (PERCOCET)  mg per tablet       oxyCODONE-acetaminophen (PERCOCET) 5-325 mg per tablet Take 1 tablet by mouth every 6 (six) hours as needed. (Patient not taking: Reported on 1/4/2024) 30 tablet 0    pantoprazole (PROTONIX) 40 MG tablet Take 40 mg by mouth once daily.      pravastatin (PRAVACHOL) 40 MG tablet Take 40 mg by mouth every evening.       topiramate (TOPAMAX) 50 MG tablet Take 50 mg by mouth every evening.       TRELEGY ELLIPTA 200-62.5-25 mcg inhaler Inhale 1 puff into the lungs once daily.      [DISCONTINUED] aspirin (ECOTRIN) 81 MG EC tablet  Take 81 mg by mouth once daily.         Past Surgical History:   Procedure Laterality Date    BREAST BIOPSY Right     BREAST LUMPECTOMY Right 2015    IDC & DCIS    CERVICAL FUSION      HYSTERECTOMY      with BSO    LUMBAR FUSION      LYMPH NODE DISSECTION Right     right lumpectomy         Social History:  Tobacco Use: High Risk (1/10/2024)    Patient History     Smoking Tobacco Use: Former     Smokeless Tobacco Use: Current     Passive Exposure: Not on file       Alcohol Use: Unknown (1/16/2024)    AUDIT-C     Frequency of Alcohol Consumption: Monthly or less     Average Number of Drinks: Not on file     Frequency of Binge Drinking: Not on file       OBJECTIVE:     Vital Signs Range:  BMI Readings from Last 1 Encounters:   01/18/24 23.07 kg/m²       Temp:  [36.6 °C (97.8 °F)-37.1 °C (98.7 °F)]   Pulse:  [80-95]   Resp:  [16-20]   BP: (115-137)/(68-77)   SpO2:  [90 %-100 %]        Significant Labs:        Component Value Date/Time    WBC 12.18 01/17/2024 0442    HGB 12.5 01/17/2024 0442    HCT 39.7 01/17/2024 0442     01/17/2024 0442     01/17/2024 0442    K 3.6 01/17/2024 0442     01/17/2024 0442    CO2 29 01/17/2024 0442    GLU 83 01/17/2024 0442    BUN 20 01/17/2024 0442    CREATININE 0.7 01/17/2024 0442    MG 2.2 01/18/2024 0458    PHOS 3.5 01/18/2024 0458    CALCIUM 8.9 01/17/2024 0442    ALBUMIN 2.9 (L) 01/17/2024 0442    PROT 6.0 01/17/2024 0442    ALKPHOS 83 01/17/2024 0442    BILITOT 1.0 01/17/2024 0442    AST 35 01/17/2024 0442    ALT 34 01/17/2024 0442    INR 1.0 01/13/2024 0729        Please see Results Review for additional labs.     Diagnostic Studies: No relevant studies.    EKG:   Results for orders placed or performed during the hospital encounter of 01/09/24   EKG 12-lead    Collection Time: 01/09/24  9:14 PM    Narrative    Test Reason : R00.0,    Vent. Rate : 097 BPM     Atrial Rate : 097 BPM     P-R Int : 156 ms          QRS Dur : 078 ms      QT Int : 358 ms       P-R-T Axes :  077 051 093 degrees     QTc Int : 454 ms    Normal sinus rhythm  ST and T wave abnormality, consider anterior ischemia  Abnormal ECG  When compared with ECG of 09-JAN-2024 20:17,  No significant change was found  Confirmed by Param Posada MD ([id number]) on 1/10/2024 11:40:49 AM    Referred By: ROSA   SELF           Confirmed By:Param Posada MD       ECHO:  See subjective, if available.      ASSESSMENT/PLAN:           Pre-op Assessment    I have reviewed the Patient Summary Reports.     I have reviewed the Nursing Notes.    I have reviewed the Medications.     Review of Systems  Anesthesia Hx:  No problems with previous Anesthesia   History of prior surgery of interest to airway management or planning:          Denies Family Hx of Anesthesia complications.    Denies Personal Hx of Anesthesia complications.                    Social:  No Alcohol Use, Former Smoker 2ppd for 20+ years in the past. No longer smoking.       Hematology/Oncology:                        --  Cancer in past history:       Breast       surgery, chemotherapy and radiation   Oncology Comments: Right breast cancer 2015      EENT/Dental:  EENT/Dental Normal           Cardiovascular:     Hypertension  Denies Valvular problems/Murmurs.  Denies MI.  Denies CAD.    Denies CABG/stent.  Denies Dysrhythmias.   Denies Angina.  Denies CHF.     ALFARO                            Pulmonary:   COPD Asthma  Shortness of breath                  Renal/:  Renal/ Normal                 Hepatic/GI:  Hepatic/GI Normal                 Neurological:    Denies CVA.    Denies Seizures.                                Endocrine:  Denies Diabetes. Denies Hypothyroidism.  Denies Hyperthyroidism.         Psych:  Psychiatric History                  Physical Exam  General: Well nourished, Cooperative, Alert and Oriented    Airway:  Mallampati: II   Mouth Opening: Normal  TM Distance: Normal  Neck ROM: Flexion Decreased    Dental:  Intact        Anesthesia Plan  Type of  Anesthesia, risks & benefits discussed:    Anesthesia Type: Gen Natural Airway, Gen Supraglottic Airway  Intra-op Monitoring Plan: Standard ASA Monitors  Post Op Pain Control Plan: multimodal analgesia and IV/PO Opioids PRN  Induction:  IV  Informed Consent: Informed consent signed with the Patient and all parties understand the risks and agree with anesthesia plan.  All questions answered.   ASA Score: 3  Day of Surgery Review of History & Physical: H&P Update referred to the surgeon/provider.    Ready For Surgery From Anesthesia Perspective.     .

## 2024-01-18 NOTE — SUBJECTIVE & OBJECTIVE
Subjective:     Interval History: Followed up with patient for consideration of G-tube placement for gastric venting. Son at bedside and procedure explained at length. All questions answered. Tolerating a small amount of clears. Vitals stable. Not currently on anticoagulation.     Review of Systems   Constitutional:  Positive for activity change, appetite change and fatigue. Negative for fever.   HENT:  Negative for sore throat and trouble swallowing.    Gastrointestinal:  Positive for abdominal pain, nausea and vomiting. Negative for abdominal distention, anal bleeding, blood in stool, constipation, diarrhea and rectal pain.   Skin:  Negative for color change and pallor.   Neurological:  Negative for dizziness, syncope and weakness.     Objective:     Vital Signs (Most Recent):  Temp: 97.8 °F (36.6 °C) (01/18/24 0738)  Pulse: 92 (01/18/24 0738)  Resp: 18 (01/18/24 0817)  BP: 119/76 (01/18/24 0738)  SpO2: 100 % (01/18/24 0738) Vital Signs (24h Range):  Temp:  [97.4 °F (36.3 °C)-98.7 °F (37.1 °C)] 97.8 °F (36.6 °C)  Pulse:  [78-92] 92  Resp:  [14-20] 18  SpO2:  [87 %-100 %] 100 %  BP: (115-127)/(64-86) 119/76     Weight: 66.8 kg (147 lb 4.3 oz) (01/18/24 0400)  Body mass index is 23.07 kg/m².      Intake/Output Summary (Last 24 hours) at 1/18/2024 0845  Last data filed at 1/17/2024 1430  Gross per 24 hour   Intake 200 ml   Output 400 ml   Net -200 ml       Lines/Drains/Airways       Drain  Duration                  NG/OG Tube 01/10/24 0255 [place] sump Right nostril 8 days    Female External Urinary Catheter w/ Suction 01/09/24 2358 8 days              Peripheral Intravenous Line  Duration                  Midline Catheter Insertion/Assessment  - Single Lumen 01/12/24 1045 Right basilic vein (medial side of arm) 20g x 10cm 5 days                     Physical Exam  Vitals reviewed.   Constitutional:       Appearance: She is normal weight. She is not ill-appearing.   HENT:      Mouth/Throat:      Mouth: Mucous membranes  are moist.      Pharynx: Oropharynx is clear.   Eyes:      General: No scleral icterus.  Abdominal:      General: Bowel sounds are normal. There is no distension.      Palpations: Abdomen is soft. There is no mass.      Tenderness: There is abdominal tenderness.   Skin:     General: Skin is warm and dry.      Capillary Refill: Capillary refill takes less than 2 seconds.      Coloration: Skin is not jaundiced or pale.   Neurological:      Mental Status: She is alert and oriented to person, place, and time. Mental status is at baseline.          Significant Labs:  CBC:   Recent Labs   Lab 01/17/24  0442   WBC 12.18   HGB 12.5   HCT 39.7        CMP:   Recent Labs   Lab 01/17/24  0442   GLU 83   CALCIUM 8.9   ALBUMIN 2.9*   PROT 6.0      K 3.6   CO2 29      BUN 20   CREATININE 0.7   ALKPHOS 83   ALT 34   AST 35   BILITOT 1.0         Significant Imaging:  Imaging results within the past 24 hours have been reviewed.

## 2024-01-18 NOTE — PT/OT/SLP PROGRESS
Physical Therapy  Not Seen  Patient Name:  Radha You   MRN:  [id number]  Admitting Diagnosis:  Partial small bowel obstruction   Recent Surgery: Procedure(s) (LRB):  INSERTION, PEG TUBE (N/A) * Surgery Date in Future *  Admit Date: 1/9/2024  Length of Stay: 8 days    Patient not seen on this date for treatment - patient declined treatment, reporting she needed to rest for her procedure tomorrow. Message sent to MD about ongoing refusals. PT will continue to follow, but will discharge the patient if she continues to refuse. Please continue progressive mobility as appropriate.      Kavitha Caba, PT, DPT  1/18/2024

## 2024-01-18 NOTE — PROGRESS NOTES
[facility] - Telemetry Stepdown  Gastroenterology  Progress Note    Patient Name: Radha You  MRN: [id number]  Admission Date: 1/9/2024  Hospital Length of Stay: 8 days  Code Status: Full Code   Attending Provider: Jada Olivas MD  Consulting Provider: Allison Draper NP  Primary Care Physician: Sapphire Ruffin FNP-C  Principal Problem: Partial small bowel obstruction    Subjective:     Interval History: Followed up with patient for consideration of G-tube placement for gastric venting. Son at bedside and procedure explained at length. All questions answered. Tolerating a small amount of clears. Vitals stable. Not currently on anticoagulation.     Review of Systems   Constitutional:  Positive for activity change, appetite change and fatigue. Negative for fever.   HENT:  Negative for sore throat and trouble swallowing.    Gastrointestinal:  Positive for abdominal pain, nausea and vomiting. Negative for abdominal distention, anal bleeding, blood in stool, constipation, diarrhea and rectal pain.   Skin:  Negative for color change and pallor.   Neurological:  Negative for dizziness, syncope and weakness.     Objective:     Vital Signs (Most Recent):  Temp: 97.8 °F (36.6 °C) (01/18/24 0738)  Pulse: 92 (01/18/24 0738)  Resp: 18 (01/18/24 0817)  BP: 119/76 (01/18/24 0738)  SpO2: 100 % (01/18/24 0738) Vital Signs (24h Range):  Temp:  [97.4 °F (36.3 °C)-98.7 °F (37.1 °C)] 97.8 °F (36.6 °C)  Pulse:  [78-92] 92  Resp:  [14-20] 18  SpO2:  [87 %-100 %] 100 %  BP: (115-127)/(64-86) 119/76     Weight: 66.8 kg (147 lb 4.3 oz) (01/18/24 0400)  Body mass index is 23.07 kg/m².      Intake/Output Summary (Last 24 hours) at 1/18/2024 0845  Last data filed at 1/17/2024 1430  Gross per 24 hour   Intake 200 ml   Output 400 ml   Net -200 ml       Lines/Drains/Airways       Drain  Duration                  NG/OG Tube 01/10/24 0255 [place] sump Right nostril 8 days    Female External Urinary Catheter w/ Suction 01/09/24 7647 8 days               Peripheral Intravenous Line  Duration                  Midline Catheter Insertion/Assessment  - Single Lumen 01/12/24 1045 Right basilic vein (medial side of arm) 20g x 10cm 5 days                     Physical Exam  Vitals reviewed.   Constitutional:       Appearance: She is normal weight. She is not ill-appearing.   HENT:      Mouth/Throat:      Mouth: Mucous membranes are moist.      Pharynx: Oropharynx is clear.   Eyes:      General: No scleral icterus.  Abdominal:      General: Bowel sounds are normal. There is no distension.      Palpations: Abdomen is soft. There is no mass.      Tenderness: There is abdominal tenderness.   Skin:     General: Skin is warm and dry.      Capillary Refill: Capillary refill takes less than 2 seconds.      Coloration: Skin is not jaundiced or pale.   Neurological:      Mental Status: She is alert and oriented to person, place, and time. Mental status is at baseline.          Significant Labs:  CBC:   Recent Labs   Lab 01/17/24  0442   WBC 12.18   HGB 12.5   HCT 39.7        CMP:   Recent Labs   Lab 01/17/24  0442   GLU 83   CALCIUM 8.9   ALBUMIN 2.9*   PROT 6.0      K 3.6   CO2 29      BUN 20   CREATININE 0.7   ALKPHOS 83   ALT 34   AST 35   BILITOT 1.0         Significant Imaging:  Imaging results within the past 24 hours have been reviewed.  Assessment/Plan:     GI  * Partial small bowel obstruction  Patient with pancreatic adenocarcinoma with reoccurring small bowel obstruction. Discussed with patient and son regarding g-tube placement for venting purposes. They wish to proceed.     - Will proceed with G-tube placement tomorrow  - NPO after midnight        Thank you for your consult. I will follow-up with patient. Please contact us if you have any additional questions.    Allison Draper NP  Gastroenterology  [facility] - Telemetry Stepdown

## 2024-01-18 NOTE — PLAN OF CARE
"  Problem: Adult Inpatient Plan of Care  Goal: Plan of Care Review  Outcome: Ongoing, Progressing  Goal: Patient-Specific Goal (Individualized)  Outcome: Ongoing, Progressing  Goal: Absence of Hospital-Acquired Illness or Injury  Outcome: Ongoing, Progressing  Goal: Optimal Comfort and Wellbeing  Outcome: Ongoing, Progressing  Goal: Readiness for Transition of Care  Outcome: Ongoing, Progressing     Problem: Skin Injury Risk Increased  Goal: Skin Health and Integrity  Outcome: Ongoing, Progressing  Intervention: Optimize Skin Protection  Flowsheets (Taken 1/18/2024 1723)  Pressure Reduction Techniques:   frequent weight shift encouraged   weight shift assistance provided  Pressure Reduction Devices:   positioning supports utilized   heel offloading device utilized  Skin Protection:   adhesive use limited   incontinence pads utilized   protective footwear used   tubing/devices free from skin contact  Head of Bed (HOB) Positioning: HOB at 30-45 degrees     Problem: Coping Ineffective  Goal: Effective Coping  Outcome: Ongoing, Progressing     Problem: Fall Injury Risk  Goal: Absence of Fall and Fall-Related Injury  Outcome: Ongoing, Progressing     Problem: Pain Acute  Goal: Acceptable Pain Control and Functional Ability  Outcome: Ongoing, Progressing  Intervention: Prevent or Manage Pain  Flowsheets (Taken 1/18/2024 1723)  Sleep/Rest Enhancement:   relaxation techniques promoted   regular sleep/rest pattern promoted  Sensory Stimulation Regulation: quiet environment promoted  Bowel Elimination Promotion:   adequate fluid intake promoted   commode/bedpan at bedside  Medication Review/Management: medications reviewed     POC reviewed. NG tube clamped off patient refused to removed NG "wait until place G-tube. Pt refused diet. Appetite poor. Pain manage with Morphine and flexeril.  Refused Rehab therapy. Encourage reposition. Cbg WNL's. Frequent safety checks performed. Call light in reach. Bed alarm.   "

## 2024-01-18 NOTE — NURSING
Nurses Note -- 4 Eyes      1/18/2024   710      Skin assessed during: Q Shift Change      [x] No Altered Skin Integrity Present    []Prevention Measures Documented      [] Yes- Altered Skin Integrity Present or Discovered   [] LDA Added if Not in Epic (Describe Wound)   [] New Altered Skin Integrity was Present on Admit and Documented in LDA   [] Wound Image Taken    Wound Care Consulted? No    Attending Nurse:  Catie Gonzalez RN/Staff Member:   Rosetta AGUIRRE

## 2024-01-18 NOTE — PLAN OF CARE
PT alert and oriented x 4.  Cont to have frequent c/o pain and PRN pain medication administered as ordered. Able to voice all wants and needs. All needs met.  Cont to have NG tube in place that is clamped at this time. She has remained free of falls and injuries. Safety eduction and plan of care reviewed with PT and she voiced understanding. Bed is low and locked with call light with in easy reach.  Bed alarm set with sounds audible

## 2024-01-19 ENCOUNTER — ANESTHESIA (OUTPATIENT)
Dept: ENDOSCOPY | Facility: HOSPITAL | Age: 79
DRG: 436 | End: 2024-01-19
Payer: MEDICARE

## 2024-01-19 LAB
ALBUMIN SERPL BCP-MCNC: 2.8 G/DL (ref 3.5–5.2)
ALP SERPL-CCNC: 91 U/L (ref 55–135)
ALT SERPL W/O P-5'-P-CCNC: 31 U/L (ref 10–44)
ANION GAP SERPL CALC-SCNC: 8 MMOL/L (ref 8–16)
AST SERPL-CCNC: 25 U/L (ref 10–40)
BASOPHILS # BLD AUTO: 0.13 K/UL (ref 0–0.2)
BASOPHILS NFR BLD: 1 % (ref 0–1.9)
BILIRUB SERPL-MCNC: 2.4 MG/DL (ref 0.1–1)
BUN SERPL-MCNC: 20 MG/DL (ref 8–23)
CALCIUM SERPL-MCNC: 9.4 MG/DL (ref 8.7–10.5)
CHLORIDE SERPL-SCNC: 101 MMOL/L (ref 95–110)
CO2 SERPL-SCNC: 27 MMOL/L (ref 23–29)
CREAT SERPL-MCNC: 0.7 MG/DL (ref 0.5–1.4)
DIFFERENTIAL METHOD BLD: ABNORMAL
EOSINOPHIL # BLD AUTO: 0.5 K/UL (ref 0–0.5)
EOSINOPHIL NFR BLD: 3.6 % (ref 0–8)
ERYTHROCYTE [DISTWIDTH] IN BLOOD BY AUTOMATED COUNT: 13.4 % (ref 11.5–14.5)
EST. GFR  (NO RACE VARIABLE): >60 ML/MIN/1.73 M^2
GLUCOSE SERPL-MCNC: 74 MG/DL (ref 70–110)
HCT VFR BLD AUTO: 39.4 % (ref 37–48.5)
HGB BLD-MCNC: 12.3 G/DL (ref 12–16)
IMM GRANULOCYTES # BLD AUTO: 0.41 K/UL (ref 0–0.04)
IMM GRANULOCYTES NFR BLD AUTO: 3.2 % (ref 0–0.5)
LYMPHOCYTES # BLD AUTO: 1.3 K/UL (ref 1–4.8)
LYMPHOCYTES NFR BLD: 10.1 % (ref 18–48)
MAGNESIUM SERPL-MCNC: 2.2 MG/DL (ref 1.6–2.6)
MCH RBC QN AUTO: 28.1 PG (ref 27–31)
MCHC RBC AUTO-ENTMCNC: 31.2 G/DL (ref 32–36)
MCV RBC AUTO: 90 FL (ref 82–98)
MONOCYTES # BLD AUTO: 1.9 K/UL (ref 0.3–1)
MONOCYTES NFR BLD: 15.1 % (ref 4–15)
NEUTROPHILS # BLD AUTO: 8.5 K/UL (ref 1.8–7.7)
NEUTROPHILS NFR BLD: 67 % (ref 38–73)
NRBC BLD-RTO: 0 /100 WBC
PHOSPHATE SERPL-MCNC: 3.1 MG/DL (ref 2.7–4.5)
PLATELET # BLD AUTO: 308 K/UL (ref 150–450)
PMV BLD AUTO: 10.5 FL (ref 9.2–12.9)
POCT GLUCOSE: 109 MG/DL (ref 70–110)
POCT GLUCOSE: 115 MG/DL (ref 70–110)
POCT GLUCOSE: 118 MG/DL (ref 70–110)
POCT GLUCOSE: 55 MG/DL (ref 70–110)
POCT GLUCOSE: 63 MG/DL (ref 70–110)
POCT GLUCOSE: 69 MG/DL (ref 70–110)
POCT GLUCOSE: 95 MG/DL (ref 70–110)
POTASSIUM SERPL-SCNC: 4.8 MMOL/L (ref 3.5–5.1)
PROT SERPL-MCNC: 6.3 G/DL (ref 6–8.4)
RBC # BLD AUTO: 4.38 M/UL (ref 4–5.4)
SODIUM SERPL-SCNC: 136 MMOL/L (ref 136–145)
WBC # BLD AUTO: 12.74 K/UL (ref 3.9–12.7)

## 2024-01-19 PROCEDURE — 0DH63UZ INSERTION OF FEEDING DEVICE INTO STOMACH, PERCUTANEOUS APPROACH: ICD-10-PCS | Performed by: STUDENT IN AN ORGANIZED HEALTH CARE EDUCATION/TRAINING PROGRAM

## 2024-01-19 PROCEDURE — 63600175 PHARM REV CODE 636 W HCPCS: Performed by: NURSE PRACTITIONER

## 2024-01-19 PROCEDURE — D9220A PRA ANESTHESIA: Mod: ANES,,, | Performed by: ANESTHESIOLOGY

## 2024-01-19 PROCEDURE — 63600175 PHARM REV CODE 636 W HCPCS: Performed by: STUDENT IN AN ORGANIZED HEALTH CARE EDUCATION/TRAINING PROGRAM

## 2024-01-19 PROCEDURE — 63600175 PHARM REV CODE 636 W HCPCS: Performed by: NURSE ANESTHETIST, CERTIFIED REGISTERED

## 2024-01-19 PROCEDURE — 27201018 HC KIT, PEG (ANY): Performed by: STUDENT IN AN ORGANIZED HEALTH CARE EDUCATION/TRAINING PROGRAM

## 2024-01-19 PROCEDURE — 36415 COLL VENOUS BLD VENIPUNCTURE: CPT | Performed by: HOSPITALIST

## 2024-01-19 PROCEDURE — 84100 ASSAY OF PHOSPHORUS: CPT | Performed by: HOSPITALIST

## 2024-01-19 PROCEDURE — 80053 COMPREHEN METABOLIC PANEL: CPT | Performed by: STUDENT IN AN ORGANIZED HEALTH CARE EDUCATION/TRAINING PROGRAM

## 2024-01-19 PROCEDURE — 37000009 HC ANESTHESIA EA ADD 15 MINS: Performed by: STUDENT IN AN ORGANIZED HEALTH CARE EDUCATION/TRAINING PROGRAM

## 2024-01-19 PROCEDURE — 25000003 PHARM REV CODE 250: Performed by: NURSE ANESTHETIST, CERTIFIED REGISTERED

## 2024-01-19 PROCEDURE — 85025 COMPLETE CBC W/AUTO DIFF WBC: CPT | Performed by: STUDENT IN AN ORGANIZED HEALTH CARE EDUCATION/TRAINING PROGRAM

## 2024-01-19 PROCEDURE — D9220A PRA ANESTHESIA: Mod: CRNA,,, | Performed by: NURSE ANESTHETIST, CERTIFIED REGISTERED

## 2024-01-19 PROCEDURE — 43246 EGD PLACE GASTROSTOMY TUBE: CPT | Performed by: STUDENT IN AN ORGANIZED HEALTH CARE EDUCATION/TRAINING PROGRAM

## 2024-01-19 PROCEDURE — 25000003 PHARM REV CODE 250: Performed by: STUDENT IN AN ORGANIZED HEALTH CARE EDUCATION/TRAINING PROGRAM

## 2024-01-19 PROCEDURE — 83735 ASSAY OF MAGNESIUM: CPT | Performed by: HOSPITALIST

## 2024-01-19 PROCEDURE — 43246 EGD PLACE GASTROSTOMY TUBE: CPT | Mod: ,,, | Performed by: STUDENT IN AN ORGANIZED HEALTH CARE EDUCATION/TRAINING PROGRAM

## 2024-01-19 PROCEDURE — 37000008 HC ANESTHESIA 1ST 15 MINUTES: Performed by: STUDENT IN AN ORGANIZED HEALTH CARE EDUCATION/TRAINING PROGRAM

## 2024-01-19 PROCEDURE — 20600001 HC STEP DOWN PRIVATE ROOM

## 2024-01-19 PROCEDURE — 25000003 PHARM REV CODE 250: Performed by: HOSPITALIST

## 2024-01-19 PROCEDURE — 63700000 PHARM REV CODE 250 ALT 637 W/O HCPCS: Performed by: STUDENT IN AN ORGANIZED HEALTH CARE EDUCATION/TRAINING PROGRAM

## 2024-01-19 PROCEDURE — 63600175 PHARM REV CODE 636 W HCPCS: Mod: JZ,JG | Performed by: STUDENT IN AN ORGANIZED HEALTH CARE EDUCATION/TRAINING PROGRAM

## 2024-01-19 RX ORDER — ONDANSETRON HYDROCHLORIDE 2 MG/ML
INJECTION, SOLUTION INTRAVENOUS
Status: DISCONTINUED | OUTPATIENT
Start: 2024-01-19 | End: 2024-01-19

## 2024-01-19 RX ORDER — SODIUM CHLORIDE, SODIUM LACTATE, POTASSIUM CHLORIDE, CALCIUM CHLORIDE 600; 310; 30; 20 MG/100ML; MG/100ML; MG/100ML; MG/100ML
INJECTION, SOLUTION INTRAVENOUS CONTINUOUS PRN
Status: DISCONTINUED | OUTPATIENT
Start: 2024-01-19 | End: 2024-01-19

## 2024-01-19 RX ORDER — CLINDAMYCIN PHOSPHATE 600 MG/50ML
600 INJECTION, SOLUTION INTRAVENOUS ONCE
Status: DISCONTINUED | OUTPATIENT
Start: 2024-01-19 | End: 2024-01-19

## 2024-01-19 RX ORDER — LIDOCAINE HYDROCHLORIDE 20 MG/ML
INJECTION INTRAVENOUS
Status: DISCONTINUED | OUTPATIENT
Start: 2024-01-19 | End: 2024-01-19

## 2024-01-19 RX ORDER — PROPOFOL 10 MG/ML
INJECTION, EMULSION INTRAVENOUS
Status: DISCONTINUED | OUTPATIENT
Start: 2024-01-19 | End: 2024-01-19

## 2024-01-19 RX ORDER — CLINDAMYCIN PHOSPHATE 900 MG/50ML
900 INJECTION, SOLUTION INTRAVENOUS ONCE
Status: COMPLETED | OUTPATIENT
Start: 2024-01-19 | End: 2024-01-19

## 2024-01-19 RX ORDER — MUPIROCIN 20 MG/G
OINTMENT TOPICAL DAILY
Status: DISCONTINUED | OUTPATIENT
Start: 2024-01-20 | End: 2024-01-23 | Stop reason: HOSPADM

## 2024-01-19 RX ADMIN — METOPROLOL SUCCINATE 25 MG: 25 TABLET, EXTENDED RELEASE ORAL at 12:01

## 2024-01-19 RX ADMIN — SODIUM CHLORIDE, SODIUM LACTATE, POTASSIUM CHLORIDE, AND CALCIUM CHLORIDE: 600; 310; 30; 20 INJECTION, SOLUTION INTRAVENOUS at 09:01

## 2024-01-19 RX ADMIN — SENNOSIDES AND DOCUSATE SODIUM 1 TABLET: 8.6; 5 TABLET ORAL at 12:01

## 2024-01-19 RX ADMIN — LIDOCAINE HYDROCHLORIDE 50 MG: 20 INJECTION INTRAVENOUS at 10:01

## 2024-01-19 RX ADMIN — PROPOFOL 200 MCG/KG/MIN: 10 INJECTION, EMULSION INTRAVENOUS at 10:01

## 2024-01-19 RX ADMIN — DEXTROSE MONOHYDRATE 125 ML: 100 INJECTION, SOLUTION INTRAVENOUS at 07:01

## 2024-01-19 RX ADMIN — MORPHINE SULFATE 4 MG: 4 INJECTION INTRAVENOUS at 07:01

## 2024-01-19 RX ADMIN — DEXTROSE MONOHYDRATE 125 ML: 100 INJECTION, SOLUTION INTRAVENOUS at 05:01

## 2024-01-19 RX ADMIN — CYCLOBENZAPRINE HYDROCHLORIDE 10 MG: 5 TABLET, FILM COATED ORAL at 08:01

## 2024-01-19 RX ADMIN — MORPHINE SULFATE 4 MG: 4 INJECTION INTRAVENOUS at 12:01

## 2024-01-19 RX ADMIN — ENOXAPARIN SODIUM 40 MG: 40 INJECTION SUBCUTANEOUS at 05:01

## 2024-01-19 RX ADMIN — ONDANSETRON 4 MG: 2 INJECTION INTRAMUSCULAR; INTRAVENOUS at 10:01

## 2024-01-19 RX ADMIN — AZITHROMYCIN 250 MG: 250 TABLET, FILM COATED ORAL at 12:01

## 2024-01-19 RX ADMIN — FAMOTIDINE 20 MG: 10 INJECTION INTRAVENOUS at 12:01

## 2024-01-19 RX ADMIN — PRAVASTATIN SODIUM 40 MG: 40 TABLET ORAL at 08:01

## 2024-01-19 RX ADMIN — CYCLOBENZAPRINE HYDROCHLORIDE 10 MG: 5 TABLET, FILM COATED ORAL at 12:01

## 2024-01-19 RX ADMIN — SCOPALAMINE 1 PATCH: 1 PATCH, EXTENDED RELEASE TRANSDERMAL at 06:01

## 2024-01-19 RX ADMIN — OXYCODONE HYDROCHLORIDE 10 MG: 10 TABLET ORAL at 01:01

## 2024-01-19 RX ADMIN — SENNOSIDES AND DOCUSATE SODIUM 1 TABLET: 8.6; 5 TABLET ORAL at 08:01

## 2024-01-19 RX ADMIN — CLINDAMYCIN IN 5 PERCENT DEXTROSE 900 MG: 18 INJECTION, SOLUTION INTRAVENOUS at 01:01

## 2024-01-19 RX ADMIN — FAMOTIDINE 20 MG: 10 INJECTION INTRAVENOUS at 08:01

## 2024-01-19 RX ADMIN — TOPIRAMATE 50 MG: 25 TABLET, FILM COATED ORAL at 08:01

## 2024-01-19 RX ADMIN — DEXTROSE MONOHYDRATE 125 ML: 100 INJECTION, SOLUTION INTRAVENOUS at 12:01

## 2024-01-19 NOTE — TREATMENT PLAN
GI Post Procedure Treatment Plan  Procedure Performed: EGD/PEG for venting    20 F PEG tube placed without any immediate complications.   External bumper at 3.5 cm trinity.    Care Instructions  - antibiotic ointment to site, clean site with soap and water daily and dry thoroughly and clean site daily with half-strength hydrogen peroxide for three days, then soap and water daily.  - Dressing: change dressing on top of bumper daily    Melyssa Gil MD  Gastroenterology Fellow

## 2024-01-19 NOTE — TRANSFER OF CARE
"Anesthesia Transfer of Care Note    Patient: Radha You    Procedure(s) Performed: Procedure(s) (LRB):  INSERTION, PEG TUBE (N/A)    Patient location: PACU    Anesthesia Type: general    Transport from OR: Transported from OR on room air with adequate spontaneous ventilation    Post pain: adequate analgesia    Post assessment: no apparent anesthetic complications    Post vital signs: stable    Level of consciousness: awake, alert and oriented    Nausea/Vomiting: no nausea/vomiting    Complications: none    Transfer of care protocol was followed      Last vitals: Visit Vitals  BP (!) 93/54   Pulse 101   Temp 36.7 °C (98.1 °F)   Resp 12   Ht 5' 7" (1.702 m)   Wt 66.8 kg (147 lb 4.3 oz)   SpO2 (!) 91%   Breastfeeding No   BMI 23.07 kg/m²     "

## 2024-01-19 NOTE — H&P
Short Stay Endoscopy History and Physical    PCP - Sapphire Ruffin FNP-C  Referring Physician - Self, Aaareferral  No address on file    Procedure - Endoscopy  ASA - per anesthesia  Mallampati - per anesthesia  History of Anesthesia problems - no  Family history Anesthesia problems -  no   Plan of anesthesia - General    HPI  78 y.o. female  Reason for procedure:   PEG    ROS:  Constitutional: No fevers, chills, No weight loss  CV: No chest pain  Pulm: No cough, No shortness of breath  GI: see HPI    Medical History:  has a past medical history of Back pain, Breast cancer (05/2015), COPD (chronic obstructive pulmonary disease), Hyperlipidemia, Hypertension, Osteoporosis (05/21/2019), and Pancreatic adenocarcinoma (12/12/2023).    Surgical History:  has a past surgical history that includes Lumbar fusion; Cervical fusion; Hysterectomy; right lumpectomy; Lymph node dissection (Right); Breast biopsy (Right); and Breast lumpectomy (Right, 2015).    Family History: family history includes Breast cancer (age of onset: 75) in her maternal grandmother..    Social History:  reports that she has quit smoking. Her smoking use included cigarettes. She has a 45.0 pack-year smoking history. She uses smokeless tobacco. She reports current alcohol use. She reports that she does not use drugs.    Review of patient's allergies indicates:   Allergen Reactions    Adhesive Rash     Use PAPER Tape / TEGADERM    Levaquin [levofloxacin] Diarrhea and Nausea Only    Penicillins Rash       Medications:   Medications Prior to Admission   Medication Sig Dispense Refill Last Dose    albuterol 90 mcg/actuation inhaler Inhale 2 puffs into the lungs every 6 (six) hours as needed for Wheezing.       albuterol-ipratropium (DUO-NEB) 2.5 mg-0.5 mg/3 mL nebulizer solution Take 3 mLs by nebulization every 6 (six) hours as needed.       azithromycin (Z-SABINE) 250 MG tablet Take 250 mg by mouth 3 (three) times a week.       azithromycin (ZITHROMAX) 500 MG  tablet Take one tablet Monday, then Wednesday, then Friday per week (Patient not taking: Reported on 1/4/2024) 12 tablet 3     calcium carbonate (OS-JENNI) 600 mg calcium (1,500 mg) Tab Take 1 tablet by mouth once daily.       cyclobenzaprine (FLEXERIL) 10 MG tablet Take 10 mg by mouth 3 (three) times daily.       DALIRESP 500 mcg Tab Take 1 tablet by mouth once daily.       losartan (COZAAR) 25 MG tablet Take 0.5 tablets (12.5 mg total) by mouth once daily. (Patient taking differently: Take 25 mg by mouth once daily.) 15 tablet 3     METOPROLOL SUCCINATE ORAL Take by mouth.       montelukast (SINGULAIR) 10 mg tablet Take 10 mg by mouth every evening.       MYRBETRIQ 25 mg Tb24 ER tablet Take 25 mg by mouth once daily.       ondansetron (ZOFRAN) 4 MG tablet Take 4 mg by mouth every 8 (eight) hours as needed.       oxyCODONE (ROXICODONE) 10 mg Tab immediate release tablet Take 10 mg by mouth 4 (four) times daily.       oxyCODONE-acetaminophen (PERCOCET)  mg per tablet        oxyCODONE-acetaminophen (PERCOCET) 5-325 mg per tablet Take 1 tablet by mouth every 6 (six) hours as needed. (Patient not taking: Reported on 1/4/2024) 30 tablet 0     pantoprazole (PROTONIX) 40 MG tablet Take 40 mg by mouth once daily.       pravastatin (PRAVACHOL) 40 MG tablet Take 40 mg by mouth every evening.        topiramate (TOPAMAX) 50 MG tablet Take 50 mg by mouth every evening.        TRELEGY ELLIPTA 200-62.5-25 mcg inhaler Inhale 1 puff into the lungs once daily.          Physical Exam:    Vital Signs:   Vitals:    01/19/24 0736   BP: 123/73   Pulse: 94   Resp: 16   Temp: 98.1 °F (36.7 °C)       General Appearance: Well appearing in no acute distress  Abdomen: Soft, non tender, non distended with normal bowel sounds, no masses    Labs:  Lab Results   Component Value Date    WBC 12.74 (H) 01/19/2024    HGB 12.3 01/19/2024    HCT 39.4 01/19/2024     01/19/2024    TRIG 109 01/13/2024    ALT 31 01/19/2024    AST 25 01/19/2024      01/19/2024    K 4.8 01/19/2024     01/19/2024    CREATININE 0.7 01/19/2024    BUN 20 01/19/2024    CO2 27 01/19/2024    TSH 2.627 06/05/2019    INR 1.0 01/13/2024       I have explained the risks and benefits of this endoscopic procedure to the patient including but not limited to bleeding, inflammation, infection, perforation, and death.      Alexi Delgado MD

## 2024-01-19 NOTE — ASSESSMENT & PLAN NOTE
Small bowel stricture   NG tube placed. Scopolamine patch, prn antiemetics. Gastroenterology consulted to place venting gastrostomy. PT and OT for debility. Appreciate Palliative Care, who gave dexamethasone trial. Started senna-docusate. Advance to full liquids.   - Patient agreeable to venting Gtube placement, planned placement 1/19

## 2024-01-19 NOTE — NURSING TRANSFER
Nursing Transfer Note      1/19/2024   11:13 AM    Nurse giving handoff:Nola AGUIRRE PACU  Nurse receiving handoff:Catie AGUIRRE MTSU    Reason patient is being transferred: s/p anesthesia    Transfer To: Room 8085    Transfer via stretcher    Transfer with 2L to O2    Transported by transport staff    Transfer Vital Signs:  Blood Pressure:114/65  Heart Rate:91  O2:98%  Temperature:98.0  Respirations:15    Medicines sent: none    Any special needs or follow-up needed: none    Patient belongings transferred with patient: No    Chart send with patient: Yes    Patient reassessed at: 1/19/24 at  1033

## 2024-01-19 NOTE — SUBJECTIVE & OBJECTIVE
Interval History: Patient reporting abd pain. NG in place. Agreeable to venting Gtube placement planned for tomorrow.   Palliative following.     Review of Systems   Constitutional:  Negative for fever.   Gastrointestinal:  Positive for abdominal pain.     Objective:     Vital Signs (Most Recent):  Temp: 98 °F (36.7 °C) (01/18/24 1930)  Pulse: 94 (01/18/24 1930)  Resp: (!) 21 (01/18/24 2034)  BP: 120/67 (01/18/24 1930)  SpO2: 99 % (01/18/24 1930) Vital Signs (24h Range):  Temp:  [97.6 °F (36.4 °C)-98.7 °F (37.1 °C)] 98 °F (36.7 °C)  Pulse:  [80-95] 94  Resp:  [16-21] 21  SpO2:  [90 %-100 %] 99 %  BP: (115-137)/(67-86) 120/67     Weight: 66.8 kg (147 lb 4.3 oz)  Body mass index is 23.07 kg/m².    Intake/Output Summary (Last 24 hours) at 1/18/2024 2042  Last data filed at 1/18/2024 1607  Gross per 24 hour   Intake 360 ml   Output 300 ml   Net 60 ml         Physical Exam  Vitals and nursing note reviewed.   Constitutional:       General: She is not in acute distress.     Appearance: She is well-developed and normal weight. She is not diaphoretic.   HENT:      Nose:      Comments: Nasogastric tube  Pulmonary:      Effort: Pulmonary effort is normal. No respiratory distress.   Abdominal:      General: There is distension.      Tenderness: There is abdominal tenderness.   Skin:     General: Skin is warm and dry.      Coloration: Skin is not jaundiced or pale.   Neurological:      Mental Status: She is alert and oriented to person, place, and time. Mental status is at baseline.      Motor: No seizure activity.   Psychiatric:         Attention and Perception: Attention normal.         Mood and Affect: Affect normal.         Behavior: Behavior is cooperative.         Thought Content: Thought content normal.             Significant Labs: All pertinent labs within the past 24 hours have been reviewed.    Significant Imaging: I have reviewed all pertinent imaging results/findings within the past 24 hours.

## 2024-01-19 NOTE — PROVATION PATIENT INSTRUCTIONS
Discharge Summary/Instructions after an Endoscopic Procedure  Patient Name: Radha Ramos  Patient MRN: [id number]  Patient YOB: 1945 Friday, January 19, 2024  Reji Bartholomew MD  Dear patient,  As a result of recent federal legislation (The Federal Cures Act), you may   receive lab or pathology results from your procedure in your [facility]   account before your physician is able to contact you. Your physician or   their representative will relay the results to you with their   recommendations at their soonest availability.  Thank you,  RESTRICTIONS:  During your procedure today, you received medications for sedation.  These   medications may affect your judgment, balance and coordination.  Therefore,   for 24 hours, you have the following restrictions:   - DO NOT drive a car, operate machinery, make legal/financial decisions,   sign important papers or drink alcohol.    ACTIVITY:  Today: no heavy lifting, straining or running due to procedural   sedation/anesthesia.  The following day: return to full activity including work.  DIET:  Eat and drink normally unless instructed otherwise.     TREATMENT FOR COMMON SIDE EFFECTS:  - Mild abdominal pain, nausea, belching, bloating or excessive gas:  rest,   eat lightly and use a heating pad.  - Sore Throat: treat with throat lozenges and/or gargle with warm salt   water.  - Because air was used during the procedure, expelling large amounts of air   from your rectum or belching is normal.  - If a bowel prep was taken, you may not have a bowel movement for 1-3 days.    This is normal.  SYMPTOMS TO WATCH FOR AND REPORT TO YOUR PHYSICIAN:  1. Abdominal pain or bloating, other than gas cramps.  2. Chest pain.  3. Back pain.  4. Signs of infection such as: chills or fever occurring within 24 hours   after the procedure.  5. Rectal bleeding, which would show as bright red, maroon, or black stools.   (A tablespoon of blood from the rectum is not serious, especially  if   hemorrhoids are present.)  6. Vomiting.  7. Weakness or dizziness.  GO DIRECTLY TO THE NEAREST EMERGENCY ROOM IF YOU HAVE ANY OF THE FOLLOWING:      Difficulty breathing              Chills and/or fever over 101 F   Persistent vomiting and/or vomiting blood   Severe abdominal pain   Severe chest pain   Black, tarry stools   Bleeding- more than one tablespoon   Any other symptom or condition that you feel may need urgent attention  Your doctor recommends these additional instructions:  If any biopsies were taken, your doctors clinic will contact you in 1 to 2   weeks with any results.  - Return patient to hospital branch for ongoing care.   - Please follow the post-PEG recommendations including: may use PEG today   for meds and water.   - GI team will assess PEG in AM for further use.  For questions, problems or results please call your physician - Reji Bartholomew MD at Work:  [phone].  [facility], EMERGENCY ROOM PHONE NUMBER: [phone]  IF A COMPLICATION OR EMERGENCY SITUATION ARISES AND YOU ARE UNABLE TO REACH   YOUR PHYSICIAN - GO DIRECTLY TO THE EMERGENCY ROOM.  Reji Bartholomew MD  1/19/2024 10:25:00 AM  This report has been verified and signed electronically.  Dear patient,  As a result of recent federal legislation (The Federal Cures Act), you may   receive lab or pathology results from your procedure in your [facility]   account before your physician is able to contact you. Your physician or   their representative will relay the results to you with their   recommendations at their soonest availability.  Thank you,  PROVATION

## 2024-01-19 NOTE — ASSESSMENT & PLAN NOTE
Being followed in Oncology clinic here. Was offered palliative chemotherapy. She wants a follow-up to discuss prognosis and current treatment options. Consulted Oncology: Per oncology: Patient was seen in oncology clinic on 1/4. Discussed diagnosis at bedside with patient and family today. Per 1/4 clinic note, treatment being offered by GI oncologist Dr. Us will consist of gemcitabine and abraxane. Family ahs been given handouts on the proposed medications. They have good understanding of the poor prognosis associated with metastatic pancreatic cancer. Patient would like to get over her current hospital issues before deciding on pursuing chemotherapy. She feels additional information is overwhelming at this time. We also briefly discussed that TPN has not been shown to improve prognosis in metastatic pancreatic cancer. Patient and family were thankful for bedside discussion. They have been instructed to call clinic with further questions or if further follow up is desired

## 2024-01-19 NOTE — PLAN OF CARE
Discussed POC with patient and son at beginning of shift. Questions were asked and answered.  Pt stated understanding of POC.  Pt AAO x 4 throughout shift. Complaints of pain that require PRN IV morphine X 3 and PRN PO oxy x 1.  They were effective in bring her pain down to an acceptable level.  Pt has been NPO since midnight for g-tube placement.  Pt remained free from injury. Denies needs at this time. Son at bedside.

## 2024-01-19 NOTE — ANESTHESIA POSTPROCEDURE EVALUATION
Anesthesia Post Evaluation    Patient: Radha You    Procedure(s) Performed: Procedure(s) (LRB):  INSERTION, PEG TUBE (N/A)    Final Anesthesia Type: general      Level of consciousness: awake and alert  Post-procedure vital signs: reviewed and stable  Pain control: Pain has been treated.  Airway patency: patent    PONV status: Absent or treated.  Anesthetic complications: no      Cardiovascular status: hemodynamically stable  Respiratory status: unassisted  Hydration status: euvolemic                Vitals Value Taken Time   /62 01/19/24 1047   Temp 36.5 °C (97.7 °F) 01/19/24 1033   Pulse 96 01/19/24 1047   Resp 14 01/19/24 1047   SpO2 90 % 01/19/24 1047   Vitals shown include unvalidated device data.      No case tracking events are documented in the log.      Pain/Aj Score: Pain Rating Prior to Med Admin: 6 (1/19/2024  7:06 AM)  Pain Rating Post Med Admin: 6 (1/19/2024  7:36 AM)  Aj Score: 8 (1/19/2024 10:33 AM)

## 2024-01-19 NOTE — PT/OT/SLP DISCHARGE
Occupational Therapy Discharge Summary    Radha You  MRN: [id number]   Principal Problem: Partial small bowel obstruction      Patient Discharged from acute Occupational Therapy on 01-19-24.  Please refer to prior OT note dated 1-12-24 for functional status.    Assessment:       Pt. With advanced spinal mets and choosing not to participate in therapy due to extensive pain.  MD aware.     Objective:     GOALS:   Multidisciplinary Problems       Occupational Therapy Goals          Problem: Occupational Therapy    Goal Priority Disciplines Outcome Interventions   Occupational Therapy Goal     OT, PT/OT Ongoing, Progressing    Description: Goals to be met by: 1/26/24     Patient will increase functional independence with ADLs by performing:    UE Dressing with Stand-by Assistance.  LE Dressing with Stand-by Assistance and Assistive Devices as needed.  Grooming while standing at sink with Contact Guard Assistance.  Toileting from toilet with Contact Guard Assistance for hygiene and clothing management.   Toilet transfer to bedside commode with Stand-by Assistance.                         Reasons for Discontinuation of Therapy Services  Patient declines to continue care.      Plan:     Patient Discharged to:  remains in acute setting    1/19/2024

## 2024-01-19 NOTE — PT/OT/SLP PROGRESS
Physical Therapy      Patient Name:  Radha You   MRN:  [id number]    Patient not seen today secondary to Patient unwilling to participate. Will follow-up per POC.

## 2024-01-19 NOTE — PLAN OF CARE
Pt's MD notified SW that Palliative has been speaking with the family and they are waiting for another family member to come to the hospital to discuss home hospice. SW will follow for possible hospice or home health referrals.     4:43 PM  [facility] is out of the Pt's service area, if DME ordered, CM will need to outsource it.     BERRY Angulo, LMSW [facility]  [id number]

## 2024-01-19 NOTE — ASSESSMENT & PLAN NOTE
Nutrition consulted. Most recent weight and BMI monitored-     Measurements:  Wt Readings from Last 1 Encounters:   01/18/24 66.8 kg (147 lb 4.3 oz)   Body mass index is 23.07 kg/m².    Patient has been screened and assessed by RD.    Malnutrition Type:  Context: acute illness or injury  Level: moderate    Malnutrition Characteristic Summary:  Weight Loss (Malnutrition): 5% in 1 month  Energy Intake (Malnutrition): less than 75% for greater than 7 days  Subcutaneous Fat (Malnutrition): moderate depletion  Muscle Mass (Malnutrition): moderate depletion    Interventions/Recommendations (treatment strategy):  1.  Cont PO liquids

## 2024-01-19 NOTE — NURSING
Patient to Endo for G-tube via stretcher with Oxygen @2l n/c. Remain NPO. No s/s distress. Family @bedside.

## 2024-01-19 NOTE — PROGRESS NOTES
[facility] Medicine  Progress Note    Patient Name: Radha You  MRN: [id number]  Patient Class: IP- Inpatient   Admission Date: 1/9/2024  Length of Stay: 8 days  Attending Physician: Jada Olivas MD  Primary Care Provider: Sapphire Ruffin FNP-C        Subjective:     Principal Problem:Partial small bowel obstruction        HPI:  Radha You is a 78 year old white woman with former cigarette smoking, hypertension, hyperlipidemia, chronic obstructive pulmonary disease, chronic hypoxic respiratory failure(on 1 liter/minute of supplemental oxygen), osteoporosis, history of estrogen receptor positive progesterone receptor negative, 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 She lives in [place], [place]. She is . Her hematologist-oncologist is Dr. Charles Us.               She was seen at [facility] clinic on 1/4/2024 for her pancreatic adenocarcinoma, which was found incidentally during a bronchoscopy and biopsied by endoscopic ultrasound at [facility]. She was offered palliative chemotherapy and declined it.               She presented to [facility] Emergency Department on 1/9/2024 with abdominal pain for 3 to 4 days, associated with severe constipation with no bowel movement for several days despite over-the-counter laxatives, including suppositories. She was able to pass a small bowel movement earlier on the day of presentation but still felt constipated. She also had increase in pain in abdominal pain associated with nausea and vomiting causing inability to tolerate oral intake.  In the emergency department, heart rate was 115 bpm. Labs showed mild  leukocytosis. CT of the abdomen and pelvis with contrast revealed the known pancreatic tail mass, abutting and possibly invading the spleen with new geographic region of decreased enhancement in the lower splenic pole, suspicious for splenic infarct. There were multiple small bowel strictures suggested, possibly on the basis of metastatic disease that is possibly associated with low-grade or partial small bowel obstruction versus ileus. General Surgery was consulted and brought up the possibility of a palliative gastrostomy for venting as a treatment option. She deferred and opted for nasogastric decompression. She was given a liter of lactated Ringer's solution, 4 mg of intravenous morphine, and 4 mg of intravenous ondansetron. She was admitted to Hospital Medicine Team S.    Overview/Hospital Course:  She was kept NPO. General Surgery followed. She was put on lactated Ringer's solution at 75 mL/hr. She was given scopolamine patch. General Surgery signed off and recommended consulting Gastroenterology. Her urine was dark so fluids were increased to 100 mL/hr. After discussing the likelihood of recurrence of her acute problem due to the intestinal strictures, Gastroenterology was consulted on 1/12/2024 for venting gastrostomy. She was still reluctant so Palliative Care was consulted to discuss goals of care. Physical and Occupational Therapy were consulted when she reported that she had not gotten out of the hospital bed since being admitted. Famotidine was started for the esophagitis diagnosed on admission as well as to prevent esophageal ulcer due to having a nasogastric tube. Palliative Care had a long discussion with her and recommended a trial of dexamethasone to see if it would decrease intestinal swelling. Abdominal X-ray showed mid increase in bowel distension and recurrence of the nasogastric tube tip in the esophagus despite having been advanced 2 nights prior. The tube was advanced again. On 1/15/2024,  abdominal X-ray showed resolution of bowel distension. She was started on senna-docusate twice daily and given bisacodyl suppository. She started having bowel movements. She tolerated clear liquid diet.     Agreeable to Gtube placement for venting. Planned for 1/18. Palliative following.     Interval History: Patient reporting abd pain. NG in place. Agreeable to venting Gtube placement planned for tomorrow.   Palliative following.     Review of Systems   Constitutional:  Negative for fever.   Gastrointestinal:  Positive for abdominal pain.     Objective:     Vital Signs (Most Recent):  Temp: 98 °F (36.7 °C) (01/18/24 1930)  Pulse: 94 (01/18/24 1930)  Resp: (!) 21 (01/18/24 2034)  BP: 120/67 (01/18/24 1930)  SpO2: 99 % (01/18/24 1930) Vital Signs (24h Range):  Temp:  [97.6 °F (36.4 °C)-98.7 °F (37.1 °C)] 98 °F (36.7 °C)  Pulse:  [80-95] 94  Resp:  [16-21] 21  SpO2:  [90 %-100 %] 99 %  BP: (115-137)/(67-86) 120/67     Weight: 66.8 kg (147 lb 4.3 oz)  Body mass index is 23.07 kg/m².    Intake/Output Summary (Last 24 hours) at 1/18/2024 2042  Last data filed at 1/18/2024 1607  Gross per 24 hour   Intake 360 ml   Output 300 ml   Net 60 ml         Physical Exam  Vitals and nursing note reviewed.   Constitutional:       General: She is not in acute distress.     Appearance: She is well-developed and normal weight. She is not diaphoretic.   HENT:      Nose:      Comments: Nasogastric tube  Pulmonary:      Effort: Pulmonary effort is normal. No respiratory distress.   Abdominal:      General: There is distension.      Tenderness: There is abdominal tenderness.   Skin:     General: Skin is warm and dry.      Coloration: Skin is not jaundiced or pale.   Neurological:      Mental Status: She is alert and oriented to person, place, and time. Mental status is at baseline.      Motor: No seizure activity.   Psychiatric:         Attention and Perception: Attention normal.         Mood and Affect: Affect normal.         Behavior:  Behavior is cooperative.         Thought Content: Thought content normal.             Significant Labs: All pertinent labs within the past 24 hours have been reviewed.    Significant Imaging: I have reviewed all pertinent imaging results/findings within the past 24 hours.    Assessment/Plan:      * Partial small bowel obstruction  Small bowel stricture   NG tube placed. Scopolamine patch, prn antiemetics. Gastroenterology consulted to place venting gastrostomy. PT and OT for debility. Appreciate Palliative Care, who gave dexamethasone trial. Started senna-docusate. Advance to full liquids.   - Patient agreeable to venting Gtube placement, planned placement 1/19    Pain  - MM pain regimen as needed      Reflux esophagitis  Started famotidine.    Small bowel stricture  - noted 2/2 to pancreatic cancer       Pancreatic adenocarcinoma  Being followed in Oncology clinic here. Was offered palliative chemotherapy. She wants a follow-up to discuss prognosis and current treatment options. Consulted Oncology: Per oncology: Patient was seen in oncology clinic on 1/4. Discussed diagnosis at bedside with patient and family today. Per 1/4 clinic note, treatment being offered by GI oncologist Dr. Us will consist of gemcitabine and abraxane. Family ahs been given handouts on the proposed medications. They have good understanding of the poor prognosis associated with metastatic pancreatic cancer. Patient would like to get over her current hospital issues before deciding on pursuing chemotherapy. She feels additional information is overwhelming at this time. We also briefly discussed that TPN has not been shown to improve prognosis in metastatic pancreatic cancer. Patient and family were thankful for bedside discussion. They have been instructed to call clinic with further questions or if further follow up is desired       Chronic respiratory failure with hypoxia  Patient with a history of chronic hypoxic respiratory failure  that is on 1-3 liters of oxygen from her COPD.    Centrilobular emphysema  Continue home azithromycin 250 mg three times weekly (MWF), albuterol-ipratropium nebulizers PRN.    Moderate malnutrition  Nutrition consulted. Most recent weight and BMI monitored-     Measurements:  Wt Readings from Last 1 Encounters:   01/18/24 66.8 kg (147 lb 4.3 oz)   Body mass index is 23.07 kg/m².    Patient has been screened and assessed by RD.    Malnutrition Type:  Context: acute illness or injury  Level: moderate    Malnutrition Characteristic Summary:  Weight Loss (Malnutrition): 5% in 1 month  Energy Intake (Malnutrition): less than 75% for greater than 7 days  Subcutaneous Fat (Malnutrition): moderate depletion  Muscle Mass (Malnutrition): moderate depletion    Interventions/Recommendations (treatment strategy):  1.  Cont PO liquids    Hyperlipidemia  Continue home pravastatin 40 mg daily.      Essential hypertension  Continue home metoprolol succinate 25 mg daily. Monitor blood pressures.    Palliative care encounter  - palliative care following      Carcinoma of right breast in female, estrogen receptor positive  History of breast cancer. Patient previously had lumpectomy and chemoradiation.        VTE Risk Mitigation (From admission, onward)           Ordered     enoxaparin injection 40 mg  Daily         01/10/24 0335     IP VTE HIGH RISK PATIENT  Once         01/10/24 0335     Place sequential compression device  Until discontinued         01/10/24 0335                    Discharge Planning   RUFINO: 1/19/2024     Code Status: Full Code   Is the patient medically ready for discharge?: No    Reason for patient still in hospital (select all that apply): Patient trending condition, Consult recommendations, and Pending disposition  Discharge Plan A: Home Health                  Jada Olivas MD  Department of Hospital Medicine   [facility] - Telemetry Stepdown

## 2024-01-20 LAB
ALBUMIN SERPL BCP-MCNC: 2.7 G/DL (ref 3.5–5.2)
ALP SERPL-CCNC: 89 U/L (ref 55–135)
ALT SERPL W/O P-5'-P-CCNC: 24 U/L (ref 10–44)
ANION GAP SERPL CALC-SCNC: 6 MMOL/L (ref 8–16)
AST SERPL-CCNC: 19 U/L (ref 10–40)
BASOPHILS # BLD AUTO: 0.11 K/UL (ref 0–0.2)
BASOPHILS NFR BLD: 0.9 % (ref 0–1.9)
BILIRUB SERPL-MCNC: 2.4 MG/DL (ref 0.1–1)
BUN SERPL-MCNC: 14 MG/DL (ref 8–23)
CALCIUM SERPL-MCNC: 9.3 MG/DL (ref 8.7–10.5)
CHLORIDE SERPL-SCNC: 96 MMOL/L (ref 95–110)
CO2 SERPL-SCNC: 30 MMOL/L (ref 23–29)
CREAT SERPL-MCNC: 0.7 MG/DL (ref 0.5–1.4)
DIFFERENTIAL METHOD BLD: ABNORMAL
EOSINOPHIL # BLD AUTO: 0.4 K/UL (ref 0–0.5)
EOSINOPHIL NFR BLD: 3.4 % (ref 0–8)
ERYTHROCYTE [DISTWIDTH] IN BLOOD BY AUTOMATED COUNT: 13.3 % (ref 11.5–14.5)
EST. GFR  (NO RACE VARIABLE): >60 ML/MIN/1.73 M^2
GLUCOSE SERPL-MCNC: 98 MG/DL (ref 70–110)
HCT VFR BLD AUTO: 40.3 % (ref 37–48.5)
HGB BLD-MCNC: 12.5 G/DL (ref 12–16)
IMM GRANULOCYTES # BLD AUTO: 0.35 K/UL (ref 0–0.04)
IMM GRANULOCYTES NFR BLD AUTO: 2.9 % (ref 0–0.5)
INR PPP: 1.1 (ref 0.8–1.2)
LYMPHOCYTES # BLD AUTO: 0.8 K/UL (ref 1–4.8)
LYMPHOCYTES NFR BLD: 6.8 % (ref 18–48)
MAGNESIUM SERPL-MCNC: 2 MG/DL (ref 1.6–2.6)
MCH RBC QN AUTO: 28.1 PG (ref 27–31)
MCHC RBC AUTO-ENTMCNC: 31 G/DL (ref 32–36)
MCV RBC AUTO: 91 FL (ref 82–98)
MONOCYTES # BLD AUTO: 1.7 K/UL (ref 0.3–1)
MONOCYTES NFR BLD: 14 % (ref 4–15)
NEUTROPHILS # BLD AUTO: 8.7 K/UL (ref 1.8–7.7)
NEUTROPHILS NFR BLD: 72 % (ref 38–73)
NRBC BLD-RTO: 0 /100 WBC
PHOSPHATE SERPL-MCNC: 2.7 MG/DL (ref 2.7–4.5)
PLATELET # BLD AUTO: 312 K/UL (ref 150–450)
PMV BLD AUTO: 10.1 FL (ref 9.2–12.9)
POCT GLUCOSE: 103 MG/DL (ref 70–110)
POCT GLUCOSE: 123 MG/DL (ref 70–110)
POCT GLUCOSE: 66 MG/DL (ref 70–110)
POCT GLUCOSE: 71 MG/DL (ref 70–110)
POCT GLUCOSE: 83 MG/DL (ref 70–110)
POTASSIUM SERPL-SCNC: 3.8 MMOL/L (ref 3.5–5.1)
PREALB SERPL-MCNC: 9 MG/DL (ref 20–43)
PROT SERPL-MCNC: 6.3 G/DL (ref 6–8.4)
PROTHROMBIN TIME: 11.6 SEC (ref 9–12.5)
RBC # BLD AUTO: 4.45 M/UL (ref 4–5.4)
SODIUM SERPL-SCNC: 132 MMOL/L (ref 136–145)
TRIGL SERPL-MCNC: 114 MG/DL (ref 30–150)
WBC # BLD AUTO: 12.07 K/UL (ref 3.9–12.7)

## 2024-01-20 PROCEDURE — 84478 ASSAY OF TRIGLYCERIDES: CPT | Performed by: HOSPITALIST

## 2024-01-20 PROCEDURE — 84134 ASSAY OF PREALBUMIN: CPT | Performed by: HOSPITALIST

## 2024-01-20 PROCEDURE — 25000003 PHARM REV CODE 250: Performed by: HOSPITALIST

## 2024-01-20 PROCEDURE — 84100 ASSAY OF PHOSPHORUS: CPT | Performed by: HOSPITALIST

## 2024-01-20 PROCEDURE — 25000003 PHARM REV CODE 250: Performed by: STUDENT IN AN ORGANIZED HEALTH CARE EDUCATION/TRAINING PROGRAM

## 2024-01-20 PROCEDURE — 85025 COMPLETE CBC W/AUTO DIFF WBC: CPT | Performed by: STUDENT IN AN ORGANIZED HEALTH CARE EDUCATION/TRAINING PROGRAM

## 2024-01-20 PROCEDURE — 85610 PROTHROMBIN TIME: CPT | Performed by: HOSPITALIST

## 2024-01-20 PROCEDURE — 83735 ASSAY OF MAGNESIUM: CPT | Performed by: HOSPITALIST

## 2024-01-20 PROCEDURE — 63600175 PHARM REV CODE 636 W HCPCS: Performed by: STUDENT IN AN ORGANIZED HEALTH CARE EDUCATION/TRAINING PROGRAM

## 2024-01-20 PROCEDURE — 20600001 HC STEP DOWN PRIVATE ROOM

## 2024-01-20 PROCEDURE — 63600175 PHARM REV CODE 636 W HCPCS: Performed by: NURSE PRACTITIONER

## 2024-01-20 PROCEDURE — 80053 COMPREHEN METABOLIC PANEL: CPT | Performed by: STUDENT IN AN ORGANIZED HEALTH CARE EDUCATION/TRAINING PROGRAM

## 2024-01-20 PROCEDURE — 36415 COLL VENOUS BLD VENIPUNCTURE: CPT | Performed by: HOSPITALIST

## 2024-01-20 RX ADMIN — FAMOTIDINE 20 MG: 10 INJECTION INTRAVENOUS at 08:01

## 2024-01-20 RX ADMIN — MORPHINE SULFATE 4 MG: 4 INJECTION INTRAVENOUS at 02:01

## 2024-01-20 RX ADMIN — OXYCODONE HYDROCHLORIDE 10 MG: 10 TABLET ORAL at 12:01

## 2024-01-20 RX ADMIN — CYCLOBENZAPRINE HYDROCHLORIDE 10 MG: 5 TABLET, FILM COATED ORAL at 09:01

## 2024-01-20 RX ADMIN — MORPHINE SULFATE 4 MG: 4 INJECTION INTRAVENOUS at 09:01

## 2024-01-20 RX ADMIN — TOPIRAMATE 50 MG: 25 TABLET, FILM COATED ORAL at 08:01

## 2024-01-20 RX ADMIN — CYCLOBENZAPRINE HYDROCHLORIDE 10 MG: 5 TABLET, FILM COATED ORAL at 08:01

## 2024-01-20 RX ADMIN — MORPHINE SULFATE 4 MG: 4 INJECTION INTRAVENOUS at 03:01

## 2024-01-20 RX ADMIN — PRAVASTATIN SODIUM 40 MG: 40 TABLET ORAL at 08:01

## 2024-01-20 RX ADMIN — CYCLOBENZAPRINE HYDROCHLORIDE 10 MG: 5 TABLET, FILM COATED ORAL at 02:01

## 2024-01-20 RX ADMIN — DEXTROSE MONOHYDRATE 125 ML: 100 INJECTION, SOLUTION INTRAVENOUS at 06:01

## 2024-01-20 RX ADMIN — SENNOSIDES AND DOCUSATE SODIUM 1 TABLET: 8.6; 5 TABLET ORAL at 09:01

## 2024-01-20 RX ADMIN — METOPROLOL SUCCINATE 25 MG: 25 TABLET, EXTENDED RELEASE ORAL at 09:01

## 2024-01-20 RX ADMIN — OXYCODONE HYDROCHLORIDE 10 MG: 10 TABLET ORAL at 10:01

## 2024-01-20 RX ADMIN — ENOXAPARIN SODIUM 40 MG: 40 INJECTION SUBCUTANEOUS at 05:01

## 2024-01-20 RX ADMIN — FAMOTIDINE 20 MG: 10 INJECTION INTRAVENOUS at 09:01

## 2024-01-20 RX ADMIN — SENNOSIDES AND DOCUSATE SODIUM 1 TABLET: 8.6; 5 TABLET ORAL at 08:01

## 2024-01-20 RX ADMIN — FLUTICASONE FUROATE AND VILANTEROL TRIFENATATE 1 PUFF: 200; 25 POWDER RESPIRATORY (INHALATION) at 10:01

## 2024-01-20 RX ADMIN — MUPIROCIN: 20 OINTMENT TOPICAL at 09:01

## 2024-01-20 RX ADMIN — MORPHINE SULFATE 4 MG: 4 INJECTION INTRAVENOUS at 08:01

## 2024-01-20 RX ADMIN — MORPHINE SULFATE 4 MG: 4 INJECTION INTRAVENOUS at 05:01

## 2024-01-20 NOTE — SUBJECTIVE & OBJECTIVE
Interval History: s/p venting GT placement today. Patient still reporting abd pain. Resume liquid diet and vent as needed.     Patient and family still undecided about pursuing further treatment with oncology versus hospice, awaiting another family member to discuss further.     Review of Systems   Constitutional:  Negative for fever.   Gastrointestinal:  Positive for abdominal pain.     Objective:     Vital Signs (Most Recent):  Temp: 98 °F (36.7 °C) (01/19/24 1611)  Pulse: 86 (01/19/24 1611)  Resp: 18 (01/19/24 1611)  BP: 131/66 (01/19/24 1611)  SpO2: 97 % (01/19/24 1611) Vital Signs (24h Range):  Temp:  [97 °F (36.1 °C)-98.1 °F (36.7 °C)] 98 °F (36.7 °C)  Pulse:  [] 86  Resp:  [11-21] 18  SpO2:  [91 %-100 %] 97 %  BP: ()/(54-83) 131/66     Weight: 66.8 kg (147 lb 4.3 oz)  Body mass index is 23.07 kg/m².    Intake/Output Summary (Last 24 hours) at 1/19/2024 1931  Last data filed at 1/19/2024 1833  Gross per 24 hour   Intake 400 ml   Output 450 ml   Net -50 ml           Physical Exam  Vitals and nursing note reviewed.   Constitutional:       General: She is not in acute distress.     Appearance: She is well-developed and normal weight. She is not diaphoretic.   Pulmonary:      Effort: Pulmonary effort is normal. No respiratory distress.   Abdominal:      General: There is distension.      Tenderness: There is abdominal tenderness.      Comments: GT in place, no erythema or drainage    Skin:     General: Skin is warm and dry.      Coloration: Skin is not jaundiced or pale.   Neurological:      Mental Status: She is alert and oriented to person, place, and time. Mental status is at baseline.      Motor: No seizure activity.   Psychiatric:         Attention and Perception: Attention normal.         Mood and Affect: Affect normal.         Behavior: Behavior is cooperative.         Thought Content: Thought content normal.             Significant Labs: All pertinent labs within the past 24 hours have been  reviewed.    Significant Imaging: I have reviewed all pertinent imaging results/findings within the past 24 hours.

## 2024-01-20 NOTE — PLAN OF CARE
Problem: Adult Inpatient Plan of Care  Goal: Plan of Care Review  Outcome: Ongoing, Progressing  Flowsheets (Taken 1/20/2024 0640)  Plan of Care Reviewed With:   patient   family  Goal: Patient-Specific Goal (Individualized)  Outcome: Ongoing, Not Progressing  Flowsheets (Taken 1/20/2024 0640)  Anxieties, Fears or Concerns: pt anxious during shift  Goal: Absence of Hospital-Acquired Illness or Injury  Outcome: Ongoing, Progressing  Intervention: Identify and Manage Fall Risk  Flowsheets (Taken 1/20/2024 0640)  Safety Promotion/Fall Prevention:   assistive device/personal item within reach   bed alarm set   side rails raised x 2  Goal: Optimal Comfort and Wellbeing  Outcome: Ongoing, Not Progressing  Intervention: Monitor Pain and Promote Comfort  Flowsheets (Taken 1/20/2024 0640)  Pain Management Interventions:   around-the-clock dosing utilized   breathing exercises utilized   pain management plan reviewed with patient/caregiver   position adjusted   pillow support provided     Problem: Adult Inpatient Plan of Care  Goal: Optimal Comfort and Wellbeing  Outcome: Ongoing, Not Progressing  Intervention: Monitor Pain and Promote Comfort  Flowsheets (Taken 1/20/2024 0640)  Pain Management Interventions:   around-the-clock dosing utilized   breathing exercises utilized   pain management plan reviewed with patient/caregiver   position adjusted   pillow support provided

## 2024-01-20 NOTE — PROGRESS NOTES
[facility] Medicine  Progress Note    Patient Name: Radha You  MRN: [id number]  Patient Class: IP- Inpatient   Admission Date: 1/9/2024  Length of Stay: 9 days  Attending Physician: Jada Olivas MD  Primary Care Provider: Sapphire Ruffin FNP-C        Subjective:     Principal Problem:Partial small bowel obstruction        HPI:  Radha You is a 78 year old white woman with former cigarette smoking, hypertension, hyperlipidemia, chronic obstructive pulmonary disease, chronic hypoxic respiratory failure(on 1 liter/minute of supplemental oxygen), osteoporosis, history of estrogen receptor positive progesterone receptor negative, 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 She lives in [place], [place]. She is . Her hematologist-oncologist is Dr. Charles Us.               She was seen at [facility] clinic on 1/4/2024 for her pancreatic adenocarcinoma, which was found incidentally during a bronchoscopy and biopsied by endoscopic ultrasound at [facility]. She was offered palliative chemotherapy and declined it.               She presented to [facility] Emergency Department on 1/9/2024 with abdominal pain for 3 to 4 days, associated with severe constipation with no bowel movement for several days despite over-the-counter laxatives, including suppositories. She was able to pass a small bowel movement earlier on the day of presentation but still felt constipated. She also had increase in pain in abdominal pain associated with nausea and vomiting causing inability to tolerate oral intake.  In the emergency department, heart rate was 115 bpm. Labs showed mild  leukocytosis. CT of the abdomen and pelvis with contrast revealed the known pancreatic tail mass, abutting and possibly invading the spleen with new geographic region of decreased enhancement in the lower splenic pole, suspicious for splenic infarct. There were multiple small bowel strictures suggested, possibly on the basis of metastatic disease that is possibly associated with low-grade or partial small bowel obstruction versus ileus. General Surgery was consulted and brought up the possibility of a palliative gastrostomy for venting as a treatment option. She deferred and opted for nasogastric decompression. She was given a liter of lactated Ringer's solution, 4 mg of intravenous morphine, and 4 mg of intravenous ondansetron. She was admitted to Hospital Medicine Team S.    Overview/Hospital Course:  She was kept NPO. General Surgery followed. She was put on lactated Ringer's solution at 75 mL/hr. She was given scopolamine patch. General Surgery signed off and recommended consulting Gastroenterology. Her urine was dark so fluids were increased to 100 mL/hr. After discussing the likelihood of recurrence of her acute problem due to the intestinal strictures, Gastroenterology was consulted on 1/12/2024 for venting gastrostomy. She was still reluctant so Palliative Care was consulted to discuss goals of care. Physical and Occupational Therapy were consulted when she reported that she had not gotten out of the hospital bed since being admitted. Famotidine was started for the esophagitis diagnosed on admission as well as to prevent esophageal ulcer due to having a nasogastric tube. Palliative Care had a long discussion with her and recommended a trial of dexamethasone to see if it would decrease intestinal swelling. Abdominal X-ray showed mid increase in bowel distension and recurrence of the nasogastric tube tip in the esophagus despite having been advanced 2 nights prior. The tube was advanced again. On 1/15/2024,  abdominal X-ray showed resolution of bowel distension. She was started on senna-docusate twice daily and given bisacodyl suppository. She started having bowel movements. She tolerated clear liquid diet.     Agreeable to Gtube placement for venting, placed 1/19. Palliative following. Patient and family still undecided about hospice versus consideration for continued treatment with oncology, awaiting other family member to arrive for discussion.     Interval History: s/p venting GT placement today. Patient still reporting abd pain. Resume liquid diet and vent as needed.     Patient and family still undecided about pursuing further treatment with oncology versus hospice, awaiting another family member to discuss further.     Review of Systems   Constitutional:  Negative for fever.   Gastrointestinal:  Positive for abdominal pain.     Objective:     Vital Signs (Most Recent):  Temp: 98 °F (36.7 °C) (01/19/24 1611)  Pulse: 86 (01/19/24 1611)  Resp: 18 (01/19/24 1611)  BP: 131/66 (01/19/24 1611)  SpO2: 97 % (01/19/24 1611) Vital Signs (24h Range):  Temp:  [97 °F (36.1 °C)-98.1 °F (36.7 °C)] 98 °F (36.7 °C)  Pulse:  [] 86  Resp:  [11-21] 18  SpO2:  [91 %-100 %] 97 %  BP: ()/(54-83) 131/66     Weight: 66.8 kg (147 lb 4.3 oz)  Body mass index is 23.07 kg/m².    Intake/Output Summary (Last 24 hours) at 1/19/2024 1931  Last data filed at 1/19/2024 1833  Gross per 24 hour   Intake 400 ml   Output 450 ml   Net -50 ml           Physical Exam  Vitals and nursing note reviewed.   Constitutional:       General: She is not in acute distress.     Appearance: She is well-developed and normal weight. She is not diaphoretic.   Pulmonary:      Effort: Pulmonary effort is normal. No respiratory distress.   Abdominal:      General: There is distension.      Tenderness: There is abdominal tenderness.      Comments: GT in place, no erythema or drainage    Skin:     General: Skin is warm and dry.      Coloration: Skin is not  jaundiced or pale.   Neurological:      Mental Status: She is alert and oriented to person, place, and time. Mental status is at baseline.      Motor: No seizure activity.   Psychiatric:         Attention and Perception: Attention normal.         Mood and Affect: Affect normal.         Behavior: Behavior is cooperative.         Thought Content: Thought content normal.             Significant Labs: All pertinent labs within the past 24 hours have been reviewed.    Significant Imaging: I have reviewed all pertinent imaging results/findings within the past 24 hours.    Assessment/Plan:      * Partial small bowel obstruction  Small bowel stricture   NG tube placed. Scopolamine patch, prn antiemetics. Gastroenterology consulted to place venting gastrostomy. PT and OT for debility. Appreciate Palliative Care, who gave dexamethasone trial. Started senna-docusate. Advance to full liquids.   - Patient agreeable to venting Gtube placement, s/p placement 1/19    Pain  - MM pain regimen as needed      Reflux esophagitis  Started famotidine.    Small bowel stricture  - noted 2/2 to pancreatic cancer       Pancreatic adenocarcinoma  Being followed in Oncology clinic here. Was offered palliative chemotherapy. She wants a follow-up to discuss prognosis and current treatment options. Consulted Oncology: Per oncology: Patient was seen in oncology clinic on 1/4. Discussed diagnosis at bedside with patient and family today. Per 1/4 clinic note, treatment being offered by GI oncologist Dr. Us will consist of gemcitabine and abraxane. Family has been given handouts on the proposed medications. They have good understanding of the poor prognosis associated with metastatic pancreatic cancer. Patient would like to get over her current hospital issues before deciding on pursuing chemotherapy. She feels additional information is overwhelming at this time. We also briefly discussed that TPN has not been shown to improve prognosis in  metastatic pancreatic cancer. Patient and family were thankful for bedside discussion. They have been instructed to call clinic with further questions or if further follow up is desired   - patient and family still undecided about pursing treatment versus transition to hospice, awaiting another family member to arrive and discuss      Chronic respiratory failure with hypoxia  Patient with a history of chronic hypoxic respiratory failure that is on 1-3 liters of oxygen from her COPD.    Centrilobular emphysema  Continue home azithromycin 250 mg three times weekly (MWF), albuterol-ipratropium nebulizers PRN.    Moderate malnutrition  Nutrition consulted. Most recent weight and BMI monitored-     Measurements:  Wt Readings from Last 1 Encounters:   01/18/24 66.8 kg (147 lb 4.3 oz)   Body mass index is 23.07 kg/m².    Patient has been screened and assessed by RD.    Malnutrition Type:  Context: acute illness or injury  Level: moderate    Malnutrition Characteristic Summary:  Weight Loss (Malnutrition): 5% in 1 month  Energy Intake (Malnutrition): less than 75% for greater than 7 days  Subcutaneous Fat (Malnutrition): moderate depletion  Muscle Mass (Malnutrition): moderate depletion    Interventions/Recommendations (treatment strategy):  1.  Cont PO full liquids as tolerated     Hyperlipidemia  Continue home pravastatin 40 mg daily.      Essential hypertension  Continue home metoprolol succinate 25 mg daily. Monitor blood pressures.    Palliative care encounter  - palliative care following      Carcinoma of right breast in female, estrogen receptor positive  History of breast cancer. Patient previously had lumpectomy and chemoradiation.        VTE Risk Mitigation (From admission, onward)           Ordered     enoxaparin injection 40 mg  Daily         01/10/24 0335     IP VTE HIGH RISK PATIENT  Once         01/10/24 0335     Place sequential compression device  Until discontinued         01/10/24 0335                     Discharge Planning   RUFINO: 1/20/2024     Code Status: Full Code   Is the patient medically ready for discharge?: No    Reason for patient still in hospital (select all that apply): Patient trending condition and Pending disposition  Discharge Plan A: Home Health                  Jada Olivas MD  Department of Hospital Medicine   [facility] - Telemetry Stepdown

## 2024-01-20 NOTE — ASSESSMENT & PLAN NOTE
Small bowel stricture   NG tube placed. Scopolamine patch, prn antiemetics. Gastroenterology consulted to place venting gastrostomy. PT and OT for debility. Appreciate Palliative Care, who gave dexamethasone trial. Started senna-docusate. Advance to full liquids.   - Patient agreeable to venting Gtube placement, s/p placement 1/19

## 2024-01-20 NOTE — PLAN OF CARE
Problem: Adult Inpatient Plan of Care  Goal: Plan of Care Review  Outcome: Ongoing, Progressing  Goal: Patient-Specific Goal (Individualized)  Outcome: Ongoing, Progressing  Goal: Absence of Hospital-Acquired Illness or Injury  Outcome: Ongoing, Progressing  Goal: Optimal Comfort and Wellbeing  Outcome: Ongoing, Progressing  Goal: Readiness for Transition of Care  Outcome: Ongoing, Progressing     Problem: Infection  Goal: Absence of Infection Signs and Symptoms  Outcome: Ongoing, Progressing     Problem: Skin Injury Risk Increased  Goal: Skin Health and Integrity  Outcome: Ongoing, Progressing  Intervention: Promote and Optimize Oral Intake  Flowsheets (Taken 1/19/2024 1838)  Oral Nutrition Promotion: rest periods promoted     Problem: Fall Injury Risk  Goal: Absence of Fall and Fall-Related Injury  Outcome: Ongoing, Progressing     Problem: Pain Acute  Goal: Acceptable Pain Control and Functional Ability  Outcome: Ongoing, Progressing  Intervention: Develop Pain Management Plan  Flowsheets (Taken 1/19/2024 1838)  Pain Management Interventions:   breathing exercises utilized   pain management plan reviewed with patient/caregiver   quiet environment facilitated   pillow support provided  Intervention: Prevent or Manage Pain  Flowsheets (Taken 1/19/2024 1838)  Sleep/Rest Enhancement: relaxation techniques promoted  Sensory Stimulation Regulation: quiet environment promoted  Bowel Elimination Promotion: adequate fluid intake promoted  Medication Review/Management: medications reviewed     POC reviewed. PEG tube placed and clamped off. Remain with poor appetite. Pain not control by MS and Oxy 9/10 pain scale. No s/s of distress. Oxygen @2l  Sat>92%. Purewick in place.  Frequent safety checks performed. [facility]

## 2024-01-20 NOTE — ASSESSMENT & PLAN NOTE
Nutrition consulted. Most recent weight and BMI monitored-     Measurements:  Wt Readings from Last 1 Encounters:   01/18/24 66.8 kg (147 lb 4.3 oz)   Body mass index is 23.07 kg/m².    Patient has been screened and assessed by RD.    Malnutrition Type:  Context: acute illness or injury  Level: moderate    Malnutrition Characteristic Summary:  Weight Loss (Malnutrition): 5% in 1 month  Energy Intake (Malnutrition): less than 75% for greater than 7 days  Subcutaneous Fat (Malnutrition): moderate depletion  Muscle Mass (Malnutrition): moderate depletion    Interventions/Recommendations (treatment strategy):  1.  Cont PO full liquids as tolerated

## 2024-01-20 NOTE — ASSESSMENT & PLAN NOTE
Being followed in Oncology clinic here. Was offered palliative chemotherapy. She wants a follow-up to discuss prognosis and current treatment options. Consulted Oncology: Per oncology: Patient was seen in oncology clinic on 1/4. Discussed diagnosis at bedside with patient and family today. Per 1/4 clinic note, treatment being offered by GI oncologist Dr. Us will consist of gemcitabine and abraxane. Family has been given handouts on the proposed medications. They have good understanding of the poor prognosis associated with metastatic pancreatic cancer. Patient would like to get over her current hospital issues before deciding on pursuing chemotherapy. She feels additional information is overwhelming at this time. We also briefly discussed that TPN has not been shown to improve prognosis in metastatic pancreatic cancer. Patient and family were thankful for bedside discussion. They have been instructed to call clinic with further questions or if further follow up is desired   - patient and family still undecided about pursing treatment versus transition to hospice, awaiting another family member to arrive and discuss

## 2024-01-21 LAB
ALBUMIN SERPL BCP-MCNC: 2.6 G/DL (ref 3.5–5.2)
ALP SERPL-CCNC: 87 U/L (ref 55–135)
ALT SERPL W/O P-5'-P-CCNC: 21 U/L (ref 10–44)
ANION GAP SERPL CALC-SCNC: 10 MMOL/L (ref 8–16)
AST SERPL-CCNC: 21 U/L (ref 10–40)
BASOPHILS # BLD AUTO: 0.15 K/UL (ref 0–0.2)
BASOPHILS NFR BLD: 1.3 % (ref 0–1.9)
BILIRUB SERPL-MCNC: 1.4 MG/DL (ref 0.1–1)
BUN SERPL-MCNC: 14 MG/DL (ref 8–23)
CALCIUM SERPL-MCNC: 9.3 MG/DL (ref 8.7–10.5)
CHLORIDE SERPL-SCNC: 101 MMOL/L (ref 95–110)
CO2 SERPL-SCNC: 24 MMOL/L (ref 23–29)
CREAT SERPL-MCNC: 0.7 MG/DL (ref 0.5–1.4)
DIFFERENTIAL METHOD BLD: ABNORMAL
EOSINOPHIL # BLD AUTO: 0.6 K/UL (ref 0–0.5)
EOSINOPHIL NFR BLD: 5.3 % (ref 0–8)
ERYTHROCYTE [DISTWIDTH] IN BLOOD BY AUTOMATED COUNT: 13.4 % (ref 11.5–14.5)
EST. GFR  (NO RACE VARIABLE): >60 ML/MIN/1.73 M^2
GLUCOSE SERPL-MCNC: 94 MG/DL (ref 70–110)
HCT VFR BLD AUTO: 37.3 % (ref 37–48.5)
HGB BLD-MCNC: 12 G/DL (ref 12–16)
IMM GRANULOCYTES # BLD AUTO: 0.34 K/UL (ref 0–0.04)
IMM GRANULOCYTES NFR BLD AUTO: 3 % (ref 0–0.5)
LYMPHOCYTES # BLD AUTO: 0.9 K/UL (ref 1–4.8)
LYMPHOCYTES NFR BLD: 8.1 % (ref 18–48)
MAGNESIUM SERPL-MCNC: 1.9 MG/DL (ref 1.6–2.6)
MCH RBC QN AUTO: 28.5 PG (ref 27–31)
MCHC RBC AUTO-ENTMCNC: 32.2 G/DL (ref 32–36)
MCV RBC AUTO: 89 FL (ref 82–98)
MONOCYTES # BLD AUTO: 1.6 K/UL (ref 0.3–1)
MONOCYTES NFR BLD: 13.9 % (ref 4–15)
NEUTROPHILS # BLD AUTO: 7.8 K/UL (ref 1.8–7.7)
NEUTROPHILS NFR BLD: 68.4 % (ref 38–73)
NRBC BLD-RTO: 0 /100 WBC
PHOSPHATE SERPL-MCNC: 3.1 MG/DL (ref 2.7–4.5)
PLATELET # BLD AUTO: 340 K/UL (ref 150–450)
PMV BLD AUTO: 10.5 FL (ref 9.2–12.9)
POCT GLUCOSE: 82 MG/DL (ref 70–110)
POCT GLUCOSE: 83 MG/DL (ref 70–110)
POCT GLUCOSE: 83 MG/DL (ref 70–110)
POTASSIUM SERPL-SCNC: 3.9 MMOL/L (ref 3.5–5.1)
PROT SERPL-MCNC: 6.2 G/DL (ref 6–8.4)
RBC # BLD AUTO: 4.21 M/UL (ref 4–5.4)
SODIUM SERPL-SCNC: 135 MMOL/L (ref 136–145)
WBC # BLD AUTO: 11.45 K/UL (ref 3.9–12.7)

## 2024-01-21 PROCEDURE — 80053 COMPREHEN METABOLIC PANEL: CPT | Performed by: STUDENT IN AN ORGANIZED HEALTH CARE EDUCATION/TRAINING PROGRAM

## 2024-01-21 PROCEDURE — 63600175 PHARM REV CODE 636 W HCPCS: Performed by: NURSE PRACTITIONER

## 2024-01-21 PROCEDURE — 84100 ASSAY OF PHOSPHORUS: CPT | Performed by: HOSPITALIST

## 2024-01-21 PROCEDURE — 25000003 PHARM REV CODE 250: Performed by: HOSPITALIST

## 2024-01-21 PROCEDURE — 99233 SBSQ HOSP IP/OBS HIGH 50: CPT | Mod: ,,, | Performed by: STUDENT IN AN ORGANIZED HEALTH CARE EDUCATION/TRAINING PROGRAM

## 2024-01-21 PROCEDURE — 25000003 PHARM REV CODE 250: Performed by: STUDENT IN AN ORGANIZED HEALTH CARE EDUCATION/TRAINING PROGRAM

## 2024-01-21 PROCEDURE — 20600001 HC STEP DOWN PRIVATE ROOM

## 2024-01-21 PROCEDURE — 36415 COLL VENOUS BLD VENIPUNCTURE: CPT | Performed by: HOSPITALIST

## 2024-01-21 PROCEDURE — 85025 COMPLETE CBC W/AUTO DIFF WBC: CPT | Performed by: STUDENT IN AN ORGANIZED HEALTH CARE EDUCATION/TRAINING PROGRAM

## 2024-01-21 PROCEDURE — 83735 ASSAY OF MAGNESIUM: CPT | Performed by: HOSPITALIST

## 2024-01-21 PROCEDURE — 63600175 PHARM REV CODE 636 W HCPCS: Performed by: STUDENT IN AN ORGANIZED HEALTH CARE EDUCATION/TRAINING PROGRAM

## 2024-01-21 RX ORDER — FAMOTIDINE 20 MG/1
20 TABLET, FILM COATED ORAL 2 TIMES DAILY
Status: DISCONTINUED | OUTPATIENT
Start: 2024-01-21 | End: 2024-01-23 | Stop reason: HOSPADM

## 2024-01-21 RX ORDER — FENTANYL 25 UG/1
1 PATCH TRANSDERMAL
Status: DISCONTINUED | OUTPATIENT
Start: 2024-01-21 | End: 2024-01-23 | Stop reason: HOSPADM

## 2024-01-21 RX ORDER — HYDROMORPHONE HYDROCHLORIDE 1 MG/ML
0.5 INJECTION, SOLUTION INTRAMUSCULAR; INTRAVENOUS; SUBCUTANEOUS ONCE
Status: COMPLETED | OUTPATIENT
Start: 2024-01-21 | End: 2024-01-21

## 2024-01-21 RX ADMIN — TOPIRAMATE 50 MG: 25 TABLET, FILM COATED ORAL at 08:01

## 2024-01-21 RX ADMIN — CYCLOBENZAPRINE HYDROCHLORIDE 10 MG: 5 TABLET, FILM COATED ORAL at 02:01

## 2024-01-21 RX ADMIN — MORPHINE SULFATE 4 MG: 4 INJECTION INTRAVENOUS at 06:01

## 2024-01-21 RX ADMIN — HYDROMORPHONE HYDROCHLORIDE 0.5 MG: 0.5 INJECTION, SOLUTION INTRAMUSCULAR; INTRAVENOUS; SUBCUTANEOUS at 11:01

## 2024-01-21 RX ADMIN — MORPHINE SULFATE 4 MG: 4 INJECTION INTRAVENOUS at 08:01

## 2024-01-21 RX ADMIN — MORPHINE SULFATE 4 MG: 4 INJECTION INTRAVENOUS at 12:01

## 2024-01-21 RX ADMIN — MUPIROCIN: 20 OINTMENT TOPICAL at 08:01

## 2024-01-21 RX ADMIN — MORPHINE SULFATE 4 MG: 4 INJECTION INTRAVENOUS at 04:01

## 2024-01-21 RX ADMIN — SENNOSIDES AND DOCUSATE SODIUM 1 TABLET: 8.6; 5 TABLET ORAL at 08:01

## 2024-01-21 RX ADMIN — FLUTICASONE FUROATE AND VILANTEROL TRIFENATATE 1 PUFF: 200; 25 POWDER RESPIRATORY (INHALATION) at 08:01

## 2024-01-21 RX ADMIN — CYCLOBENZAPRINE HYDROCHLORIDE 10 MG: 5 TABLET, FILM COATED ORAL at 08:01

## 2024-01-21 RX ADMIN — FENTANYL 1 PATCH: 25 PATCH TRANSDERMAL at 02:01

## 2024-01-21 RX ADMIN — FAMOTIDINE 20 MG: 20 TABLET, FILM COATED ORAL at 08:01

## 2024-01-21 RX ADMIN — ENOXAPARIN SODIUM 40 MG: 40 INJECTION SUBCUTANEOUS at 04:01

## 2024-01-21 RX ADMIN — METOPROLOL SUCCINATE 25 MG: 25 TABLET, EXTENDED RELEASE ORAL at 08:01

## 2024-01-21 RX ADMIN — FAMOTIDINE 20 MG: 10 INJECTION INTRAVENOUS at 08:01

## 2024-01-21 RX ADMIN — PRAVASTATIN SODIUM 40 MG: 40 TABLET ORAL at 08:01

## 2024-01-21 NOTE — PLAN OF CARE
Problem: Adult Inpatient Plan of Care  Goal: Plan of Care Review  Outcome: Ongoing, Not Progressing  Goal: Patient-Specific Goal (Individualized)  Outcome: Ongoing, Not Progressing  Goal: Absence of Hospital-Acquired Illness or Injury  Outcome: Ongoing, Not Progressing  Goal: Optimal Comfort and Wellbeing  Outcome: Ongoing, Not Progressing  Goal: Readiness for Transition of Care  Outcome: Ongoing, Not Progressing     Problem: Infection  Goal: Absence of Infection Signs and Symptoms  Outcome: Ongoing, Not Progressing     Problem: Skin Injury Risk Increased  Goal: Skin Health and Integrity  Outcome: Ongoing, Not Progressing     Problem: Coping Ineffective  Goal: Effective Coping  Outcome: Ongoing, Not Progressing     Problem: Fall Injury Risk  Goal: Absence of Fall and Fall-Related Injury  Outcome: Ongoing, Not Progressing     Problem: Pain Acute  Goal: Acceptable Pain Control and Functional Ability  Outcome: Ongoing, Not Progressing

## 2024-01-21 NOTE — ASSESSMENT & PLAN NOTE
PainIs multifactorial related to her previous spinal surgery.  She does have a spinal stimulator in place.  She does use a lidocaine patch.  Normally at home she takes Percocet as needed and sleeps on the couch. GLORIA GARCIA reviewed, prescribed Percocet 10 q6h PRN by provider in [place], [place].  - START fentanyl 25 mcg/hr TD patch as patient's 24 hour OME was ~104. Patient not obtaining much relief from oral medication due to venting.   - Continue morphine 4 mg IV q3h PRN severe pain, unable to tolerate PO  - Continue oxycodone 10 mg PO q4h PRN severe pain if able to tolerate PO  - Prior 24 hour MME: 104 MME (seven doses of morphine 4 mg IV and two doses of oxycodone 10 mg)

## 2024-01-21 NOTE — ASSESSMENT & PLAN NOTE
Being followed in Oncology clinic here. Was offered palliative chemotherapy. She wants a follow-up to discuss prognosis and current treatment options. Consulted Oncology: Per oncology: Patient was seen in oncology clinic on 1/4. Discussed diagnosis at bedside with patient and family today. Per 1/4 clinic note, treatment being offered by GI oncologist Dr. Us will consist of gemcitabine and abraxane. Family has been given handouts on the proposed medications. They have good understanding of the poor prognosis associated with metastatic pancreatic cancer. Patient would like to get over her current hospital issues before deciding on pursuing chemotherapy. She feels additional information is overwhelming at this time. We also briefly discussed that TPN has not been shown to improve prognosis in metastatic pancreatic cancer. Patient and family were thankful for bedside discussion. They have been instructed to call clinic with further questions or if further follow up is desired   - patient and family decided want to go home with palliative followup, not hospice

## 2024-01-21 NOTE — ASSESSMENT & PLAN NOTE
01.21.24  Met with patient and multiple family members (3) at bedside. Mrs. You states that she understands that is just going to have to die slowly. She expressed that she doesn't need to go back to [facility] as she can just die here too, in context of discharging to local son's house for a few days prior to driving back to [facility] area. Patient's son expressing concern about venting g tube output as well as that the IV medication only lasting for at most 45 minutes. Patient's son spoke about other options as mentioned by Dr. Ibarra including oral soln, patches, and IV pumps. Discussed some of these are available through hospice care. Patient's son states patient is not in a mental state to make any decisions regarding hospice care at this time. Patient's family report that patient's mentation changes with pain medications and she will state things that are not like herself or do not make sense in the context of the ongoing conversation.   01.17.2024  Ms. You shared that she believes she made her mind up regarding venting gastrostomy, which she is agreeable. She was also told that if not needed, then they don't have to proceed, which she would be accepting of as well. Also not sure if they will follow with oncology. I asked if she'd keep me in the loop about this decision as it will help me know who to communicate in terms of appropriate next steps and she said she would. Asked me to leave my card, which I did at her bedside table.  01.15.2024  No change in plan of care.  No family in room.  Decision regarding venting gastrostomy need to be made.  Decision also on when IR would be available if they are agreeable to doing it.  Need to revisit code status when all family is available and discharge plans with hospice.  01.14.2024  Symptoms:  Abdominal pain, nausea, vomiting.     Medicolegal:  Patient has decision making capacity.  Son Hiram is surrogate decision maker and is HCPOA in his present at  the bedside.     Psychosocial:  support system consists of friends and family.  Her best friend for over 60 years i came to visit her and she is distraught because she thinks that she is trying to take over her life and get her finances.     Spiritual:  Advent.  Has not been involved with the Gnosticism family since moving to [facility].    Prognostication:  Poor    Understanding of disease and Illness Trajectory: Patient and Family  has  adequate understanding of her illness, they can benefit from continued education on what to expect in the future.      Goals of care:  Comfort and quality of life as well as independence.  Advance Care Planning     Date: 01/13/2024    Today a voluntary meeting took place: bedside    Patient Participation: Patient is unable to participate     Attendees (Name and  Relationship to patient): Health care power of : Hiram grace   Staff attendees (Name and  Role): Glo Ibarra MD    ACP Conversation (General): Patient and family are aware that her disease is not curable.  We did talk again about improving her ileus/small-bowel obstruction.  Malignant obstruction can sometimes be relieved with higher dose Decadron and I offered that as an option to the family for 2/3 days.  We have that timeframe because they venting gastrostomy can not be placed prior to Tuesday at the earliest.  Her primary team has started her on peripheral nutrition.  I did discuss again that this has not really been shown to be that effective and certainly would not alter the cancer and lack of treatment options.     Code Status:  Discussed with son that if she dies in her sleep we should let her.  I would put in a do not resuscitate order.  He said he would talk with his brother about this.  ACP Documents: Reviewed current existing digital forms    Goals of care: The patient and family endorses that what is most important right now is to focus on remaining as independent as possible and symptom/pain  control    Accordingly, we have decided that the best plan to meet the patient's goals includes continuing with treatment      Recommendations/  Follow-up tasks: The patient and health care agent were provided the following recommendations trial of Decadron 10 mg IV daily for on day2 of 3 days.  Continue with peripheral nutrition.  Have decreased the IV fluids to see if that will help her IV pump from beeping.  Monitor condition and treat abdominal and back pain.      Length of ACP   conversation in minutes: >16 min          Code status:  Full for now    Advance directives:  Documents are in place.  Her living will says that she would want artificial hydration and nutrition.  She is getting that currently.  We will need to clarify this with the patient and sons particularly as we move closer to hospice treatment.      Summary and recommendations:  Continue pain medication for both her lower back pain and the abdominal pain  Trial of Decadron 10 mg IV Q 24 hours x3 days to see if that will help her small-bowel obstruction.  Consider additional Oncology recommendations so patient and family can move forward with decision-making.  I will return to visit with them tomorrow morning.  Have encouraged the son who lives in [facility] to go home because the weather is about to get really bad.    01.13.2024  Impression:    Symptoms:  Abdominal pain, nausea, vomiting.     Medicolegal:  Patient has decision making capacity.  Son Hiram is surrogate decision maker and is HCPOA.     Psychosocial:  support system consists of friends and family.  Her best friend for over 60 years is coming to visit her tomorrow.  She plans to have a checklist of things that her friend needs to take care of.  This gives her great comfort.     Spiritual:  Latter day.  Has not been involved with the Mandaeism family since moving to [facility].    Prognostication:  Poor    Understanding of disease and Illness Trajectory: Patient and Family  has   adequate understanding of her illness, they can benefit from continued education on what to expect in the future.      Goals of care:  Comfort and quality of life as well as independence.  Advance Care Planning     Date: 01/13/2024    Today a voluntary meeting took place: bedside    Patient Participation: Patient is able to participate     Attendees (Name and  Relationship to patient): Health care power of : Hiram son and other son Anibal and Hiram's daughter Shabnam.    Staff attendees (Name and  Role): Glo Ibarra MD    ACP Conversation (General): Understanding of current condition long conversation with patient and her 2 sons and granddaughter.  Patient is aware that her disease is not curable.  She is still thinking that she could see an oncologist in [place] and receive chemotherapy or radiation.  She is not a surgical candidate and understands that.  I did ask her how much she was willing to go through for extra time.  She was unable to answer that.  She was more focused on making sure her house was okay and the  came etc. and so forth.  We did talk at length about improving her ileus/small-bowel obstruction.  Malignant obstruction can sometimes be relieved with higher dose Decadron and I offered that as an option to the family for 3 days.  We have that timeframe because they venting gastrostomy can not be placed prior to Tuesday at the earliest.  Her primary team has started her on peripheral nutrition.  I did discuss separately with the sons that this has not really been shown to be that effective and certainly would not alter the cancer and lack of treatment options.     Code Status: did not engage in discussions today    ACP Documents: Reviewed current existing digital forms    Goals of care: The patient and family endorses that what is most important right now is to focus on remaining as independent as possible and symptom/pain control    Accordingly, we have decided that the best  plan to meet the patient's goals includes continuing with treatment      Recommendations/  Follow-up tasks: The patient and health care agent were provided the following recommendations trial of Decadron 10 mg IV daily for 3 days.  Continue with peripheral nutrition.  Monitor condition and treat abdominal and back pain.       Length of ACP   conversation in minutes: >76 minutes          Code status:  Full for now    Advance directives:  Documents are in place.  Her living will says that she would want artificial hydration and nutrition.  She is getting that currently.  We will need to clarify this with the patient and sons particularly as we move closer to hospice treatment.      Summary and recommendations:  Continue pain medication for both her lower back pain and the abdominal pain  Trial of Decadron 10 mg IV Q 24 hours x3 days to see if that will help her small-bowel obstruction.  Consider additional Oncology recommendations so patient and family can move forward with decision-making.  I will return to visit with them tomorrow morning.  Have encouraged the son who lives in [facility] to go home because the weather is about to get really bad.      > 76min time was spent on advance care planning, goals of care discussion, emotional support, formulating and communicating prognosis and goals of care, exploring burden/benefit of various approaches of treatment.

## 2024-01-21 NOTE — PLAN OF CARE
Problem: Adult Inpatient Plan of Care  Goal: Plan of Care Review  Outcome: Ongoing, Not Progressing  Flowsheets (Taken 1/21/2024 0621)  Plan of Care Reviewed With:   patient   family  Goal: Patient-Specific Goal (Individualized)  Outcome: Ongoing, Progressing  Goal: Absence of Hospital-Acquired Illness or Injury  Outcome: Ongoing, Progressing  Intervention: Prevent Skin Injury  Flowsheets (Taken 1/21/2024 0621)  Body Position: turned  Goal: Optimal Comfort and Wellbeing  Outcome: Ongoing, Not Progressing  Goal: Readiness for Transition of Care  Outcome: Ongoing, Not Progressing

## 2024-01-21 NOTE — SUBJECTIVE & OBJECTIVE
Interval History:  Met Ms. You at bedside along with other family members, including one son. Patient reporting continued pain across upper abdomen. Patient deferred most of the speaking to her son. Patient's son notes that oral pain medications vented as soon as given yesterday. Please see A&P for full details of conversations.    24 hour OME: ~ 104  7 doses of morphine 4 mg IVP  2 doses of oxycodone 10 mg PO    Medications:  Continuous Infusions:      Scheduled Meds:   azithromycin  250 mg Oral Once per day on Mon Wed Fri    cyclobenzaprine  10 mg Oral TID    enoxparin  40 mg Subcutaneous Daily    famotidine (PF)  20 mg Intravenous BID    fluticasone furoate-vilanteroL  1 puff Inhalation Daily    metoprolol succinate  25 mg Oral Daily    mupirocin   Topical (Top) Daily    pravastatin  40 mg Oral QHS    scopolamine  1 patch Transdermal Q3 Days    senna-docusate 8.6-50 mg  1 tablet Oral BID    topiramate  50 mg Oral QHS     PRN Meds:acetaminophen, albuterol-ipratropium, dextrose 10%, dextrose 10%, glucagon (human recombinant), glucose, glucose, haloperidol lactate, melatonin, morphine, naloxone, ondansetron, oxyCODONE, simethicone, sodium chloride 0.9%    Objective:     Vital Signs (Most Recent):  Temp: 97.8 °F (36.6 °C) (01/21/24 0751)  Pulse: 89 (01/21/24 1138)  Resp: 17 (01/21/24 0832)  BP: 118/76 (01/21/24 0831)  SpO2: 96 % (01/21/24 0751) Vital Signs (24h Range):  Temp:  [91 °F (32.8 °C)-97.9 °F (36.6 °C)] 97.8 °F (36.6 °C)  Pulse:  [87-98] 89  Resp:  [13-22] 17  SpO2:  [95 %-97 %] 96 %  BP: (118-126)/(66-76) 118/76     Weight: 64.7 kg (142 lb 10.2 oz)  Body mass index is 22.34 kg/m².       Physical Exam  Vitals and nursing note reviewed.   Constitutional:       Appearance: She is ill-appearing.   HENT:      Head: Normocephalic and atraumatic.      Right Ear: External ear normal.      Left Ear: External ear normal.      Nose: Nose normal.      Mouth/Throat:      Mouth: Mucous membranes are dry.   Eyes:       General:         Right eye: No discharge.         Left eye: No discharge.      Extraocular Movements: Extraocular movements intact.   Neck:      Comments: Trachea midline  Cardiovascular:      Rate and Rhythm: Normal rate.   Pulmonary:      Effort: Pulmonary effort is normal.   Abdominal:      Tenderness: There is abdominal tenderness.      Comments: Venting G tube in place   Skin:     General: Skin is warm and dry.   Neurological:      Mental Status: She is alert and oriented to person, place, and time. Mental status is at baseline.   Psychiatric:         Mood and Affect: Mood is depressed. Affect is angry.            Review of Symptoms      Symptom Assessment (ESAS 0-10 Scale)  Pain:  10  Dyspnea:  0  Anxiety:  1  Nausea:  0  Depression:  4  Anorexia:  4  Fatigue:  4  Insomnia:  0  Restlessness:  0  Agitation:  0     CAM / Delirium:  Negative  Constipation:  Negative  Diarrhea:  Negative      Bowel Management Plan (BMP):  Yes      Pain Assessment:  OME in 24 hours:  104  Location(s): abdomen    Abdomen       Location: generalized        Quality: Colicky        Quantity: 10/10 in intensity        Chronicity: Onset 3 week(s) ago, unchanged        Aggravating Factors: Sitting up        Alleviating Factors: Opiates and belching        Associated Symptoms: None    Performance Status:  50    Living Arrangements:  Lives alone and Lives >50 miles from facility    Psychosocial/Cultural:   See Palliative Psychosocial Note: No   2 sons, lives in [facility].  Son in [place] is willing to take her into his home.  He is willing to care for her.  He has not sure that hospice will be accepted by his mother.  **Primary  to Follow**  Palliative Care  Consult: No    Spiritual:  F - Lilly and Belief:  Shinto  I - Importance:  Yes  C - Community:  No has not been involved in the Buddhism since moving from [place] to [facility].  A - Address in Care:  Yes        Advance Care Planning  "  Advance Directives:   Living Will: Yes        Copy on chart: Yes    [place]POST: No    Do Not Resuscitate Status: No    Medical Power of : Yes    Agent's Name:  Hiram You   Agent's Contact Number:  [phone]    Decision Making:  Patient answered questions  Goals of Care: What is most important right now is to focus on remaining as independent as possible, symptom/pain control. Accordingly, we have decided that the best plan to meet the patient's goals includes continuing with treatment.         Significant Labs: All pertinent labs within the past 24 hours have been reviewed.  CBC:   Recent Labs   Lab 01/21/24 0719   WBC 11.45   HGB 12.0   HCT 37.3   MCV 89          BMP:  Recent Labs   Lab 01/21/24 0719   GLU 94   *   K 3.9      CO2 24   BUN 14   CREATININE 0.7   CALCIUM 9.3   MG 1.9       LFT:  Lab Results   Component Value Date    AST 21 01/21/2024    ALKPHOS 87 01/21/2024    BILITOT 1.4 (H) 01/21/2024     Albumin:   Albumin   Date Value Ref Range Status   01/21/2024 2.6 (L) 3.5 - 5.2 g/dL Final     Protein:   Total Protein   Date Value Ref Range Status   01/21/2024 6.2 6.0 - 8.4 g/dL Final     Lactic acid:   Lab Results   Component Value Date    LACTATE 0.7 03/05/2020    LACTATE 1.0 03/27/2019       Significant Imaging: I have reviewed all pertinent imaging results/findings within the past 24 hours.    01/20/2024 Xray abdomen: "Percutaneous gastrostomy tube in place.  No significant distention of the stomach. Overall unchanged distribution of the bowel gas pattern with prominence of the small bowel loops. Additional evaluation, as clinically warranted."  "

## 2024-01-21 NOTE — NURSING
Received report from day shift RN that teaching needed to be given to family and pt on venting peg tube. Peg tube vented at this time with son at bedside and 30cc's of dark brown output released from tube. Bowel sounds present all quadrants, pt c/o 10/10 pain abd tenderness and guarding present upon palpitation. NP made aware, order for stat KUB obtained and nursing communication placed to unclamp peg tube and connect to crews bag for decompression. Crews bag placed and 150'cc's of dark brown output obtained. NP secure chat nurse to keep bag unclamped x1 hour and treat pain with PRN's. Pt is stating some relief from decompression. Will continue to monitor for acute changes.

## 2024-01-21 NOTE — SUBJECTIVE & OBJECTIVE
Interval History: Still reporting abd pain, having difficulty getting pain controlled, switching to fentanyl patch per palliative recommendation.     Venting GT with some relief, gastric contents noted in collection bag.     Patient and family decided to go home with palliative care f/u.    Review of Systems   Constitutional:  Negative for fever.   Gastrointestinal:  Positive for abdominal pain.     Objective:     Vital Signs (Most Recent):  Temp: 98.4 °F (36.9 °C) (01/21/24 1526)  Pulse: 97 (01/21/24 1526)  Resp: 20 (01/21/24 1526)  BP: 127/80 (01/21/24 1526)  SpO2: 96 % (01/21/24 1526) Vital Signs (24h Range):  Temp:  [91 °F (32.8 °C)-98.4 °F (36.9 °C)] 98.4 °F (36.9 °C)  Pulse:  [87-98] 97  Resp:  [13-22] 20  SpO2:  [95 %-97 %] 96 %  BP: (108-127)/(66-80) 127/80     Weight: 64.7 kg (142 lb 10.2 oz)  Body mass index is 22.34 kg/m².    Intake/Output Summary (Last 24 hours) at 1/21/2024 1528  Last data filed at 1/21/2024 0651  Gross per 24 hour   Intake --   Output 940 ml   Net -940 ml           Physical Exam  Vitals and nursing note reviewed.   Constitutional:       General: She is not in acute distress.     Appearance: She is well-developed and normal weight. She is not diaphoretic.   Pulmonary:      Effort: Pulmonary effort is normal. No respiratory distress.   Abdominal:      General: There is distension.      Tenderness: There is abdominal tenderness.      Comments: GT in place, no erythema or drainage   GT vented/draining gastric contents in collection bag.    Skin:     General: Skin is warm and dry.      Coloration: Skin is not jaundiced or pale.   Neurological:      Mental Status: She is alert and oriented to person, place, and time. Mental status is at baseline.      Motor: No seizure activity.   Psychiatric:         Attention and Perception: Attention normal.         Mood and Affect: Affect normal.         Behavior: Behavior is cooperative.         Thought Content: Thought content normal.              Significant Labs: All pertinent labs within the past 24 hours have been reviewed.    Significant Imaging: I have reviewed all pertinent imaging results/findings within the past 24 hours.

## 2024-01-21 NOTE — ASSESSMENT & PLAN NOTE
0.21.204  Continued pain and not much improvement with venting gastrostomy tube. No decision has been made regarding discharge with hospice care.   01.15.2024  Symptomatically not much different.  She will get her 3rd dose of dexamethasone today.  KUB apparently is unchanged.  Still anticipating venting gastrostomy in the future.  She has not a candidate for surgery or chemotherapy.  Would recommend home with hospice.  01.14.2024  Has not had a significant improvement with the addition of dexamethasone.  Would continue for 3 days and monitor.  KUB looks worse today.  Consider venting gastrostomy.  01.13.2024  Partial small bowel obstruction could be multifactorial including ileus, stricture from previous surgery or tumor.  Currently with NG tube in place.  Dr. Kate has started peripheral nutrition.  Patient is contemplating venting gastrostomy.  In the meantime would consider the use of Decadron 10 mg IVP daily for 3 days to see if there is an inflammatory component to this stricture to enable the ileus to resolve.  Patient and family are aware of this and are eager to start.  Discussed side effects of Decadron including elevated blood sugars and confusion.  They will be with her 24 a day.

## 2024-01-21 NOTE — PROGRESS NOTES
[facility]  Progress Note    Patient Name: Radha You  MRN: [id number]  Patient Class: IP- Inpatient   Admission Date: 1/9/2024  Length of Stay: 11 days  Attending Physician: Jada Olivas MD  Primary Care Provider: Sapphire Ruffin FNP-C        Subjective:     Principal Problem:Partial small bowel obstruction        HPI:  Radha You is a 78 year old white woman with former cigarette smoking, hypertension, hyperlipidemia, chronic obstructive pulmonary disease, chronic hypoxic respiratory failure(on 1 liter/minute of supplemental oxygen), osteoporosis, history of estrogen receptor positive progesterone receptor negative, 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 She lives in [place], [place]. She is . Her hematologist-oncologist is Dr. Charles Us.               She was seen at [facility]ology[facility]On[facility] clinic on 1/4/2024 for her pancreatic adenocarcinoma, which was found incidentally during a bronchoscopy and biopsied by endoscopic ultrasound at [facility]. She was offered palliative chemotherapy and declined it.               She presented to [facility] Emergency Department on 1/9/2024 with abdominal pain for 3 to 4 days, associated with severe constipation with no bowel movement for several days despite over-the-counter laxatives, including suppositories. She was able to pass a small bowel movement earlier on the day of presentation but still felt constipated. She also had increase in pain in abdominal pain associated with nausea and vomiting causing inability to tolerate oral intake.  In the emergency department, heart rate was 115 bpm. Labs showed mild  leukocytosis. CT of the abdomen and pelvis with contrast revealed the known pancreatic tail mass, abutting and possibly invading the spleen with new geographic region of decreased enhancement in the lower splenic pole, suspicious for splenic infarct. There were multiple small bowel strictures suggested, possibly on the basis of metastatic disease that is possibly associated with low-grade or partial small bowel obstruction versus ileus. General Surgery was consulted and brought up the possibility of a palliative gastrostomy for venting as a treatment option. She deferred and opted for nasogastric decompression. She was given a liter of lactated Ringer's solution, 4 mg of intravenous morphine, and 4 mg of intravenous ondansetron. She was admitted to Hospital Medicine Team S.    Overview/Hospital Course:  She was kept NPO. General Surgery followed. She was put on lactated Ringer's solution at 75 mL/hr. She was given scopolamine patch. General Surgery signed off and recommended consulting Gastroenterology. Her urine was dark so fluids were increased to 100 mL/hr. After discussing the likelihood of recurrence of her acute problem due to the intestinal strictures, Gastroenterology was consulted on 1/12/2024 for venting gastrostomy. She was still reluctant so Palliative Care was consulted to discuss goals of care. Physical and Occupational Therapy were consulted when she reported that she had not gotten out of the hospital bed since being admitted. Famotidine was started for the esophagitis diagnosed on admission as well as to prevent esophageal ulcer due to having a nasogastric tube. Palliative Care had a long discussion with her and recommended a trial of dexamethasone to see if it would decrease intestinal swelling. Abdominal X-ray showed mid increase in bowel distension and recurrence of the nasogastric tube tip in the esophagus despite having been advanced 2 nights prior. The tube was advanced again. On 1/15/2024,  abdominal X-ray showed resolution of bowel distension. She was started on senna-docusate twice daily and given bisacodyl suppository. She started having bowel movements. She tolerated clear liquid diet.     Agreeable to Gtube placement for venting, placed 1/19. Palliative following. Patient and family still undecided about hospice versus consideration for continued treatment with oncology, awaiting other family member to arrive for discussion.   Patient and family want to go home with palliative care f/u. Working on pain control prior to discharge, palliative recommend fentanyl patch.     Interval History: Still reporting abd pain, having difficulty getting pain controlled, switching to fentanyl patch per palliative recommendation.     Venting GT with some relief, gastric contents noted in collection bag.     Patient and family decided to go home with palliative care f/u.    Radha You requires a hospital bed due to her requiring positioning of the body in ways not feasible with an ordinary bed to alleviate pain/ is completely immobile /or limited mobility and cannot independently make changes in body position without the use of the bed.  The positioning of the body cannot be sufficiently resolved by the use of pillows and wedge       Review of Systems   Constitutional:  Negative for fever.   Gastrointestinal:  Positive for abdominal pain.     Objective:     Vital Signs (Most Recent):  Temp: 98.4 °F (36.9 °C) (01/21/24 1526)  Pulse: 97 (01/21/24 1526)  Resp: 20 (01/21/24 1526)  BP: 127/80 (01/21/24 1526)  SpO2: 96 % (01/21/24 1526) Vital Signs (24h Range):  Temp:  [91 °F (32.8 °C)-98.4 °F (36.9 °C)] 98.4 °F (36.9 °C)  Pulse:  [87-98] 97  Resp:  [13-22] 20  SpO2:  [95 %-97 %] 96 %  BP: (108-127)/(66-80) 127/80     Weight: 64.7 kg (142 lb 10.2 oz)  Body mass index is 22.34 kg/m².    Intake/Output Summary (Last 24 hours) at 1/21/2024 1528  Last data filed at 1/21/2024 0651  Gross per 24 hour   Intake --   Output 940  ml   Net -940 ml           Physical Exam  Vitals and nursing note reviewed.   Constitutional:       General: She is not in acute distress.     Appearance: She is well-developed and normal weight. She is not diaphoretic.   Pulmonary:      Effort: Pulmonary effort is normal. No respiratory distress.   Abdominal:      General: There is distension.      Tenderness: There is abdominal tenderness.      Comments: GT in place, no erythema or drainage   GT vented/draining gastric contents in collection bag.    Skin:     General: Skin is warm and dry.      Coloration: Skin is not jaundiced or pale.   Neurological:      Mental Status: She is alert and oriented to person, place, and time. Mental status is at baseline.      Motor: No seizure activity.   Psychiatric:         Attention and Perception: Attention normal.         Mood and Affect: Affect normal.         Behavior: Behavior is cooperative.         Thought Content: Thought content normal.             Significant Labs: All pertinent labs within the past 24 hours have been reviewed.    Significant Imaging: I have reviewed all pertinent imaging results/findings within the past 24 hours.    Assessment/Plan:      * Partial small bowel obstruction  Small bowel stricture   NG tube placed. Scopolamine patch, prn antiemetics. Gastroenterology consulted to place venting gastrostomy. PT and OT for debility. Appreciate Palliative Care, who gave dexamethasone trial. Started senna-docusate. Advance to full liquids.   - Patient agreeable to venting Gtube placement, s/p placement 1/19    Pain  - MM pain regimen as needed  - started on fentanyl patch per palliative recs      Reflux esophagitis  Started famotidine.    Small bowel stricture  - noted 2/2 to pancreatic cancer       Pancreatic adenocarcinoma  Being followed in Oncology clinic here. Was offered palliative chemotherapy. She wants a follow-up to discuss prognosis and current treatment options. Consulted Oncology: Per oncology:  Patient was seen in oncology clinic on 1/4. Discussed diagnosis at bedside with patient and family today. Per 1/4 clinic note, treatment being offered by GI oncologist Dr. Us will consist of gemcitabine and abraxane. Family has been given handouts on the proposed medications. They have good understanding of the poor prognosis associated with metastatic pancreatic cancer. Patient would like to get over her current hospital issues before deciding on pursuing chemotherapy. She feels additional information is overwhelming at this time. We also briefly discussed that TPN has not been shown to improve prognosis in metastatic pancreatic cancer. Patient and family were thankful for bedside discussion. They have been instructed to call clinic with further questions or if further follow up is desired   - patient and family decided want to go home with palliative followup, not hospice      Chronic respiratory failure with hypoxia  Patient with a history of chronic hypoxic respiratory failure that is on 1-3 liters of oxygen from her COPD.    Centrilobular emphysema  Continue home azithromycin 250 mg three times weekly (MWF), albuterol-ipratropium nebulizers PRN.    Moderate malnutrition  Nutrition consulted. Most recent weight and BMI monitored-     Measurements:  Wt Readings from Last 1 Encounters:   01/21/24 64.7 kg (142 lb 10.2 oz)   Body mass index is 22.34 kg/m².    Patient has been screened and assessed by RD.    Malnutrition Type:  Context: acute illness or injury  Level: moderate    Malnutrition Characteristic Summary:  Weight Loss (Malnutrition): 5% in 1 month  Energy Intake (Malnutrition): less than 75% for greater than 7 days  Subcutaneous Fat (Malnutrition): moderate depletion  Muscle Mass (Malnutrition): moderate depletion    Interventions/Recommendations (treatment strategy):  1.  Cont PO full liquids as tolerated     Hyperlipidemia  Continue home pravastatin 40 mg daily.      Essential hypertension  Continue  home metoprolol succinate 25 mg daily. Monitor blood pressures.    Palliative care encounter  - palliative care following      Carcinoma of right breast in female, estrogen receptor positive  History of breast cancer. Patient previously had lumpectomy and chemoradiation.        VTE Risk Mitigation (From admission, onward)           Ordered     enoxaparin injection 40 mg  Daily         01/10/24 0335     IP VTE HIGH RISK PATIENT  Once         01/10/24 0335     Place sequential compression device  Until discontinued         01/10/24 0335                    Discharge Planning   RUFINO: 1/22/2024     Code Status: Full Code   Is the patient medically ready for discharge?: Yes    Reason for patient still in hospital (select all that apply): Patient trending condition  Discharge Plan A: Hospice/home ([facility])   Discharge Delays: None known at this time              Jada Olivas MD  Department of Hospital Medicine   [facility] - Telemetry Stepdown

## 2024-01-21 NOTE — PLAN OF CARE
01/21/24 1427   Post-Acute Status   Post-Acute Authorization Other;HME  (home with Palliative)   HME Status Pending Qualifying Diagnosis  (hospital bed)   Patient choice form signed by patient/caregiver List from CMS Compare;List with quality metrics by geographic area provided   Discharge Delays None known at this time   Discharge Plan   Discharge Plan A Hospice/home  ([facility])   Discharge Plan B Home with family     CM met with patient/family to discuss any changes in discharge planning.  Patients plan is to home with Palliative, family provided a provider list. Patients grace Barnett informed CM that his brother in law owns several hospice homes. CM provided the family Palliative vs hospice information sheet   No changes in DC plans. RUFINO: 1/22/24    Xena Francis RN  Case Management  [facility]  [phone]

## 2024-01-21 NOTE — PLAN OF CARE
Problem: Adult Inpatient Plan of Care  Goal: Plan of Care Review  Outcome: Ongoing, Progressing  Goal: Patient-Specific Goal (Individualized)  Outcome: Ongoing, Progressing  Goal: Absence of Hospital-Acquired Illness or Injury  Outcome: Ongoing, Progressing  Goal: Optimal Comfort and Wellbeing  Outcome: Ongoing, Progressing  Goal: Readiness for Transition of Care  Outcome: Ongoing, Progressing     Problem: Infection  Goal: Absence of Infection Signs and Symptoms  Outcome: Ongoing, Progressing     Problem: Skin Injury Risk Increased  Goal: Skin Health and Integrity  Outcome: Ongoing, Progressing     Problem: Coping Ineffective  Goal: Effective Coping  Outcome: Ongoing, Progressing     Problem: Fall Injury Risk  Goal: Absence of Fall and Fall-Related Injury  Outcome: Ongoing, Progressing     Problem: Pain Acute  Goal: Acceptable Pain Control and Functional Ability  Outcome: Ongoing, Progressing     Pt has frequent complaints of pain unrelieved by PRN Morphine and Oxy 10. Blood glucose monitored. Routine meds administered. Safety measures maintained. Family a bedside. Monitoring ongoing.

## 2024-01-21 NOTE — NURSING
Went to check pt dinner BG, 66. Pulled glucose tabs to administer but pt refused. Bolus pt with D10. Reassessed BG, 103. Monitoring ongoing.

## 2024-01-21 NOTE — ASSESSMENT & PLAN NOTE
Nutrition consulted. Most recent weight and BMI monitored-     Measurements:  Wt Readings from Last 1 Encounters:   01/21/24 64.7 kg (142 lb 10.2 oz)   Body mass index is 22.34 kg/m².    Patient has been screened and assessed by RD.    Malnutrition Type:  Context: acute illness or injury  Level: moderate    Malnutrition Characteristic Summary:  Weight Loss (Malnutrition): 5% in 1 month  Energy Intake (Malnutrition): less than 75% for greater than 7 days  Subcutaneous Fat (Malnutrition): moderate depletion  Muscle Mass (Malnutrition): moderate depletion    Interventions/Recommendations (treatment strategy):  1.  Cont PO full liquids as tolerated

## 2024-01-21 NOTE — PROGRESS NOTES
[facility] - Telemetry Stepdown  Palliative Medicine  Progress Note    Patient Name: Radha You  MRN: [id number]  Admission Date: 1/9/2024  Hospital Length of Stay: 11 days  Code Status: Full Code   Attending Provider: Jada Olivas MD  Consulting Provider: Antonette Zarco MD  Primary Care Physician: Sapphire Ruffin FNP-C  Principal Problem:Partial small bowel obstruction    Patient information was obtained from patient, relative(s), primary team, and current medical records .      Assessment/Plan:     GI  * Partial small bowel obstruction  0.21.204  Continued pain and not much improvement with venting gastrostomy tube. No decision has been made regarding discharge with hospice care.   01.15.2024  Symptomatically not much different.  She will get her 3rd dose of dexamethasone today.  KUB apparently is unchanged.  Still anticipating venting gastrostomy in the future.  She has not a candidate for surgery or chemotherapy.  Would recommend home with hospice.  01.14.2024  Has not had a significant improvement with the addition of dexamethasone.  Would continue for 3 days and monitor.  KUB looks worse today.  Consider venting gastrostomy.  01.13.2024  Partial small bowel obstruction could be multifactorial including ileus, stricture from previous surgery or tumor.  Currently with NG tube in place.  Dr. Kate has started peripheral nutrition.  Patient is contemplating venting gastrostomy.  In the meantime would consider the use of Decadron 10 mg IVP daily for 3 days to see if there is an inflammatory component to this stricture to enable the ileus to resolve.  Patient and family are aware of this and are eager to start.  Discussed side effects of Decadron including elevated blood sugars and confusion.  They will be with her 24 a day.      Palliative Care  Palliative care encounter  01.21.24  Met with patient and multiple family members (3) at bedside. Mrs. You states that she understands that is just going to  have to die slowly. She expressed that she doesn't need to go back to [facility] as she can just die here too, in context of discharging to local son's house for a few days prior to driving back to [facility] area. Patient's son expressing concern about venting g tube output as well as that the IV medication only lasting for at most 45 minutes. Patient's son spoke about other options as mentioned by Dr. Ibarra including oral soln, patches, and IV pumps. Discussed some of these are available through hospice care. Patient's son states patient is not in a mental state to make any decisions regarding hospice care at this time. Patient's family report that patient's mentation changes with pain medications and she will state things that are not like herself or do not make sense in the context of the ongoing conversation.   01.17.2024  Ms. You shared that she believes she made her mind up regarding venting gastrostomy, which she is agreeable. She was also told that if not needed, then they don't have to proceed, which she would be accepting of as well. Also not sure if they will follow with oncology. I asked if she'd keep me in the loop about this decision as it will help me know who to communicate in terms of appropriate next steps and she said she would. Asked me to leave my card, which I did at her bedside table.  01.15.2024  No change in plan of care.  No family in room.  Decision regarding venting gastrostomy need to be made.  Decision also on when IR would be available if they are agreeable to doing it.  Need to revisit code status when all family is available and discharge plans with hospice.  01.14.2024  Symptoms:  Abdominal pain, nausea, vomiting.     Medicolegal:  Patient has decision making capacity.  Son Hiram is surrogate decision maker and is HCPOA in his present at the bedside.     Psychosocial:  support system consists of friends and family.  Her best friend for over 60 years i came to visit her  and she is distraught because she thinks that she is trying to take over her life and get her finances.     Spiritual:  Lutheran.  Has not been involved with the Evangelical family since moving to [facility].    Prognostication:  Poor    Understanding of disease and Illness Trajectory: Patient and Family  has  adequate understanding of her illness, they can benefit from continued education on what to expect in the future.      Goals of care:  Comfort and quality of life as well as independence.  Advance Care Planning     Date: 01/13/2024    Today a voluntary meeting took place: bedside    Patient Participation: Patient is unable to participate     Attendees (Name and  Relationship to patient): Health care power of : Hiram edwards   Staff attendees (Name and  Role): Glo Ibarra MD    ACP Conversation (General): Patient and family are aware that her disease is not curable.  We did talk again about improving her ileus/small-bowel obstruction.  Malignant obstruction can sometimes be relieved with higher dose Decadron and I offered that as an option to the family for 2/3 days.  We have that timeframe because they venting gastrostomy can not be placed prior to Tuesday at the earliest.  Her primary team has started her on peripheral nutrition.  I did discuss again that this has not really been shown to be that effective and certainly would not alter the cancer and lack of treatment options.     Code Status:  Discussed with son that if she dies in her sleep we should let her.  I would put in a do not resuscitate order.  He said he would talk with his brother about this.  ACP Documents: Reviewed current existing digital forms    Goals of care: The patient and family endorses that what is most important right now is to focus on remaining as independent as possible and symptom/pain control    Accordingly, we have decided that the best plan to meet the patient's goals includes continuing with treatment       Recommendations/  Follow-up tasks: The patient and health care agent were provided the following recommendations trial of Decadron 10 mg IV daily for on day2 of 3 days.  Continue with peripheral nutrition.  Have decreased the IV fluids to see if that will help her IV pump from beeping.  Monitor condition and treat abdominal and back pain.      Length of ACP   conversation in minutes: >16 min          Code status:  Full for now    Advance directives:  Documents are in place.  Her living will says that she would want artificial hydration and nutrition.  She is getting that currently.  We will need to clarify this with the patient and sons particularly as we move closer to hospice treatment.      Summary and recommendations:  Continue pain medication for both her lower back pain and the abdominal pain  Trial of Decadron 10 mg IV Q 24 hours x3 days to see if that will help her small-bowel obstruction.  Consider additional Oncology recommendations so patient and family can move forward with decision-making.  I will return to visit with them tomorrow morning.  Have encouraged the son who lives in [facility] to go home because the weather is about to get really bad.    01.13.2024  Impression:    Symptoms:  Abdominal pain, nausea, vomiting.     Medicolegal:  Patient has decision making capacity.  Son Hiram is surrogate decision maker and is HCPOA.     Psychosocial:  support system consists of friends and family.  Her best friend for over 60 years is coming to visit her tomorrow.  She plans to have a checklist of things that her friend needs to take care of.  This gives her great comfort.     Spiritual:  Sabianism.  Has not been involved with the Baptism family since moving to [facility].    Prognostication:  Poor    Understanding of disease and Illness Trajectory: Patient and Family  has  adequate understanding of her illness, they can benefit from continued education on what to expect in the future.      Goals of  care:  Comfort and quality of life as well as independence.  Advance Care Planning    Date: 01/13/2024    Today a voluntary meeting took place: bedside    Patient Participation: Patient is able to participate     Attendees (Name and  Relationship to patient): Health care power of : Hiram son and other son Anibal and Hiram's daughter Shabnam.    Staff attendees (Name and  Role): Glo Ibarra MD    ACP Conversation (General): Understanding of current condition long conversation with patient and her 2 sons and granddaughter.  Patient is aware that her disease is not curable.  She is still thinking that she could see an oncologist in least will and receive chemotherapy or radiation.  She is not a surgical candidate and understands that.  I did ask her how much she was willing to go through for extra time.  She was unable to answer that.  She was more focused on making sure her house was okay and the  came etc. and so forth.  We did talk at length about improving her ileus/small-bowel obstruction.  Malignant obstruction can sometimes be relieved with higher dose Decadron and I offered that as an option to the family for 3 days.  We have that timeframe because they venting gastrostomy can not be placed prior to Tuesday at the earliest.  Her primary team has started her on peripheral nutrition.  I did discuss separately with the sons that this has not really been shown to be that effective and certainly would not alter the cancer and lack of treatment options.     Code Status: did not engage in discussions today    ACP Documents: Reviewed current existing digital forms    Goals of care: The patient and family endorses that what is most important right now is to focus on remaining as independent as possible and symptom/pain control    Accordingly, we have decided that the best plan to meet the patient's goals includes continuing with treatment      Recommendations/  Follow-up tasks: The patient and  no info health care agent were provided the following recommendations trial of Decadron 10 mg IV daily for 3 days.  Continue with peripheral nutrition.  Monitor condition and treat abdominal and back pain.       Length of ACP   conversation in minutes: >76 minutes          Code status:  Full for now    Advance directives:  Documents are in place.  Her living will says that she would want artificial hydration and nutrition.  She is getting that currently.  We will need to clarify this with the patient and sons particularly as we move closer to hospice treatment.      Summary and recommendations:  Continue pain medication for both her lower back pain and the abdominal pain  Trial of Decadron 10 mg IV Q 24 hours x3 days to see if that will help her small-bowel obstruction.  Consider additional Oncology recommendations so patient and family can move forward with decision-making.  I will return to visit with them tomorrow morning.  Have encouraged the son who lives in [facility] to go home because the weather is about to get really bad.      > 76min time was spent on advance care planning, goals of care discussion, emotional support, formulating and communicating prognosis and goals of care, exploring burden/benefit of various approaches of treatment.            Other  Pain  PainIs multifactorial related to her previous spinal surgery.  She does have a spinal stimulator in place.  She does use a lidocaine patch.  Normally at home she takes Percocet as needed and sleeps on the couch. [place] RADHA reviewed, prescribed Percocet 10 q6h PRN by provider in [place], [place].  - START fentanyl 25 mcg/hr TD patch as patient's 24 hour OME was ~104. Patient not obtaining much relief from oral medication due to venting.   - Continue morphine 4 mg IV q3h PRN severe pain, unable to tolerate PO  - Continue oxycodone 10 mg PO q4h PRN severe pain if able to tolerate PO  - Prior 24 hour MME: 104 MME (seven doses of morphine 4 mg IV and two doses of  oxycodone 10 mg)        I will follow-up with patient. Please contact us if you have any additional questions.    Subjective:     Chief Complaint:   Chief Complaint   Patient presents with    Abdominal Pain     Recently diagnosed with pancreatic cancer. C/o constipation. Last BM x6 days ago. Hx of COPD. Wears 3L O2 at baseline       HPI:   Ms. You is a 79yo female with a history of COPD, HLD, HTN, h/o breast cancer, and recently diagnosed metastatic pancreatic cancer who presented 1.10.2024  with abdominal pain, nausea, and emesis.  Been evaluated by Surgical Oncology and not felt to be a surgical candidate.  She is still considering chemotherapy and/or radiation therapy to prolong her life.  At this time however she has an ileus/small-bowel obstruction and has nasogastric decompression.  There is a consideration for placing a venting gastrostomy next week.    Palliative Medicine has been asked to assist with goals of care and treatment options and planning.      Hospital Course:  No notes on file    Interval History:  Met Ms. You at bedside along with other family members, including one son. Patient reporting continued pain across upper abdomen. Patient deferred most of the speaking to her son. Patient's son notes that oral pain medications vented as soon as given yesterday. Please see A&P for full details of conversations.    24 hour OME: ~ 104  7 doses of morphine 4 mg IVP  2 doses of oxycodone 10 mg PO    Medications:  Continuous Infusions:      Scheduled Meds:   azithromycin  250 mg Oral Once per day on Mon Wed Fri    cyclobenzaprine  10 mg Oral TID    enoxparin  40 mg Subcutaneous Daily    famotidine (PF)  20 mg Intravenous BID    fluticasone furoate-vilanteroL  1 puff Inhalation Daily    metoprolol succinate  25 mg Oral Daily    mupirocin   Topical (Top) Daily    pravastatin  40 mg Oral QHS    scopolamine  1 patch Transdermal Q3 Days    senna-docusate 8.6-50 mg  1 tablet Oral BID    topiramate  50  mg Oral QHS     PRN Meds:acetaminophen, albuterol-ipratropium, dextrose 10%, dextrose 10%, glucagon (human recombinant), glucose, glucose, haloperidol lactate, melatonin, morphine, naloxone, ondansetron, oxyCODONE, simethicone, sodium chloride 0.9%    Objective:     Vital Signs (Most Recent):  Temp: 97.8 °F (36.6 °C) (01/21/24 0751)  Pulse: 89 (01/21/24 1138)  Resp: 17 (01/21/24 0832)  BP: 118/76 (01/21/24 0831)  SpO2: 96 % (01/21/24 0751) Vital Signs (24h Range):  Temp:  [91 °F (32.8 °C)-97.9 °F (36.6 °C)] 97.8 °F (36.6 °C)  Pulse:  [87-98] 89  Resp:  [13-22] 17  SpO2:  [95 %-97 %] 96 %  BP: (118-126)/(66-76) 118/76     Weight: 64.7 kg (142 lb 10.2 oz)  Body mass index is 22.34 kg/m².       Physical Exam  Vitals and nursing note reviewed.   Constitutional:       Appearance: She is ill-appearing.   HENT:      Head: Normocephalic and atraumatic.      Right Ear: External ear normal.      Left Ear: External ear normal.      Nose: Nose normal.      Mouth/Throat:      Mouth: Mucous membranes are dry.   Eyes:      General:         Right eye: No discharge.         Left eye: No discharge.      Extraocular Movements: Extraocular movements intact.   Neck:      Comments: Trachea midline  Cardiovascular:      Rate and Rhythm: Normal rate.   Pulmonary:      Effort: Pulmonary effort is normal.   Abdominal:      Tenderness: There is abdominal tenderness.      Comments: Venting G tube in place   Skin:     General: Skin is warm and dry.   Neurological:      Mental Status: She is alert and oriented to person, place, and time. Mental status is at baseline.   Psychiatric:         Mood and Affect: Mood is depressed. Affect is angry.            Review of Symptoms      Symptom Assessment (ESAS 0-10 Scale)  Pain:  10  Dyspnea:  0  Anxiety:  1  Nausea:  0  Depression:  4  Anorexia:  4  Fatigue:  4  Insomnia:  0  Restlessness:  0  Agitation:  0     CAM / Delirium:  Negative  Constipation:  Negative  Diarrhea:  Negative      Bowel Management  Plan (BMP):  Yes      Pain Assessment:  OME in 24 hours:  104  Location(s): abdomen    Abdomen       Location: generalized        Quality: Colicky        Quantity: 10/10 in intensity        Chronicity: Onset 3 week(s) ago, unchanged        Aggravating Factors: Sitting up        Alleviating Factors: Opiates and belching        Associated Symptoms: None    Performance Status:  50    Living Arrangements:  Lives alone and Lives >50 miles from facility    Psychosocial/Cultural:   See Palliative Psychosocial Note: No   2 sons, lives in [facility].  Son in [place] is willing to take her into his home.  He is willing to care for her.  He has not sure that hospice will be accepted by his mother.  **Primary  to Follow**  Palliative Care  Consult: No    Spiritual:  F - Lilly and Belief:  Methodist  I - Importance:  Yes  C - Community:  No has not been involved in the Religion since moving from [place] to [facility].  A - Address in Care:  Yes        Advance Care Planning  Advance Directives:   Living Will: Yes        Copy on chart: Yes    [place]POST: No    Do Not Resuscitate Status: No    Medical Power of : Yes    Agent's Name:  Hiram You   Agent's Contact Number:  [phone]    Decision Making:  Patient answered questions  Goals of Care: What is most important right now is to focus on remaining as independent as possible, symptom/pain control. Accordingly, we have decided that the best plan to meet the patient's goals includes continuing with treatment.         Significant Labs: All pertinent labs within the past 24 hours have been reviewed.  CBC:   Recent Labs   Lab 01/21/24  0719   WBC 11.45   HGB 12.0   HCT 37.3   MCV 89          BMP:  Recent Labs   Lab 01/21/24  0719   GLU 94   *   K 3.9      CO2 24   BUN 14   CREATININE 0.7   CALCIUM 9.3   MG 1.9       LFT:  Lab Results   Component Value Date    AST 21 01/21/2024    ALKPHOS 87 01/21/2024    BILITOT  "1.4 (H) 01/21/2024     Albumin:   Albumin   Date Value Ref Range Status   01/21/2024 2.6 (L) 3.5 - 5.2 g/dL Final     Protein:   Total Protein   Date Value Ref Range Status   01/21/2024 6.2 6.0 - 8.4 g/dL Final     Lactic acid:   Lab Results   Component Value Date    LACTATE 0.7 03/05/2020    LACTATE 1.0 03/27/2019       Significant Imaging: I have reviewed all pertinent imaging results/findings within the past 24 hours.    01/20/2024 Xray abdomen: "Percutaneous gastrostomy tube in place.  No significant distention of the stomach. Overall unchanged distribution of the bowel gas pattern with prominence of the small bowel loops. Additional evaluation, as clinically warranted."    Antonette Zarco MD  Palliative Medicine  [facility] - Telemetry Stepdown            "

## 2024-01-21 NOTE — PROGRESS NOTES
[facility] Medicine  Progress Note    Patient Name: Radha You  MRN: [id number]  Patient Class: IP- Inpatient   Admission Date: 1/9/2024  Length of Stay: 10 days  Attending Physician: Jada Olivas MD  Primary Care Provider: Sapphire Ruffin FNP-C        Subjective:     Principal Problem:Partial small bowel obstruction        HPI:  Radha You is a 78 year old white woman with former cigarette smoking, hypertension, hyperlipidemia, chronic obstructive pulmonary disease, chronic hypoxic respiratory failure(on 1 liter/minute of supplemental oxygen), osteoporosis, history of estrogen receptor positive progesterone receptor negative, 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 She lives in [place], [place]. She is . Her hematologist-oncologist is Dr. Charles Us.               She was seen at [facility]ematology[facility]Oncology clinic on 1/4/2024 for her pancreatic adenocarcinoma, which was found incidentally during a bronchoscopy and biopsied by endoscopic ultrasound at [facility]. She was offered palliative chemotherapy and declined it.               She presented to [facility] Emergency Department on 1/9/2024 with abdominal pain for 3 to 4 days, associated with severe constipation with no bowel movement for several days despite over-the-counter laxatives, including suppositories. She was able to pass a small bowel movement earlier on the day of presentation but still felt constipated. She also had increase in pain in abdominal pain associated with nausea and vomiting causing inability to tolerate oral intake.  In the emergency department, heart rate was 115 bpm. Labs showed mild  leukocytosis. CT of the abdomen and pelvis with contrast revealed the known pancreatic tail mass, abutting and possibly invading the spleen with new geographic region of decreased enhancement in the lower splenic pole, suspicious for splenic infarct. There were multiple small bowel strictures suggested, possibly on the basis of metastatic disease that is possibly associated with low-grade or partial small bowel obstruction versus ileus. General Surgery was consulted and brought up the possibility of a palliative gastrostomy for venting as a treatment option. She deferred and opted for nasogastric decompression. She was given a liter of lactated Ringer's solution, 4 mg of intravenous morphine, and 4 mg of intravenous ondansetron. She was admitted to Hospital Medicine Team S.    Overview/Hospital Course:  She was kept NPO. General Surgery followed. She was put on lactated Ringer's solution at 75 mL/hr. She was given scopolamine patch. General Surgery signed off and recommended consulting Gastroenterology. Her urine was dark so fluids were increased to 100 mL/hr. After discussing the likelihood of recurrence of her acute problem due to the intestinal strictures, Gastroenterology was consulted on 1/12/2024 for venting gastrostomy. She was still reluctant so Palliative Care was consulted to discuss goals of care. Physical and Occupational Therapy were consulted when she reported that she had not gotten out of the hospital bed since being admitted. Famotidine was started for the esophagitis diagnosed on admission as well as to prevent esophageal ulcer due to having a nasogastric tube. Palliative Care had a long discussion with her and recommended a trial of dexamethasone to see if it would decrease intestinal swelling. Abdominal X-ray showed mid increase in bowel distension and recurrence of the nasogastric tube tip in the esophagus despite having been advanced 2 nights prior. The tube was advanced again. On 1/15/2024,  abdominal X-ray showed resolution of bowel distension. She was started on senna-docusate twice daily and given bisacodyl suppository. She started having bowel movements. She tolerated clear liquid diet.     Agreeable to Gtube placement for venting, placed 1/19. Palliative following. Patient and family still undecided about hospice versus consideration for continued treatment with oncology, awaiting other family member to arrive for discussion.     Interval History: No changes, still having abd pain. Resume liquid diet and vent as needed.     Patient and family still undecided about pursuing further treatment with oncology versus hospice, awaiting another family member to discuss further. Patient very overwhelmed and no decision made in regards to furthering treatment versus hospice. Family updated at bedside.     Review of Systems   Constitutional:  Negative for fever.   Gastrointestinal:  Positive for abdominal pain.     Objective:     Vital Signs (Most Recent):  Temp: 97.4 °F (36.3 °C) (01/20/24 1604)  Pulse: 92 (01/20/24 1604)  Resp: (!) 22 (01/20/24 1736)  BP: 126/73 (01/20/24 1604)  SpO2: 97 % (01/20/24 1604) Vital Signs (24h Range):  Temp:  [91 °F (32.8 °C)-97.9 °F (36.6 °C)] 97.4 °F (36.3 °C)  Pulse:  [84-93] 92  Resp:  [13-22] 22  SpO2:  [94 %-99 %] 97 %  BP: (126-138)/(70-84) 126/73     Weight: 64.9 kg (143 lb 1.3 oz)  Body mass index is 22.41 kg/m².    Intake/Output Summary (Last 24 hours) at 1/20/2024 1837  Last data filed at 1/20/2024 1822  Gross per 24 hour   Intake --   Output 700 ml   Net -700 ml           Physical Exam  Vitals and nursing note reviewed.   Constitutional:       General: She is not in acute distress.     Appearance: She is well-developed and normal weight. She is not diaphoretic.   Pulmonary:      Effort: Pulmonary effort is normal. No respiratory distress.   Abdominal:      General: There is distension.      Tenderness: There is abdominal tenderness.      Comments: GT in place, no  erythema or drainage    Skin:     General: Skin is warm and dry.      Coloration: Skin is not jaundiced or pale.   Neurological:      Mental Status: She is alert and oriented to person, place, and time. Mental status is at baseline.      Motor: No seizure activity.   Psychiatric:         Attention and Perception: Attention normal.         Mood and Affect: Affect normal.         Behavior: Behavior is cooperative.         Thought Content: Thought content normal.             Significant Labs: All pertinent labs within the past 24 hours have been reviewed.    Significant Imaging: I have reviewed all pertinent imaging results/findings within the past 24 hours.    Assessment/Plan:      * Partial small bowel obstruction  Small bowel stricture   NG tube placed. Scopolamine patch, prn antiemetics. Gastroenterology consulted to place venting gastrostomy. PT and OT for debility. Appreciate Palliative Care, who gave dexamethasone trial. Started senna-docusate. Advance to full liquids.   - Patient agreeable to venting Gtube placement, s/p placement 1/19    Pain  - MM pain regimen as needed      Reflux esophagitis  Started famotidine.    Small bowel stricture  - noted 2/2 to pancreatic cancer       Pancreatic adenocarcinoma  Being followed in Oncology clinic here. Was offered palliative chemotherapy. She wants a follow-up to discuss prognosis and current treatment options. Consulted Oncology: Per oncology: Patient was seen in oncology clinic on 1/4. Discussed diagnosis at bedside with patient and family today. Per 1/4 clinic note, treatment being offered by GI oncologist Dr. Us will consist of gemcitabine and abraxane. Family has been given handouts on the proposed medications. They have good understanding of the poor prognosis associated with metastatic pancreatic cancer. Patient would like to get over her current hospital issues before deciding on pursuing chemotherapy. She feels additional information is overwhelming at  this time. We also briefly discussed that TPN has not been shown to improve prognosis in metastatic pancreatic cancer. Patient and family were thankful for bedside discussion. They have been instructed to call clinic with further questions or if further follow up is desired   - patient and family still undecided about pursing treatment versus transition to hospice, awaiting another family member to arrive and discuss      Chronic respiratory failure with hypoxia  Patient with a history of chronic hypoxic respiratory failure that is on 1-3 liters of oxygen from her COPD.    Centrilobular emphysema  Continue home azithromycin 250 mg three times weekly (MWF), albuterol-ipratropium nebulizers PRN.    Moderate malnutrition  Nutrition consulted. Most recent weight and BMI monitored-     Measurements:  Wt Readings from Last 1 Encounters:   01/18/24 66.8 kg (147 lb 4.3 oz)   Body mass index is 23.07 kg/m².    Patient has been screened and assessed by RD.    Malnutrition Type:  Context: acute illness or injury  Level: moderate    Malnutrition Characteristic Summary:  Weight Loss (Malnutrition): 5% in 1 month  Energy Intake (Malnutrition): less than 75% for greater than 7 days  Subcutaneous Fat (Malnutrition): moderate depletion  Muscle Mass (Malnutrition): moderate depletion    Interventions/Recommendations (treatment strategy):  1.  Cont PO full liquids as tolerated     Hyperlipidemia  Continue home pravastatin 40 mg daily.      Essential hypertension  Continue home metoprolol succinate 25 mg daily. Monitor blood pressures.    Palliative care encounter  - palliative care following      Carcinoma of right breast in female, estrogen receptor positive  History of breast cancer. Patient previously had lumpectomy and chemoradiation.        VTE Risk Mitigation (From admission, onward)           Ordered     enoxaparin injection 40 mg  Daily         01/10/24 0335     IP VTE HIGH RISK PATIENT  Once         01/10/24 0335     Place  sequential compression device  Until discontinued         01/10/24 0335                    Discharge Planning   RUFINO: 1/20/2024     Code Status: Full Code   Is the patient medically ready for discharge?: No    Reason for patient still in hospital (select all that apply): Pending disposition and Other (specify) Patient and family decision about hospice versus home to think more  Discharge Plan A: Home Health                  Jada Olivas MD  Department of Hospital Medicine   [facility]

## 2024-01-21 NOTE — SUBJECTIVE & OBJECTIVE
Interval History: No changes, still having abd pain. Resume liquid diet and vent as needed.     Patient and family still undecided about pursuing further treatment with oncology versus hospice, awaiting another family member to discuss further. Patient very overwhelmed and no decision made in regards to furthering treatment versus hospice. Family updated at bedside.     Review of Systems   Constitutional:  Negative for fever.   Gastrointestinal:  Positive for abdominal pain.     Objective:     Vital Signs (Most Recent):  Temp: 97.4 °F (36.3 °C) (01/20/24 1604)  Pulse: 92 (01/20/24 1604)  Resp: (!) 22 (01/20/24 1736)  BP: 126/73 (01/20/24 1604)  SpO2: 97 % (01/20/24 1604) Vital Signs (24h Range):  Temp:  [91 °F (32.8 °C)-97.9 °F (36.6 °C)] 97.4 °F (36.3 °C)  Pulse:  [84-93] 92  Resp:  [13-22] 22  SpO2:  [94 %-99 %] 97 %  BP: (126-138)/(70-84) 126/73     Weight: 64.9 kg (143 lb 1.3 oz)  Body mass index is 22.41 kg/m².    Intake/Output Summary (Last 24 hours) at 1/20/2024 1837  Last data filed at 1/20/2024 1822  Gross per 24 hour   Intake --   Output 700 ml   Net -700 ml           Physical Exam  Vitals and nursing note reviewed.   Constitutional:       General: She is not in acute distress.     Appearance: She is well-developed and normal weight. She is not diaphoretic.   Pulmonary:      Effort: Pulmonary effort is normal. No respiratory distress.   Abdominal:      General: There is distension.      Tenderness: There is abdominal tenderness.      Comments: GT in place, no erythema or drainage    Skin:     General: Skin is warm and dry.      Coloration: Skin is not jaundiced or pale.   Neurological:      Mental Status: She is alert and oriented to person, place, and time. Mental status is at baseline.      Motor: No seizure activity.   Psychiatric:         Attention and Perception: Attention normal.         Mood and Affect: Affect normal.         Behavior: Behavior is cooperative.         Thought Content: Thought  content normal.             Significant Labs: All pertinent labs within the past 24 hours have been reviewed.    Significant Imaging: I have reviewed all pertinent imaging results/findings within the past 24 hours.

## 2024-01-22 LAB
ALBUMIN SERPL BCP-MCNC: 2.6 G/DL (ref 3.5–5.2)
ALP SERPL-CCNC: 86 U/L (ref 55–135)
ALT SERPL W/O P-5'-P-CCNC: 23 U/L (ref 10–44)
ANION GAP SERPL CALC-SCNC: 12 MMOL/L (ref 8–16)
AST SERPL-CCNC: 20 U/L (ref 10–40)
BASOPHILS # BLD AUTO: 0.13 K/UL (ref 0–0.2)
BASOPHILS NFR BLD: 1.1 % (ref 0–1.9)
BILIRUB SERPL-MCNC: 1.6 MG/DL (ref 0.1–1)
BUN SERPL-MCNC: 16 MG/DL (ref 8–23)
CALCIUM SERPL-MCNC: 9.5 MG/DL (ref 8.7–10.5)
CHLORIDE SERPL-SCNC: 97 MMOL/L (ref 95–110)
CO2 SERPL-SCNC: 27 MMOL/L (ref 23–29)
CREAT SERPL-MCNC: 0.7 MG/DL (ref 0.5–1.4)
DIFFERENTIAL METHOD BLD: ABNORMAL
EOSINOPHIL # BLD AUTO: 0.6 K/UL (ref 0–0.5)
EOSINOPHIL NFR BLD: 4.9 % (ref 0–8)
ERYTHROCYTE [DISTWIDTH] IN BLOOD BY AUTOMATED COUNT: 13.5 % (ref 11.5–14.5)
EST. GFR  (NO RACE VARIABLE): >60 ML/MIN/1.73 M^2
GLUCOSE SERPL-MCNC: 83 MG/DL (ref 70–110)
HCT VFR BLD AUTO: 40.1 % (ref 37–48.5)
HGB BLD-MCNC: 13 G/DL (ref 12–16)
IMM GRANULOCYTES # BLD AUTO: 0.37 K/UL (ref 0–0.04)
IMM GRANULOCYTES NFR BLD AUTO: 3 % (ref 0–0.5)
LYMPHOCYTES # BLD AUTO: 1.3 K/UL (ref 1–4.8)
LYMPHOCYTES NFR BLD: 10.7 % (ref 18–48)
MAGNESIUM SERPL-MCNC: 2.1 MG/DL (ref 1.6–2.6)
MCH RBC QN AUTO: 28.6 PG (ref 27–31)
MCHC RBC AUTO-ENTMCNC: 32.4 G/DL (ref 32–36)
MCV RBC AUTO: 88 FL (ref 82–98)
MONOCYTES # BLD AUTO: 1.6 K/UL (ref 0.3–1)
MONOCYTES NFR BLD: 12.8 % (ref 4–15)
NEUTROPHILS # BLD AUTO: 8.2 K/UL (ref 1.8–7.7)
NEUTROPHILS NFR BLD: 67.5 % (ref 38–73)
NRBC BLD-RTO: 0 /100 WBC
PHOSPHATE SERPL-MCNC: 3.7 MG/DL (ref 2.7–4.5)
PLATELET # BLD AUTO: 382 K/UL (ref 150–450)
PMV BLD AUTO: 10.7 FL (ref 9.2–12.9)
POCT GLUCOSE: 107 MG/DL (ref 70–110)
POCT GLUCOSE: 78 MG/DL (ref 70–110)
POCT GLUCOSE: 81 MG/DL (ref 70–110)
POCT GLUCOSE: 96 MG/DL (ref 70–110)
POTASSIUM SERPL-SCNC: 4.1 MMOL/L (ref 3.5–5.1)
PROT SERPL-MCNC: 6.3 G/DL (ref 6–8.4)
RBC # BLD AUTO: 4.54 M/UL (ref 4–5.4)
SODIUM SERPL-SCNC: 136 MMOL/L (ref 136–145)
WBC # BLD AUTO: 12.15 K/UL (ref 3.9–12.7)

## 2024-01-22 PROCEDURE — 84100 ASSAY OF PHOSPHORUS: CPT | Performed by: HOSPITALIST

## 2024-01-22 PROCEDURE — 63700000 PHARM REV CODE 250 ALT 637 W/O HCPCS: Performed by: STUDENT IN AN ORGANIZED HEALTH CARE EDUCATION/TRAINING PROGRAM

## 2024-01-22 PROCEDURE — 99233 SBSQ HOSP IP/OBS HIGH 50: CPT | Mod: ,,, | Performed by: STUDENT IN AN ORGANIZED HEALTH CARE EDUCATION/TRAINING PROGRAM

## 2024-01-22 PROCEDURE — 85025 COMPLETE CBC W/AUTO DIFF WBC: CPT | Performed by: STUDENT IN AN ORGANIZED HEALTH CARE EDUCATION/TRAINING PROGRAM

## 2024-01-22 PROCEDURE — 20600001 HC STEP DOWN PRIVATE ROOM

## 2024-01-22 PROCEDURE — 99900035 HC TECH TIME PER 15 MIN (STAT)

## 2024-01-22 PROCEDURE — 25000003 PHARM REV CODE 250: Performed by: STUDENT IN AN ORGANIZED HEALTH CARE EDUCATION/TRAINING PROGRAM

## 2024-01-22 PROCEDURE — 63600175 PHARM REV CODE 636 W HCPCS: Performed by: NURSE PRACTITIONER

## 2024-01-22 PROCEDURE — 27000221 HC OXYGEN, UP TO 24 HOURS

## 2024-01-22 PROCEDURE — 80053 COMPREHEN METABOLIC PANEL: CPT | Performed by: STUDENT IN AN ORGANIZED HEALTH CARE EDUCATION/TRAINING PROGRAM

## 2024-01-22 PROCEDURE — 36415 COLL VENOUS BLD VENIPUNCTURE: CPT | Performed by: STUDENT IN AN ORGANIZED HEALTH CARE EDUCATION/TRAINING PROGRAM

## 2024-01-22 PROCEDURE — 25000003 PHARM REV CODE 250: Performed by: HOSPITALIST

## 2024-01-22 PROCEDURE — 63600175 PHARM REV CODE 636 W HCPCS: Performed by: HOSPITALIST

## 2024-01-22 PROCEDURE — 94761 N-INVAS EAR/PLS OXIMETRY MLT: CPT

## 2024-01-22 PROCEDURE — 83735 ASSAY OF MAGNESIUM: CPT | Performed by: HOSPITALIST

## 2024-01-22 RX ORDER — MORPHINE SULFATE 4 MG/ML
4 INJECTION, SOLUTION INTRAMUSCULAR; INTRAVENOUS ONCE
Status: COMPLETED | OUTPATIENT
Start: 2024-01-23 | End: 2024-01-23

## 2024-01-22 RX ADMIN — FLUTICASONE FUROATE AND VILANTEROL TRIFENATATE 1 PUFF: 200; 25 POWDER RESPIRATORY (INHALATION) at 08:01

## 2024-01-22 RX ADMIN — TOPIRAMATE 50 MG: 25 TABLET, FILM COATED ORAL at 09:01

## 2024-01-22 RX ADMIN — FAMOTIDINE 20 MG: 20 TABLET, FILM COATED ORAL at 09:01

## 2024-01-22 RX ADMIN — CYCLOBENZAPRINE HYDROCHLORIDE 10 MG: 5 TABLET, FILM COATED ORAL at 08:01

## 2024-01-22 RX ADMIN — MORPHINE SULFATE 4 MG: 4 INJECTION INTRAVENOUS at 03:01

## 2024-01-22 RX ADMIN — SENNOSIDES AND DOCUSATE SODIUM 1 TABLET: 8.6; 5 TABLET ORAL at 09:01

## 2024-01-22 RX ADMIN — METOPROLOL SUCCINATE 25 MG: 25 TABLET, EXTENDED RELEASE ORAL at 08:01

## 2024-01-22 RX ADMIN — FAMOTIDINE 20 MG: 20 TABLET, FILM COATED ORAL at 08:01

## 2024-01-22 RX ADMIN — MORPHINE SULFATE 4 MG: 4 INJECTION INTRAVENOUS at 11:01

## 2024-01-22 RX ADMIN — PRAVASTATIN SODIUM 40 MG: 40 TABLET ORAL at 09:01

## 2024-01-22 RX ADMIN — MORPHINE SULFATE 4 MG: 4 INJECTION INTRAVENOUS at 04:01

## 2024-01-22 RX ADMIN — MUPIROCIN: 20 OINTMENT TOPICAL at 08:01

## 2024-01-22 RX ADMIN — AZITHROMYCIN 250 MG: 250 TABLET, FILM COATED ORAL at 08:01

## 2024-01-22 RX ADMIN — CYCLOBENZAPRINE HYDROCHLORIDE 10 MG: 5 TABLET, FILM COATED ORAL at 09:01

## 2024-01-22 RX ADMIN — SENNOSIDES AND DOCUSATE SODIUM 1 TABLET: 8.6; 5 TABLET ORAL at 08:01

## 2024-01-22 RX ADMIN — ONDANSETRON 4 MG: 2 INJECTION INTRAMUSCULAR; INTRAVENOUS at 11:01

## 2024-01-22 RX ADMIN — MORPHINE SULFATE 4 MG: 4 INJECTION INTRAVENOUS at 09:01

## 2024-01-22 NOTE — SUBJECTIVE & OBJECTIVE
Interval History:  Met Ms. You alone. No family at bedside. She expresses deep desire to return home, still wants to contemplate cancer-directed therapies. Says that oral pain medicines are effective, but just had been receiving IV therapies because she can.    Prior 24 hour: 108 MME  Fentanyl patch 25 mcg (placed 1/21) = 50 MME  Morphine 4 mg IV x4 = 48 MME  Hydromorphone 0.5 mg IV x1 = 10 MME    Medications:  Continuous Infusions:      Scheduled Meds:   azithromycin  250 mg Oral Once per day on Mon Wed Fri    cyclobenzaprine  10 mg Oral TID    enoxparin  40 mg Subcutaneous Daily    famotidine  20 mg Oral BID    fentaNYL  1 patch Transdermal Q72H    fluticasone furoate-vilanteroL  1 puff Inhalation Daily    metoprolol succinate  25 mg Oral Daily    mupirocin   Topical (Top) Daily    pravastatin  40 mg Oral QHS    scopolamine  1 patch Transdermal Q3 Days    senna-docusate 8.6-50 mg  1 tablet Oral BID    topiramate  50 mg Oral QHS     PRN Meds:acetaminophen, albuterol-ipratropium, dextrose 10%, dextrose 10%, glucagon (human recombinant), glucose, glucose, haloperidol lactate, melatonin, morphine, naloxone, ondansetron, simethicone, sodium chloride 0.9%    Objective:     Vital Signs (Most Recent):  Temp: 97.6 °F (36.4 °C) (01/22/24 0500)  Pulse: 97 (01/22/24 0831)  Resp: 18 (01/22/24 1106)  BP: 124/80 (01/22/24 0825)  SpO2: 97 % (01/22/24 0831) Vital Signs (24h Range):  Temp:  [97 °F (36.1 °C)-98.4 °F (36.9 °C)] 97.6 °F (36.4 °C)  Pulse:  [] 97  Resp:  [13-22] 18  SpO2:  [95 %-99 %] 97 %  BP: (118-133)/(80-84) 124/80     Weight: 64.7 kg (142 lb 10.2 oz)  Body mass index is 22.34 kg/m².       Physical Exam  Vitals and nursing note reviewed.   Constitutional:       Appearance: She is ill-appearing.   HENT:      Head: Normocephalic and atraumatic.      Right Ear: External ear normal.      Left Ear: External ear normal.      Nose: Nose normal.      Mouth/Throat:      Mouth: Mucous membranes are dry.   Eyes:       General:         Right eye: No discharge.         Left eye: No discharge.      Extraocular Movements: Extraocular movements intact.   Neck:      Comments: Trachea midline  Cardiovascular:      Rate and Rhythm: Normal rate.   Pulmonary:      Effort: Pulmonary effort is normal.   Abdominal:      Tenderness: There is abdominal tenderness.      Comments: Venting G tube in place   Skin:     General: Skin is warm and dry.   Neurological:      Mental Status: She is alert and oriented to person, place, and time. Mental status is at baseline.   Psychiatric:         Mood and Affect: Mood is depressed. Affect is angry.            Review of Symptoms      Symptom Assessment (ESAS 0-10 Scale)  Pain:  0  Dyspnea:  0  Anxiety:  0  Nausea:  0  Depression:  0  Anorexia:  0  Fatigue:  0  Insomnia:  0  Restlessness:  0  Agitation:  0     CAM / Delirium:  Negative  Constipation:  Negative  Diarrhea:  Negative      Bowel Management Plan (BMP):  Yes      Pain Assessment:  OME in 24 hours:  104  Location(s): abdomen    Abdomen       Location: generalized        Quality: Colicky        Quantity: 10/10 in intensity        Chronicity: Onset 3 week(s) ago, unchanged        Aggravating Factors: Sitting up        Alleviating Factors: Opiates and belching        Associated Symptoms: None    Performance Status:  50    Living Arrangements:  Lives alone and Lives >50 miles from facility    Psychosocial/Cultural:   See Palliative Psychosocial Note: No   2 sons, lives in [facility].  Son in [place] is willing to take her into his home.  He is willing to care for her.  He has not sure that hospice will be accepted by his mother.  **Primary  to Follow**  Palliative Care  Consult: No    Spiritual:  F - Lilly and Belief:  Adventism  I - Importance:  Yes  C - Community:  No has not been involved in the [employer] since moving from [place] to [facility].  A - Address in Care:  Yes        Advance Care Planning   Advance  "Directives:   Living Will: Yes        Copy on chart: Yes    [place]POST: No    Do Not Resuscitate Status: No    Medical Power of : Yes    Agent's Name:  Hiram You   Agent's Contact Number:  [phone]    Decision Making:  Patient answered questions  Goals of Care: What is most important right now is to focus on remaining as independent as possible, symptom/pain control. Accordingly, we have decided that the best plan to meet the patient's goals includes continuing with treatment.         Significant Labs: All pertinent labs within the past 24 hours have been reviewed.  CBC:   Recent Labs   Lab 01/22/24  0602   WBC 12.15   HGB 13.0   HCT 40.1   MCV 88          BMP:  Recent Labs   Lab 01/22/24 0602   GLU 83      K 4.1   CL 97   CO2 27   BUN 16   CREATININE 0.7   CALCIUM 9.5   MG 2.1       LFT:  Lab Results   Component Value Date    AST 20 01/22/2024    ALKPHOS 86 01/22/2024    BILITOT 1.6 (H) 01/22/2024     Albumin:   Albumin   Date Value Ref Range Status   01/22/2024 2.6 (L) 3.5 - 5.2 g/dL Final     Protein:   Total Protein   Date Value Ref Range Status   01/22/2024 6.3 6.0 - 8.4 g/dL Final     Lactic acid:   Lab Results   Component Value Date    LACTATE 0.7 03/05/2020    LACTATE 1.0 03/27/2019       Significant Imaging: I have reviewed all pertinent imaging results/findings within the past 24 hours.    01/20/2024 Xray abdomen: "Percutaneous gastrostomy tube in place.  No significant distention of the stomach. Overall unchanged distribution of the bowel gas pattern with prominence of the small bowel loops. Additional evaluation, as clinically warranted."  "

## 2024-01-22 NOTE — PLAN OF CARE
Pt and family decided to place Pt on a palliative program rather than hospice. Referral sent to [facility]'s Palliative Care Program (f) [phone]. SW attempted to contact family, but could not reach any of the emergency contacts on Pt's list. SW will follow.    11:15 AM  SW met at bedside with the Pt and explained how Palliative will call the Pt upon discharge to set an appt to see her in her home. Pt agrees to referral being sent to the [facility] Palliative Program, which has accepted this patient. [facility] says they still have to run it through the Pt's Medicare. Pt has straight Medicare A & B.     Pt will be discharged tomorrow after delivery of DME, including her hospital bed.    BERRY Angulo, LMSW [facility]  [id number]

## 2024-01-22 NOTE — PLAN OF CARE
REFERRAL FOR PALLIATIVE CARE PROGRAM          [facility] - Telemetry Stepdown       Encounter Date: 2024  8:58 PM    IP Admit Date: 1/10/24   Discharge Date: No discharge date for patient encounter.   Care Everywhere:  [id number]    MRN: [id number]   Guarantor: ASHLIE YOU   Contact Serial #: [id number]   Hospital Account: [id number]       ENCOUNTER          Patient Class: Inpatient   Unit: [facility] MEDICAL TE*   Hospital Service: Hospital Medicine   Bed: 8085 A   Admitting Provider: Jaron Staton Md   Referring Physician: Self, Aaareferral   Attending Provider: Jada Olivas Md   Adm Diagnosis: SBO (small bowel obstruc*   PATIENT                 Name: ASHLIE YOU : 1945 (78 yrs)   Address: [address] [place] [address] Sex: Female   City: [place] [address] SSN:    Primary Care Provider: Sapphire Ruffin FNP-C         Primary Phone: [phone]   EMERGENCY CONTACT   Contact Name Legal Guardian? Relationship to Patient Home Phone Work Phone Mobile Phone   1. Hiram You  2. Anibal You    No Son  Son [phone]   GUARANTOR                  Guarantor: ASHLIE YOU KRISTINE       : 1945   Address: [address] [place] [address]    Sex: Female     GLORIA [place] [id number] Guarantor  Type: P/F   Relation to Patient: Self         Home Phone: [phone]   Guarantor ID: [id number]         Work Phone:     GUARANTOR EMPLOYER     Employer: retired           Status: RETIRED   COVERAGE        PRIMARY INSURANCE   Payor: MEDICARE Plan: MEDICARE PART A & B   Group Number:   Insurance Type: INDEMNITY   Subscriber Name: AHSLIE YOU KRISTINE Subscriber : 1945   Subscriber ID: [id number] Insurance Address: [address] [address]   Pat. Rel. to Subscriber: Self       SECONDARY INSURANCE   Payor: [facility] Plan: MEDICARE SUPPLEMENT [facility] *   Group Number:   Insurance Type: INDEMNITY   Subscriber Name: ASHLIE YOU KRISTINE Subscriber : 1945    Subscriber ID: [id number] Insurance Address: [facility] [id number]  [place] [address]   Pat. Rel. to Subscriber: SELF          H & P  Notes from 12/23/23 through 01/22/24  H&P by Alexi Delgado MD at 1/19/2024  9:49 AM    Author: Alexi Delgado MD Service: Gastroenterology Author Type: Resident   Filed: 1/19/2024  9:50 AM Creation Time: 1/19/2024  9:49 AM Status: Signed   : Alexi Delgado MD (Resident) Cosigner: Reji Bartholomew MD at 1/19/2024 10:26 AM   Expand All Collapse All  Short Stay Endoscopy History and Physical     PCP - Sapphire Ruffin FNP-C  Referring Physician - Self, Aaareferral  No address on file     Procedure - Endoscopy  ASA - per anesthesia  Mallampati - per anesthesia  History of Anesthesia problems - no  Family history Anesthesia problems -  no   Plan of anesthesia - General     HPI  78 y.o. female  Reason for procedure:   PEG     ROS:  Constitutional: No fevers, chills, No weight loss  CV: No chest pain  Pulm: No cough, No shortness of breath  GI: see HPI     Medical History:  has a past medical history of Back pain, Breast cancer (05/2015), COPD (chronic obstructive pulmonary disease), Hyperlipidemia, Hypertension, Osteoporosis (05/21/2019), and Pancreatic adenocarcinoma (12/12/2023).     Surgical History:  has a past surgical history that includes Lumbar fusion; Cervical fusion; Hysterectomy; right lumpectomy; Lymph node dissection (Right); Breast biopsy (Right); and Breast lumpectomy (Right, 2015).     Family History: family history includes Breast cancer (age of onset: 75) in her maternal grandmother..     Social History:  reports that she has quit smoking. Her smoking use included cigarettes. She has a 45.0 pack-year smoking history. She uses smokeless tobacco. She reports current alcohol use. She reports that she does not use drugs.           Review of patient's allergies indicates:   Allergen Reactions    Adhesive Rash       Use PAPER Tape / TEGADERM    Levaquin  [levofloxacin] Diarrhea and Nausea Only    Penicillins Rash         Medications:   Prescriptions Prior to Admission           Medications Prior to Admission   Medication Sig Dispense Refill Last Dose    albuterol 90 mcg/actuation inhaler Inhale 2 puffs into the lungs every 6 (six) hours as needed for Wheezing.          albuterol-ipratropium (DUO-NEB) 2.5 mg-0.5 mg/3 mL nebulizer solution Take 3 mLs by nebulization every 6 (six) hours as needed.          azithromycin (Z-SABINE) 250 MG tablet Take 250 mg by mouth 3 (three) times a week.          azithromycin (ZITHROMAX) 500 MG tablet Take one tablet Monday, then Wednesday, then Friday per week (Patient not taking: Reported on 1/4/2024) 12 tablet 3      calcium carbonate (OS-JENNI) 600 mg calcium (1,500 mg) Tab Take 1 tablet by mouth once daily.          cyclobenzaprine (FLEXERIL) 10 MG tablet Take 10 mg by mouth 3 (three) times daily.          DALIRESP 500 mcg Tab Take 1 tablet by mouth once daily.          losartan (COZAAR) 25 MG tablet Take 0.5 tablets (12.5 mg total) by mouth once daily. (Patient taking differently: Take 25 mg by mouth once daily.) 15 tablet 3      METOPROLOL SUCCINATE ORAL Take by mouth.          montelukast (SINGULAIR) 10 mg tablet Take 10 mg by mouth every evening.          MYRBETRIQ 25 mg Tb24 ER tablet Take 25 mg by mouth once daily.          ondansetron (ZOFRAN) 4 MG tablet Take 4 mg by mouth every 8 (eight) hours as needed.          oxyCODONE (ROXICODONE) 10 mg Tab immediate release tablet Take 10 mg by mouth 4 (four) times daily.          oxyCODONE-acetaminophen (PERCOCET)  mg per tablet            oxyCODONE-acetaminophen (PERCOCET) 5-325 mg per tablet Take 1 tablet by mouth every 6 (six) hours as needed. (Patient not taking: Reported on 1/4/2024) 30 tablet 0      pantoprazole (PROTONIX) 40 MG tablet Take 40 mg by mouth once daily.          pravastatin (PRAVACHOL) 40 MG tablet Take 40 mg by mouth every evening.           topiramate  (TOPAMAX) 50 MG tablet Take 50 mg by mouth every evening.           TRELEGY ELLIPTA 200-62.5-25 mcg inhaler Inhale 1 puff into the lungs once daily.                  Physical Exam:     Vital Signs:       Vitals:     01/19/24 0736   BP: 123/73   Pulse: 94   Resp: 16   Temp: 98.1 °F (36.7 °C)         General Appearance: Well appearing in no acute distress  Abdomen: Soft, non tender, non distended with normal bowel sounds, no masses     Labs:        Lab Results   Component Value Date     WBC 12.74 (H) 01/19/2024     HGB 12.3 01/19/2024     HCT 39.4 01/19/2024      01/19/2024     TRIG 109 01/13/2024     ALT 31 01/19/2024     AST 25 01/19/2024      01/19/2024     K 4.8 01/19/2024      01/19/2024     CREATININE 0.7 01/19/2024     BUN 20 01/19/2024     CO2 27 01/19/2024     TSH 2.627 06/05/2019     INR 1.0 01/13/2024         I have explained the risks and benefits of this endoscopic procedure to the patient including but not limited to bleeding, inflammation, infection, perforation, and death.        Alexi Delgado MD         Electronically signed by Reji Bartholomew MD on 1/19/2024 10:26 AM   H&P by Melchor Singh MD at 1/10/2024  5:39 AM    Author: Melchor Singh MD Service: Hospital Medicine Author Type: Physician   Filed: 1/10/2024  5:40 AM Creation Time: 1/10/2024  5:39 AM Status: Signed   : Melchor Singh MD (Physician)      [facility] - Emergency Dept  [facility]  History & Physical     Patient Name: Radha You  MRN: [id number]  Patient Class: IP- Inpatient  Admission Date: 1/9/2024  Attending Physician: Jaron Staton MD   Primary Care Provider: Sapphire Ruffin FNP-C           Patient information was obtained from patient, relative(s), past medical records, and ER records.      Subjective:      Principal Problem:Partial small bowel obstruction     Chief Complaint:        Chief Complaint   Patient presents with    Abdominal Pain       Recently diagnosed with pancreatic  cancer. C/o constipation. Last BM x6 days ago. Hx of COPD. Wears 3L O2 at baseline         HPI: Radha You is a 78-year-old woman with a past medical history of HTN, HLD, chronic hypoxic respiratory failure on 1-3 liters of oxygen 2/2 COPD, history of breast cancer s/p chemoradiation and lumpectomy who presents to the emergency department with abdominal pain.    Per report, the patient had a pancreatic tail mass that was incidentally discovered during a bronchoscopic procedure. She underwent EUS at [facility] which revealed a mass in the tail of the pancreas. This was biopsied and was consistent with pancreatic ductal adenocarcinoma. The patient has been following in the Oncology clinic and was offered palliative chemotherapy but declined.     She began having abdominal pain approximately three to four days ago. She also complains of severe constipation. She was unable to have a bowel movement for several days. She said that she purchased multiple over-the-counter laxatives, including suppositories, and was able to pass a small bowel movement earlier today but still felt constipation. Additionally, the patient began having increased pain in her abdomen that was associated with nausea and vomiting and was unable to tolerate any oral intake while at home. She denies other symptoms such as fevers, chills, chest pain or discomfort, cough, or shortness of breath.     While in the emergency department, she was mildly tachycardic at 115 at presentation but other vital signs were within normal limits. She had a mild leukocytosis, but electrolytes, renal and liver function were all within normal limits. A CT abdomen/pelvis with contrast was ordered which revealed the known pancreatic tail mass, abutting and possibly invading the spleen with new geographic region of decreased enhancement in the lower splenic pole, suspicious for splenic infarct. There were multiple small bowel strictures are suggested, possibly on the  basis of metastatic disease that is possibly associated with low-grade or partial small bowel obstruction versus ileus.       General Surgery was consulted given concern for obstruction. However, given the patient's metastatic disease, they felt that she would not be a good surgical candidate. The possibility of a G-tube was discussed but the patient deferred and opted for nasogastric decompression at this time. She was given a 1 liter bolus of lactated ringer's solution in addition to 4 mg of IV morphine and 4 mg of IV ondansetron. She was admitted to Hospital Medicine for further management.                      Past Medical History:   Diagnosis Date    Back pain       instrumented fusion L3-L5. Spinal cord stimulator in place. L5-S1 right paracentral disc osteophyte complex resulting in moderate right neural foraminal narrowing.    Breast cancer 05/2015     (status post lumpectomy and chemoradiation, currently on maintenance therapy with FEMARA). Right breast infiltrating ductal CA with DCIS, ER positive, NJ/Her2 negative     COPD (chronic obstructive pulmonary disease)       Chronic bronchitis;  (on 3 L at home)     Hyperlipidemia      Hypertension      Osteoporosis 5/21/2019               Past Surgical History:   Procedure Laterality Date    BREAST BIOPSY Right      BREAST LUMPECTOMY Right 2015     IDC & DCIS    CERVICAL FUSION        HYSTERECTOMY         with BSO    LUMBAR FUSION        LYMPH NODE DISSECTION Right      right lumpectomy                   Review of patient's allergies indicates:   Allergen Reactions    Adhesive Rash       Use PAPER Tape / TEGADERM    Levaquin [levofloxacin] Diarrhea and Nausea Only    Penicillins Rash         No current facility-administered medications on file prior to encounter.           Current Outpatient Medications on File Prior to Encounter   Medication Sig    albuterol 90 mcg/actuation inhaler Inhale 2 puffs into the lungs every 6 (six) hours as needed for Wheezing.     albuterol-ipratropium (DUO-NEB) 2.5 mg-0.5 mg/3 mL nebulizer solution Take 3 mLs by nebulization every 6 (six) hours as needed.    azithromycin (Z-SABINE) 250 MG tablet Take 250 mg by mouth 3 (three) times a week.    azithromycin (ZITHROMAX) 500 MG tablet Take one tablet Monday, then Wednesday, then Friday per week (Patient not taking: Reported on 1/4/2024)    calcium carbonate (OS-JENNI) 600 mg calcium (1,500 mg) Tab Take 1 tablet by mouth once daily.    cyclobenzaprine (FLEXERIL) 10 MG tablet Take 10 mg by mouth 3 (three) times daily.    DALIRESP 500 mcg Tab Take 1 tablet by mouth once daily.    losartan (COZAAR) 25 MG tablet Take 0.5 tablets (12.5 mg total) by mouth once daily. (Patient taking differently: Take 25 mg by mouth once daily.)    METOPROLOL SUCCINATE ORAL Take by mouth.    montelukast (SINGULAIR) 10 mg tablet Take 10 mg by mouth every evening.    MYRBETRIQ 25 mg Tb24 ER tablet Take 25 mg by mouth once daily.    ondansetron (ZOFRAN) 4 MG tablet Take 4 mg by mouth every 8 (eight) hours as needed.    oxyCODONE (ROXICODONE) 10 mg Tab immediate release tablet Take 10 mg by mouth 4 (four) times daily.    oxyCODONE-acetaminophen (PERCOCET)  mg per tablet      oxyCODONE-acetaminophen (PERCOCET) 5-325 mg per tablet Take 1 tablet by mouth every 6 (six) hours as needed. (Patient not taking: Reported on 1/4/2024)    pantoprazole (PROTONIX) 40 MG tablet Take 40 mg by mouth once daily.    pravastatin (PRAVACHOL) 40 MG tablet Take 40 mg by mouth every evening.     topiramate (TOPAMAX) 50 MG tablet Take 50 mg by mouth every evening.     TRELEGY ELLIPTA 200-62.5-25 mcg inhaler Inhale 1 puff into the lungs once daily.    [DISCONTINUED] aspirin (ECOTRIN) 81 MG EC tablet Take 81 mg by mouth once daily.    [DISCONTINUED] predniSONE (DELTASONE) 10 MG tablet Take 1 tablet by mouth every other day.      Family History         Problem Relation (Age of Onset)     Breast cancer Maternal Grandmother (75)                    Tobacco Use    Smoking status: Former       Current packs/day: 1.00       Average packs/day: 1 pack/day for 45.0 years (45.0 ttl pk-yrs)       Types: Cigarettes    Smokeless tobacco: Current    Tobacco comments:       admits to attempting to quit multiple times; currently smoking up to 1 packs/day down from 2 packs/day at time of diagnosis   Substance and Sexual Activity    Alcohol use: Yes       Comment: occasionaly    Drug use: No    Sexual activity: Not Currently      Review of Systems   Constitutional:  Positive for appetite change. Negative for fatigue and fever.   HENT:  Negative for rhinorrhea, sneezing, sore throat and tinnitus.    Eyes:  Negative for photophobia and visual disturbance.   Respiratory:  Negative for cough and chest tightness.    Cardiovascular:  Negative for chest pain and leg swelling.   Gastrointestinal:  Positive for abdominal pain, constipation, nausea and vomiting. Negative for diarrhea.   Endocrine: Negative for polyphagia and polyuria.   Genitourinary:  Negative for difficulty urinating, dysuria, hematuria and menstrual problem.   Musculoskeletal:  Negative for gait problem and joint swelling.   Skin:  Negative for rash and wound.   Neurological:  Negative for seizures, syncope and facial asymmetry.   Psychiatric/Behavioral:  Negative for agitation, behavioral problems and confusion.       Objective:      Vital Signs (Most Recent):  Temp: 97.6 °F (36.4 °C) (01/09/24 2310)  Pulse: 67 (01/10/24 0230)  Resp: 16 (01/10/24 0230)  BP: 135/77 (01/10/24 0230)  SpO2: 97 % (01/10/24 0230) Vital Signs (24h Range):  Temp:  [97.6 °F (36.4 °C)-98 °F (36.7 °C)] 97.6 °F (36.4 °C)  Pulse:  [] 67  Resp:  [14-20] 16  SpO2:  [97 %-100 %] 97 %  BP: (126-141)/(77-85) 135/77      Weight: 68 kg (150 lb)  Body mass index is 23.49 kg/m².     Physical Exam  Vitals reviewed.   Constitutional:       General: She is not in acute distress.     Appearance: Normal appearance. She is not ill-appearing or  toxic-appearing.      Comments: Pleasant woman in no acute distress. Cooperative with examination and conversant with interview.   HENT:      Head: Normocephalic and atraumatic.      Right Ear: External ear normal.      Left Ear: External ear normal.      Nose:      Comments: Nasogatric tube in the nares.     Mouth/Throat:      Comments: Oropharynx appearing dry with tongue fissuring.  Eyes:      General:         Right eye: No discharge.         Left eye: No discharge.      Extraocular Movements: Extraocular movements intact.   Cardiovascular:      Rate and Rhythm: Normal rate and regular rhythm.      Pulses: Normal pulses.      Heart sounds: No murmur heard.     No friction rub. No gallop.   Pulmonary:      Effort: Pulmonary effort is normal.      Breath sounds: Normal breath sounds.   Abdominal:      Tenderness: There is abdominal tenderness.      Comments: Slight abdominal fullness and tenderness to the right side of the abdomen but no rigidity or peritoneal signs.   Musculoskeletal:      Cervical back: Neck supple.      Right lower leg: No edema.      Left lower leg: No edema.   Skin:     General: Skin is warm and dry.   Neurological:      General: No focal deficit present.      Mental Status: She is alert and oriented to person, place, and time. Mental status is at baseline.      Sensory: No sensory deficit.      Motor: No weakness.   Psychiatric:         Mood and Affect: Mood normal.         Behavior: Behavior normal.                  Significant Labs: All pertinent labs within the past 24 hours have been reviewed.  CBC:       Recent Labs   Lab 01/09/24 2034   WBC 15.89*   HGB 15.0   HCT 46.3         CMP:       Recent Labs   Lab 01/09/24 2034      K 4.2   CL 98   CO2 27   *   BUN 17   CREATININE 0.9   CALCIUM 9.9   PROT 7.9   ALBUMIN 3.8   BILITOT 0.8   ALKPHOS 81   AST 18   ALT 9*   ANIONGAP 13         Significant Imaging: I have reviewed all pertinent imaging results/findings within the  past 24 hours.  Assessment/Plan:      * Partial small bowel obstruction  Known pancreatic tail mass with multiple small bowel strictures that are likely secondary to low-grade or a partial small bowel obstruction. General Surgery following, patient declining surgical or operative intervention at this time and would prefer nasogastric decompression.  - General Surgery following, appreciate additional recommendations  - patient NPO at this time  - continue nasogastric decompression at this time  - begin lactated ringer's at 75 mL/hour  - begin scopolamine patch q3 days for anti-nausea and anti-secretory effects  - begin IV haloperidol 2 mg q6h PRN nausea/vomiting  - begin IV morphine 4 mg q4h PRN pain     Pancreatic adenocarcinoma  Patient with a history of pancreatic adenocarcinoma of the tail of the pancreas. Currently following with Oncology clinic.         Chronic respiratory failure with hypoxia  Patient with a history of chronic hypoxic respiratory failure that is on 1-3 liters of oxygen from her COPD. No signs of respiratory distress at this time, breathing is even and unlabored. Patient currently stable on supplemental oxygen via nasal cannula.     Centrilobular emphysema  Patient with a history of chronic obstructive pulmonary disease. On 1-3 liters of supplemental oxygen at baseline.  - continue supplemental oxygen, wean as tolerated  - azithromycin 250 mg three times weekly (MWF)  - albuterol-ipratropium nebulizers PRN     Hyperlipidemia  Continue home pravastatin 40 mg daily.        Essential hypertension  Blood pressure elevated which may be secondary to the patient's pain and discomfort.   - resume home metoprolol succinate 25 mg daily     Breast cancer  History of breast cancer. Patient previously had lumpectomy and chemoradiation.           VTE Risk Mitigation (From admission, onward)              Ordered       enoxaparin injection 40 mg  Daily         01/10/24 0335       IP VTE HIGH RISK PATIENT  Once          01/10/24 0335       Place sequential compression device  Until discontinued         01/10/24 0335                             Melchro Singh MD  Department of Hospital Medicine  [facility] - Emergency Dept                    Electronically signed by Melchor Singh MD on 1/10/2024  5:40 AM     D/C Summary    No notes found.     Medications    Scheduled    Medication Ordered Dose/Rate, Route, Frequency Last Action   azithromycin tablet 250 mg 250 mg, Oral, Three Times Weekly Given, 250 mg at 01/22 0831   cyclobenzaprine tablet 10 mg 10 mg, Oral, TID Given, 10 mg at 01/22 0824   enoxaparin injection 40 mg 40 mg, SubQ, Q24H Given, 40 mg at 01/21 1650   famotidine tablet 20 mg 20 mg, Oral, BID Given, 20 mg at 01/22 0824   fentaNYL 25 mcg/hr 1 patch 1 patch, TD, Q72H Patch Applied, 1 patch at 01/21 1411   fluticasone furoate-vilanteroL 200-25 mcg/dose diskus inhaler 1 puff 1 puff, Inhl, Daily Given, 1 puff at 01/22 0831   metoprolol succinate (TOPROL-XL) 24 hr tablet 25 mg 25 mg, Oral, Daily Given, 25 mg at 01/22 0825   mupirocin 2 % ointment No Dose/Rate, Top, Daily Given, No Dose/Rate at 01/22 0831   pravastatin tablet 40 mg 40 mg, Oral, QHS Given, 40 mg at 01/21 2055   scopolamine 1.3-1.5 mg (1 mg over 3 days) 1 patch 1 patch, TD, Q3 Days Patch Applied, 1 patch at 01/19 0626   senna-docusate 8.6-50 mg per tablet 1 tablet 1 tablet, Oral, BID Given, 1 tablet at 01/22 0824   topiramate tablet 50 mg 50 mg, Oral, QHS Given, 50 mg at 01/21 2055   PRN    Medication Ordered Dose/Rate, Route, Frequency Last Action   acetaminophen tablet 650 mg 650 mg, Oral, Q8H PRN Ordered   albuterol-ipratropium 2.5 mg-0.5 mg/3 mL nebulizer solution 3 mL 3 mL, NEBULIZATION, Q4H PRN Given, 3 mL at 01/17 1718   dextrose 10% bolus 125 mL 125 mL 999 mL/hr, IV, PRN Stopped, 01/20 1816   dextrose 10% bolus 250 mL 250 mL 999 mL/hr, IV, PRN Stopped, 01/12 0843   glucagon (human recombinant) injection 1 mg 1 mg, IM, PRN Ordered   glucose  chewable tablet 16 g 16 g, Oral, PRN Ordered   glucose chewable tablet 24 g 24 g, Oral, PRN Ordered   haloperidol lactate injection 2 mg 2 mg, IV, Q6H PRN Given, 2 mg at 01/12 0434   melatonin tablet 6 mg 6 mg, Oral, Nightly PRN Ordered   morphine injection 4 mg 4 mg, IV, Q3H PRN Given, 4 mg at 01/22 0308   naloxone 0.4 mg/mL injection 0.02 mg 0.02 mg, IV, PRN Ordered   ondansetron injection 4 mg 4 mg, IV, Q6H PRN Ordered   simethicone chewable tablet 80 mg 1 tablet, Oral, QID PRN Ordered   sodium chloride 0.9% flush 10 mL 10 mL, IV, Q12H PRN Ordered   Overdue / Upcoming   (Next 2 Hours)  Medication Ordered Dose/Rate, Route, Frequency Due   scopolamine 1.3-1.5 mg (1 mg over 3 days) 1 patch 1 patch, TD, Q3 Days 01/22 0444 (Patch Removed)   scopolamine 1.3-1.5 mg (1 mg over 3 days) 1 patch 1 patch, TD, Q3 Days 01/22 0445        Radiology  (Last 24 hours)  01/21 1001 CARDIAC MONITORING STRIPS  Images     Nursing Activity Orders  Expand  Hide  (From admission, onward)  Start   Ordered   Unscheduled  Straight Cath  As needed     Comments:  Every 4 hours PRN if bladder s...    01/10/24 0335        Skin Assessment (last 2 days)    None     Patient Observations  (Last 24 hours)  BP    01/22 0825  124/80   01/22 0500  133/80   01/22 0058  123/80   01/21 1955  118/84   01/21 1526  127/80   01/21 1149  108/72   Pulse    01/22 0831  97   01/22 0825  97   01/22 0500  91   01/22 0310  93   01/22 0058  94   01/21 2336  97   01/21 1955  101   01/21 1526  97   01/21 1510  97   01/21 1149  91   01/21 1138  89   Resp    01/22 0831  18   01/22 0308  18   01/22 0058  19   01/21 2305  18   01/21 2055  18   01/21 1955  18   01/21 1806   22   01/21 1526  20   01/21 1411  13   01/21 1149  17   SpO2    01/22 0831  97 %   01/22 0500  97 %   01/22 0310  99 %   01/22 0058  97 %   01/21 2336  95 %   01/21 1955  96 %   01/21 1526  96 %   01/21 1510  96 %   01/21 1149  97 %   Temp src    01/22 0500  Oral   01/22 0058  Oral   01/21 1955  Oral    01/21 1526  Oral   01/21 1149  Oral   Temp    01/22 0500  97.6 °F (36.4 °C)   01/22 0058  97 °F (36.1 °C)   01/21 1955  97.9 °F (36.6 °C)   01/21 1526  98.4 °F (36.9 °C)   01/21 1149  98.1 °F (36.7 °C)   Notes    01/21 1542 Progress Notes by Jada Olivas MD Note   01/21 1206 Progress Notes by Antonette Zarco MD Note

## 2024-01-22 NOTE — ASSESSMENT & PLAN NOTE
PainIs multifactorial related to her previous spinal surgery.  She does have a spinal stimulator in place.  She does use a lidocaine patch.  Normally at home she takes Percocet as needed and sleeps on the couch. GLORIA GARCIA reviewed, prescribed Percocet 10 q6h PRN by provider in [place], [place].  - Started fentanyl 25 mcg/hr TD patch on 1/21/24  - Continue morphine 4 mg IV q3h PRN severe pain, unable to tolerate PO  - Continue oxycodone 10 mg PO q4h PRN severe pain if able to tolerate PO

## 2024-01-22 NOTE — PT/OT/SLP DISCHARGE
"Physical Therapy Discharge Summary    Name: Radha You  MRN: [id number]   Principal Problem: Partial small bowel obstruction     Patient Discharged from acute Physical Therapy on 2024.  Please refer to prior PT noted date on 2024 for functional status.     Assessment:     Patient declining PT session this date stating, "I'm dying. That's all I care about right now". Patient's statement prompting PT to facilitate conversation about desire for continuing PT services, in which patient said, "No". PT provided patient education on re-accessing services in the future, if she changes her mind. Therefore, this writer is discontinuing PT orders.    Objective:     GOALS:   Multidisciplinary Problems       Physical Therapy Goals          Problem: Physical Therapy    Goal Priority Disciplines Outcome Goal Variances Interventions   Physical Therapy Goal     PT, PT/OT Ongoing, Progressing     Description: Goals to be met by: 2024     Patient will increase functional independence with mobility by performin. Supine to sit with Modified [place]  2. Sit to supine with Modified [place]  3. Sit to stand transfer with Supervision  4. Bed to chair transfer with Supervision using LRAD  5. Gait  x 100 feet with Supervision using LRAD.   6. Ascend/descend 2 stair with bilateral Handrails Contact Guard Assistance using LRAD.                          Reasons for Discontinuation of Therapy Services  Patient declines to continue care.      Plan:     Patient Discharged from PT services. Upon discharge, patient remains in acute care.      2024    "

## 2024-01-22 NOTE — PLAN OF CARE
Radha You requires a hospital bed due to her requiring positioning of the body in ways not feasible with an ordinary bed to alleviate pain/ is completely immobile /or limited mobility and cannot independently make changes in body position without the use of the bed.  The positioning of the body cannot be sufficiently resolved by the use of pillows and wedge

## 2024-01-22 NOTE — ASSESSMENT & PLAN NOTE
01.22.24  Met with Ms. You, alone at bedside. She tells me that she doesn't feel well and just asked the RN for a pain medicine dose. I noted that I saw multiple doses of IV pain medicine, but no oral medicines. I asked her if the oral medicines aren't working or if she's too nauseous to take it in. She says that the oral medicines work, but feels that it's ok to continue IV pain medicines until she has to go home. She is eager to return home. She confirms for me that plans are to see her cancer doctor before making any final decisions about pursuing cancer-directed therapies vs utilizing hospice support. I asked her how she feels about this, and she tells me that she's done having these conversations now, and will continue this at clinic. Validated how frustrating this has been, and how difficult it has to be to confront her diagnosis/prognosis with conversations about the future that seem uncontrollable. She verifies that she will be using home palliative services when she returns home in [place], [place].    01.21.24, Dr. Zarco:  Met with patient and multiple family members (3) at bedside. Mrs. You states that she understands that is just going to have to die slowly. She expressed that she doesn't need to go back to [facility] as she can just die here too, in context of discharging to local son's house for a few days prior to driving back to [facility] area. Patient's son expressing concern about venting g tube output as well as that the IV medication only lasting for at most 45 minutes. Patient's son spoke about other options as mentioned by Dr. Ibarra including oral soln, patches, and IV pumps. Discussed some of these are available through hospice care. Patient's son states patient is not in a mental state to make any decisions regarding hospice care at this time. Patient's family report that patient's mentation changes with pain medications and she will state things that are not like herself  or do not make sense in the context of the ongoing conversation.     01.17.2024  Ms. You shared that she believes she made her mind up regarding venting gastrostomy, which she is agreeable. She was also told that if not needed, then they don't have to proceed, which she would be accepting of as well. Also not sure if they will follow with oncology. I asked if she'd keep me in the loop about this decision as it will help me know who to communicate in terms of appropriate next steps and she said she would. Asked me to leave my card, which I did at her bedside table.    01.15.2024  No change in plan of care.  No family in room.  Decision regarding venting gastrostomy need to be made.  Decision also on when IR would be available if they are agreeable to doing it.  Need to revisit code status when all family is available and discharge plans with hospice.    Please see prior documentation by Palliative Medicine for ACP conversations previously held by Dr. Ibarra during this admission.

## 2024-01-22 NOTE — PROGRESS NOTES
[facility] - Telemetry Stepdown  Palliative Medicine  Progress Note    Patient Name: Radha You  MRN: [id number]  Admission Date: 1/9/2024  Hospital Length of Stay: 12 days  Code Status: Full Code   Attending Provider: Jada Olivas MD  Consulting Provider: Melissa Toscano MD  Primary Care Physician: Sapphire Ruffin FNP-C  Principal Problem:Partial small bowel obstruction    Patient information was obtained from patient and primary team.      Assessment/Plan:     Palliative Care  Palliative care encounter  01.22.24  Met with Ms. You, alone at bedside. She tells me that she doesn't feel well and just asked the RN for a pain medicine dose. I noted that I saw multiple doses of IV pain medicine, but no oral medicines. I asked her if the oral medicines aren't working or if she's too nauseous to take it in. She says that the oral medicines work, but feels that it's ok to continue IV pain medicines until she has to go home. She is eager to return home. She confirms for me that plans are to see her cancer doctor before making any final decisions about pursuing cancer-directed therapies vs utilizing hospice support. I asked her how she feels about this, and she tells me that she's done having these conversations now, and will continue this at clinic. Validated how frustrating this has been, and how difficult it has to be to confront her diagnosis/prognosis with conversations about the future that seem uncontrollable. She verifies that she will be using home palliative services when she returns home in [place], [place].    01.21.24, Dr. Zarco:  Met with patient and multiple family members (3) at bedside. Mrs. You states that she understands that is just going to have to die slowly. She expressed that she doesn't need to go back to [facility] as she can just die here too, in context of discharging to local son's house for a few days prior to driving back to [facility] area. Patient's son expressing concern  about venting g tube output as well as that the IV medication only lasting for at most 45 minutes. Patient's son spoke about other options as mentioned by Dr. Ibarra including oral soln, patches, and IV pumps. Discussed some of these are available through hospice care. Patient's son states patient is not in a mental state to make any decisions regarding hospice care at this time. Patient's family report that patient's mentation changes with pain medications and she will state things that are not like herself or do not make sense in the context of the ongoing conversation.     01.17.2024  Ms. You shared that she believes she made her mind up regarding venting gastrostomy, which she is agreeable. She was also told that if not needed, then they don't have to proceed, which she would be accepting of as well. Also not sure if they will follow with oncology. I asked if she'd keep me in the loop about this decision as it will help me know who to communicate in terms of appropriate next steps and she said she would. Asked me to leave my card, which I did at her bedside table.    01.15.2024  No change in plan of care.  No family in room.  Decision regarding venting gastrostomy need to be made.  Decision also on when IR would be available if they are agreeable to doing it.  Need to revisit code status when all family is available and discharge plans with hospice.    Please see prior documentation by Palliative Medicine for ACP conversations previously held by Dr. Ibarra during this admission.    Other  Pain  PainIs multifactorial related to her previous spinal surgery.  She does have a spinal stimulator in place.  She does use a lidocaine patch.  Normally at home she takes Percocet as needed and sleeps on the couch. [place]  reviewed, prescribed Percocet 10 q6h PRN by provider in [place], [place].  - Started fentanyl 25 mcg/hr TD patch on 1/21/24  - Continue morphine 4 mg IV q3h PRN severe pain, unable to tolerate PO  -  Continue oxycodone 10 mg PO q4h PRN severe pain if able to tolerate PO        I will follow-up with patient. Please contact us if you have any additional questions.    Subjective:     Chief Complaint:   Chief Complaint   Patient presents with    Abdominal Pain     Recently diagnosed with pancreatic cancer. C/o constipation. Last BM x6 days ago. Hx of COPD. Wears 3L O2 at baseline       HPI:   Ms. You is a 79yo female with a history of COPD, HLD, HTN, h/o breast cancer, and recently diagnosed metastatic pancreatic cancer who presented 1.10.2024  with abdominal pain, nausea, and emesis.  Been evaluated by Surgical Oncology and not felt to be a surgical candidate.  She is still considering chemotherapy and/or radiation therapy to prolong her life.  At this time however she has an ileus/small-bowel obstruction and has nasogastric decompression.  There is a consideration for placing a venting gastrostomy next week.    Palliative Medicine has been asked to assist with goals of care and treatment options and planning.      Hospital Course:  No notes on file    Interval History:  Met Ms. You alone. No family at bedside. She expresses deep desire to return home, still wants to contemplate cancer-directed therapies. Says that oral pain medicines are effective, but just had been receiving IV therapies because she can.    Prior 24 hour: 108 MME  Fentanyl patch 25 mcg (placed 1/21) = 50 MME  Morphine 4 mg IV x4 = 48 MME  Hydromorphone 0.5 mg IV x1 = 10 MME    Medications:  Continuous Infusions:      Scheduled Meds:   azithromycin  250 mg Oral Once per day on Mon Wed Fri    cyclobenzaprine  10 mg Oral TID    enoxparin  40 mg Subcutaneous Daily    famotidine  20 mg Oral BID    fentaNYL  1 patch Transdermal Q72H    fluticasone furoate-vilanteroL  1 puff Inhalation Daily    metoprolol succinate  25 mg Oral Daily    mupirocin   Topical (Top) Daily    pravastatin  40 mg Oral QHS    scopolamine  1 patch Transdermal Q3 Days     senna-docusate 8.6-50 mg  1 tablet Oral BID    topiramate  50 mg Oral QHS     PRN Meds:acetaminophen, albuterol-ipratropium, dextrose 10%, dextrose 10%, glucagon (human recombinant), glucose, glucose, haloperidol lactate, melatonin, morphine, naloxone, ondansetron, simethicone, sodium chloride 0.9%    Objective:     Vital Signs (Most Recent):  Temp: 97.6 °F (36.4 °C) (01/22/24 0500)  Pulse: 97 (01/22/24 0831)  Resp: 18 (01/22/24 1106)  BP: 124/80 (01/22/24 0825)  SpO2: 97 % (01/22/24 0831) Vital Signs (24h Range):  Temp:  [97 °F (36.1 °C)-98.4 °F (36.9 °C)] 97.6 °F (36.4 °C)  Pulse:  [] 97  Resp:  [13-22] 18  SpO2:  [95 %-99 %] 97 %  BP: (118-133)/(80-84) 124/80     Weight: 64.7 kg (142 lb 10.2 oz)  Body mass index is 22.34 kg/m².       Physical Exam  Vitals and nursing note reviewed.   Constitutional:       Appearance: She is ill-appearing.   HENT:      Head: Normocephalic and atraumatic.      Right Ear: External ear normal.      Left Ear: External ear normal.      Nose: Nose normal.      Mouth/Throat:      Mouth: Mucous membranes are dry.   Eyes:      General:         Right eye: No discharge.         Left eye: No discharge.      Extraocular Movements: Extraocular movements intact.   Neck:      Comments: Trachea midline  Cardiovascular:      Rate and Rhythm: Normal rate.   Pulmonary:      Effort: Pulmonary effort is normal.   Abdominal:      Tenderness: There is abdominal tenderness.      Comments: Venting G tube in place   Skin:     General: Skin is warm and dry.   Neurological:      Mental Status: She is alert and oriented to person, place, and time. Mental status is at baseline.   Psychiatric:         Mood and Affect: Mood is depressed. Affect is angry.            Review of Symptoms      Symptom Assessment (ESAS 0-10 Scale)  Pain:  0  Dyspnea:  0  Anxiety:  0  Nausea:  0  Depression:  0  Anorexia:  0  Fatigue:  0  Insomnia:  0  Restlessness:  0  Agitation:  0     CAM / Delirium:  Negative  Constipation:   Negative  Diarrhea:  Negative      Bowel Management Plan (BMP):  Yes      Pain Assessment:  OME in 24 hours:  104  Location(s): abdomen    Abdomen       Location: generalized        Quality: Colicky        Quantity: 10/10 in intensity        Chronicity: Onset 3 week(s) ago, unchanged        Aggravating Factors: Sitting up        Alleviating Factors: Opiates and belching        Associated Symptoms: None    Performance Status:  50    Living Arrangements:  Lives alone and Lives >50 miles from facility    Psychosocial/Cultural:   See Palliative Psychosocial Note: No   2 sons, lives in [facility].  Son in [place] is willing to take her into his home.  He is willing to care for her.  He has not sure that hospice will be accepted by his mother.  **Primary  to Follow**  Palliative Care  Consult: No    Spiritual:  F - Lilly and Belief:  Jewish  I - Importance:  Yes  C - Community:  No has not been involved in the Denominational since moving from [place] to [facility].  A - Address in Care:  Yes        Advance Care Planning  Advance Directives:   Living Will: Yes        Copy on chart: Yes    [place]: No    Do Not Resuscitate Status: No    Medical Power of : Yes    Agent's Name:  Hiram You   Agent's Contact Number:  [phone]    Decision Making:  Patient answered questions  Goals of Care: What is most important right now is to focus on remaining as independent as possible, symptom/pain control. Accordingly, we have decided that the best plan to meet the patient's goals includes continuing with treatment.         Significant Labs: All pertinent labs within the past 24 hours have been reviewed.  CBC:   Recent Labs   Lab 01/22/24  0602   WBC 12.15   HGB 13.0   HCT 40.1   MCV 88          BMP:  Recent Labs   Lab 01/22/24  0602   GLU 83      K 4.1   CL 97   CO2 27   BUN 16   CREATININE 0.7   CALCIUM 9.5   MG 2.1       LFT:  Lab Results   Component Value Date    AST  "20 01/22/2024    ALKPHOS 86 01/22/2024    BILITOT 1.6 (H) 01/22/2024     Albumin:   Albumin   Date Value Ref Range Status   01/22/2024 2.6 (L) 3.5 - 5.2 g/dL Final     Protein:   Total Protein   Date Value Ref Range Status   01/22/2024 6.3 6.0 - 8.4 g/dL Final     Lactic acid:   Lab Results   Component Value Date    LACTATE 0.7 03/05/2020    LACTATE 1.0 03/27/2019       Significant Imaging: I have reviewed all pertinent imaging results/findings within the past 24 hours.    01/20/2024 Xray abdomen: "Percutaneous gastrostomy tube in place.  No significant distention of the stomach. Overall unchanged distribution of the bowel gas pattern with prominence of the small bowel loops. Additional evaluation, as clinically warranted."    I spent a total of 50 minutes on the day of the visit. This includes face to face time in discussion of goals of care, symptom assessment, coordination of care and emotional support. This also includes non-face to face time preparing to see the patient (eg, review of tests/imaging), obtaining and/or reviewing separately obtained history, documenting clinical information in the electronic or other health record, independently interpreting results and communicating results to the patient/family/caregiver, or care coordinator.     Melissa Toscano MD  Palliative Medicine  [facility] - Telemetry Stepdown                "

## 2024-01-23 VITALS
DIASTOLIC BLOOD PRESSURE: 67 MMHG | BODY MASS INDEX: 22.39 KG/M2 | SYSTOLIC BLOOD PRESSURE: 109 MMHG | WEIGHT: 142.63 LBS | HEIGHT: 67 IN | OXYGEN SATURATION: 96 % | RESPIRATION RATE: 18 BRPM | HEART RATE: 75 BPM | TEMPERATURE: 99 F

## 2024-01-23 LAB
ALBUMIN SERPL BCP-MCNC: 2.6 G/DL (ref 3.5–5.2)
ALP SERPL-CCNC: 87 U/L (ref 55–135)
ALT SERPL W/O P-5'-P-CCNC: 21 U/L (ref 10–44)
ANION GAP SERPL CALC-SCNC: 13 MMOL/L (ref 8–16)
AST SERPL-CCNC: 18 U/L (ref 10–40)
BASOPHILS # BLD AUTO: 0.16 K/UL (ref 0–0.2)
BASOPHILS NFR BLD: 1.3 % (ref 0–1.9)
BILIRUB SERPL-MCNC: 1.5 MG/DL (ref 0.1–1)
BUN SERPL-MCNC: 19 MG/DL (ref 8–23)
CALCIUM SERPL-MCNC: 9.2 MG/DL (ref 8.7–10.5)
CHLORIDE SERPL-SCNC: 98 MMOL/L (ref 95–110)
CO2 SERPL-SCNC: 24 MMOL/L (ref 23–29)
CREAT SERPL-MCNC: 0.7 MG/DL (ref 0.5–1.4)
DIFFERENTIAL METHOD BLD: ABNORMAL
EOSINOPHIL # BLD AUTO: 0.5 K/UL (ref 0–0.5)
EOSINOPHIL NFR BLD: 3.7 % (ref 0–8)
ERYTHROCYTE [DISTWIDTH] IN BLOOD BY AUTOMATED COUNT: 13.3 % (ref 11.5–14.5)
EST. GFR  (NO RACE VARIABLE): >60 ML/MIN/1.73 M^2
GLUCOSE SERPL-MCNC: 77 MG/DL (ref 70–110)
HCT VFR BLD AUTO: 41.8 % (ref 37–48.5)
HGB BLD-MCNC: 12.9 G/DL (ref 12–16)
IMM GRANULOCYTES # BLD AUTO: 0.31 K/UL (ref 0–0.04)
IMM GRANULOCYTES NFR BLD AUTO: 2.5 % (ref 0–0.5)
LYMPHOCYTES # BLD AUTO: 1.1 K/UL (ref 1–4.8)
LYMPHOCYTES NFR BLD: 8.8 % (ref 18–48)
MAGNESIUM SERPL-MCNC: 2 MG/DL (ref 1.6–2.6)
MCH RBC QN AUTO: 28 PG (ref 27–31)
MCHC RBC AUTO-ENTMCNC: 30.9 G/DL (ref 32–36)
MCV RBC AUTO: 91 FL (ref 82–98)
MONOCYTES # BLD AUTO: 1.6 K/UL (ref 0.3–1)
MONOCYTES NFR BLD: 12.4 % (ref 4–15)
NEUTROPHILS # BLD AUTO: 8.9 K/UL (ref 1.8–7.7)
NEUTROPHILS NFR BLD: 71.3 % (ref 38–73)
NRBC BLD-RTO: 0 /100 WBC
PHOSPHATE SERPL-MCNC: 4 MG/DL (ref 2.7–4.5)
PLATELET # BLD AUTO: 391 K/UL (ref 150–450)
PMV BLD AUTO: 10.5 FL (ref 9.2–12.9)
POCT GLUCOSE: 86 MG/DL (ref 70–110)
POTASSIUM SERPL-SCNC: 4.1 MMOL/L (ref 3.5–5.1)
PROT SERPL-MCNC: 6.3 G/DL (ref 6–8.4)
RBC # BLD AUTO: 4.61 M/UL (ref 4–5.4)
SODIUM SERPL-SCNC: 135 MMOL/L (ref 136–145)
WBC # BLD AUTO: 12.55 K/UL (ref 3.9–12.7)

## 2024-01-23 PROCEDURE — 25000003 PHARM REV CODE 250: Performed by: STUDENT IN AN ORGANIZED HEALTH CARE EDUCATION/TRAINING PROGRAM

## 2024-01-23 PROCEDURE — 36415 COLL VENOUS BLD VENIPUNCTURE: CPT | Performed by: HOSPITALIST

## 2024-01-23 PROCEDURE — 99233 SBSQ HOSP IP/OBS HIGH 50: CPT | Mod: ,,, | Performed by: STUDENT IN AN ORGANIZED HEALTH CARE EDUCATION/TRAINING PROGRAM

## 2024-01-23 PROCEDURE — 94761 N-INVAS EAR/PLS OXIMETRY MLT: CPT

## 2024-01-23 PROCEDURE — 94640 AIRWAY INHALATION TREATMENT: CPT

## 2024-01-23 PROCEDURE — 25000003 PHARM REV CODE 250: Performed by: HOSPITALIST

## 2024-01-23 PROCEDURE — 63600175 PHARM REV CODE 636 W HCPCS: Performed by: NURSE PRACTITIONER

## 2024-01-23 PROCEDURE — 83735 ASSAY OF MAGNESIUM: CPT | Performed by: HOSPITALIST

## 2024-01-23 PROCEDURE — 85025 COMPLETE CBC W/AUTO DIFF WBC: CPT | Performed by: STUDENT IN AN ORGANIZED HEALTH CARE EDUCATION/TRAINING PROGRAM

## 2024-01-23 PROCEDURE — 80053 COMPREHEN METABOLIC PANEL: CPT | Performed by: STUDENT IN AN ORGANIZED HEALTH CARE EDUCATION/TRAINING PROGRAM

## 2024-01-23 PROCEDURE — 84100 ASSAY OF PHOSPHORUS: CPT | Performed by: HOSPITALIST

## 2024-01-23 PROCEDURE — 27000221 HC OXYGEN, UP TO 24 HOURS

## 2024-01-23 RX ORDER — NALOXONE HYDROCHLORIDE 4 MG/.1ML
1 SPRAY NASAL ONCE
Qty: 2 EACH | Refills: 0 | Status: SHIPPED | OUTPATIENT
Start: 2024-01-23 | End: 2024-01-24

## 2024-01-23 RX ORDER — AMOXICILLIN 250 MG
1 CAPSULE ORAL 2 TIMES DAILY
Qty: 60 TABLET | Refills: 11 | Status: SHIPPED | OUTPATIENT
Start: 2024-01-23 | End: 2025-01-22

## 2024-01-23 RX ORDER — CYCLOBENZAPRINE HCL 10 MG
10 TABLET ORAL 3 TIMES DAILY
Qty: 90 TABLET | Refills: 11 | Status: SHIPPED | OUTPATIENT
Start: 2024-01-23 | End: 2025-01-22

## 2024-01-23 RX ORDER — ONDANSETRON 4 MG/1
4 TABLET, ORALLY DISINTEGRATING ORAL EVERY 6 HOURS PRN
Qty: 30 TABLET | Refills: 11 | Status: SHIPPED | OUTPATIENT
Start: 2024-01-23 | End: 2025-01-22

## 2024-01-23 RX ORDER — METOPROLOL SUCCINATE 25 MG/1
25 TABLET, EXTENDED RELEASE ORAL DAILY
Qty: 30 TABLET | Refills: 11 | Status: SHIPPED | OUTPATIENT
Start: 2024-01-24 | End: 2025-01-23

## 2024-01-23 RX ORDER — PRAVASTATIN SODIUM 40 MG/1
40 TABLET ORAL NIGHTLY
Qty: 90 TABLET | Refills: 3 | Status: SHIPPED | OUTPATIENT
Start: 2024-01-23 | End: 2025-01-22

## 2024-01-23 RX ORDER — SCOLOPAMINE TRANSDERMAL SYSTEM 1 MG/1
1 PATCH, EXTENDED RELEASE TRANSDERMAL
Qty: 10 PATCH | Refills: 11 | Status: SHIPPED | OUTPATIENT
Start: 2024-01-23 | End: 2025-01-22

## 2024-01-23 RX ORDER — SIMETHICONE 80 MG
80 TABLET,CHEWABLE ORAL 4 TIMES DAILY PRN
Qty: 30 TABLET | Refills: 11 | Status: SHIPPED | OUTPATIENT
Start: 2024-01-23 | End: 2025-01-22

## 2024-01-23 RX ORDER — TOPIRAMATE 50 MG/1
50 TABLET, FILM COATED ORAL NIGHTLY
Qty: 30 TABLET | Refills: 11 | Status: SHIPPED | OUTPATIENT
Start: 2024-01-23 | End: 2025-01-22

## 2024-01-23 RX ORDER — OXYCODONE HYDROCHLORIDE 5 MG/1
5 TABLET ORAL EVERY 4 HOURS PRN
Status: DISCONTINUED | OUTPATIENT
Start: 2024-01-23 | End: 2024-01-23

## 2024-01-23 RX ORDER — ALBUTEROL SULFATE 90 UG/1
2 AEROSOL, METERED RESPIRATORY (INHALATION) EVERY 6 HOURS PRN
Qty: 18 G | Refills: 3 | Status: SHIPPED | OUTPATIENT
Start: 2024-01-23 | End: 2025-01-22

## 2024-01-23 RX ORDER — OXYCODONE HYDROCHLORIDE 10 MG/1
10 TABLET ORAL EVERY 4 HOURS PRN
Status: DISCONTINUED | OUTPATIENT
Start: 2024-01-23 | End: 2024-01-23 | Stop reason: HOSPADM

## 2024-01-23 RX ORDER — PANTOPRAZOLE SODIUM 40 MG/1
40 TABLET, DELAYED RELEASE ORAL DAILY
Qty: 30 TABLET | Refills: 11 | Status: SHIPPED | OUTPATIENT
Start: 2024-01-23 | End: 2025-01-22

## 2024-01-23 RX ORDER — FENTANYL 25 UG/1
1 PATCH TRANSDERMAL
Qty: 10 PATCH | Refills: 0 | Status: SHIPPED | OUTPATIENT
Start: 2024-01-24 | End: 2024-02-23

## 2024-01-23 RX ORDER — OXYCODONE HYDROCHLORIDE 10 MG/1
10 TABLET ORAL
Qty: 56 TABLET | Refills: 0 | Status: SHIPPED | OUTPATIENT
Start: 2024-01-23 | End: 2024-01-30

## 2024-01-23 RX ORDER — MIRABEGRON 25 MG/1
25 TABLET, FILM COATED, EXTENDED RELEASE ORAL DAILY
Qty: 30 TABLET | Refills: 11 | Status: SHIPPED | OUTPATIENT
Start: 2024-01-23 | End: 2025-01-22

## 2024-01-23 RX ORDER — DEXTROSE 40 %
15 GEL (GRAM) ORAL DAILY PRN
Qty: 112.5 G | Refills: 3 | Status: SHIPPED | OUTPATIENT
Start: 2024-01-23 | End: 2025-01-22

## 2024-01-23 RX ADMIN — MORPHINE SULFATE 4 MG: 4 INJECTION INTRAVENOUS at 12:01

## 2024-01-23 RX ADMIN — OXYCODONE HYDROCHLORIDE 10 MG: 10 TABLET ORAL at 03:01

## 2024-01-23 RX ADMIN — ACETAMINOPHEN 650 MG: 325 TABLET ORAL at 09:01

## 2024-01-23 RX ADMIN — SENNOSIDES AND DOCUSATE SODIUM 1 TABLET: 8.6; 5 TABLET ORAL at 09:01

## 2024-01-23 RX ADMIN — CYCLOBENZAPRINE HYDROCHLORIDE 10 MG: 5 TABLET, FILM COATED ORAL at 03:01

## 2024-01-23 RX ADMIN — OXYCODONE HYDROCHLORIDE 10 MG: 10 TABLET ORAL at 09:01

## 2024-01-23 RX ADMIN — CYCLOBENZAPRINE HYDROCHLORIDE 10 MG: 5 TABLET, FILM COATED ORAL at 09:01

## 2024-01-23 RX ADMIN — MUPIROCIN: 20 OINTMENT TOPICAL at 09:01

## 2024-01-23 RX ADMIN — METOPROLOL SUCCINATE 25 MG: 25 TABLET, EXTENDED RELEASE ORAL at 09:01

## 2024-01-23 RX ADMIN — MORPHINE SULFATE 4 MG: 4 INJECTION INTRAVENOUS at 06:01

## 2024-01-23 RX ADMIN — MORPHINE SULFATE 4 MG: 4 INJECTION INTRAVENOUS at 05:01

## 2024-01-23 RX ADMIN — OXYCODONE HYDROCHLORIDE 10 MG: 10 TABLET ORAL at 06:01

## 2024-01-23 RX ADMIN — FLUTICASONE FUROATE AND VILANTEROL TRIFENATATE 1 PUFF: 200; 25 POWDER RESPIRATORY (INHALATION) at 08:01

## 2024-01-23 RX ADMIN — FAMOTIDINE 20 MG: 20 TABLET, FILM COATED ORAL at 09:01

## 2024-01-23 NOTE — PROGRESS NOTES
"[facility] - Telemetry Stepdown  Palliative Medicine  Progress Note    Patient Name: Radha You  MRN: [id number]  Admission Date: 1/9/2024  Hospital Length of Stay: 13 days  Code Status: Full Code   Attending Provider: Jada Olivas MD  Consulting Provider: Melissa Toscano MD  Primary Care Physician: Sapphire Ruffin FNP-C  Principal Problem:Partial small bowel obstruction    Patient information was obtained from patient, relative(s), and primary team.      Assessment/Plan:     Palliative Care  Palliative care encounter  01.23.24  Met with Ms. You and her son Tanmay at bedside. Tanmay helped clarify that plans are to go his home today once the hospital bed is set up through home health. This will be the midway journey until she can return to her second home that they endearingly refer to as "camp" and then will use home hospice services ([facility] home-based palliative care program will be transitioned to home hospice) once she settles back home. She expresses a lot of frustration and confusion about this plan, Tanmay patiently and kindly explained and offered her even more explanation after our talk. Discussed pain regimen, including need to clamp venting PEG tube when she takes in her pain medicine to optimize absorption. Will continue fentanyl patch that was started a couple of days ago.    01.22.24  Met with Ms. You, alone at bedside. She tells me that she doesn't feel well and just asked the RN for a pain medicine dose. I noted that I saw multiple doses of IV pain medicine, but no oral medicines. I asked her if the oral medicines aren't working or if she's too nauseous to take it in. She says that the oral medicines work, but feels that it's ok to continue IV pain medicines until she has to go home. She is eager to return home. She confirms for me that plans are to see her cancer doctor before making any final decisions about pursuing cancer-directed therapies vs utilizing hospice support. I asked " her how she feels about this, and she tells me that she's done having these conversations now, and will continue this at clinic. Validated how frustrating this has been, and how difficult it has to be to confront her diagnosis/prognosis with conversations about the future that seem uncontrollable. She verifies that she will be using home palliative services when she returns home in [place], [place].    01.21.24, Dr. Zarco:  Met with patient and multiple family members (3) at bedside. Mrs. You states that she understands that is just going to have to die slowly. She expressed that she doesn't need to go back to [facility] as she can just die here too, in context of discharging to local son's house for a few days prior to driving back to [facility] area. Patient's son expressing concern about venting g tube output as well as that the IV medication only lasting for at most 45 minutes. Patient's son spoke about other options as mentioned by Dr. Ibarra including oral soln, patches, and IV pumps. Discussed some of these are available through hospice care. Patient's son states patient is not in a mental state to make any decisions regarding hospice care at this time. Patient's family report that patient's mentation changes with pain medications and she will state things that are not like herself or do not make sense in the context of the ongoing conversation.     01.17.2024  Ms. You shared that she believes she made her mind up regarding venting gastrostomy, which she is agreeable. She was also told that if not needed, then they don't have to proceed, which she would be accepting of as well. Also not sure if they will follow with oncology. I asked if she'd keep me in the loop about this decision as it will help me know who to communicate in terms of appropriate next steps and she said she would. Asked me to leave my card, which I did at her bedside table.    01.15.2024  No change in plan of care.  No  family in room.  Decision regarding venting gastrostomy need to be made.  Decision also on when IR would be available if they are agreeable to doing it.  Need to revisit code status when all family is available and discharge plans with hospice.    Please see prior documentation by Palliative Medicine for ACP conversations previously held by Dr. Ibarra during this admission.    Other  Pain  PainIs multifactorial related to her previous spinal surgery.  She does have a spinal stimulator in place.  She does use a lidocaine patch.  Normally at home she takes Percocet as needed and sleeps on the couch. [place]  reviewed, prescribed Percocet 10 q6h PRN by provider in [place], [place].  - Started fentanyl 25 mcg/hr TD patch on 1/21/24  - Continue morphine 4 mg IV q3h PRN severe pain, unable to tolerate PO  - Continue oxycodone 10 mg PO q4h PRN severe pain if able to tolerate PO        I will sign off. Please contact us if you have any additional questions.    Subjective:     Chief Complaint:   Chief Complaint   Patient presents with    Abdominal Pain     Recently diagnosed with pancreatic cancer. C/o constipation. Last BM x6 days ago. Hx of COPD. Wears 3L O2 at baseline       HPI:   Ms. You is a 79yo female with a history of COPD, HLD, HTN, h/o breast cancer, and recently diagnosed metastatic pancreatic cancer who presented 1.10.2024  with abdominal pain, nausea, and emesis.  Been evaluated by Surgical Oncology and not felt to be a surgical candidate.  She is still considering chemotherapy and/or radiation therapy to prolong her life.  At this time however she has an ileus/small-bowel obstruction and has nasogastric decompression.  There is a consideration for placing a venting gastrostomy next week.    Palliative Medicine has been asked to assist with goals of care and treatment options and planning.      Hospital Course:  No notes on file    Interval History:  Met Ms. You with her son Tanamy at bedside. Eager to  return home today.    Prior 24 hour: 110 MME  Fentanyl patch 25 mcg (placed 1/21) = 50 MME  Morphine 4 mg IV x5 = 60 MME    Medications:  Continuous Infusions:      Scheduled Meds:   azithromycin  250 mg Oral Once per day on Mon Wed Fri    cyclobenzaprine  10 mg Oral TID    enoxparin  40 mg Subcutaneous Daily    famotidine  20 mg Oral BID    fentaNYL  1 patch Transdermal Q72H    fluticasone furoate-vilanteroL  1 puff Inhalation Daily    metoprolol succinate  25 mg Oral Daily    mupirocin   Topical (Top) Daily    pravastatin  40 mg Oral QHS    scopolamine  1 patch Transdermal Q3 Days    senna-docusate 8.6-50 mg  1 tablet Oral BID    topiramate  50 mg Oral QHS     PRN Meds:acetaminophen, albuterol-ipratropium, dextrose 10%, dextrose 10%, glucagon (human recombinant), glucose, glucose, haloperidol lactate, melatonin, morphine, naloxone, ondansetron, oxyCODONE, simethicone, sodium chloride 0.9%    Objective:     Vital Signs (Most Recent):  Temp: 98 °F (36.7 °C) (01/23/24 0808)  Pulse: 95 (01/23/24 0808)  Resp: 19 (01/23/24 0949)  BP: 121/67 (01/23/24 0949)  SpO2: 95 % (01/23/24 0808) Vital Signs (24h Range):  Temp:  [97 °F (36.1 °C)-98.4 °F (36.9 °C)] 98 °F (36.7 °C)  Pulse:  [82-95] 95  Resp:  [17-19] 19  SpO2:  [94 %-98 %] 95 %  BP: (119-134)/(65-77) 121/67     Weight: 64.7 kg (142 lb 10.2 oz)  Body mass index is 22.34 kg/m².       Physical Exam  Vitals and nursing note reviewed.   Constitutional:       Appearance: She is ill-appearing.   HENT:      Head: Normocephalic and atraumatic.      Right Ear: External ear normal.      Left Ear: External ear normal.      Nose: Nose normal.      Mouth/Throat:      Mouth: Mucous membranes are dry.   Eyes:      General:         Right eye: No discharge.         Left eye: No discharge.      Extraocular Movements: Extraocular movements intact.   Neck:      Comments: Trachea midline  Cardiovascular:      Rate and Rhythm: Normal rate.   Pulmonary:      Effort: Pulmonary effort is  normal.   Abdominal:      Tenderness: There is abdominal tenderness.      Comments: Venting G tube in place   Skin:     General: Skin is warm and dry.   Neurological:      Mental Status: She is alert and oriented to person, place, and time. Mental status is at baseline.   Psychiatric:         Mood and Affect: Mood is depressed. Affect is angry.            Review of Symptoms      Symptom Assessment (ESAS 0-10 Scale)  Pain:  0  Dyspnea:  0  Anxiety:  0  Nausea:  0  Depression:  0  Anorexia:  0  Fatigue:  0  Insomnia:  0  Restlessness:  0  Agitation:  0     CAM / Delirium:  Negative  Constipation:  Negative  Diarrhea:  Negative      Bowel Management Plan (BMP):  Yes      Pain Assessment:  OME in 24 hours:  104  Location(s): abdomen    Abdomen       Location: generalized        Quality: Colicky        Quantity: 10/10 in intensity        Chronicity: Onset 3 week(s) ago, unchanged        Aggravating Factors: Sitting up        Alleviating Factors: Opiates and belching        Associated Symptoms: None    Performance Status:  50    Living Arrangements:  Lives alone and Lives >50 miles from facility    Psychosocial/Cultural:   See Palliative Psychosocial Note: No   2 sons, lives in [facility].  Son in [place] is willing to take her into his home.  He is willing to care for her.  He has not sure that hospice will be accepted by his mother.  **Primary  to Follow**  Palliative Care  Consult: No    Spiritual:  F - Lilly and Belief:  Anabaptist  I - Importance:  Yes  C - Community:  No has not been involved in the [employer] since moving from [place] to [facility].  A - Address in Care:  Yes        Advance Care Planning  Advance Directives:   Living Will: Yes        Copy on chart: Yes    [place]: No    Do Not Resuscitate Status: No    Medical Power of : Yes    Agent's Name:  Hiram You   Agent's Contact Number:  [phone]    Decision Making:  Patient answered questions  Goals of  "Care: What is most important right now is to focus on remaining as independent as possible, symptom/pain control. Accordingly, we have decided that the best plan to meet the patient's goals includes continuing with treatment.         Significant Labs: All pertinent labs within the past 24 hours have been reviewed.  CBC:   Recent Labs   Lab 01/23/24 0413   WBC 12.55   HGB 12.9   HCT 41.8   MCV 91          BMP:  Recent Labs   Lab 01/23/24 0413   GLU 77   *   K 4.1   CL 98   CO2 24   BUN 19   CREATININE 0.7   CALCIUM 9.2   MG 2.0       LFT:  Lab Results   Component Value Date    AST 18 01/23/2024    ALKPHOS 87 01/23/2024    BILITOT 1.5 (H) 01/23/2024     Albumin:   Albumin   Date Value Ref Range Status   01/23/2024 2.6 (L) 3.5 - 5.2 g/dL Final     Protein:   Total Protein   Date Value Ref Range Status   01/23/2024 6.3 6.0 - 8.4 g/dL Final     Lactic acid:   Lab Results   Component Value Date    LACTATE 0.7 03/05/2020    LACTATE 1.0 03/27/2019       Significant Imaging: I have reviewed all pertinent imaging results/findings within the past 24 hours.    01/20/2024 Xray abdomen: "Percutaneous gastrostomy tube in place.  No significant distention of the stomach. Overall unchanged distribution of the bowel gas pattern with prominence of the small bowel loops. Additional evaluation, as clinically warranted."    I spent a total of 50 minutes on the day of the visit. This includes face to face time in discussion of goals of care, symptom assessment, coordination of care and emotional support. This also includes non-face to face time preparing to see the patient (eg, review of tests/imaging), obtaining and/or reviewing separately obtained history, documenting clinical information in the electronic or other health record, independently interpreting results and communicating results to the patient/family/caregiver, or care coordinator.     Melissa Toscano MD  Palliative Medicine  [facility] " Stepdown

## 2024-01-23 NOTE — PROGRESS NOTES
"Pt started opening up about the dying process.   Tearfully stating there is so much she needs to do. Pt speaks about grandson she adopted and how it is going to hurt him and how broken hearted she is about dying. Pt talked about her brother who is also dying she stated with leukemia. They havent spoken in years. Pt states she sent word to him that she loved him. Pt talks about sister that she is close to at one time but after a death in the family they all grew apart with anger. Pt stated she called her sister back and is letting her come visit. We talked awhile about letting her last days not be ones to worry about but to try to enjoy. She is hopeful about getting go to [facility] in time to at least have some things cleaned out so it wont be such a mess for her sons to do. Pt spoke of she needing to talk to GOD more. Pt stated she has lived a good life but just shocked and asked "well do they let you know how much time you may have left". Educated pt to try to keep drinking and eating when its comfortable for her to do so to keep some strength. Pt stated if she would have known she would die she would nt have had the Peg placed.     Pt does seem to have less pain when peg is unclamped. Pt talked with Son in Room about the Hospital bed and how long it may be before they could try to make the trip to [facility].   Pt stated to me "I love ya" and "thank you for talking and spending a little time with me".  "

## 2024-01-23 NOTE — CHAPLAIN
"Palliative Care     Patient: Radha You  MRN: [id number]  : 1945  Age: 78 y.o.  Hospital Length of Stay: 13  Code Status: Code Status Discussion Note  Attending Provider: Jada Olivas MD  Principal Problem: Partial small bowel obstruction    Non-clinical observations of patient/room: Visited pall med pt and her son, Tanmay, bedside. Nurse was in the room most the time as well giving meds to pt; Pt was awake, alert and communicative; she was in pain, but nurse was just giving her pain meds; 25 min visit    The dispo plan per pt is she will be d/c to Tanmay's home and stay there for about a week, then they will go to their "camp" in [facility]. Pt's biggest concern is "getting rid of all her stuff"; Without prompting pt spoke of her impending death--"I wish I knew sooner that I was dying or I'd never have gotten the PEG." Pt also concerned about her family's grief. She has two sons, one here and one in [place], 5 grand kids and two great grands.     Pt confirmed that Fr. Flores had visited her last week and she welcomed me to pray with/for/over her and did so, followed by the Our Father prayer; Pt teary, but appreciative.     Gave son Tanmay my card and reminded him where the chapel is. Palliative  will continue to follow while pt still admitted. Lord, in your mercy.    **Spiritual Care Dept. Chaplains are available evenings, overnight and weekends **    In Peace,    Rev. Teresa Owens MDiv, [facility]  Board Certified   Palliative Medicine Department  [phone]  "

## 2024-01-23 NOTE — PLAN OF CARE
CHW met with patient/family at bedside. Patient experience rounding completed and reviewed the following.     Do you know your discharge plan? Yes,    If yes, what is the plan? Home    Have you discussed your needs and preferences with your SW/CM? Yes    If you are discharging home, do you have help at home? Yes    Do you think you will need help additional at home at discharge? Yes     Do you currently have difficulty keeping up with bills, affording medicine or buying food? No    Assigned SW/CM notified of any patient/family needs or concerns. Appropriate resources provided to address patient's needs.

## 2024-01-23 NOTE — PROGRESS NOTES
[facility] - Telemetry Stepdown  Adult Nutrition  Progress Note    SUMMARY       Recommendations    Please re-consult if further nutrition intervention warranted.    Goals: Meet % EEN, EPN by RD f/u date  Nutrition Goal Status: progressing towards goal  Communication of RD Recs: reviewed with physician    Reason for Assessment    Reason For Assessment: RD follow-up  Diagnosis: other (see comments) (SBO)  Relevant Medical History: HTN, HLD, Ca  Interdisciplinary Rounds: did not attend    General Information Comments: Pt continues w/ poor appetite - poor prognosis noted (palliative care following). S/p venting PEG placement 1/19.    Nutrition Follow-Up    RD Follow-up?: No

## 2024-01-23 NOTE — PLAN OF CARE
Problem: Adult Inpatient Plan of Care  Goal: Plan of Care Review  Outcome: Ongoing, Progressing  Goal: Patient-Specific Goal (Individualized)  Outcome: Ongoing, Progressing  Goal: Absence of Hospital-Acquired Illness or Injury  Outcome: Ongoing, Progressing  Goal: Optimal Comfort and Wellbeing  Outcome: Ongoing, Progressing  Goal: Readiness for Transition of Care  Outcome: Ongoing, Progressing     Problem: Infection  Goal: Absence of Infection Signs and Symptoms  Outcome: Ongoing, Progressing     Problem: Skin Injury Risk Increased  Goal: Skin Health and Integrity  Outcome: Ongoing, Progressing     Problem: Coping Ineffective  Goal: Effective Coping  Outcome: Ongoing, Progressing     Problem: Fall Injury Risk  Goal: Absence of Fall and Fall-Related Injury  Outcome: Ongoing, Progressing     Problem: Pain Acute  Goal: Acceptable Pain Control and Functional Ability  Outcome: Ongoing, Progressing

## 2024-01-23 NOTE — PROGRESS NOTES
Pt having pain to abdomen. Requested break through pain medication from team. Will place order to give morphine early.

## 2024-01-23 NOTE — PLAN OF CARE
[facility] - Telemetry Stepdown      HOME HEALTH ORDERS  FACE TO FACE ENCOUNTER    Patient Name: Radha You  YOB: 1945    PCP: Sapphire Ruffin FNP-C   PCP Address: [address] [place] [address]  PCP Phone Number: [phone]  PCP Fax: [phone]    Encounter Date: 1/9/24    Admit to Home Health    Diagnoses:  Active Hospital Problems    Diagnosis  POA    *Partial small bowel obstruction [K56.600]  Yes    Pain [R52]  Yes    Small bowel stricture [K56.699]  Yes    Reflux esophagitis [K21.00]  Yes    Pancreatic adenocarcinoma [C25.9]  Yes     Chronic    Chronic respiratory failure with hypoxia [J96.11]  Yes     Chronic    Centrilobular emphysema [J43.2]  Yes     Chronic    Moderate malnutrition [E44.0]  Yes    Hyperlipidemia [E78.5]  Yes     Chronic    Essential hypertension [I10]  Yes     Chronic    Palliative care encounter [Z51.5]  Not Applicable    Carcinoma of right breast in female, estrogen receptor positive [C50.911, Z17.0]  Not Applicable     Chronic     Wire localized partial mastectomy and level 1 and 2 axillary lymph node dissection 6/29/15  ER strongly positive, DC negative, and HER-2 roe negative         Resolved Hospital Problems   No resolved problems to display.       Follow Up Appointments:  No future appointments.    Allergies:  Review of patient's allergies indicates:   Allergen Reactions    Adhesive Rash     Use PAPER Tape / TEGADERM    Levaquin [levofloxacin] Diarrhea and Nausea Only    Penicillins Rash       Medications: Review discharge medications with patient and family and provide education.    Current Facility-Administered Medications   Medication Dose Route Frequency Provider Last Rate Last Admin    acetaminophen tablet 650 mg  650 mg Oral Q8H PRN Melchor Singh MD   650 mg at 01/23/24 0950    albuterol-ipratropium 2.5 mg-0.5 mg/3 mL nebulizer solution 3 mL  3 mL Nebulization Q4H PRN Melchor Singh MD   3 mL at 01/17/24 1718    azithromycin tablet 250 mg   250 mg Oral Once per day on Mon Wed Fri Melchor Singh MD   250 mg at 01/22/24 0831    cyclobenzaprine tablet 10 mg  10 mg Oral TID Melchor Singh MD   10 mg at 01/23/24 0950    dextrose 10% bolus 125 mL 125 mL  12.5 g Intravenous PRN Melchor Singh MD   Stopped at 01/20/24 1816    dextrose 10% bolus 250 mL 250 mL  25 g Intravenous PRN Melchor Singh MD   Stopped at 01/12/24 0843    enoxaparin injection 40 mg  40 mg Subcutaneous Daily Melchor Singh MD   40 mg at 01/21/24 1650    famotidine tablet 20 mg  20 mg Oral BID Jada Olivas MD   20 mg at 01/23/24 0954    fentaNYL 25 mcg/hr 1 patch  1 patch Transdermal Q72H Jada Olivas MD   1 patch at 01/21/24 1411    fluticasone furoate-vilanteroL 200-25 mcg/dose diskus inhaler 1 puff  1 puff Inhalation Daily Navid Kate MD   1 puff at 01/23/24 0808    glucagon (human recombinant) injection 1 mg  1 mg Intramuscular PRN Melchor Singh MD        glucose chewable tablet 16 g  16 g Oral PRN Melchor Sinhg MD        glucose chewable tablet 24 g  24 g Oral PRN Melchor Singh MD        haloperidol lactate injection 2 mg  2 mg Intravenous Q6H PRN Melchor Singh MD   2 mg at 01/12/24 0434    melatonin tablet 6 mg  6 mg Oral Nightly PRN Melchor Singh MD        metoprolol succinate (TOPROL-XL) 24 hr tablet 25 mg  25 mg Oral Daily Melchor Singh MD   25 mg at 01/23/24 0949    morphine injection 4 mg  4 mg Intravenous Q3H PRN Danielle Aguilar NP   4 mg at 01/23/24 0635    mupirocin 2 % ointment   Topical (Top) Daily Jada Olivas MD   Given at 01/23/24 0948    naloxone 0.4 mg/mL injection 0.02 mg  0.02 mg Intravenous PRN Melchor Singh MD        ondansetron injection 4 mg  4 mg Intravenous Q6H PRN Navid Kate MD   4 mg at 01/22/24 1109    oxyCODONE immediate release tablet Tab 10 mg  10 mg Oral Q4H PRN Melissa Toscano MD   10 mg at 01/23/24 0953    pravastatin tablet 40 mg  40 mg Oral QHS Melchor Singh MD   40 mg at  01/22/24 2132    scopolamine 1.3-1.5 mg (1 mg over 3 days) 1 patch  1 patch Transdermal Q3 Days Melchor Singh MD   1 patch at 01/19/24 0626    senna-docusate 8.6-50 mg per tablet 1 tablet  1 tablet Oral BID Navid Kate MD   1 tablet at 01/23/24 0950    simethicone chewable tablet 80 mg  1 tablet Oral QID PRN Melchor Singh MD        sodium chloride 0.9% flush 10 mL  10 mL Intravenous Q12H PRN Melchor Singh MD        topiramate tablet 50 mg  50 mg Oral QHS Melchor Singh MD   50 mg at 01/22/24 2133     Current Discharge Medication List        START taking these medications    Details   dextrose (GLUCOSE GEL) 40 % gel Take 37.5 mLs (15,000 mg total) by mouth daily as needed (hypoglycemia).  Qty: 112.5 g, Refills: 3      fentaNYL (DURAGESIC) 25 mcg/hr Place 1 patch onto the skin every 72 hours.  Qty: 10 patch, Refills: 0    Comments: Quantity prescribed more than 7 day supply? Yes, quantity medically necessary      naloxone (NARCAN) 4 mg/actuation Spry 1 spray (4 mg total) by Nasal route once. for 1 dose  Qty: 2 each, Refills: 0      ondansetron (ZOFRAN-ODT) 4 MG TbDL Take 1 tablet (4 mg total) by mouth every 6 (six) hours as needed (nauea).  Qty: 30 tablet, Refills: 11      scopolamine (TRANSDERM-SCOP) 1.3-1.5 mg (1 mg over 3 days) Place 1 patch onto the skin Every 3 (three) days.  Qty: 10 patch, Refills: 11      senna-docusate 8.6-50 mg (PERICOLACE) 8.6-50 mg per tablet Take 1 tablet by mouth 2 (two) times daily.  Qty: 60 tablet, Refills: 11      simethicone (MYLICON) 80 MG chewable tablet Take 1 tablet (80 mg total) by mouth 4 (four) times daily as needed for Flatulence.  Qty: 30 tablet, Refills: 11           CONTINUE these medications which have CHANGED    Details   albuterol (PROVENTIL/VENTOLIN HFA) 90 mcg/actuation inhaler Inhale 2 puffs into the lungs every 6 (six) hours as needed for Wheezing or Shortness of Breath.  Qty: 18 g, Refills: 3      cyclobenzaprine (FLEXERIL) 10 MG tablet Take 1  tablet (10 mg total) by mouth 3 (three) times daily.  Qty: 90 tablet, Refills: 11      metoprolol succinate (TOPROL-XL) 25 MG 24 hr tablet Take 1 tablet (25 mg total) by mouth once daily.  Qty: 30 tablet, Refills: 11    Comments: .      MYRBETRIQ 25 mg Tb24 ER tablet Take 1 tablet (25 mg total) by mouth once daily.  Qty: 30 tablet, Refills: 11      oxyCODONE (ROXICODONE) 10 mg Tab immediate release tablet Take 1 tablet (10 mg total) by mouth every 3 (three) hours as needed for Pain.  Qty: 56 tablet, Refills: 0    Comments: Quantity prescribed more than 7 day supply? No      pantoprazole (PROTONIX) 40 MG tablet Take 1 tablet (40 mg total) by mouth once daily.  Qty: 30 tablet, Refills: 11      pravastatin (PRAVACHOL) 40 MG tablet Take 1 tablet (40 mg total) by mouth every evening.  Qty: 90 tablet, Refills: 3      topiramate (TOPAMAX) 50 MG tablet Take 1 tablet (50 mg total) by mouth every evening.  Qty: 30 tablet, Refills: 11           CONTINUE these medications which have NOT CHANGED    Details   albuterol-ipratropium (DUO-NEB) 2.5 mg-0.5 mg/3 mL nebulizer solution Take 3 mLs by nebulization every 6 (six) hours as needed.      TRELEGY ELLIPTA 200-62.5-25 mcg inhaler Inhale 1 puff into the lungs once daily.           STOP taking these medications       azithromycin (Z-SABINE) 250 MG tablet Comments:   Reason for Stopping:         azithromycin (ZITHROMAX) 500 MG tablet Comments:   Reason for Stopping:         calcium carbonate (OS-JENNI) 600 mg calcium (1,500 mg) Tab Comments:   Reason for Stopping:         DALIRESP 500 mcg Tab Comments:   Reason for Stopping:         losartan (COZAAR) 25 MG tablet Comments:   Reason for Stopping:         montelukast (SINGULAIR) 10 mg tablet Comments:   Reason for Stopping:         ondansetron (ZOFRAN) 4 MG tablet Comments:   Reason for Stopping:         oxyCODONE-acetaminophen (PERCOCET)  mg per tablet Comments:   Reason for Stopping:         oxyCODONE-acetaminophen (PERCOCET) 5-325  mg per tablet Comments:   Reason for Stopping:                 I have seen and examined this patient within the last 30 days. My clinical findings that support the need for the home health skilled services and home bound status are the following:no   Weakness/numbness causing balance and gait disturbance due to Malignancy/Cancer making it taxing to leave home.     Diet:   regular diet    Labs:  None    Referrals/ Consults  Physical Therapy to evaluate and treat. Evaluate for home safety and equipment needs; Establish/upgrade home exercise program. Perform / instruct on therapeutic exercises, gait training, transfer training, and Range of Motion.  Occupational Therapy to evaluate and treat. Evaluate home environment for safety and equipment needs. Perform/Instruct on transfers, ADL training, ROM, and therapeutic exercises.   to evaluate for community resources/long-range planning.  Aide to provide assistance with personal care, ADLs, and vital signs.    Activities:   activity as tolerated    Nursing:   Agency to admit patient within 24 hours of hospital discharge unless specified on physician order or at patient request    SN to complete comprehensive assessment including routine vital signs. Instruct on disease process and s/s of complications to report to MD. Review/verify medication list sent home with the patient at time of discharge  and instruct patient/caregiver as needed. Frequency may be adjusted depending on start of care date.     Skilled nurse to perform up to 3 visits PRN for symptoms related to diagnosis    Notify MD if SBP > 160 or < 90; DBP > 90 or < 50; HR > 120 or < 50; Temp > 101; O2 < 88%; Other:       Ok to schedule additional visits based on staff availability and patient request on consecutive days within the home health episode.    When multiple disciplines ordered:    Start of Care occurs on Sunday - Wednesday schedule remaining discipline evaluations as ordered on separate  consecutive days following the start of care.    Thursday SOC -schedule subsequent evaluations Friday and Monday the following week.     Friday - Saturday SOC - schedule subsequent discipline evaluations on consecutive days starting Monday of the following week.    For all post-discharge communication and subsequent orders please contact patient's primary care physician. If unable to reach primary care physician or do not receive response within 30 minutes, please contact PCP for clinical staff order clarification    Miscellaneous   PEG Care:  Instruct patient/caregiver to clean site.  Monitor skin integrity. Vent GT as needed for abdominal comfort  Routine Skin for Bedridden Patients: Instruct patient/caregiver to apply moisture barrier cream to all skin folds and wet areas in perineal area daily and after baths and all bowel movements.  Home Oxygen:  Oxygen at 2 L/min nasal canula to be used:  Continuously.  Keep staples until NSY followup. Clean with soap and water daily.     Home Health Aide:  Nursing Three times weekly, Physical Therapy Three times weekly, Occupational Therapy Three times weekly, Medical Social Work Three times weekly, Respiratory Therapy Three times weekly, and Home Health Aide Three times weekly    Wound Care Orders  no    I certify that this patient is confined to her home and needs intermittent skilled nursing care, physical therapy, and occupational therapy.

## 2024-01-23 NOTE — ASSESSMENT & PLAN NOTE
"01.23.24  Met with Ms. You and her son Tanmay at bedside. Tanmay helped clarify that plans are to go his home today once the hospital bed is set up through home health. This will be the midway journey until she can return to her second home that they endearingly refer to as "camp" and then will use home hospice services ([facility] home-based palliative care program will be transitioned to home hospice) once she settles back home. She expresses a lot of frustration and confusion about this plan, Tanmay patiently and kindly explained and offered her even more explanation after our talk. Discussed pain regimen, including need to clamp venting PEG tube when she takes in her pain medicine to optimize absorption. Will continue fentanyl patch that was started a couple of days ago.    01.22.24  Met with Ms. You, alone at bedside. She tells me that she doesn't feel well and just asked the RN for a pain medicine dose. I noted that I saw multiple doses of IV pain medicine, but no oral medicines. I asked her if the oral medicines aren't working or if she's too nauseous to take it in. She says that the oral medicines work, but feels that it's ok to continue IV pain medicines until she has to go home. She is eager to return home. She confirms for me that plans are to see her cancer doctor before making any final decisions about pursuing cancer-directed therapies vs utilizing hospice support. I asked her how she feels about this, and she tells me that she's done having these conversations now, and will continue this at clinic. Validated how frustrating this has been, and how difficult it has to be to confront her diagnosis/prognosis with conversations about the future that seem uncontrollable. She verifies that she will be using home palliative services when she returns home in [place], [place].    01.21.24, Dr. Zarco:  Met with patient and multiple family members (3) at bedside. Mrs. You states that she " understands that is just going to have to die slowly. She expressed that she doesn't need to go back to [facility] as she can just die here too, in context of discharging to local son's house for a few days prior to driving back to [facility] area. Patient's son expressing concern about venting g tube output as well as that the IV medication only lasting for at most 45 minutes. Patient's son spoke about other options as mentioned by Dr. Ibarra including oral soln, patches, and IV pumps. Discussed some of these are available through hospice care. Patient's son states patient is not in a mental state to make any decisions regarding hospice care at this time. Patient's family report that patient's mentation changes with pain medications and she will state things that are not like herself or do not make sense in the context of the ongoing conversation.     01.17.2024  Ms. You shared that she believes she made her mind up regarding venting gastrostomy, which she is agreeable. She was also told that if not needed, then they don't have to proceed, which she would be accepting of as well. Also not sure if they will follow with oncology. I asked if she'd keep me in the loop about this decision as it will help me know who to communicate in terms of appropriate next steps and she said she would. Asked me to leave my card, which I did at her bedside table.    01.15.2024  No change in plan of care.  No family in room.  Decision regarding venting gastrostomy need to be made.  Decision also on when IR would be available if they are agreeable to doing it.  Need to revisit code status when all family is available and discharge plans with hospice.    Please see prior documentation by Palliative Medicine for ACP conversations previously held by Dr. Ibarra during this admission.

## 2024-01-23 NOTE — ASSESSMENT & PLAN NOTE
PainIs multifactorial related to her previous spinal surgery.  She does have a spinal stimulator in place.  She does use a lidocaine patch.  Normally at home she takes Percocet as needed and sleeps on the couch. GLORIA GARCIA reviewed, prescribed Percocet 10 q6h PRN by provider in [place], [place].  - Started fentanyl 25 mcg/hr TD patch on 1/21/24  - Continue morphine 4 mg IV q3h PRN severe pain, unable to tolerate PO  - Continue oxycodone 10 mg PO q4h PRN severe pain if able to tolerate PO

## 2024-01-23 NOTE — PLAN OF CARE
[facility] - Telemetry Stepdown  Discharge Final Note    SW observed d/c orders for pt. Ordered [facility] transport via ambulance for today at 1730 to pt's home. Pt has been accepted by [facility] for [facility]. SW informed pt/family of transport ETA and they are agreeable. All questions answered.    Primary Care Provider: Sapphire Ruffin FNP-C    Expected Discharge Date: 1/23/2024    Final Discharge Note (most recent)       Final Note - 01/23/24 1702          Post-Acute Status    Post-Acute Authorization HME;Home Health     HME Status Set-up Complete/Auth obtained     Home Health Status Set-up Complete/Auth obtained     Hospice Status --     Discharge Delays None known at this time                     Important Message from Medicare  Important Message from Medicare regarding Discharge Appeal Rights: Given to patient/caregiver, Explained to patient/caregiver, Signed/date by patient/caregiver     Date IMM was signed: 01/23/24  Time IMM was signed: 1114    Contact Info       Sapphire Ruffin FNP-C   Specialty: Family Medicine   Relationship: PCP - General    [address] [place] [id number]   Phone: [phone]       Next Steps: Follow up on 1/26/2024    Instructions: Established pt's hospital f/u visit @ 1:30pm. Please bring discharge summary, ID, insurance card, and medication list.    [facility]   Specialty: Hospice and Palliative Medicine    [address] [address]   Phone: [phone]       Next Steps: Follow up          ARIEL Mccurdy, LMSW    Case Management Department  [facility]

## 2024-01-23 NOTE — SUBJECTIVE & OBJECTIVE
Interval History:  Met Ms. You with her son Tanmay at bedside. Eager to return home today.    Prior 24 hour: 110 MME  Fentanyl patch 25 mcg (placed 1/21) = 50 MME  Morphine 4 mg IV x5 = 60 MME    Medications:  Continuous Infusions:      Scheduled Meds:   azithromycin  250 mg Oral Once per day on Mon Wed Fri    cyclobenzaprine  10 mg Oral TID    enoxparin  40 mg Subcutaneous Daily    famotidine  20 mg Oral BID    fentaNYL  1 patch Transdermal Q72H    fluticasone furoate-vilanteroL  1 puff Inhalation Daily    metoprolol succinate  25 mg Oral Daily    mupirocin   Topical (Top) Daily    pravastatin  40 mg Oral QHS    scopolamine  1 patch Transdermal Q3 Days    senna-docusate 8.6-50 mg  1 tablet Oral BID    topiramate  50 mg Oral QHS     PRN Meds:acetaminophen, albuterol-ipratropium, dextrose 10%, dextrose 10%, glucagon (human recombinant), glucose, glucose, haloperidol lactate, melatonin, morphine, naloxone, ondansetron, oxyCODONE, simethicone, sodium chloride 0.9%    Objective:     Vital Signs (Most Recent):  Temp: 98 °F (36.7 °C) (01/23/24 0808)  Pulse: 95 (01/23/24 0808)  Resp: 19 (01/23/24 0949)  BP: 121/67 (01/23/24 0949)  SpO2: 95 % (01/23/24 0808) Vital Signs (24h Range):  Temp:  [97 °F (36.1 °C)-98.4 °F (36.9 °C)] 98 °F (36.7 °C)  Pulse:  [82-95] 95  Resp:  [17-19] 19  SpO2:  [94 %-98 %] 95 %  BP: (119-134)/(65-77) 121/67     Weight: 64.7 kg (142 lb 10.2 oz)  Body mass index is 22.34 kg/m².       Physical Exam  Vitals and nursing note reviewed.   Constitutional:       Appearance: She is ill-appearing.   HENT:      Head: Normocephalic and atraumatic.      Right Ear: External ear normal.      Left Ear: External ear normal.      Nose: Nose normal.      Mouth/Throat:      Mouth: Mucous membranes are dry.   Eyes:      General:         Right eye: No discharge.         Left eye: No discharge.      Extraocular Movements: Extraocular movements intact.   Neck:      Comments: Trachea midline  Cardiovascular:      Rate  and Rhythm: Normal rate.   Pulmonary:      Effort: Pulmonary effort is normal.   Abdominal:      Tenderness: There is abdominal tenderness.      Comments: Venting G tube in place   Skin:     General: Skin is warm and dry.   Neurological:      Mental Status: She is alert and oriented to person, place, and time. Mental status is at baseline.   Psychiatric:         Mood and Affect: Mood is depressed. Affect is angry.            Review of Symptoms      Symptom Assessment (ESAS 0-10 Scale)  Pain:  0  Dyspnea:  0  Anxiety:  0  Nausea:  0  Depression:  0  Anorexia:  0  Fatigue:  0  Insomnia:  0  Restlessness:  0  Agitation:  0     CAM / Delirium:  Negative  Constipation:  Negative  Diarrhea:  Negative      Bowel Management Plan (BMP):  Yes      Pain Assessment:  OME in 24 hours:  104  Location(s): abdomen    Abdomen       Location: generalized        Quality: Colicky        Quantity: 10/10 in intensity        Chronicity: Onset 3 week(s) ago, unchanged        Aggravating Factors: Sitting up        Alleviating Factors: Opiates and belching        Associated Symptoms: None    Performance Status:  50    Living Arrangements:  Lives alone and Lives >50 miles from facility    Psychosocial/Cultural:   See Palliative Psychosocial Note: No   2 sons, lives in [facility].  Son in [place] is willing to take her into his home.  He is willing to care for her.  He has not sure that hospice will be accepted by his mother.  **Primary  to Follow**  Palliative Care  Consult: No    Spiritual:  F - Lilly and Belief:  Spiritism  I - Importance:  Yes  C - Community:  No has not been involved in the [employer] since moving from [place] to [facility].  A - Address in Care:  Yes        Advance Care Planning   Advance Directives:   Living Will: Yes        Copy on chart: Yes    [place]: No    Do Not Resuscitate Status: No    Medical Power of : Yes    Agent's Name:  Hiram Clarkonald   Agent's Contact Number:  " [phone]    Decision Making:  Patient answered questions  Goals of Care: What is most important right now is to focus on remaining as independent as possible, symptom/pain control. Accordingly, we have decided that the best plan to meet the patient's goals includes continuing with treatment.         Significant Labs: All pertinent labs within the past 24 hours have been reviewed.  CBC:   Recent Labs   Lab 01/23/24 0413   WBC 12.55   HGB 12.9   HCT 41.8   MCV 91          BMP:  Recent Labs   Lab 01/23/24 0413   GLU 77   *   K 4.1   CL 98   CO2 24   BUN 19   CREATININE 0.7   CALCIUM 9.2   MG 2.0       LFT:  Lab Results   Component Value Date    AST 18 01/23/2024    ALKPHOS 87 01/23/2024    BILITOT 1.5 (H) 01/23/2024     Albumin:   Albumin   Date Value Ref Range Status   01/23/2024 2.6 (L) 3.5 - 5.2 g/dL Final     Protein:   Total Protein   Date Value Ref Range Status   01/23/2024 6.3 6.0 - 8.4 g/dL Final     Lactic acid:   Lab Results   Component Value Date    LACTATE 0.7 03/05/2020    LACTATE 1.0 03/27/2019       Significant Imaging: I have reviewed all pertinent imaging results/findings within the past 24 hours.    01/20/2024 Xray abdomen: "Percutaneous gastrostomy tube in place.  No significant distention of the stomach. Overall unchanged distribution of the bowel gas pattern with prominence of the small bowel loops. Additional evaluation, as clinically warranted."  "

## 2024-01-24 NOTE — ASSESSMENT & PLAN NOTE
Small bowel stricture   NG tube placed. Scopolamine patch, prn antiemetics. Gastroenterology consulted to place venting gastrostomy. PT and OT for debility. Appreciate Palliative Care, who gave dexamethasone trial. Started senna-docusate. Advance to full liquids.   - Patient agreeable to venting Gtube placement, s/p placement 1/19  - vent GT as needed, clamp for  meds and oral intake

## 2024-01-24 NOTE — DISCHARGE SUMMARY
[facility] Medicine  Discharge Summary      Patient Name: Radha You  MRN: [id number]  [facility]: [id number]  Patient Class: IP- Inpatient  Admission Date: 1/9/2024  Hospital Length of Stay: 13 days  Discharge Date and Time: 1/23/2024  7:38 PM  Attending Physician: Sherice att. 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 She lives in [place], [place]. She is . Her hematologist-oncologist is Dr. Charles Us.               She was seen at [facility] Hematology-Oncology clinic on 1/4/2024 for her pancreatic adenocarcinoma, which was found incidentally during a bronchoscopy and biopsied by endoscopic ultrasound at [facility]. She was offered palliative chemotherapy and declined it.               She presented to [facility] Emergency Department on 1/9/2024 with abdominal pain for 3 to 4 days, associated with severe constipation with no bowel movement for several days despite over-the-counter laxatives, including suppositories. She was able to pass a small bowel movement earlier on the day of presentation but still felt constipated. She also had increase in pain in abdominal pain associated with  nausea and vomiting causing inability to tolerate oral intake.  In the emergency department, heart rate was 115 bpm. Labs showed mild leukocytosis. CT of the abdomen and pelvis with contrast revealed the known pancreatic tail mass, abutting and possibly invading the spleen with new geographic region of decreased enhancement in the lower splenic pole, suspicious for splenic infarct. There were multiple small bowel strictures suggested, possibly on the basis of metastatic disease that is possibly associated with low-grade or partial small bowel obstruction versus ileus. General Surgery was consulted and brought up the possibility of a palliative gastrostomy for venting as a treatment option. She deferred and opted for nasogastric decompression. She was given a liter of lactated Ringer's solution, 4 mg of intravenous morphine, and 4 mg of intravenous ondansetron. She was admitted to Hospital Medicine Team S.    Procedure(s) (LRB):  INSERTION, PEG TUBE (N/A)      Hospital Course:   She was kept NPO. General Surgery followed. She was put on lactated Ringer's solution at 75 mL/hr. She was given scopolamine patch. General Surgery signed off and recommended consulting Gastroenterology. Her urine was dark so fluids were increased to 100 mL/hr. After discussing the likelihood of recurrence of her acute problem due to the intestinal strictures, Gastroenterology was consulted on 1/12/2024 for venting gastrostomy. She was still reluctant so Palliative Care was consulted to discuss goals of care. Physical and Occupational Therapy were consulted when she reported that she had not gotten out of the hospital bed since being admitted. Famotidine was started for the esophagitis diagnosed on admission as well as to prevent esophageal ulcer due to having a nasogastric tube. Palliative Care had a long discussion with her and recommended a trial of dexamethasone to see if it would decrease intestinal swelling. Abdominal X-ray showed mid  increase in bowel distension and recurrence of the nasogastric tube tip in the esophagus despite having been advanced 2 nights prior. The tube was advanced again. On 1/15/2024, abdominal X-ray showed resolution of bowel distension. She was started on senna-docusate twice daily and given bisacodyl suppository. She started having bowel movements. She tolerated clear liquid diet.     Agreeable to Gtube placement for venting, placed 1/19. Palliative following. Patient and family still undecided about hospice versus consideration for continued treatment with oncology, awaiting other family member to arrive for discussion.   Patient and family want to go home with palliative care f/u. Working on pain control prior to discharge, palliative recommend fentanyl patch. Venting GT as needed, clamp for meds and oral intake. Pain regimen adjusted.   Stable for discharge with palliative home f/u.      Goals of Care Treatment Preferences:  Code Status: Full Code    Health care agent: Hiram You  [facility] agent number: [phone]    Living Will: Yes     What is most important right now is to focus on remaining as independent as possible, symptom/pain control.  Accordingly, we have decided that the best plan to meet the patient's goals includes continuing with treatment.      Consults:   Consults (From admission, onward)          Status Ordering Provider     [facility]  Once        Provider:  (Not yet assigned)    Completed DELIA HERZOG A     Inpatient consult to Palliative Care  Once        Provider:  (Not yet assigned)    Completed ABAARNALDO BELLIN A     Inpatient consult to Hematology/Oncology  Once        Provider:  (Not yet assigned)    Completed ABAARNALDO BELLIN A     Inpatient consult to Gastroenterology  Once        Provider:  (Not yet assigned)    Completed ARNALDO HERZOGIN A     Inpatient consult to General Surgery  Once        Provider:  (Not yet assigned)    Completed DARIUS VALADEZ             Pulmonary  Chronic respiratory failure with hypoxia  Patient with a history of chronic hypoxic respiratory failure that is on 1-3 liters of oxygen from her COPD.    Centrilobular emphysema  Continue home azithromycin 250 mg three times weekly (MWF), albuterol-ipratropium nebulizers PRN.    Cardiac/Vascular  Hyperlipidemia  Continue home pravastatin 40 mg daily.      Essential hypertension  Continue home metoprolol succinate 25 mg daily. Monitor blood pressures.    Oncology  Pancreatic adenocarcinoma  Being followed in Oncology clinic here. Was offered palliative chemotherapy. She wants a follow-up to discuss prognosis and current treatment options. Consulted Oncology: Per oncology: Patient was seen in oncology clinic on 1/4. Discussed diagnosis at bedside with patient and family today. Per 1/4 clinic note, treatment being offered by GI oncologist Dr. Us will consist of gemcitabine and abraxane. Family has been given handouts on the proposed medications. They have good understanding of the poor prognosis associated with metastatic pancreatic cancer. Patient would like to get over her current hospital issues before deciding on pursuing chemotherapy. She feels additional information is overwhelming at this time. We also briefly discussed that TPN has not been shown to improve prognosis in metastatic pancreatic cancer. Patient and family were thankful for bedside discussion. They have been instructed to call clinic with further questions or if further follow up is desired   - patient and family decided want to go home with palliative followup, not hospice  - discharge with home palliative       Carcinoma of right breast in female, estrogen receptor positive  History of breast cancer. Patient previously had lumpectomy and chemoradiation.      Endocrine  Moderate malnutrition  Nutrition consulted. Most recent weight and BMI monitored-     Measurements:  Wt Readings from Last 1 Encounters:   01/21/24 64.7  kg (142 lb 10.2 oz)   Body mass index is 22.34 kg/m².    Patient has been screened and assessed by RD.    Malnutrition Type:  Context: acute illness or injury  Level: moderate    Malnutrition Characteristic Summary:  Weight Loss (Malnutrition): 5% in 1 month  Energy Intake (Malnutrition): less than 75% for greater than 7 days  Subcutaneous Fat (Malnutrition): moderate depletion  Muscle Mass (Malnutrition): moderate depletion    Interventions/Recommendations (treatment strategy):  1.  Cont PO full liquids as tolerated   - clamp venting VT for meds and oral intake     GI  * Partial small bowel obstruction  Small bowel stricture   NG tube placed. Scopolamine patch, prn antiemetics. Gastroenterology consulted to place venting gastrostomy. PT and OT for debility. Appreciate Palliative Care, who gave dexamethasone trial. Started senna-docusate. Advance to full liquids.   - Patient agreeable to venting Gtube placement, s/p placement 1/19  - vent GT as needed, clamp for  meds and oral intake     Reflux esophagitis  Started famotidine.    Small bowel stricture  - noted 2/2 to pancreatic cancer       Palliative Care  Palliative care encounter  - palliative care following      Other  Pain  - MM pain regimen as needed  - started on fentanyl patch per palliative recs        Final Active Diagnoses:    Diagnosis Date Noted POA    PRINCIPAL PROBLEM:  Partial small bowel obstruction [K56.600] 01/10/2024 Yes    Pain [R52] 01/13/2024 Yes    Small bowel stricture [K56.699] 01/10/2024 Yes    Reflux esophagitis [K21.00] 01/10/2024 Yes    Pancreatic adenocarcinoma [C25.9] 12/12/2023 Yes     Chronic    Chronic respiratory failure with hypoxia [J96.11] 04/06/2022 Yes     Chronic    Centrilobular emphysema [J43.2]  Yes     Chronic    Moderate malnutrition [E44.0] 03/11/2020 Yes    Hyperlipidemia [E78.5] 03/07/2020 Yes     Chronic    Essential hypertension [I10] 03/07/2020 Yes     Chronic    Palliative care encounter [Z51.5]  Not Applicable  "   Carcinoma of right breast in female, estrogen receptor positive [C50.911, Z17.0] 06/29/2015 Not Applicable     Chronic      Problems Resolved During this Admission:       Discharged Condition: fair    Disposition: Home or Self Care    Follow Up:   Follow-up Information       Sapphire Ruffin FNP-C Follow up on 1/26/2024.    Specialty: Family Medicine  Why: Established 's hospital f/u visit @ 1:30pm. Please bring discharge summary, ID, insurance card, and medication list.  Contact information:  [address] [facility] [address] [phone]               [place] [place] [facility]    Specialty: Hospice and Palliative Medicine  Contact information:  [address] [id number] [phone]                           Patient Instructions:      HOSPITAL BED FOR HOME USE     Order Specific Question Answer Comments   Type: Semi-electric    Length of need (1-99 months): 99    Does patient have medical equipment at home? bedside commode transport wheelchair / transport wheelchair / transport wheelchair / transport wheelchair / transport wheelchair / transport wheelchair   Does patient have medical equipment at home? bath bench    Does patient have medical equipment at home? cane, straight    Does patient have medical equipment at home? oxygen    Does patient have medical equipment at home? walker, rolling    Does patient have medical equipment at home? other (see comments)    Height: 5' 7" (1.702 m)    Weight: 66.8 kg (147 lb 4.3 oz)    Accessories: Gel overlay mattress    Please check all that apply: Patient requires positioning of the body in ways not feasible in an ordinary bed due to a medical condition which is expected to last at least one month.    Vendor: Other (use comments)    CRM Resolution Date: 1/19/2024      COMMODE FOR HOME USE     Order Specific Question Answer Comments   Type: Standard    Height: 5' 7" (1.702 m)    Weight: 64.7 kg (142 lb 10.2 oz)  " "  Does patient have medical equipment at home? bedside commode transport wheelchair / transport wheelchair / transport wheelchair / transport wheelchair / transport wheelchair / transport wheelchair   Does patient have medical equipment at home? bath bench    Does patient have medical equipment at home? cane, straight    Does patient have medical equipment at home? oxygen    Does patient have medical equipment at home? walker, rolling    Does patient have medical equipment at home? other (see comments)    Length of need (1-99 months): 99    Vendor: [employer]    CRM Resolution Date: 1/23/2024      BATH/SHOWER CHAIR FOR HOME USE     Order Specific Question Answer Comments   Height: 5' 7" (1.702 m)    Weight: 64.7 kg (142 lb 10.2 oz)    Does patient have medical equipment at home? bedside commode transport wheelchair / transport wheelchair / transport wheelchair / transport wheelchair / transport wheelchair / transport wheelchair   Does patient have medical equipment at home? bath bench    Does patient have medical equipment at home? cane, straight    Does patient have medical equipment at home? oxygen    Does patient have medical equipment at home? walker, rolling    Does patient have medical equipment at home? other (see comments)    Length of need (1-99 months): 99    Type: With back      HME - OTHER   Order Comments: 99 months, collection bag for venting Gtube     Order Specific Question Answer Comments   Type of Equipment: collection bag for venting Gtube    Height: 5' 7" (1.702 m)    Weight: 64.7 kg (142 lb 10.2 oz)    Does patient have medical equipment at home? bedside commode transport wheelchair / transport wheelchair / transport wheelchair / transport wheelchair / transport wheelchair / transport wheelchair   Does patient have medical equipment at home? bath bench    Does patient have medical equipment at home? cane, straight    Does patient have medical equipment at home? oxygen    Does patient have " "medical equipment at home? walker, rolling    Does patient have medical equipment at home? other (see comments)    Vendor: [employer]    CRM Resolution Date: 1/23/2024      HME - OTHER     Order Specific Question Answer Comments   Type of Equipment: crews collection bag and connector for venting Gtube    Height: 5' 7" (1.702 m)    Weight: 64.7 kg (142 lb 10.2 oz)    Does patient have medical equipment at home? bedside commode transport wheelchair / transport wheelchair / transport wheelchair / transport wheelchair / transport wheelchair / transport wheelchair   Does patient have medical equipment at home? bath bench    Does patient have medical equipment at home? cane, straight    Does patient have medical equipment at home? oxygen    Does patient have medical equipment at home? walker, rolling    Does patient have medical equipment at home? other (see comments)    Vendor: [employer]    CRM Resolution Date: 1/23/2024      Ambulatory referral/consult to HOME Palliative Care   Standing Status: Future   Referral Priority: Urgent Referral Type: Consultation   Referred to Provider: [facility] [place] [facility] Requested Specialty: Hospice and Palliative Medicine   Number of Visits Requested: 1     Diet full liquid     Notify your health care provider if you experience any of the following:  temperature >100.4     Notify your health care provider if you experience any of the following:  severe uncontrolled pain     Notify your health care provider if you experience any of the following:  increased confusion or weakness     Activity as tolerated       Significant Diagnostic Studies: Labs: All labs within the past 24 hours have been reviewed    Pending Diagnostic Studies:       None           Medications:  Reconciled Home Medications:      Medication List        START taking these medications      dextrose 40 % gel  Commonly known as: Glucose GeL  Take 37.5 mLs (15,000 mg total) by mouth daily as " needed (hypoglycemia).     fentaNYL 25 mcg/hr  Commonly known as: DURAGESIC  Place 1 patch onto the skin every 72 hours.     naloxone 4 mg/actuation Spry  Commonly known as: NARCAN  1 spray (4 mg total) by Nasal route once. for 1 dose     ondansetron 4 MG Tbdl  Commonly known as: ZOFRAN-ODT  Dissolve 1 tablet (4 mg total) by mouth every 6 (six) hours as needed (nauea).     scopolamine 1.3-1.5 mg (1 mg over 3 days)  Commonly known as: TRANSDERM-SCOP  Place 1 patch onto the skin Every 3 (three) days.     simethicone 80 MG chewable tablet  Commonly known as: MYLICON  Chew and swallow 1 tablet (80 mg total) by mouth 4 (four) times daily as needed for Flatulence.     STOOL SOFTENER-LAXATIVE 8.6-50 mg per tablet  Generic drug: senna-docusate 8.6-50 mg  Take 1 tablet by mouth 2 (two) times daily.            CHANGE how you take these medications      albuterol 90 mcg/actuation inhaler  Commonly known as: PROVENTIL/VENTOLIN HFA  Inhale 2 puffs into the lungs every 6 (six) hours as needed for Wheezing or Shortness of Breath.  What changed: reasons to take this     metoprolol succinate 25 MG 24 hr tablet  Commonly known as: TOPROL-XL  Take 1 tablet (25 mg total) by mouth once daily.  What changed:   medication strength  how much to take  when to take this     oxyCODONE 10 mg Tab immediate release tablet  Commonly known as: ROXICODONE  Take 1 tablet (10 mg total) by mouth every 3 (three) hours as needed for Pain.  What changed:   when to take this  reasons to take this            CONTINUE taking these medications      albuterol-ipratropium 2.5 mg-0.5 mg/3 mL nebulizer solution  Commonly known as: DUO-NEB  Take 3 mLs by nebulization every 6 (six) hours as needed.     cyclobenzaprine 10 MG tablet  Commonly known as: FLEXERIL  Take 1 tablet (10 mg total) by mouth 3 (three) times daily.     MYRBETRIQ 25 mg Tb24 ER tablet  Generic drug: mirabegron  Take 1 tablet (25 mg total) by mouth once daily.     pantoprazole 40 MG  tablet  Commonly known as: PROTONIX  Take 1 tablet (40 mg total) by mouth once daily.     pravastatin 40 MG tablet  Commonly known as: PRAVACHOL  Take 1 tablet (40 mg total) by mouth every evening.     topiramate 50 MG tablet  Commonly known as: TOPAMAX  Take 1 tablet (50 mg total) by mouth every evening.     TRELEGY ELLIPTA 200-62.5-25 mcg inhaler  Generic drug: fluticasone-umeclidin-vilanter  Inhale 1 puff into the lungs once daily.            STOP taking these medications      azithromycin 250 MG tablet  Commonly known as: Z-SABINE     azithromycin 500 MG tablet  Commonly known as: ZITHROMAX     calcium carbonate 600 mg calcium (1,500 mg) Tab  Commonly known as: OS-JENNI     DALIRESP 500 mcg Tab  Generic drug: roflumilast     losartan 25 MG tablet  Commonly known as: COZAAR     montelukast 10 mg tablet  Commonly known as: SINGULAIR     ondansetron 4 MG tablet  Commonly known as: ZOFRAN     oxyCODONE-acetaminophen  mg per tablet  Commonly known as: PERCOCET     oxyCODONE-acetaminophen 5-325 mg per tablet  Commonly known as: PERCOCET              Indwelling Lines/Drains at time of discharge:   Lines/Drains/Airways       Drain  Duration             Female External Urinary Catheter w/ Suction 01/09/24 2358 14 days         Gastrostomy/Enterostomy 01/19/24 1012 Percutaneous endoscopic gastrostomy (PEG) LUQ decompression 5 days                    Time spent on the discharge of patient: 40 minutes         Jada Olivas MD  Department of Hospital Medicine  [facility] - Telemetry Stepdown

## 2024-01-24 NOTE — ASSESSMENT & PLAN NOTE
Being followed in Oncology clinic here. Was offered palliative chemotherapy. She wants a follow-up to discuss prognosis and current treatment options. Consulted Oncology: Per oncology: Patient was seen in oncology clinic on 1/4. Discussed diagnosis at bedside with patient and family today. Per 1/4 clinic note, treatment being offered by GI oncologist Dr. Us will consist of gemcitabine and abraxane. Family has been given handouts on the proposed medications. They have good understanding of the poor prognosis associated with metastatic pancreatic cancer. Patient would like to get over her current hospital issues before deciding on pursuing chemotherapy. She feels additional information is overwhelming at this time. We also briefly discussed that TPN has not been shown to improve prognosis in metastatic pancreatic cancer. Patient and family were thankful for bedside discussion. They have been instructed to call clinic with further questions or if further follow up is desired   - patient and family decided want to go home with palliative followup, not hospice  - discharge with home palliative

## 2024-01-24 NOTE — ASSESSMENT & PLAN NOTE
Nutrition consulted. Most recent weight and BMI monitored-     Measurements:  Wt Readings from Last 1 Encounters:   01/21/24 64.7 kg (142 lb 10.2 oz)   Body mass index is 22.34 kg/m².    Patient has been screened and assessed by RD.    Malnutrition Type:  Context: acute illness or injury  Level: moderate    Malnutrition Characteristic Summary:  Weight Loss (Malnutrition): 5% in 1 month  Energy Intake (Malnutrition): less than 75% for greater than 7 days  Subcutaneous Fat (Malnutrition): moderate depletion  Muscle Mass (Malnutrition): moderate depletion    Interventions/Recommendations (treatment strategy):  1.  Cont PO full liquids as tolerated   - clamp venting VT for meds and oral intake

## 2024-01-24 NOTE — PLAN OF CARE
Problem: Adult Inpatient Plan of Care  Goal: Plan of Care Review  Outcome: Met  Goal: Patient-Specific Goal (Individualized)  Outcome: Met  Goal: Absence of Hospital-Acquired Illness or Injury  Outcome: Met  Goal: Optimal Comfort and Wellbeing  Outcome: Met  Goal: Readiness for Transition of Care  Outcome: Met     Problem: Infection  Goal: Absence of Infection Signs and Symptoms  Outcome: Met     Problem: Skin Injury Risk Increased  Goal: Skin Health and Integrity  Outcome: Met     Problem: Coping Ineffective  Goal: Effective Coping  Outcome: Met     Problem: Fall Injury Risk  Goal: Absence of Fall and Fall-Related Injury  Outcome: Met     Problem: Pain Acute  Goal: Acceptable Pain Control and Functional Ability  Outcome: Met

## 2024-01-29 ENCOUNTER — PATIENT MESSAGE (OUTPATIENT)
Dept: HEMATOLOGY/ONCOLOGY | Facility: CLINIC | Age: 79
End: 2024-01-29
Payer: MEDICARE

## 2024-02-09 ENCOUNTER — TELEPHONE (OUTPATIENT)
Dept: HEMATOLOGY/ONCOLOGY | Facility: CLINIC | Age: 79
End: 2024-02-09
Payer: MEDICARE

## 2024-02-09 NOTE — TELEPHONE ENCOUNTER
"Spoke with Vuze.   Pt is no longer established with us.     Office is trying to figure out who to get new orders from as the pt is seen in [place] now..     Provided the office with Dr. Rueda's information as that is the last MD in our system to see her in [place].     Office was thankful of time and information given.     ----- Message from Dora Madrigal LCSW sent at 2/9/2024 10:29 AM CST -----  There's one oxygen order dated 6/15/2016 by Dr. Allen.    I copied part of it:  OXYGEN FOR HOME USE [[id number]]    Electronically signed by: Rosio Allen MD on 06/15/16 5435 Status: Active  Ordering user: Rosio Allen MD 06/15/16 [id number] Authorized by: Rosio Allne MD  Ordering mode: Standard  Frequency:  06/15/16 -    Diagnoses  Hypoxia [R09.02]  Questionnaire    Question Answer  Liter Flow 2  Duration Continuous  Oxygen saturation at rest 88  Qualifying Test Performed at: [facility]  Device home concentrator with portable unit  Length of need (in months): 99 mos  Patient condition with qualifying saturation COPD  Height: 5' 7" (1.702 m)  Weight: 71.9 kg (158 lb 8.2 oz)  Alternative treatment measures have been tried or considered and deemed clinically ineffective. Yes    Order Diagnosis and CPT Display    Order Diagnosis: Hypoxia [R09.02 (ICD-10-CM)] CPT:            ----- Message -----  From: Megan Puri RN  Sent: 2/8/2024   5:01 PM CST  To: Dora Madrigal LCSW    Can you see who wrote the orders?   She has not seen Dr. Allen or KAELA in 3 years.       ----- Message -----  From: Lauri Salgado  Sent: 2/8/2024   3:32 PM CST  To: Tiffany CARMONA Staff    Type:  Patient Returning Call    Who Called:[employer]  Who Left Message for Patient:  Does the patient know what this is regarding?:orders  Would the patient rather a call back or a response via [facility]? Call  Best Call Back Number: [phone]                         FAX-   [phone]  Additional Information: Caller " states they would like to speak with office in regards to electronic rx software (pts oxygen). Caller states she would like a call if possible. Thank you

## 2024-03-01 NOTE — PROGRESS NOTES
[facility] [place] [facility]  Progress Note    Patient Name: Radha You  MRN: [id number]  Patient Class: IP- Inpatient   Admission Date: 1/9/2024  Length of Stay: 13 days  Attending Physician: No att. 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 She lives in [place], [place]. She is . Her hematologist-oncologist is Dr. Charles Us.               She was seen at [facility] clinic on 1/4/2024 for her pancreatic adenocarcinoma, which was found incidentally during a bronchoscopy and biopsied by endoscopic ultrasound at [facility]. She was offered palliative chemotherapy and declined it.               She presented to [facility] Emergency Department on 1/9/2024 with abdominal pain for 3 to 4 days, associated with severe constipation with no bowel movement for several days despite over-the-counter laxatives, including suppositories. She was able to pass a small bowel movement earlier on the day of presentation but still felt constipated. She also had increase in pain in abdominal pain associated with nausea and vomiting causing inability to tolerate oral intake.  In the emergency department, heart rate was 115 bpm. Labs showed mild  leukocytosis. CT of the abdomen and pelvis with contrast revealed the known pancreatic tail mass, abutting and possibly invading the spleen with new geographic region of decreased enhancement in the lower splenic pole, suspicious for splenic infarct. There were multiple small bowel strictures suggested, possibly on the basis of metastatic disease that is possibly associated with low-grade or partial small bowel obstruction versus ileus. General Surgery was consulted and brought up the possibility of a palliative gastrostomy for venting as a treatment option. She deferred and opted for nasogastric decompression. She was given a liter of lactated Ringer's solution, 4 mg of intravenous morphine, and 4 mg of intravenous ondansetron. She was admitted to Hospital Medicine Team S.    Overview/Hospital Course:  She was kept NPO. General Surgery followed. She was put on lactated Ringer's solution at 75 mL/hr. She was given scopolamine patch. General Surgery signed off and recommended consulting Gastroenterology. Her urine was dark so fluids were increased to 100 mL/hr. After discussing the likelihood of recurrence of her acute problem due to the intestinal strictures, Gastroenterology was consulted on 1/12/2024 for venting gastrostomy. She was still reluctant so Palliative Care was consulted to discuss goals of care. Physical and Occupational Therapy were consulted when she reported that she had not gotten out of the hospital bed since being admitted. Famotidine was started for the esophagitis diagnosed on admission as well as to prevent esophageal ulcer due to having a nasogastric tube. Palliative Care had a long discussion with her and recommended a trial of dexamethasone to see if it would decrease intestinal swelling. Abdominal X-ray showed mid increase in bowel distension and recurrence of the nasogastric tube tip in the esophagus despite having been advanced 2 nights prior. The tube was advanced again. On 1/15/2024,  abdominal X-ray showed resolution of bowel distension. She was started on senna-docusate twice daily and given bisacodyl suppository. She started having bowel movements. She tolerated clear liquid diet.     Agreeable to Gtube placement for venting, placed 1/19. Palliative following. Patient and family still undecided about hospice versus consideration for continued treatment with oncology, awaiting other family member to arrive for discussion.   Patient and family want to go home with palliative care f/u. Working on pain control prior to discharge, palliative recommend fentanyl patch. Venting GT as needed, clamp for meds and oral intake. Pain regimen adjusted.   Stable for discharge with palliative home f/u.     Interval History: No acute issues.     Review of Systems   Constitutional:  Negative for fever.   Gastrointestinal:  Positive for abdominal pain.     Objective:     Vital Signs (Most Recent):  Temp: 98.6 °F (37 °C) (01/23/24 1647)  Pulse: 75 (01/23/24 1647)  Resp: 18 (01/23/24 1834)  BP: 109/67 (01/23/24 1647)  SpO2: 96 % (01/23/24 1647) Vital Signs (24h Range):        Weight: 64.7 kg (142 lb 10.2 oz)  Body mass index is 22.34 kg/m².  No intake or output data in the 24 hours ending 02/29/24 1937        Physical Exam  Vitals and nursing note reviewed.   Constitutional:       General: She is not in acute distress.     Appearance: She is well-developed and normal weight. She is not diaphoretic.   Pulmonary:      Effort: Pulmonary effort is normal. No respiratory distress.   Abdominal:      General: There is distension.      Tenderness: There is abdominal tenderness.      Comments: GT in place, no erythema or drainage   GT vented/draining gastric contents in collection bag.    Skin:     General: Skin is warm and dry.      Coloration: Skin is not jaundiced or pale.   Neurological:      Mental Status: She is alert and oriented to person, place, and time. Mental status is at baseline.      Motor: No seizure  activity.   Psychiatric:         Attention and Perception: Attention normal.         Mood and Affect: Affect normal.         Behavior: Behavior is cooperative.         Thought Content: Thought content normal.             Significant Labs: All pertinent labs within the past 24 hours have been reviewed.    Significant Imaging: I have reviewed all pertinent imaging results/findings within the past 24 hours.    Assessment/Plan:      * Partial small bowel obstruction  Small bowel stricture   NG tube placed. Scopolamine patch, prn antiemetics. Gastroenterology consulted to place venting gastrostomy. PT and OT for debility. Appreciate Palliative Care, who gave dexamethasone trial. Started senna-docusate. Advance to full liquids.   - Patient agreeable to venting Gtube placement, s/p placement 1/19  - vent GT as needed, clamp for  meds and oral intake     Pain  - MM pain regimen as needed  - started on fentanyl patch per palliative recs      Reflux esophagitis  Started famotidine.    Small bowel stricture  - noted 2/2 to pancreatic cancer       Pancreatic adenocarcinoma  Being followed in Oncology clinic here. Was offered palliative chemotherapy. She wants a follow-up to discuss prognosis and current treatment options. Consulted Oncology: Per oncology: Patient was seen in oncology clinic on 1/4. Discussed diagnosis at bedside with patient and family today. Per 1/4 clinic note, treatment being offered by GI oncologist Dr. Us will consist of gemcitabine and abraxane. Family has been given handouts on the proposed medications. They have good understanding of the poor prognosis associated with metastatic pancreatic cancer. Patient would like to get over her current hospital issues before deciding on pursuing chemotherapy. She feels additional information is overwhelming at this time. We also briefly discussed that TPN has not been shown to improve prognosis in metastatic pancreatic cancer. Patient and family were thankful  for bedside discussion. They have been instructed to call clinic with further questions or if further follow up is desired   - patient and family decided want to go home with palliative followup, not hospice  - discharge with home palliative       Chronic respiratory failure with hypoxia  Patient with a history of chronic hypoxic respiratory failure that is on 1-3 liters of oxygen from her COPD.    Centrilobular emphysema  Continue home azithromycin 250 mg three times weekly (MWF), albuterol-ipratropium nebulizers PRN.    Moderate malnutrition  Nutrition consulted. Most recent weight and BMI monitored-     Measurements:  Wt Readings from Last 1 Encounters:   01/21/24 64.7 kg (142 lb 10.2 oz)   Body mass index is 22.34 kg/m².    Patient has been screened and assessed by RD.    Malnutrition Type:  Context: acute illness or injury  Level: moderate    Malnutrition Characteristic Summary:  Weight Loss (Malnutrition): 5% in 1 month  Energy Intake (Malnutrition): less than 75% for greater than 7 days  Subcutaneous Fat (Malnutrition): moderate depletion  Muscle Mass (Malnutrition): moderate depletion    Interventions/Recommendations (treatment strategy):  1.  Cont PO full liquids as tolerated   - clamp venting VT for meds and oral intake     Hyperlipidemia  Continue home pravastatin 40 mg daily.      Essential hypertension  Continue home metoprolol succinate 25 mg daily. Monitor blood pressures.    Palliative care encounter  - palliative care following      Carcinoma of right breast in female, estrogen receptor positive  History of breast cancer. Patient previously had lumpectomy and chemoradiation.        VTE Risk Mitigation (From admission, onward)           Ordered     IP VTE HIGH RISK PATIENT  Once         01/10/24 0335                    Discharge Planning   RUFINO: 1/23/2024     Code Status: Prior   Is the patient medically ready for discharge?: Yes    Reason for patient still in hospital (select all that apply): Patient  trending condition  Discharge Plan A: Home Health, Home with family   Discharge Delays: None known at this time              Jada Olivas MD  Department of Hospital Medicine   [facility] - Telemetry Stepdown

## 2024-03-01 NOTE — SUBJECTIVE & OBJECTIVE
Interval History: No acute issues.     Review of Systems   Constitutional:  Negative for fever.   Gastrointestinal:  Positive for abdominal pain.     Objective:     Vital Signs (Most Recent):  Temp: 98.6 °F (37 °C) (01/23/24 1647)  Pulse: 75 (01/23/24 1647)  Resp: 18 (01/23/24 1834)  BP: 109/67 (01/23/24 1647)  SpO2: 96 % (01/23/24 1647) Vital Signs (24h Range):        Weight: 64.7 kg (142 lb 10.2 oz)  Body mass index is 22.34 kg/m².  No intake or output data in the 24 hours ending 02/29/24 1937        Physical Exam  Vitals and nursing note reviewed.   Constitutional:       General: She is not in acute distress.     Appearance: She is well-developed and normal weight. She is not diaphoretic.   Pulmonary:      Effort: Pulmonary effort is normal. No respiratory distress.   Abdominal:      General: There is distension.      Tenderness: There is abdominal tenderness.      Comments: GT in place, no erythema or drainage   GT vented/draining gastric contents in collection bag.    Skin:     General: Skin is warm and dry.      Coloration: Skin is not jaundiced or pale.   Neurological:      Mental Status: She is alert and oriented to person, place, and time. Mental status is at baseline.      Motor: No seizure activity.   Psychiatric:         Attention and Perception: Attention normal.         Mood and Affect: Affect normal.         Behavior: Behavior is cooperative.         Thought Content: Thought content normal.             Significant Labs: All pertinent labs within the past 24 hours have been reviewed.    Significant Imaging: I have reviewed all pertinent imaging results/findings within the past 24 hours.

## 2024-04-15 PROBLEM — J96.11 CHRONIC RESPIRATORY FAILURE WITH HYPOXIA: Chronic | Status: RESOLVED | Noted: 2022-04-06 | Resolved: 2024-04-15

## 2025-03-24 NOTE — ASSESSMENT & PLAN NOTE
Caller: Raul Howell    Relationship: Self    Best call back number: [phone]     Requested Prescriptions:   Requested Prescriptions     Pending Prescriptions Disp Refills    glucose blood (True Metrix Blood Glucose Test) test strip 300 each 1     Sig: Test bs once a day (true metrix) dx: e11.65        Pharmacy where request should be sent:    [id number] - [place], [place] - [address] [place] [address]  - [phone]  - [phone] [address] [place] [address]   Phone: [phone] Fax: [phone]   Hours: Not open 24 hours     Last office visit with prescribing clinician: 3/18/2025   Last telemedicine visit with prescribing clinician: Visit date not found   Next office visit with prescribing clinician: 6/23/2025     Additional details provided by patient: PT IS NEEDING A REFILL PT ONLY HAS THREE DAYS LEFT PLEASE ADVISE     Does the patient have less than a 3 day supply:  [x] Yes  [] No    Would you like a call back once the refill request has been completed: [x] Yes [] No    If the office needs to give you a call back, can they leave a voicemail: [x] Yes [] No    Robert Marques   03/24/25 13:27 EDT          Nutrition Problem:  Moderate Protein-Calorie Malnutrition  Malnutrition in the context of Chronic Illness/Injury    Related to (etiology):  Malignant neoplasm of female breast, (currently on FEMARA maintenance therapy)    Signs and Symptoms (as evidenced by):  Energy Intake: <75% of estimated energy requirement for greater than 3 months  Body Fat Depletion: severe depletion of orbitals, triceps and thoracic and lumbar region   Muscle Mass Depletion: moderate depletion of temples, clavicle region, scapular region and interosseous muscle   Weight Loss: 5% x 1 month   Fluid Accumulation: none noted    Interventions(treatment strategy):  Collaboration with other providers  [facility]    Nutrition Diagnosis Status:  New